# Patient Record
Sex: FEMALE | Race: WHITE | Employment: FULL TIME | URBAN - METROPOLITAN AREA
[De-identification: names, ages, dates, MRNs, and addresses within clinical notes are randomized per-mention and may not be internally consistent; named-entity substitution may affect disease eponyms.]

---

## 2017-03-06 ENCOUNTER — HOSPITAL ENCOUNTER (OUTPATIENT)
Dept: CT IMAGING | Age: 57
Discharge: HOME | End: 2017-03-06
Attending: ORTHOPAEDIC SURGERY

## 2017-03-06 DIAGNOSIS — S92.901K: ICD-10-CM

## 2017-08-10 ENCOUNTER — OFFICE VISIT (OUTPATIENT)
Dept: NEUROLOGY | Age: 57
End: 2017-08-10

## 2017-08-10 VITALS — HEIGHT: 69 IN

## 2017-08-10 DIAGNOSIS — G62.9 NEUROPATHY: Primary | ICD-10-CM

## 2017-08-10 DIAGNOSIS — G62.9 NEUROPATHY: ICD-10-CM

## 2017-08-10 RX ORDER — GABAPENTIN 300 MG/1
CAPSULE ORAL
Qty: 90 CAP | Refills: 6 | Status: SHIPPED | OUTPATIENT
Start: 2017-08-10 | End: 2018-01-16 | Stop reason: SDUPTHER

## 2017-08-10 NOTE — PROGRESS NOTES
Neurology Consult      Subjective:      Pinky Rosen is a 49 y.o. female  who comes in on first encounter with the following history. 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 Walking is a routine part of her job. Has noticed in the last year a combination of numbness in the distal legs and feet and a pain factor especially in the left foot. Tends to be a burning searing type. The sensory changes have not changed and are continuous. Perhaps slightly more noticeable at the end of her workday. Gabapentin has been issued 300 mg twice daily for the last 2 months and does seem to help. Bowel and bladder function is okay. May notice plus or minus changes about her balance but that is not an issue and strength is good. Family history is positive for dad with a neuropathy in his later years but I have no details. Patient is status post left total knee replacement and previous right knee surgery. Former smoker years ago. Drinks alcohol socially on the weekends but it is not a problem. Looking at her electronic medical records she had elevated glucose readings in 2013. Does notice on occasion she has a milder version of what she notes with sensory changes in her upper extremities. They tend to be more positional.  Currently self-motivated to do a treadmill exercise routine at home. Denies any personal knowledge of diabetes for herself or family history. Current Outpatient Prescriptions   Medication Sig Dispense Refill    gabapentin (NEURONTIN) 300 mg capsule 1 po tid prn  Indications: NEUROPATHIC PAIN 90 Cap 6    HYDROcodone-acetaminophen (NORCO) 5-325 mg per tablet Take 2 Tabs by mouth every six (6) hours as needed for Pain for 30 doses. 30 Tab 1    HYDROcodone-acetaminophen (NORCO) 5-325 mg per tablet Take 1 Tab by mouth every four (4) hours as needed.  30 Tab 1    HYDROcodone-acetaminophen (NORCO) 7.5-325 mg per tablet Take 2 Tabs by mouth every six (6) hours as needed for Pain. 30 Tab 1    PV W-O SULEMAN/FERROUS FUMARATE/FA (M-VIT PO) Take 1 Tab by mouth daily.  levothyroxine (SYNTHROID) 150 mcg tablet Take 150 mcg by mouth Daily (before breakfast).  garlic 269 mg Tab Take 577 mg by mouth daily. No Known Allergies  Past Medical History:   Diagnosis Date    Arthritis     left knee    GERD (gastroesophageal reflux disease)     Morbid obesity ([address]     Thyroid disease       Past Surgical History:   Procedure Laterality Date    CHEST SURGERY PROCEDURE UNLISTED  1/7/14    RESECTION OF REMAINING THYROID    HX BLADDER SUSPENSION      HX GYN      uterine ablation about 10 years ago no menses since then    HX HEENT      thyroidectomy    HX ORTHOPAEDIC      left knee arthroscopy    HX ORTHOPAEDIC      left knee meniscus repair    HX TONSILLECTOMY        Social History     Social History    Marital status:      Spouse name: N/A    Number of children: N/A    Years of education: N/A     Occupational History    Not on file. Social History Main Topics    Smoking status: Former Smoker     Packs/day: 0.50     Years: 25.00     Quit date: 7/1/2013    Smokeless tobacco: Never Used    Alcohol use 2.5 oz/week     5 Glasses of wine per week    Drug use: No    Sexual activity: No     Other Topics Concern    Not on file     Social History Narrative    No narrative on file      Family History   Problem Relation Age of Onset    Immunodeficiency Father      lupus      There were no vitals taken for this visit. Review of Systems:   A comprehensive review of systems was negative except for that written in the HPI. Neuro Exam:     Appearance: The patient is well developed, well nourished, provides a coherent history and is in no acute distress. Mental Status: Oriented to time, place and person. Mood and affect appropriate. Cranial Nerves:   Intact visual fields. Fundi are benign. BERTO, EOM's full, no nystagmus, no ptosis.  Facial sensation is normal. Corneal reflexes are intact. Facial movement is symmetric. Hearing is normal bilaterally. Palate is midline with normal sternocleidomastoid and trapezius muscles are normal. Tongue is midline. Motor:  5/5 strength in upper and lower proximal and distal muscles. Normal bulk and tone. No fasciculations. Reflexes:   Deep tendon reflexes 2+/4 and symmetrical.   Sensory:    Diminished distally lower extremities greater than upper to touch, pinprick and vibration. Position sense intact. Gait:  Normal gait. Romberg negative   Tremor:   No tremor noted. Cerebellar:  No cerebellar signs present. Neurovascular:  Normal heart sounds and regular rhythm, peripheral pulses intact, and no carotid bruits. Toes downgoing no clonus no Ana Cristina's. 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 Will suggest a lab work screening and EMG nerve conduction of an arm and leg. Will reissue gabapentin at 300 mg 3 times daily as needed. Encouraged her to continue her efforts with the treadmill exercise routine. Plan:   Revisit in about 2 months.   Signed by :  Catalina Du MD

## 2017-08-10 NOTE — MR AVS SNAPSHOT
Visit Information Date & Time Provider Department Dept. Phone Encounter #  
 8/10/2017  8:00 AM Mandy Esposito MD [facility] [phone] [id number] Follow-up Instructions Return in about 2 months (around 10/10/2017). Upcoming Health Maintenance Date Due Hepatitis C Screening 1960 DTaP/Tdap/Td series (1 - Tdap) 10/6/1983 PAP AKA CERVICAL CYTOLOGY 10/6/1983 BREAST CANCER SCRN MAMMOGRAM 10/6/2012 FOBT Q 1 YEAR AGE 50-75 10/6/2012 INFLUENZA AGE 9 TO ADULT 8/1/2017 Allergies as of 8/10/2017  Review Complete On: 8/10/2017 By: Mandy Esposito MD  
 No Known Allergies Current Immunizations  Never Reviewed Name Date Influenza Vaccine PF 1/8/2014 11:40 AM  
  
 Not reviewed this visit You Were Diagnosed With   
  
 Codes Comments Neuropathy ([facility]    -  Primary ICD-10-CM: G62.9 ICD-9-CM: 183. 9 Vitals Height(growth percentile) OB Status Smoking Status 5' 9\" (1.753 m) Ablation Former Smoker Your Updated Medication List  
  
   
This list is accurate as of: 8/10/17  8:20 AM.  Always use your most recent med list.  
  
  
  
  
 gabapentin 300 mg capsule Commonly known as:  NEURONTIN  
1 po tid prn  Indications: NEUROPATHIC PAIN  
  
 garlic 105 mg Tab Take 300 mg by mouth daily. * HYDROcodone-acetaminophen 5-325 mg per tablet Commonly known as:  [address] Take 2 Tabs by mouth every six (6) hours as needed for Pain for 30 doses. * HYDROcodone-acetaminophen 5-325 mg per tablet Commonly known as:  [address] Take 1 Tab by mouth every four (4) hours as needed. * HYDROcodone-acetaminophen 7.5-325 mg per tablet Commonly known as:  [address] Take 2 Tabs by mouth every six (6) hours as needed for Pain. M-VIT PO Take 1 Tab by mouth daily. SYNTHROID 150 mcg tablet Generic drug:  levothyroxine Take 150 mcg by mouth Daily (before breakfast). * Notice: This list has 3 medication(s) that are the same as other medications prescribed for you. Read the directions carefully, and ask your doctor or other care provider to review them with you. Prescriptions Printed Refills  
 gabapentin (NEURONTIN) 300 mg capsule 6 Si po tid prn  Indications: NEUROPATHIC PAIN Class: Print We Performed the Following DAV, DIRECT, W/REFLEX R723810 CPT(R)] ANCA PANEL N4012170 CPT(R)] C REACTIVE PROTEIN, QT [57975 CPT(R)] CBC WITH AUTOMATED DIFF [85118 CPT(R)] GLUCOSE TOLERANCE (3 SP BLOOD) [id number] CPT(R)] METABOLIC PANEL, COMPREHENSIVE [42745 CPT(R)] PROTEIN ELECTROPHORESIS [09024 CPT(R)] RHEUMATOID FACTOR, QL [id number] CPT(R)] SJOGREN'S ABS, SSA AND SSB [[id number] Custom] TSH 3RD GENERATION [45991 CPT(R)] VITAMIN B12 & FOLATE [47400 CPT(R)] Follow-up Instructions Return in about 2 months (around 10/10/2017). To-Do List   
 08/10/2017 Lab:  SED RATE (ES[facility])   
  
 2017 Neurology:  NCV SENSORY OR MIXED Introducing [facility]! [employer] introduces [employer] patient portal. Now you can access parts of your medical record, email your doctor's office, and request medication refills online. 1. In your internet browser, go to [email or web address] 2. Click on the First Time User? Click Here link in the Sign In box. You will see the New Member Sign Up page. 3. Enter your [employer] Access Code exactly as it appears below. You will not need to use this code after youve completed the sign-up process. If you do not sign up before the expiration date, you must request a new code. · [employer] Access Code: [email or web address] Expires: 2017  8:20 AM 
 
4. Enter the last four digits of your Social Security Number (xxxx) and Date of Birth (mm/dd/yyyy) as indicated and click Submit. You will be taken to the next sign-up page. 5. Create a [employer] ID. This will be your [employer] login ID and cannot be changed, so think of one that is secure and easy to remember. 6. Create a [employer] password. You can change your password at any time. 7. Enter your Password Reset Question and Answer. This can be used at a later time if you forget your password. 8. Enter your e-mail address. You will receive e-mail notification when new information is available in [address] 9. Click Sign Up. You can now view and download portions of your medical record. 10. Click the Download Summary menu link to download a portable copy of your medical information. If you have questions, please visit the Frequently Asked Questions section of the [employer] website. Remember, [employer] is NOT to be used for urgent needs. For medical emergencies, dial 911. Now available from your iPhone and Android! Please provide this summary of care documentation to your next provider. Your primary care clinician is listed as Leon Rahman. If you have any questions after today's visit, please call [phone].

## 2017-09-06 ENCOUNTER — OFFICE VISIT (OUTPATIENT)
Dept: NEUROLOGY | Age: 57
End: 2017-09-06

## 2017-09-06 DIAGNOSIS — G62.9 NEUROPATHY: Primary | ICD-10-CM

## 2017-09-06 NOTE — PROGRESS NOTES
Patient underwent elective EMG nerve conduction for concerns of an expressed length dependent sensory neuropathy by history. One years worth of a combination of numbness in both lower extremities and superimposed burning searing pain especially left leg and foot. 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 Had an isolated elevated random blood sugar some years ago. Diabetes runs with cousins on the maternal side. Blood work pending for peripheral neuropathy screening. Is a remote smoker and no alcohol consumption. No history of falls. Bowel and bladder function is maintained. Balance and strength maintained. Patient's exam on first encounter showed normal strength length dependent sensory depression to touch temperature vibratory in the lower extremities. Reflexes +2 and toes downgoing no clonus no Menchaca's. Transfers and gait normal.    Findings:    1. The EMG interrogation of both lower extremities was normal failing to define a confluent pattern of acute denervation chronic denervation/reinnervation or myopathic potentials. Patient tolerated well. 2.  Nerve conduction portion revealed a mild delay in the right distal median sensory at the wrist.  No response for the right and left superior fibular sensory nerves. No response for the right sural sensory nerve. Depressed amplitudes for the right fibular motor and right tubular motor. Impression: This information suggests a manifest sensorimotor neuropathy most clearly defined for the lower extremities and likely primary axonal and secondary demyelinative by features. Clinical correlation is advised.   [address].

## 2017-09-06 NOTE — PROGRESS NOTES
EMG/ NCS Report  [facility], [address]  Ph: [phone]/ [phone]  FAX: [phone]/ [phone]  Test Date:  2017    Patient: Dorina Goodell : 1960 Physician: Eduar Mendoza MD   Sex: Female Height: ' \" Ref Phys: Charlotte Boyer IV, MD   ID#: [id number] Weight:  lbs. Technician: Sherren Moots     Patient History / Exam:  CC:NUMBNESS,TINGLING ARM/LEG,SYMPTOMS X 18 MOS. EMG & NCV Findings:  Evaluation of the right Fibular motor nerve showed normal distal onset latency (2.7 ms), reduced amplitude (1.3 mV), normal conduction velocity (B Fib-Ankle, 43 m/s), normal conduction velocity (Poplt-B Fib, 77 m/s), normal distal onset latency (3.0 ms), reduced amplitude (0.9 mV), normal conduction velocity (B Fib-Ankle, 45 m/s), and normal conduction velocity (Poplt-B Fib, 71 m/s). The right median motor nerve showed normal distal onset latency (4.5 ms), normal amplitude (8.3 mV), and normal conduction velocity (Elbow-Wrist, 51 m/s). The right tibial motor nerve showed normal distal onset latency (3.8 ms), reduced amplitude (2.2 mV), and normal conduction velocity (Knee-Ankle, 47 m/s). The right ulnar motor nerve showed normal distal onset latency (2.7 ms), normal amplitude (8.8 mV), normal conduction velocity (B Elbow-Wrist, 64 m/s), and normal conduction velocity (A Elbow-B Elbow, 71 m/s). The right median sensory nerve showed prolonged distal peak latency (4.8 ms) and normal amplitude (21.1 µV). The right radial sensory and the right ulnar sensory nerves showed normal distal peak latency (R2.0, R2.8 ms) and normal amplitude (R32.0, R33.0 µV). The left Sup Fibular sensory nerve showed no response (Lower leg) and no response (Site 2). The right Sup Fibular sensory nerve showed no response (Lower leg). The left sural sensory nerve showed normal distal peak latency (3.5 ms) and reduced amplitude (2.3 µV).   The right sural sensory nerve showed no response (Calf). All F Wave latencies were within normal limits. All examined muscles (as indicated in the following table) showed no evidence of electrical instability.         Impression:        ___________________________  General Jeans, IV, MD      Nerve Conduction Studies  Anti Sensory Summary Table     Stim Site NR Peak (ms) Norm Peak (ms) P-T Amp (µV) Norm P-T Amp Site1 Site2 Dist (cm)   Right Median Anti Sensory (2nd Digit)  31.7°C   Wrist    4.8 <4 21.1 >13 Wrist 2nd Digit 14.0   Elbow    4.8  23.9  Elbow Wrist 0.0   Axilla    4.8  21.2  Axilla Elbow 0.0   Right Radial Anti Sensory (Base 1st Digit)  31.9°C   Wrist    2.0 <2.8 32.0 >11 Wrist Base 1st Digit 10.0   Site 2    2.0  31.0       Left Sup Fibular Anti Sensory (Lat ankle)  33.7°C   Lower leg NR  <4.5  >5 Lower leg Lat ankle 10.0   Site 2 NR          Right Sup Fibular Anti Sensory (Lat ankle)  32.8°C   Lower leg NR  <4.5  >5 Lower leg Lat ankle 10.0   Left Sural Anti Sensory (Lat Mall)  33.6°C   Calf    3.5 <4.5 2.3 >4.0 Calf Lat Mall 14.0   Site 2    3.3  3.3       Site 3    3.2  2.8           5.1  21.0       Right Sural Anti Sensory (Lat Mall)  33.1°C   Calf NR  <4.5  >4.0 Calf Lat Mall 14.0   Right Ulnar Anti Sensory (5th Digit)  33.6°C   Wrist    2.8 <4.0 33.0 >9 Wrist 5th Digit 14.0   B Elbow    2.9  40.8  B Elbow Wrist 0.0     Motor Summary Table     Stim Site NR Onset (ms) Norm Onset (ms) O-P Amp (mV) Norm O-P Amp Amp (Prev) (%) Site1 Site2 Dist (cm) Rufino (m/s) Norm Rufino (m/s)   Right Fibular Motor Run #1 (Ext Dig Brev)  33.5°C   Ankle    2.7 <6.5 1.3 >2.6 100.0 Ankle Ext Dig Brev 8.0     B Fib    10.3  1.1  84.6 B Fib Ankle 32.5 43 >38   Poplt    11.6  1.2  109.1 Poplt B Fib 10.0 77 >42   Right Fibular Motor Run #2 (Ext Dig Brev)  34°C   Ankle    3.0 <6.5 0.9 >2.6 100.0 Ankle Ext Dig Brev 8.0     B Fib    10.5  0.9  100.0 B Fib Ankle 34.0 45 >38   Poplt    11.9  0.9  100.0 Poplt B Fib 10.0 71 >42   Right Median Motor (Abd Poll Brev) 32.2°C   Wrist    4.5 <4.5 8.3 >4.1 100.0 Wrist Abd Poll Brev 8.0     Elbow    8.4  7.8  94.0 Elbow Wrist 20.0 51 >49   Right Tibial Motor (Abd Rodney Brev)  32.4°C   Ankle    3.8 <6.1 2.2 >5.3 100.0 Ankle Abd Rodney Brev 8.0     Knee    12.7  1.2  54.5 Knee Ankle 42.0 47 >39   Right Ulnar Motor (Abd Dig Minimi)  32.1°C   Wrist    2.7 <3.1 8.8 >7.0 100.0 Wrist Abd Dig Minimi 8.0  >50   B Elbow    6.0  8.3  94.3 B Elbow Wrist 21.0 64 >50   A Elbow    7.4  7.8  94.0 A Elbow B Elbow 10.0 71 >50     F Wave Studies     NR F-Lat (ms) Lat Norm (ms) L-R F-Lat (ms) L-R Lat Norm   Right Tibial (Mrkrs) (Abd Hallucis)  33.7°C      53.44 <56  <5.7   Right Ulnar (Mrkrs) (Abd Dig Min)  32°C      27.57 <32  <2.5     H Reflex Studies     NR H-Lat (ms) L-R H-Lat (ms) L-R Lat Norm   Right Tibial (Gastroc)  32.5°C      35.20  <2.0     EMG     Side Muscle Nerve Root Ins Act Fibs Psw Recrt Duration Amp Poly Comment   Right Ext Dig Brev Dp Br Peron L5, S1 Nml Nml Nml Nml Nml Nml Nml    Right AntTibialis Dp Br Peron L4-5 Nml Nml Nml Nml Nml Nml Nml    Right MedGastroc Tibial S1-2 Nml Nml Nml Nml Nml Nml Nml    Right VastusMed Femoral L2-4 Nml Nml Nml Nml Nml Nml Nml    Right BicepsFemL Sciatic L5-S2 Nml Nml Nml Nml Nml Nml Nml    Left Ext Dig Brev Dp Br Peron L5, S1 Nml Nml Nml Nml Nml Nml Nml    Left AntTibialis Dp Br Peron L4-5 Nml Nml Nml Nml Nml Nml Nml    Left MedGastroc Tibial S1-2 Nml Nml Nml Nml Nml Nml Nml    Left VastusMed Femoral L2-4 Nml Nml Nml Nml Nml Nml Nml    Left BicepsFemL Sciatic L5-S2 Nml Nml Nml Nml Nml Nml Nml                Nerve Conduction Studies  Anti Sensory Left/Right Comparison     Stim Site L Lat (ms) R Lat (ms) L-R Lat (ms) L Amp (µV) R Amp (µV) L-R Amp (%) Site1 Site2 L Rufino (m/s) R Rufnio (m/s) L-R Rufino (m/s)   Median Anti Sensory (2nd Digit)  31.7°C   Wrist  4.0   21.1  Wrist 2nd Digit  35    Elbow  4.1   23.9  Elbow Wrist      Axilla  4.0   21.2  Axilla Elbow      Radial Anti Sensory (Base 1st Digit)  31.9°C Wrist  1.3   32.0  Wrist Base 1st Digit  77    Site 2  0.9   31.0         Sup Fibular Anti Sensory (Lat ankle)  33.7°C   Lower leg       Lower leg Lat ankle      Site 2              Sural Anti Sensory (Lat Mall)  33.6°C   Calf 2.6   2.3   Calf Lat Mall 54     Site 2 2.7   3.3          Site 3 2.7   2.8           0.9   21.0          Ulnar Anti Sensory (5th Digit)  33.6°C   Wrist  1.8   33.0  Wrist 5th Digit  78    B Elbow  2.1   40.8  B Elbow Wrist        Motor Left/Right Comparison     Stim Site L Lat (ms) R Lat (ms) L-R Lat (ms) L Amp (mV) R Amp (mV) L-R Amp (%) Site1 Site2 L Rufino (m/s) R Rufino (m/s) L-R Rufino (m/s)   Fibular Motor Run #2 (Ext Dig Brev)  34°C   Ankle  3.0   0.9  Ankle Ext Dig Brev      B Fib  10.5   0.9  B Fib Ankle  45    Poplt  11.9   0.9  Poplt B Fib  71    Median Motor (Abd Poll Brev)  32.2°C   Wrist  4.5   8.3  Wrist Abd Poll Brev      Elbow  8.4   7.8  Elbow Wrist  51    Tibial Motor (Abd Rodney Brev)  32.4°C   Ankle  3.8   2.2  Ankle Abd Rodney Brev      Knee  12.7   1.2  Knee Ankle  47    Ulnar Motor (Abd Dig Minimi)  32.1°C   Wrist  2.7   8.8  Wrist Abd Dig Minimi      B Elbow  6.0   8.3  B Elbow Wrist  64    A Elbow  7.4   7.8  A Elbow B Elbow  71          Waveforms:

## 2017-10-12 ENCOUNTER — TELEPHONE (OUTPATIENT)
Dept: NEUROLOGY | Age: 57
End: 2017-10-12

## 2017-10-12 DIAGNOSIS — G62.9 NEUROPATHY: Primary | ICD-10-CM

## 2017-10-12 NOTE — TELEPHONE ENCOUNTER
----- Message from Bryan Simmonds sent at 10/12/2017  1:18 PM EDT -----  Regarding: Dr. Shruti Navarrete  5th request, pt was very upset that she has not been able to get her labs done for a month now because the lab order is not specific for [facility] to complete the draw. She is asking for it to be corrected and a call to her with details if she misses the call. Her number is [phone].

## 2018-01-16 ENCOUNTER — TELEPHONE (OUTPATIENT)
Dept: NEUROLOGY | Age: 58
End: 2018-01-16

## 2018-01-16 RX ORDER — GABAPENTIN 300 MG/1
CAPSULE ORAL
Qty: 90 CAP | Refills: 3 | Status: SHIPPED | OUTPATIENT
Start: 2018-01-16 | End: 2018-01-18 | Stop reason: SDUPTHER

## 2018-01-16 NOTE — TELEPHONE ENCOUNTER
----- Message from Hebert Barthel sent at 1/16/2018  1:52 PM EST -----  Regarding: Dr. Juno Martinez  Pt requested a refill on her Rx(Gabepentin) called to [address] in [place] on [address]. Best contact number [phone].

## 2018-01-23 LAB
ALBUMIN SERPL ELPH-MCNC: 3.5 G/DL (ref 2.9–4.4)
ALBUMIN SERPL-MCNC: 4.1 G/DL (ref 3.5–5.5)
ALBUMIN/GLOB SERPL: 1.2 {RATIO} (ref 0.7–1.7)
ALBUMIN/GLOB SERPL: 1.7 {RATIO} (ref 1.2–2.2)
ALP SERPL-CCNC: 92 IU/L (ref 39–117)
ALPHA1 GLOB SERPL ELPH-MCNC: 0.3 G/DL (ref 0–0.4)
ALPHA2 GLOB SERPL ELPH-MCNC: 0.8 G/DL (ref 0.4–1)
ALT SERPL-CCNC: 21 IU/L (ref 0–32)
ANA SER QL: NEGATIVE
AST SERPL-CCNC: 16 IU/L (ref 0–40)
B-GLOBULIN SERPL ELPH-MCNC: 1.2 G/DL (ref 0.7–1.3)
BASOPHILS # BLD AUTO: 0.1 X10E3/UL (ref 0–0.2)
BASOPHILS NFR BLD AUTO: 1 %
BILIRUB SERPL-MCNC: 0.5 MG/DL (ref 0–1.2)
BUN SERPL-MCNC: 14 MG/DL (ref 6–24)
BUN/CREAT SERPL: 27 (ref 9–23)
C-ANCA TITR SER IF: NORMAL TITER
CALCIUM SERPL-MCNC: 9.1 MG/DL (ref 8.7–10.2)
CHLORIDE SERPL-SCNC: 106 MMOL/L (ref 96–106)
CO2 SERPL-SCNC: 24 MMOL/L (ref 18–29)
CREAT SERPL-MCNC: 0.51 MG/DL (ref 0.57–1)
CRP SERPL-MCNC: 6.9 MG/L (ref 0–4.9)
ENA SS-A AB SER-ACNC: <0.2 AI (ref 0–0.9)
ENA SS-B AB SER-ACNC: <0.2 AI (ref 0–0.9)
EOSINOPHIL # BLD AUTO: 0.4 X10E3/UL (ref 0–0.4)
EOSINOPHIL NFR BLD AUTO: 6 %
ERYTHROCYTE [DISTWIDTH] IN BLOOD BY AUTOMATED COUNT: 13.6 % (ref 12.3–15.4)
ERYTHROCYTE [SEDIMENTATION RATE] IN BLOOD BY WESTERGREN METHOD: 9 MM/HR (ref 0–40)
FOLATE SERPL-MCNC: 9.6 NG/ML
GAMMA GLOB SERPL ELPH-MCNC: 0.8 G/DL (ref 0.4–1.8)
GLOBULIN SER CALC-MCNC: 2.4 G/DL (ref 1.5–4.5)
GLOBULIN SER CALC-MCNC: 3 G/DL (ref 2.2–3.9)
GLUCOSE 1.5H P 75 G GLC PO SERPL-MCNC: NORMAL MG/DL
GLUCOSE 1H P 75 G GLC PO SERPL-MCNC: 167 MG/DL (ref 65–199)
GLUCOSE 2H P 75 G GLC PO SERPL-MCNC: 93 MG/DL (ref 65–139)
GLUCOSE 30M P 75 G GLC PO SERPL-MCNC: NORMAL MG/DL
GLUCOSE 3H P 75 G GLC PO SERPL-MCNC: NORMAL MG/DL
GLUCOSE 4H P 75 G GLC PO SERPL-MCNC: NORMAL MG/DL
GLUCOSE 5H P 75 G GLC PO SERPL-MCNC: NORMAL MG/DL
GLUCOSE 6H P 75 G GLC PO SERPL-MCNC: NORMAL MG/DL
GLUCOSE P FAST SERPL-MCNC: 89 MG/DL (ref 65–99)
GLUCOSE SERPL-MCNC: 98 MG/DL (ref 65–99)
HCT VFR BLD AUTO: 41.4 % (ref 34–46.6)
HGB BLD-MCNC: 13.9 G/DL (ref 11.1–15.9)
IMM GRANULOCYTES # BLD: 0 X10E3/UL (ref 0–0.1)
IMM GRANULOCYTES NFR BLD: 0 %
LYMPHOCYTES # BLD AUTO: 2.4 X10E3/UL (ref 0.7–3.1)
LYMPHOCYTES NFR BLD AUTO: 36 %
M PROTEIN SERPL ELPH-MCNC: NORMAL G/DL
MCH RBC QN AUTO: 30.5 PG (ref 26.6–33)
MCHC RBC AUTO-ENTMCNC: 33.6 G/DL (ref 31.5–35.7)
MCV RBC AUTO: 91 FL (ref 79–97)
MONOCYTES # BLD AUTO: 0.5 X10E3/UL (ref 0.1–0.9)
MONOCYTES NFR BLD AUTO: 8 %
MYELOPEROXIDASE AB SER IA-ACNC: <9 U/ML (ref 0–9)
NEUTROPHILS # BLD AUTO: 3.2 X10E3/UL (ref 1.4–7)
NEUTROPHILS NFR BLD AUTO: 49 %
P-ANCA ATYPICAL TITR SER IF: NORMAL TITER
P-ANCA TITR SER IF: NORMAL TITER
PLATELET # BLD AUTO: 315 X10E3/UL (ref 150–379)
PLEASE NOTE, 011150: NORMAL
POTASSIUM SERPL-SCNC: 4.5 MMOL/L (ref 3.5–5.2)
PROT SERPL-MCNC: 6.5 G/DL (ref 6–8.5)
PROTEINASE3 AB SER IA-ACNC: <3.5 U/ML (ref 0–3.5)
RBC # BLD AUTO: 4.56 X10E6/UL (ref 3.77–5.28)
RHEUMATOID FACT SERPL-ACNC: <10 IU/ML (ref 0–13.9)
SODIUM SERPL-SCNC: 145 MMOL/L (ref 134–144)
TSH SERPL DL<=0.005 MIU/L-ACNC: 0.01 UIU/ML (ref 0.45–4.5)
VIT B12 SERPL-MCNC: 650 PG/ML (ref 232–1245)
WBC # BLD AUTO: 6.6 X10E3/UL (ref 3.4–10.8)

## 2018-01-30 ENCOUNTER — TELEPHONE (OUTPATIENT)
Dept: NEUROLOGY | Age: 58
End: 2018-01-30

## 2018-01-30 NOTE — TELEPHONE ENCOUNTER
----- Message from Brie Granados sent at 1/30/2018  2:32 PM EST -----  Regarding: Dr. Lozano Can  Pt stated that she would like for a call back from the practice in regards to seeing if her meds can be refilled prior to her appt on 3/1/18. Her bets contact number is [phone].

## 2018-02-01 RX ORDER — GABAPENTIN 300 MG/1
CAPSULE ORAL
Qty: 90 CAP | Refills: 0 | Status: SHIPPED | OUTPATIENT
Start: 2018-02-01 | End: 2018-03-01 | Stop reason: SDUPTHER

## 2018-03-01 ENCOUNTER — OFFICE VISIT (OUTPATIENT)
Dept: NEUROLOGY | Age: 58
End: 2018-03-01

## 2018-03-01 VITALS
DIASTOLIC BLOOD PRESSURE: 75 MMHG | BODY MASS INDEX: 40.29 KG/M2 | HEART RATE: 76 BPM | WEIGHT: 272 LBS | OXYGEN SATURATION: 97 % | TEMPERATURE: 98.5 F | HEIGHT: 69 IN | SYSTOLIC BLOOD PRESSURE: 141 MMHG | RESPIRATION RATE: 18 BRPM

## 2018-03-01 DIAGNOSIS — G62.9 NEUROPATHY: Primary | ICD-10-CM

## 2018-03-01 PROBLEM — E66.01 OBESITY, MORBID (HCC): Status: ACTIVE | Noted: 2018-03-01

## 2018-03-01 RX ORDER — GABAPENTIN 300 MG/1
CAPSULE ORAL
Qty: 90 CAP | Refills: 6 | Status: SHIPPED | OUTPATIENT
Start: 2018-03-01 | End: 2018-11-20 | Stop reason: SDUPTHER

## 2018-03-01 NOTE — PROGRESS NOTES
Neurology Consult      Subjective:      Clint Owen is a 56 y.o. female Who comes in with previous neuropathy concerns by history and exam.  EMG and nerve conduction in September showed normal EMG component but nerve conduction showed collectively and mild delay in the right median sensory at the wrist and we went over the repercussions there. Does admit to doing some very repetitive tasks with her right hand and is welcome to try a carpal tunnel splint. Use it at night and take off during the day. Was also taught on the anatomy of the carpal tunnel and what sort of posturing and repetitive action comes to bear on the nerve trauma. The nerve conduction part also showed unobtainable right and left fibular sensory nerve responses as well as right sural sensory. Had a diminished amplitude of the right fibular and tibia motor responses. Subjectively still notices the altered sensibility in her extremities lower greater than upper. Lab work was normal except for a slightly elevated CRP and a diminished TSH. Is already on thyroid supplement. Current Outpatient Prescriptions   Medication Sig Dispense Refill    ASCORBIC ACID/MULTIVIT-MIN (EMERGEN-C PO) Take  by mouth.  gabapentin (NEURONTIN) 300 mg capsule TAKE 1 CAPSULE THREE TIMES A DAY AS NEEDED  Indications: NEUROPATHIC PAIN 90 Cap 6    PV W-O SULEMAN/FERROUS FUMARATE/FA (M-VIT PO) Take 1 Tab by mouth daily.  levothyroxine (SYNTHROID) 150 mcg tablet Take 150 mcg by mouth Daily (before breakfast).  HYDROcodone-acetaminophen (NORCO) 5-325 mg per tablet Take 2 Tabs by mouth every six (6) hours as needed for Pain for 30 doses. 30 Tab 1    HYDROcodone-acetaminophen (NORCO) 5-325 mg per tablet Take 1 Tab by mouth every four (4) hours as needed. 30 Tab 1    HYDROcodone-acetaminophen (NORCO) 7.5-325 mg per tablet Take 2 Tabs by mouth every six (6) hours as needed for Pain. 30 Tab 1    garlic 981 mg Tab Take 698 mg by mouth daily.         No Known Allergies  Past Medical History:   Diagnosis Date    Arthritis     left knee    GERD (gastroesophageal reflux disease)     Morbid obesity ([address]     Thyroid disease       Past Surgical History:   Procedure Laterality Date    CHEST SURGERY PROCEDURE UNLISTED  1/7/14    RESECTION OF REMAINING THYROID    HX BLADDER SUSPENSION      HX GYN      uterine ablation about 10 years ago no menses since then    HX HEENT      thyroidectomy    HX ORTHOPAEDIC      left knee arthroscopy    HX ORTHOPAEDIC      left knee meniscus repair    HX TONSILLECTOMY        Social History     Social History    Marital status:      Spouse name: N/A    Number of children: N/A    Years of education: N/A     Occupational History    Not on file. Social History Main Topics    Smoking status: Former Smoker     Packs/day: 0.50     Years: 25.00     Quit date: 7/1/2013    Smokeless tobacco: Never Used    Alcohol use 2.5 oz/week     5 Glasses of wine per week    Drug use: No    Sexual activity: No     Other Topics Concern    Not on file     Social History Narrative      Family History   Problem Relation Age of Onset    Immunodeficiency Father      lupus      Visit Vitals    /75    Pulse 76    Temp 98.5 °F (36.9 °C) (Oral)    Resp 18    Ht 5' 9\" (1.753 m)    Wt 123.4 kg (272 lb)    SpO2 97%    BMI 40.17 kg/m2        Review of Systems:   A comprehensive review of systems was negative except for that written in the HPI. Neuro Exam:     Appearance: The patient is well developed, well nourished, provides a coherent history and is in no acute distress. Mental Status: Oriented to time, place and person. Mood and affect appropriate. Cranial Nerves:   Intact visual fields. Fundi are benign. BERTO, EOM's full, no nystagmus, no ptosis. Facial sensation is normal. Corneal reflexes are intact. Facial movement is symmetric. Hearing is normal bilaterally.  Palate is midline with normal sternocleidomastoid and trapezius muscles are normal. Tongue is midline. Motor:  5/5 strength in upper and lower proximal and distal muscles. Normal bulk and tone. No fasciculations. Reflexes:   Deep tendon reflexes trace to 0/4 and symmetrical.   Sensory:    Diminished distally to touch, pinprick and vibration. Gait:  Normal gait. Tremor:   No tremor noted. Cerebellar:  No cerebellar signs present. Neurovascular:  Normal heart sounds and regular rhythm, peripheral pulses intact, and no carotid bruits. Assessment:   Sensorimotor neuropathy. Cannot confidently label this neuropathy as to diagnostic possibilities. Would like to share with [facility] and get an academic opinion. This would go not only potentially to diagnosis but even better management depending on evaluation. Will renew gabapentin by request.  Only turned up a slight depression in the TSH and a mild elevation in CRP with labs. Could refer to rheumatology regarding the CRP if she changes her mind but would make no absolute promises as to where that would go and may be just an isolated abnormal lab without a clinical reference. The patient is welcome to try a right hand splint for mild carpal tunnel and went over the education process to the anatomy of the joint and posturing and repetitive activity. Please see progress notes. Plan:   Revisit 3 months.   Signed by :  Navid Naqvi MD

## 2018-03-01 NOTE — MR AVS SNAPSHOT
[address] [address] [phone] Patient: Jazmin Ascencio MRN: [id number] :1960 Visit Information Date & Time Provider Department Dept. Phone Encounter #  
 3/1/2018  8:40 AM Von Nj MD [facility] [phone] [id number] Follow-up Instructions Return in about 3 months (around 2018). Your Appointments 2018  7:40 AM  
Follow Up with Von Nj MD  
[address] [place] [address] [facility]rology Clinic [facility]: follow up neuropathy  $CP  richard  3/1/18  
 [address] [facility] [phone] [facility] [id number]  
[facility] [phone] [facility] [address] Upcoming Health Maintenance Date Due Hepatitis C Screening 1960 DTaP/Tdap/Td series (1 - Tdap) 10/6/1983 PAP AKA CERVICAL CYTOLOGY 10/6/1983 BREAST CANCER SCRN MAMMOGRAM 10/6/2012 FOBT Q 1 YEAR AGE 50-75 10/6/2012 Influenza Age 5 to Adult 2017 Allergies as of 3/1/2018  Review Complete On: 3/1/2018 By: Von Nj MD  
 No Known Allergies Current Immunizations  Never Reviewed Name Date Influenza Vaccine PF 2014 11:40 AM  
  
 Not reviewed this visit You Were Diagnosed With   
  
 Codes Comments Neuropathy    -  Primary ICD-10-CM: G62.9 ICD-9-CM: 682. 9 Vitals BP Pulse Temp Resp Height(growth percentile) Weight(growth percentile) 141/75 76 98.5 °F (36.9 °C) (Oral) 18 5' 9\" (1.753 m) 272 lb (123.4 kg) SpO2 BMI OB Status Smoking Status 97% 40.17 kg/m2 Ablation Former Smoker Vitals History BMI and BSA Data Body Mass Index Body Surface Area  
 40.17 kg/m 2 2.45 m 2 Preferred Pharmacy Pharmacy Name Phone [id number], [address] [place] [address] [phone] Your Updated Medication List  
  
   
 This list is accurate as of 3/1/18 10:05 AM.  Always use your most recent med list.  
  
  
  
  
 EMERGEN-C PO Take  by mouth.  
  
 gabapentin 300 mg capsule Commonly known as:  NEURONTIN  
TAKE 1 CAPSULE THREE TIMES A DAY AS NEEDED  Indications: NEUROPATHIC PAIN  
  
 garlic 772 mg Tab Take 300 mg by mouth daily. * HYDROcodone-acetaminophen 5-325 mg per tablet Commonly known as:  Carmina Dimes Take 2 Tabs by mouth every six (6) hours as needed for Pain for 30 doses. * HYDROcodone-acetaminophen 5-325 mg per tablet Commonly known as:  Carmina Dimes Take 1 Tab by mouth every four (4) hours as needed. * HYDROcodone-acetaminophen 7.5-325 mg per tablet Commonly known as:  Carmina Dimes Take 2 Tabs by mouth every six (6) hours as needed for Pain. M-VIT PO Take 1 Tab by mouth daily. SYNTHROID 150 mcg tablet Generic drug:  levothyroxine Take 150 mcg by mouth Daily (before breakfast). * Notice: This list has 3 medication(s) that are the same as other medications prescribed for you. Read the directions carefully, and ask your doctor or other care provider to review them with you. Prescriptions Sent to Pharmacy Refills  
 gabapentin (NEURONTIN) 300 mg capsule 6 Sig: TAKE 1 CAPSULE THREE TIMES A DAY AS NEEDED  Indications: NEUROPATHIC PAIN Class: Normal  
 Pharmacy: [address] [place] [address], [address] [place] [address] #: [phone] We Performed the Following REFERRAL TO NEUROLOGY [[id number] Custom] Comments:  
 Please evaluate patient for further evaluation and guidance on peripheral neuropathy and diagnosis etc.. [facility] neuromuscular dept. .. Follow-up Instructions Return in about 3 months (around 6/1/2018). Referral Information Referral ID Referred By Referred To  
  
 [id number] Carole Matthew Not Available Visits Status Start Date End Date 1 New Request 3/1/18 3/1/19 If your referral has a status of pending review or denied, additional information will be sent to support the outcome of this decision. Patient Instructions Patient history reviewed and patient examined. Cannot give a precise diagnosis to patient's neuropathy features and will share with [facility] neurology as an academic referral.  Will renew gabapentin and see her back in 3 months. Went over what appears to be a mild right carpal tunnel feature and the anatomy and the nature of irritation to the nerve and a carpal tunnel splint may help at night and take it off during the day. Understands the posturing and repetitive injury to the nerve at the wrist etc. 
 
 
  
Introducing [facility]! Marino Tom introduces [employer] patient portal. Now you can access parts of your medical record, email your doctor's office, and request medication refills online. 1. In your internet browser, go to [email or web address] 2. Click on the First Time User? Click Here link in the Sign In box. You will see the New Member Sign Up page. 3. Enter your [employer] Access Code exactly as it appears below. You will not need to use this code after youve completed the sign-up process. If you do not sign up before the expiration date, you must request a new code. · [employer] Access Code: [email or web address] Expires: 5/30/2018  9:59 AM 
 
4. Enter the last four digits of your Social Security Number (xxxx) and Date of Birth (mm/dd/yyyy) as indicated and click Submit. You will be taken to the next sign-up page. 5. Create a 3VRt ID. This will be your [employer] login ID and cannot be changed, so think of one that is secure and easy to remember. 6. Create a [employer] password. You can change your password at any time. 7. Enter your Password Reset Question and Answer. This can be used at a later time if you forget your password. 8. Enter your e-mail address.  You will receive e-mail notification when new information is available in [employer]. 9. Click Sign Up. You can now view and download portions of your medical record. 10. Click the Download Summary menu link to download a portable copy of your medical information. If you have questions, please visit the Frequently Asked Questions section of the [employer] website. Remember, [employer] is NOT to be used for urgent needs. For medical emergencies, dial 911. Now available from your iPhone and Android! Please provide this summary of care documentation to your next provider. Your primary care clinician is listed as Radha So. If you have any questions after today's visit, please call [phone].

## 2018-03-01 NOTE — PATIENT INSTRUCTIONS
Patient history reviewed and patient examined. Cannot give a precise diagnosis to patient's neuropathy features and will share with [facility] neurology as an academic referral.  Will renew gabapentin and see her back in 3 months. Went over what appears to be a mild right carpal tunnel feature and the anatomy and the nature of irritation to the nerve and a carpal tunnel splint may help at night and take it off during the day.   Understands the posturing and repetitive injury to the nerve at the wrist etc.

## 2018-03-07 ENCOUNTER — TELEPHONE (OUTPATIENT)
Dept: NEUROLOGY | Age: 58
End: 2018-03-07

## 2018-03-07 NOTE — TELEPHONE ENCOUNTER
Appointment made for Dr. Ranjana Garcia on 6/23/18 at [place] [address].    [address], [facility] 7

## 2018-06-22 ENCOUNTER — OP HISTORICAL/CONVERTED ENCOUNTER (OUTPATIENT)
Dept: OTHER | Age: 58
End: 2018-06-22

## 2019-09-08 ENCOUNTER — IP HISTORICAL/CONVERTED ENCOUNTER (OUTPATIENT)
Dept: OTHER | Age: 59
End: 2019-09-08

## 2019-09-15 ENCOUNTER — IP HISTORICAL/CONVERTED ENCOUNTER (OUTPATIENT)
Dept: OTHER | Age: 59
End: 2019-09-15

## 2019-09-25 ENCOUNTER — OP HISTORICAL/CONVERTED ENCOUNTER (OUTPATIENT)
Dept: OTHER | Age: 59
End: 2019-09-25

## 2019-09-30 ENCOUNTER — OP HISTORICAL/CONVERTED ENCOUNTER (OUTPATIENT)
Dept: OTHER | Age: 59
End: 2019-09-30

## 2019-09-30 ENCOUNTER — ED HISTORICAL/CONVERTED ENCOUNTER (OUTPATIENT)
Dept: OTHER | Age: 59
End: 2019-09-30

## 2019-10-04 ENCOUNTER — IP HISTORICAL/CONVERTED ENCOUNTER (OUTPATIENT)
Dept: OTHER | Age: 59
End: 2019-10-04

## 2019-10-23 ENCOUNTER — OP HISTORICAL/CONVERTED ENCOUNTER (OUTPATIENT)
Dept: OTHER | Age: 59
End: 2019-10-23

## 2019-11-14 ENCOUNTER — OP HISTORICAL/CONVERTED ENCOUNTER (OUTPATIENT)
Dept: OTHER | Age: 59
End: 2019-11-14

## 2019-12-04 ENCOUNTER — OP HISTORICAL/CONVERTED ENCOUNTER (OUTPATIENT)
Dept: OTHER | Age: 59
End: 2019-12-04

## 2020-07-30 DIAGNOSIS — E78.5 HYPERLIPIDEMIA, UNSPECIFIED HYPERLIPIDEMIA TYPE: ICD-10-CM

## 2020-07-30 DIAGNOSIS — G62.9 NEUROPATHY: Primary | ICD-10-CM

## 2020-07-30 DIAGNOSIS — G62.9 NEUROPATHY: ICD-10-CM

## 2020-07-30 RX ORDER — ATORVASTATIN CALCIUM 20 MG/1
20 TABLET, FILM COATED ORAL DAILY
Qty: 90 TAB | Refills: 1 | Status: SHIPPED | OUTPATIENT
Start: 2020-07-30

## 2020-07-30 RX ORDER — ATORVASTATIN CALCIUM 20 MG/1
20 TABLET, FILM COATED ORAL DAILY
Qty: 90 TAB | Refills: 1 | Status: SHIPPED | OUTPATIENT
Start: 2020-07-30 | End: 2020-07-30 | Stop reason: SDUPTHER

## 2020-07-30 RX ORDER — GABAPENTIN 300 MG/1
300 CAPSULE ORAL 3 TIMES DAILY
Qty: 90 CAP | Refills: 5 | Status: SHIPPED | OUTPATIENT
Start: 2020-07-30 | End: 2021-10-13

## 2020-07-30 RX ORDER — GABAPENTIN 300 MG/1
300 CAPSULE ORAL 3 TIMES DAILY
Qty: 90 CAP | Refills: 5 | Status: SHIPPED | OUTPATIENT
Start: 2020-07-30 | End: 2020-07-30 | Stop reason: SDUPTHER

## 2021-02-24 RX ORDER — LEVOTHYROXINE SODIUM 125 UG/1
TABLET ORAL
Qty: 90 TAB | Refills: 1 | Status: SHIPPED | OUTPATIENT
Start: 2021-02-24

## 2021-10-13 ENCOUNTER — APPOINTMENT (OUTPATIENT)
Dept: GENERAL RADIOLOGY | Age: 61
End: 2021-10-13
Attending: EMERGENCY MEDICINE
Payer: COMMERCIAL

## 2021-10-13 ENCOUNTER — HOSPITAL ENCOUNTER (EMERGENCY)
Age: 61
Discharge: HOME | End: 2021-10-13
Attending: EMERGENCY MEDICINE
Payer: COMMERCIAL

## 2021-10-13 ENCOUNTER — APPOINTMENT (OUTPATIENT)
Dept: CT IMAGING | Age: 61
End: 2021-10-13
Attending: EMERGENCY MEDICINE
Payer: COMMERCIAL

## 2021-10-13 VITALS
TEMPERATURE: 98.2 F | HEART RATE: 90 BPM | RESPIRATION RATE: 18 BRPM | BODY MASS INDEX: 41.47 KG/M2 | OXYGEN SATURATION: 99 % | DIASTOLIC BLOOD PRESSURE: 63 MMHG | SYSTOLIC BLOOD PRESSURE: 157 MMHG | HEIGHT: 69 IN | WEIGHT: 280 LBS

## 2021-10-13 DIAGNOSIS — M17.11 PRIMARY OSTEOARTHRITIS OF RIGHT KNEE: Primary | ICD-10-CM

## 2021-10-13 PROCEDURE — 73700 CT LOWER EXTREMITY W/O DYE: CPT

## 2021-10-13 PROCEDURE — 96372 THER/PROPH/DIAG INJ SC/IM: CPT

## 2021-10-13 PROCEDURE — 73562 X-RAY EXAM OF KNEE 3: CPT

## 2021-10-13 PROCEDURE — 99283 EMERGENCY DEPT VISIT LOW MDM: CPT

## 2021-10-13 PROCEDURE — 74011250636 HC RX REV CODE- 250/636: Performed by: EMERGENCY MEDICINE

## 2021-10-13 RX ORDER — KETOROLAC TROMETHAMINE 30 MG/ML
60 INJECTION, SOLUTION INTRAMUSCULAR; INTRAVENOUS
Status: COMPLETED | OUTPATIENT
Start: 2021-10-13 | End: 2021-10-13

## 2021-10-13 RX ORDER — TRAMADOL HYDROCHLORIDE 50 MG/1
50 TABLET ORAL
Qty: 12 TABLET | Refills: 0 | Status: SHIPPED | OUTPATIENT
Start: 2021-10-13 | End: 2021-10-16

## 2021-10-13 RX ADMIN — KETOROLAC TROMETHAMINE 60 MG: 30 INJECTION, SOLUTION INTRAMUSCULAR at 08:19

## 2021-10-13 NOTE — ED TRIAGE NOTES
Right knee pain since last Wed with no  Known injury.  Seen by MD for same last week with no relief from prednisone

## 2021-10-13 NOTE — DISCHARGE INSTRUCTIONS
Take medicines as prescribed and follow-up with your PCP in 2 to 3 days. Return to emergency room for any new or worsening symptoms. Thank you! Thank you for allowing me to care for you in the emergency department. I sincerely hope that you are satisfied with your visit today. It is my goal to provide you with excellent care. Below you will find a list of your labs and imaging from your visit today. Should you have any questions regarding these results please do not hesitate to call the emergency department. Labs -     No results found for this or any previous visit (from the past 12 hour(s)). Radiologic Studies -   XR KNEE RT 3 V   Final Result   Degenerative change of the medial right knee. No acute trauma   identified. If internal derangement is of clinical concern, consider MRI. CT KNEE RT WO CONT    (Results Pending)     CT Results  (Last 48 hours)      None          CXR Results  (Last 48 hours)      None               If you feel that you have not received excellent quality care or timely care, please ask to speak to the nurse manager. Please choose us in the future for your continued health care needs. ------------------------------------------------------------------------------------------------------------  The exam and treatment you received in the Emergency Department were for an urgent problem and are not intended as complete care. It is important that you follow-up with a doctor, nurse practitioner, or physician assistant to:  (1) confirm your diagnosis,  (2) re-evaluation of changes in your illness and treatment, and  (3) for ongoing care. If your symptoms become worse or you do not improve as expected and you are unable to reach your usual health care provider, you should return to the Emergency Department. We are available 24 hours a day. Please take your discharge instructions with you when you go to your follow-up appointment.      If you have any problem arranging a follow-up appointment, contact the Emergency Department immediately. If a prescription has been provided, please have it filled as soon as possible to prevent a delay in treatment. Read the entire medication instruction sheet provided to you by the pharmacy. If you have any questions or reservations about taking the medication due to side effects or interactions with other medications, please call your primary care physician or contact the ER to speak with the charge nurse. Make an appointment with your family doctor or the physician you were referred to for follow-up of this visit as instructed on your discharge paperwork, as this is a mandatory follow-up. Return to the ER if you are unable to be seen or if you are unable to be seen in a timely manner. If you have any problem arranging the follow-up visit, contact the Emergency Department immediately.

## 2021-10-14 NOTE — ED PROVIDER NOTES
EMERGENCY DEPARTMENT HISTORY AND PHYSICAL EXAM      Date: 10/13/2021  Patient Name: Kd William    History of Presenting Illness     Chief Complaint   Patient presents with    Knee Pain       History Provided By: Patient    HPI: Kd William, 63 y.o. female with a past medical history significant hypertension, obesity, osteoarthritis and Thyroid disease presents to the ED with cc of right knee pain. No other injuries. No other constitutional symtoms. Pain is persistent in the right knee. No swelling of right knee. There are no other complaints, changes, or physical findings at this time. PCP: Unknown, Provider, MD    No current facility-administered medications on file prior to encounter. Current Outpatient Medications on File Prior to Encounter   Medication Sig Dispense Refill    levothyroxine (SYNTHROID) 125 mcg tablet TAKE 1 TABLET BY MOUTH EVERY DAY 90 Tab 1    atorvastatin (LIPITOR) 20 mg tablet Take 1 Tab by mouth daily. Indications: high cholesterol and high triglycerides 90 Tab 1    [DISCONTINUED] gabapentin (NEURONTIN) 300 mg capsule Take 1 Cap by mouth three (3) times daily. Max Daily Amount: 900 mg. Indications: neuropathic pain 90 Cap 5    [DISCONTINUED] gabapentin (NEURONTIN) 300 mg capsule TAKE 1 CAPSULE THREE TIMES A DAY AS NEEDED FOR NEUROPATHIC PAIN 90 Cap 1    [DISCONTINUED] ASCORBIC ACID/MULTIVIT-MIN (EMERGEN-C PO) Take  by mouth.  [DISCONTINUED] HYDROcodone-acetaminophen (NORCO) 5-325 mg per tablet Take 2 Tabs by mouth every six (6) hours as needed for Pain for 30 doses. 30 Tab 1    [DISCONTINUED] HYDROcodone-acetaminophen (NORCO) 5-325 mg per tablet Take 1 Tab by mouth every four (4) hours as needed. 30 Tab 1    [DISCONTINUED] HYDROcodone-acetaminophen (NORCO) 7.5-325 mg per tablet Take 2 Tabs by mouth every six (6) hours as needed for Pain. 30 Tab 1    levothyroxine (SYNTHROID) 150 mcg tablet Take 150 mcg by mouth Daily (before breakfast).       [DISCONTINUED] PV W-O SULEMAN/FERROUS FUMARATE/FA (M-VIT PO) Take 1 Tab by mouth daily.  [DISCONTINUED] garlic 613 mg Tab Take 591 mg by mouth daily. Past History     Past Medical History:  Past Medical History:   Diagnosis Date    Arthritis     left knee    GERD (gastroesophageal reflux disease)     Hypertension     Morbid obesity ([address]     Thyroid disease        Past Surgical History:  Past Surgical History:   Procedure Laterality Date    HX BLADDER SUSPENSION      HX GYN      uterine ablation about 10 years ago no menses since then    HX HEENT      thyroidectomy    HX ORTHOPAEDIC      left knee arthroscopy    HX ORTHOPAEDIC      left knee meniscus repair    HX TONSILLECTOMY      MA CHEST SURGERY PROCEDURE UNLISTED  14    RESECTION OF REMAINING THYROID       Family History:  Family History   Problem Relation Age of Onset    Immunodeficiency Father         lupus       Social History:  Social History     Tobacco Use    Smoking status: Former Smoker     Packs/day: 0.50     Years: 25.00     Pack years: 12.50     Quit date: 2013     Years since quittin.2    Smokeless tobacco: Never Used    Tobacco comment: quit 5 years ago   Substance Use Topics    Alcohol use: Yes     Alcohol/week: 4.2 standard drinks     Types: 5 Glasses of wine per week    Drug use: No       Allergies:  No Known Allergies      Review of Systems     Review of Systems   Constitutional: Negative for diaphoresis and fatigue. HENT: Negative for congestion, dental problem, ear discharge and ear pain. Eyes: Negative for discharge and redness. Respiratory: Negative for cough, chest tightness and shortness of breath. Cardiovascular: Negative for chest pain and palpitations. Gastrointestinal: Negative for abdominal pain, constipation, diarrhea, nausea and vomiting. Endocrine: Negative. Genitourinary: Negative. Negative for dysuria and frequency. Musculoskeletal: Negative for arthralgias and myalgias. Skin: Negative. Neurological: Negative for dizziness, syncope and light-headedness. Hematological: Negative. Psychiatric/Behavioral: Negative for agitation and behavioral problems. All other systems reviewed and are negative. Physical Exam     Physical Exam  Vitals and nursing note reviewed. Constitutional:       Appearance: Normal appearance. She is normal weight. HENT:      Head: Normocephalic and atraumatic. Nose: Nose normal.      Mouth/Throat:      Mouth: Mucous membranes are moist.      Pharynx: Oropharynx is clear. Eyes:      Extraocular Movements: Extraocular movements intact. Conjunctiva/sclera: Conjunctivae normal.      Pupils: Pupils are equal, round, and reactive to light. Cardiovascular:      Rate and Rhythm: Normal rate and regular rhythm. Pulses: Normal pulses. Heart sounds: Normal heart sounds. Pulmonary:      Effort: Pulmonary effort is normal.      Breath sounds: Normal breath sounds. Abdominal:      General: Abdomen is flat. Palpations: Abdomen is soft. Musculoskeletal:         General: Tenderness present. No swelling, deformity or signs of injury. Cervical back: Normal range of motion and neck supple. Skin:     General: Skin is warm and dry. Capillary Refill: Capillary refill takes less than 2 seconds. Neurological:      General: No focal deficit present. Mental Status: She is alert and oriented to person, place, and time. Psychiatric:         Mood and Affect: Mood normal.         Behavior: Behavior normal.         Lab and Diagnostic Study Results     Labs -   No results found for this or any previous visit (from the past 12 hour(s)). Radiologic Studies -     CT Results  (Last 48 hours)               10/13/21 0848  CT KNEE RT WO CONT Final result    Impression:  1. No findings of acute osseous abnormality. 2. Osteoarthritis with calcified intra-articular bodies. 3. Small joint effusion.         Narrative:  Exam: CT KNEE RT WO CONT TECHNIQUE: Multiple transaxial CT images of the right knee were obtained without   contrast. Coronal and sagittal reformatted images were provided. Dose reduction: All CT scans at this facility are performed using dose reduction   optimization techniques as appropriate to a performed exam including the   following: Automated exposure control, adjustments of the mA and/or kV according   to patient size, or use of iterative reconstruction technique. COMPARISON: Right knee radiographs 10/13/2021       HISTORY: Acute pain right knee r/o occult tibial plateau fracture       FINDINGS:       No findings of acute displaced fracture, dislocation, or aggressive osseous   lesion. Tricompartmental osteoarthritis. There are small calcified   intra-articular bodies present, the largest of which measures up to   approximately 10 mm. Small nonspecific joint effusion. No radiopaque soft tissue   foreign bodies are evident. CXR Results  (Last 48 hours)    None            Medical Decision Making   - I am the first provider for this patient. - I reviewed the vital signs, available nursing notes, past medical history, past surgical history, family history and social history. - Initial assessment performed. The patients presenting problems have been discussed, and they are in agreement with the care plan formulated and outlined with them. I have encouraged them to ask questions as they arise throughout their visit. Vital Signs-Reviewed the patient's vital signs. No data found. Records Reviewed: Nursing Notes    ED Course/Provider Notes (Medical Decision Making): Uneventful ED course, clinical improvement with therapy, patient will be discharged to followup with PCP as directed    Disposition     Disposition: Condition stable  DC- Adult Discharges: All of the diagnostic tests were reviewed and questions answered. Diagnosis, care plan and treatment options were discussed.   The patient understands the instructions and will follow up as directed. The patients results have been reviewed with them. They have been counseled regarding their diagnosis. The patient verbally convey understanding and agreement of the signs, symptoms, diagnosis, treatment and prognosis and additionally agrees to follow up as recommended with their PCP in 24 - 48 hours. They also agree with the care-plan and convey that all of their questions have been answered. I have also put together some discharge instructions for them that include: 1) educational information regarding their diagnosis, 2) how to care for their diagnosis at home, as well a 3) list of reasons why they would want to return to the ED prior to their follow-up appointment, should their condition change. Discharged    DISCHARGE PLAN:  1. Cannot display discharge medications since this patient is not currently admitted. 2.   Follow-up Information     Follow up With Specialties Details Why Contact Info    Follow-up with PCP of your choice in 2 to 3 days. In 2 days          3. Return to ED if worse   4. Discharge Medication List as of 10/13/2021  9:27 AM      START taking these medications    Details   traMADoL (Ultram) 50 mg tablet Take 1 Tablet by mouth every six (6) hours as needed for Pain for up to 3 days. Max Daily Amount: 200 mg., Normal, Disp-12 Tablet, R-0         CONTINUE these medications which have NOT CHANGED    Details   !! levothyroxine (SYNTHROID) 125 mcg tablet TAKE 1 TABLET BY MOUTH EVERY DAY, Normal, Disp-90 Tab, R-1      atorvastatin (LIPITOR) 20 mg tablet Take 1 Tab by mouth daily. Indications: high cholesterol and high triglycerides, Normal, Disp-90 Tab,R-1      !! levothyroxine (SYNTHROID) 150 mcg tablet Take 150 mcg by mouth Daily (before breakfast). , Historical Med       !! - Potential duplicate medications found. Please discuss with provider. Diagnosis     Clinical Impression:   1.  Primary osteoarthritis of right knee        Attestations:    Zion Becerril MD    Please note that this dictation was completed with [employer], the computer voice recognition software. Quite often unanticipated grammatical, syntax, homophones, and other interpretive errors are inadvertently transcribed by the computer software. Please disregard these errors. Please excuse any errors that have escaped final proofreading. Thank you.

## 2022-03-19 PROBLEM — E66.01 OBESITY, MORBID (HCC): Status: ACTIVE | Noted: 2018-03-01

## 2022-11-23 DIAGNOSIS — G62.9 NEUROPATHY: Primary | ICD-10-CM

## 2022-11-23 DIAGNOSIS — G62.9 NEUROPATHY: ICD-10-CM

## 2022-11-23 RX ORDER — GABAPENTIN 300 MG/1
300 CAPSULE ORAL 3 TIMES DAILY
Qty: 90 CAPSULE | Refills: 2 | Status: SHIPPED | OUTPATIENT
Start: 2022-11-23

## 2022-11-23 RX ORDER — GABAPENTIN 300 MG/1
300 CAPSULE ORAL 3 TIMES DAILY
Qty: 90 CAPSULE | Refills: 2 | Status: SHIPPED | OUTPATIENT
Start: 2022-11-23 | End: 2022-11-23 | Stop reason: SDUPTHER

## 2023-03-30 DIAGNOSIS — G62.9 NEUROPATHY: ICD-10-CM

## 2023-03-30 RX ORDER — GABAPENTIN 300 MG/1
300 CAPSULE ORAL 3 TIMES DAILY
Qty: 90 CAPSULE | Refills: 2 | Status: SHIPPED | OUTPATIENT
Start: 2023-03-30

## 2023-05-22 RX ORDER — LEVOTHYROXINE SODIUM 0.15 MG/1
150 TABLET ORAL
COMMUNITY

## 2023-05-22 RX ORDER — LEVOTHYROXINE SODIUM 0.12 MG/1
1 TABLET ORAL DAILY
COMMUNITY
Start: 2021-02-24

## 2023-05-22 RX ORDER — GABAPENTIN 300 MG/1
300 CAPSULE ORAL 3 TIMES DAILY
COMMUNITY
Start: 2023-03-30

## 2023-05-22 RX ORDER — ATORVASTATIN CALCIUM 20 MG/1
20 TABLET, FILM COATED ORAL DAILY
COMMUNITY
Start: 2020-07-30

## 2023-10-23 DIAGNOSIS — G62.9 NEUROPATHY: Primary | ICD-10-CM

## 2023-10-23 RX ORDER — METRONIDAZOLE 500 MG/1
500 TABLET ORAL 2 TIMES DAILY
Qty: 14 TABLET | Refills: 0 | Status: SHIPPED | OUTPATIENT
Start: 2023-10-23 | End: 2023-10-30

## 2023-10-23 RX ORDER — GABAPENTIN 300 MG/1
300 CAPSULE ORAL NIGHTLY
Qty: 90 CAPSULE | Refills: 1 | Status: SHIPPED | OUTPATIENT
Start: 2023-10-23 | End: 2024-04-20

## 2024-02-29 RX ORDER — PREDNISONE 20 MG/1
20 TABLET ORAL 2 TIMES DAILY
Qty: 10 TABLET | Refills: 0 | Status: SHIPPED | OUTPATIENT
Start: 2024-02-29 | End: 2024-03-05

## 2024-02-29 RX ORDER — SULFAMETHOXAZOLE AND TRIMETHOPRIM 800; 160 MG/1; MG/1
1 TABLET ORAL 2 TIMES DAILY
Qty: 14 TABLET | Refills: 0 | Status: SHIPPED | OUTPATIENT
Start: 2024-02-29 | End: 2024-03-07

## 2024-03-06 RX ORDER — CIPROFLOXACIN 250 MG/1
250 TABLET, FILM COATED ORAL 2 TIMES DAILY
Qty: 6 TABLET | Refills: 0 | Status: SHIPPED | OUTPATIENT
Start: 2024-03-06 | End: 2024-03-09

## 2024-04-03 RX ORDER — LEVOTHYROXINE SODIUM 0.15 MG/1
150 TABLET ORAL
Qty: 90 TABLET | Refills: 3 | Status: SHIPPED | OUTPATIENT
Start: 2024-04-03

## 2024-04-03 RX ORDER — CLOBETASOL PROPIONATE 0.5 MG/G
OINTMENT TOPICAL
Qty: 60 G | Refills: 5 | Status: SHIPPED | OUTPATIENT
Start: 2024-04-03

## 2024-04-11 RX ORDER — PREDNISONE 20 MG/1
20 TABLET ORAL 2 TIMES DAILY
Qty: 20 TABLET | Refills: 1 | Status: SHIPPED | OUTPATIENT
Start: 2024-04-11

## 2024-05-30 RX ORDER — PREDNISONE 20 MG/1
20 TABLET ORAL 2 TIMES DAILY
Qty: 20 TABLET | Refills: 1 | Status: ON HOLD | OUTPATIENT
Start: 2024-05-30

## 2024-06-02 ENCOUNTER — ANESTHESIA EVENT (OUTPATIENT)
Facility: HOSPITAL | Age: 64
End: 2024-06-02
Payer: COMMERCIAL

## 2024-06-02 ENCOUNTER — APPOINTMENT (OUTPATIENT)
Facility: HOSPITAL | Age: 64
DRG: 329 | End: 2024-06-02
Payer: COMMERCIAL

## 2024-06-02 ENCOUNTER — ANESTHESIA (OUTPATIENT)
Facility: HOSPITAL | Age: 64
End: 2024-06-02
Payer: COMMERCIAL

## 2024-06-02 ENCOUNTER — HOSPITAL ENCOUNTER (INPATIENT)
Facility: HOSPITAL | Age: 64
LOS: 24 days | Discharge: NURSING, REHAB OR LONG-TERM CARE | DRG: 329 | End: 2024-06-26
Attending: STUDENT IN AN ORGANIZED HEALTH CARE EDUCATION/TRAINING PROGRAM | Admitting: SURGERY
Payer: COMMERCIAL

## 2024-06-02 DIAGNOSIS — K63.89 COLON DISTENTION: ICD-10-CM

## 2024-06-02 DIAGNOSIS — K91.83: ICD-10-CM

## 2024-06-02 DIAGNOSIS — R60.9 EDEMA, UNSPECIFIED TYPE: ICD-10-CM

## 2024-06-02 DIAGNOSIS — K56.2 CECAL VOLVULUS (HCC): Primary | ICD-10-CM

## 2024-06-02 DIAGNOSIS — K55.9 LARGE BOWEL ISCHEMIA (HCC): ICD-10-CM

## 2024-06-02 LAB
ALBUMIN SERPL-MCNC: 3.1 G/DL (ref 3.5–5)
ALBUMIN/GLOB SERPL: 0.9 (ref 1.1–2.2)
ALP SERPL-CCNC: 105 U/L (ref 45–117)
ALT SERPL-CCNC: 31 U/L (ref 12–78)
ANION GAP SERPL CALC-SCNC: 10 MMOL/L (ref 5–15)
APPEARANCE UR: ABNORMAL
AST SERPL W P-5'-P-CCNC: 11 U/L (ref 15–37)
BACTERIA URNS QL MICRO: NEGATIVE /HPF
BASOPHILS # BLD: 0 K/UL (ref 0–0.1)
BASOPHILS NFR BLD: 0 % (ref 0–1)
BILIRUB SERPL-MCNC: 0.5 MG/DL (ref 0.2–1)
BILIRUB UR QL CFM: POSITIVE
BILIRUB UR QL: ABNORMAL
BUN SERPL-MCNC: 15 MG/DL (ref 6–20)
BUN/CREAT SERPL: 20 (ref 12–20)
CA-I BLD-MCNC: 9.2 MG/DL (ref 8.5–10.1)
CAOX CRY URNS QL MICRO: ABNORMAL
CHLORIDE SERPL-SCNC: 106 MMOL/L (ref 97–108)
CO2 SERPL-SCNC: 27 MMOL/L (ref 21–32)
COLOR UR: YELLOW
CREAT SERPL-MCNC: 0.74 MG/DL (ref 0.55–1.02)
DIFFERENTIAL METHOD BLD: ABNORMAL
EOSINOPHIL # BLD: 0 K/UL (ref 0–0.4)
EOSINOPHIL NFR BLD: 0 % (ref 0–7)
EPITH CASTS URNS QL MICRO: ABNORMAL /LPF
ERYTHROCYTE [DISTWIDTH] IN BLOOD BY AUTOMATED COUNT: 12.7 % (ref 11.5–14.5)
GLOBULIN SER CALC-MCNC: 3.3 G/DL (ref 2–4)
GLUCOSE SERPL-MCNC: 165 MG/DL (ref 65–100)
GLUCOSE UR STRIP.AUTO-MCNC: NEGATIVE MG/DL
HCT VFR BLD AUTO: 39.5 % (ref 35–47)
HGB BLD-MCNC: 13.5 G/DL (ref 11.5–16)
HGB UR QL STRIP: ABNORMAL
IMM GRANULOCYTES # BLD AUTO: 0 K/UL (ref 0–0.04)
IMM GRANULOCYTES NFR BLD AUTO: 0 % (ref 0–0.5)
KETONES UR QL STRIP.AUTO: 15 MG/DL
LACTATE SERPL-SCNC: 0.9 MMOL/L (ref 0.4–2)
LEUKOCYTE ESTERASE UR QL STRIP.AUTO: ABNORMAL
LIPASE SERPL-CCNC: 22 U/L (ref 13–75)
LYMPHOCYTES # BLD: 0.9 K/UL (ref 0.8–3.5)
LYMPHOCYTES NFR BLD: 11 % (ref 12–49)
MCH RBC QN AUTO: 35.5 PG (ref 26–34)
MCHC RBC AUTO-ENTMCNC: 34.2 G/DL (ref 30–36.5)
MCV RBC AUTO: 103.9 FL (ref 80–99)
MONOCYTES # BLD: 0.5 K/UL (ref 0–1)
MONOCYTES NFR BLD: 6 % (ref 5–13)
NEUTS SEG # BLD: 6.9 K/UL (ref 1.8–8)
NEUTS SEG NFR BLD: 83 % (ref 32–75)
NITRITE UR QL STRIP.AUTO: NEGATIVE
PH UR STRIP: 6 (ref 5–8)
PLATELET # BLD AUTO: 325 K/UL (ref 150–400)
PMV BLD AUTO: 9.7 FL (ref 8.9–12.9)
POTASSIUM SERPL-SCNC: 3.6 MMOL/L (ref 3.5–5.1)
PROT SERPL-MCNC: 6.4 G/DL (ref 6.4–8.2)
PROT UR STRIP-MCNC: ABNORMAL MG/DL
RBC # BLD AUTO: 3.8 M/UL (ref 3.8–5.2)
RBC #/AREA URNS HPF: ABNORMAL /HPF (ref 0–5)
SODIUM SERPL-SCNC: 143 MMOL/L (ref 136–145)
SP GR UR REFRACTOMETRY: 1.02 (ref 1–1.03)
TROPONIN I SERPL HS-MCNC: 7 NG/L (ref 0–51)
UROBILINOGEN UR QL STRIP.AUTO: 4 EU/DL (ref 0.2–1)
WBC # BLD AUTO: 8.3 K/UL (ref 3.6–11)
WBC URNS QL MICRO: ABNORMAL /HPF (ref 0–4)

## 2024-06-02 PROCEDURE — 3600000014 HC SURGERY LEVEL 4 ADDTL 15MIN: Performed by: SURGERY

## 2024-06-02 PROCEDURE — 99285 EMERGENCY DEPT VISIT HI MDM: CPT

## 2024-06-02 PROCEDURE — 7100000001 HC PACU RECOVERY - ADDTL 15 MIN: Performed by: SURGERY

## 2024-06-02 PROCEDURE — 81001 URINALYSIS AUTO W/SCOPE: CPT

## 2024-06-02 PROCEDURE — 2709999900 HC NON-CHARGEABLE SUPPLY: Performed by: SURGERY

## 2024-06-02 PROCEDURE — 2720000010 HC SURG SUPPLY STERILE: Performed by: SURGERY

## 2024-06-02 PROCEDURE — 6360000004 HC RX CONTRAST MEDICATION: Performed by: STUDENT IN AN ORGANIZED HEALTH CARE EDUCATION/TRAINING PROGRAM

## 2024-06-02 PROCEDURE — 2580000003 HC RX 258: Performed by: NURSE ANESTHETIST, CERTIFIED REGISTERED

## 2024-06-02 PROCEDURE — 6360000002 HC RX W HCPCS: Performed by: ANESTHESIOLOGY

## 2024-06-02 PROCEDURE — 83690 ASSAY OF LIPASE: CPT

## 2024-06-02 PROCEDURE — 3700000000 HC ANESTHESIA ATTENDED CARE: Performed by: SURGERY

## 2024-06-02 PROCEDURE — 6360000002 HC RX W HCPCS: Performed by: STUDENT IN AN ORGANIZED HEALTH CARE EDUCATION/TRAINING PROGRAM

## 2024-06-02 PROCEDURE — 6360000002 HC RX W HCPCS: Performed by: NURSE ANESTHETIST, CERTIFIED REGISTERED

## 2024-06-02 PROCEDURE — 80053 COMPREHEN METABOLIC PANEL: CPT

## 2024-06-02 PROCEDURE — A4217 STERILE WATER/SALINE, 500 ML: HCPCS | Performed by: SURGERY

## 2024-06-02 PROCEDURE — 3600000004 HC SURGERY LEVEL 4 BASE: Performed by: SURGERY

## 2024-06-02 PROCEDURE — 2580000003 HC RX 258: Performed by: SURGERY

## 2024-06-02 PROCEDURE — 6360000002 HC RX W HCPCS

## 2024-06-02 PROCEDURE — 96375 TX/PRO/DX INJ NEW DRUG ADDON: CPT

## 2024-06-02 PROCEDURE — 2500000003 HC RX 250 WO HCPCS: Performed by: ANESTHESIOLOGY

## 2024-06-02 PROCEDURE — 3700000001 HC ADD 15 MINUTES (ANESTHESIA): Performed by: SURGERY

## 2024-06-02 PROCEDURE — 2580000003 HC RX 258: Performed by: STUDENT IN AN ORGANIZED HEALTH CARE EDUCATION/TRAINING PROGRAM

## 2024-06-02 PROCEDURE — 74177 CT ABD & PELVIS W/CONTRAST: CPT

## 2024-06-02 PROCEDURE — 2500000003 HC RX 250 WO HCPCS: Performed by: NURSE ANESTHETIST, CERTIFIED REGISTERED

## 2024-06-02 PROCEDURE — 96365 THER/PROPH/DIAG IV INF INIT: CPT

## 2024-06-02 PROCEDURE — 87040 BLOOD CULTURE FOR BACTERIA: CPT

## 2024-06-02 PROCEDURE — 85025 COMPLETE CBC W/AUTO DIFF WBC: CPT

## 2024-06-02 PROCEDURE — 94761 N-INVAS EAR/PLS OXIMETRY MLT: CPT

## 2024-06-02 PROCEDURE — 99222 1ST HOSP IP/OBS MODERATE 55: CPT | Performed by: SURGERY

## 2024-06-02 PROCEDURE — 83605 ASSAY OF LACTIC ACID: CPT

## 2024-06-02 PROCEDURE — 6360000002 HC RX W HCPCS: Performed by: SURGERY

## 2024-06-02 PROCEDURE — 87086 URINE CULTURE/COLONY COUNT: CPT

## 2024-06-02 PROCEDURE — 93005 ELECTROCARDIOGRAM TRACING: CPT

## 2024-06-02 PROCEDURE — 7100000000 HC PACU RECOVERY - FIRST 15 MIN: Performed by: SURGERY

## 2024-06-02 PROCEDURE — 84484 ASSAY OF TROPONIN QUANT: CPT

## 2024-06-02 PROCEDURE — 0DTF0ZZ RESECTION OF RIGHT LARGE INTESTINE, OPEN APPROACH: ICD-10-PCS | Performed by: SURGERY

## 2024-06-02 PROCEDURE — 2500000003 HC RX 250 WO HCPCS: Performed by: SURGERY

## 2024-06-02 PROCEDURE — 88307 TISSUE EXAM BY PATHOLOGIST: CPT

## 2024-06-02 PROCEDURE — 1100000000 HC RM PRIVATE

## 2024-06-02 RX ORDER — KETOROLAC TROMETHAMINE 15 MG/ML
15 INJECTION, SOLUTION INTRAMUSCULAR; INTRAVENOUS EVERY 6 HOURS
Status: DISPENSED | OUTPATIENT
Start: 2024-06-02 | End: 2024-06-04

## 2024-06-02 RX ORDER — MORPHINE SULFATE 2 MG/ML
2 INJECTION, SOLUTION INTRAMUSCULAR; INTRAVENOUS
Status: DISCONTINUED | OUTPATIENT
Start: 2024-06-02 | End: 2024-06-05

## 2024-06-02 RX ORDER — ROCURONIUM BROMIDE 10 MG/ML
INJECTION, SOLUTION INTRAVENOUS PRN
Status: DISCONTINUED | OUTPATIENT
Start: 2024-06-02 | End: 2024-06-02 | Stop reason: SDUPTHER

## 2024-06-02 RX ORDER — SODIUM CHLORIDE 0.9 % (FLUSH) 0.9 %
5-40 SYRINGE (ML) INJECTION PRN
Status: DISCONTINUED | OUTPATIENT
Start: 2024-06-02 | End: 2024-06-26 | Stop reason: HOSPADM

## 2024-06-02 RX ORDER — ENOXAPARIN SODIUM 100 MG/ML
30 INJECTION SUBCUTANEOUS 2 TIMES DAILY
Status: DISCONTINUED | OUTPATIENT
Start: 2024-06-02 | End: 2024-06-09

## 2024-06-02 RX ORDER — FENTANYL CITRATE 50 UG/ML
INJECTION, SOLUTION INTRAMUSCULAR; INTRAVENOUS PRN
Status: DISCONTINUED | OUTPATIENT
Start: 2024-06-02 | End: 2024-06-02 | Stop reason: SDUPTHER

## 2024-06-02 RX ORDER — SODIUM CHLORIDE, SODIUM LACTATE, POTASSIUM CHLORIDE, AND CALCIUM CHLORIDE .6; .31; .03; .02 G/100ML; G/100ML; G/100ML; G/100ML
1000 INJECTION, SOLUTION INTRAVENOUS ONCE
Status: COMPLETED | OUTPATIENT
Start: 2024-06-02 | End: 2024-06-02

## 2024-06-02 RX ORDER — DEXTROSE MONOHYDRATE, SODIUM CHLORIDE, AND POTASSIUM CHLORIDE 50; 1.49; 4.5 G/1000ML; G/1000ML; G/1000ML
INJECTION, SOLUTION INTRAVENOUS CONTINUOUS
Status: DISCONTINUED | OUTPATIENT
Start: 2024-06-02 | End: 2024-06-04

## 2024-06-02 RX ORDER — MORPHINE SULFATE 4 MG/ML
4 INJECTION, SOLUTION INTRAMUSCULAR; INTRAVENOUS
Status: COMPLETED | OUTPATIENT
Start: 2024-06-02 | End: 2024-06-02

## 2024-06-02 RX ORDER — MIDAZOLAM HYDROCHLORIDE 1 MG/ML
INJECTION INTRAMUSCULAR; INTRAVENOUS PRN
Status: DISCONTINUED | OUTPATIENT
Start: 2024-06-02 | End: 2024-06-02 | Stop reason: SDUPTHER

## 2024-06-02 RX ORDER — LABETALOL HYDROCHLORIDE 5 MG/ML
10 INJECTION, SOLUTION INTRAVENOUS
Status: DISCONTINUED | OUTPATIENT
Start: 2024-06-02 | End: 2024-06-02 | Stop reason: HOSPADM

## 2024-06-02 RX ORDER — MORPHINE SULFATE 4 MG/ML
4 INJECTION, SOLUTION INTRAMUSCULAR; INTRAVENOUS
Status: DISCONTINUED | OUTPATIENT
Start: 2024-06-02 | End: 2024-06-05

## 2024-06-02 RX ORDER — DEXTROSE MONOHYDRATE 100 MG/ML
INJECTION, SOLUTION INTRAVENOUS CONTINUOUS PRN
Status: DISCONTINUED | OUTPATIENT
Start: 2024-06-02 | End: 2024-06-02 | Stop reason: HOSPADM

## 2024-06-02 RX ORDER — SODIUM CHLORIDE, SODIUM LACTATE, POTASSIUM CHLORIDE, CALCIUM CHLORIDE 600; 310; 30; 20 MG/100ML; MG/100ML; MG/100ML; MG/100ML
INJECTION, SOLUTION INTRAVENOUS CONTINUOUS PRN
Status: DISCONTINUED | OUTPATIENT
Start: 2024-06-02 | End: 2024-06-02 | Stop reason: SDUPTHER

## 2024-06-02 RX ORDER — SODIUM CHLORIDE 9 MG/ML
INJECTION, SOLUTION INTRAVENOUS PRN
Status: DISCONTINUED | OUTPATIENT
Start: 2024-06-02 | End: 2024-06-02 | Stop reason: HOSPADM

## 2024-06-02 RX ORDER — OXYCODONE HYDROCHLORIDE 5 MG/1
5 TABLET ORAL PRN
Status: DISCONTINUED | OUTPATIENT
Start: 2024-06-02 | End: 2024-06-02 | Stop reason: HOSPADM

## 2024-06-02 RX ORDER — IPRATROPIUM BROMIDE AND ALBUTEROL SULFATE 2.5; .5 MG/3ML; MG/3ML
1 SOLUTION RESPIRATORY (INHALATION)
Status: DISCONTINUED | OUTPATIENT
Start: 2024-06-02 | End: 2024-06-02 | Stop reason: HOSPADM

## 2024-06-02 RX ORDER — METOCLOPRAMIDE HYDROCHLORIDE 5 MG/ML
10 INJECTION INTRAMUSCULAR; INTRAVENOUS
Status: DISCONTINUED | OUTPATIENT
Start: 2024-06-02 | End: 2024-06-02 | Stop reason: HOSPADM

## 2024-06-02 RX ORDER — DEXAMETHASONE SODIUM PHOSPHATE 4 MG/ML
INJECTION, SOLUTION INTRA-ARTICULAR; INTRALESIONAL; INTRAMUSCULAR; INTRAVENOUS; SOFT TISSUE PRN
Status: DISCONTINUED | OUTPATIENT
Start: 2024-06-02 | End: 2024-06-02 | Stop reason: SDUPTHER

## 2024-06-02 RX ORDER — ONDANSETRON 2 MG/ML
INJECTION INTRAMUSCULAR; INTRAVENOUS PRN
Status: DISCONTINUED | OUTPATIENT
Start: 2024-06-02 | End: 2024-06-02 | Stop reason: SDUPTHER

## 2024-06-02 RX ORDER — 0.9 % SODIUM CHLORIDE 0.9 %
1000 INTRAVENOUS SOLUTION INTRAVENOUS ONCE
Status: COMPLETED | OUTPATIENT
Start: 2024-06-02 | End: 2024-06-03

## 2024-06-02 RX ORDER — FAMOTIDINE 20 MG/1
20 TABLET, FILM COATED ORAL 2 TIMES DAILY
Status: DISCONTINUED | OUTPATIENT
Start: 2024-06-02 | End: 2024-06-26 | Stop reason: HOSPADM

## 2024-06-02 RX ORDER — SODIUM CHLORIDE 0.9 % (FLUSH) 0.9 %
5-40 SYRINGE (ML) INJECTION EVERY 12 HOURS SCHEDULED
Status: DISCONTINUED | OUTPATIENT
Start: 2024-06-02 | End: 2024-06-09

## 2024-06-02 RX ORDER — ONDANSETRON 2 MG/ML
4 INJECTION INTRAMUSCULAR; INTRAVENOUS
Status: DISCONTINUED | OUTPATIENT
Start: 2024-06-02 | End: 2024-06-02 | Stop reason: HOSPADM

## 2024-06-02 RX ORDER — NALOXONE HYDROCHLORIDE 0.4 MG/ML
INJECTION, SOLUTION INTRAMUSCULAR; INTRAVENOUS; SUBCUTANEOUS PRN
Status: DISCONTINUED | OUTPATIENT
Start: 2024-06-02 | End: 2024-06-02 | Stop reason: HOSPADM

## 2024-06-02 RX ORDER — SODIUM CHLORIDE 0.9 % (FLUSH) 0.9 %
5-40 SYRINGE (ML) INJECTION EVERY 12 HOURS SCHEDULED
Status: DISCONTINUED | OUTPATIENT
Start: 2024-06-02 | End: 2024-06-02 | Stop reason: HOSPADM

## 2024-06-02 RX ORDER — CALCIUM GLUCONATE 94 MG/ML
INJECTION, SOLUTION INTRAVENOUS PRN
Status: DISCONTINUED | OUTPATIENT
Start: 2024-06-02 | End: 2024-06-02 | Stop reason: SDUPTHER

## 2024-06-02 RX ORDER — OXYCODONE HYDROCHLORIDE 5 MG/1
10 TABLET ORAL PRN
Status: DISCONTINUED | OUTPATIENT
Start: 2024-06-02 | End: 2024-06-02 | Stop reason: HOSPADM

## 2024-06-02 RX ORDER — LEVOTHYROXINE SODIUM 0.07 MG/1
150 TABLET ORAL
Status: DISCONTINUED | OUTPATIENT
Start: 2024-06-03 | End: 2024-06-26 | Stop reason: HOSPADM

## 2024-06-02 RX ORDER — SODIUM CHLORIDE 9 MG/ML
INJECTION, SOLUTION INTRAVENOUS PRN
Status: DISCONTINUED | OUTPATIENT
Start: 2024-06-02 | End: 2024-06-09

## 2024-06-02 RX ORDER — GLUCAGON 1 MG/ML
1 KIT INJECTION PRN
Status: DISCONTINUED | OUTPATIENT
Start: 2024-06-02 | End: 2024-06-02 | Stop reason: HOSPADM

## 2024-06-02 RX ORDER — DIPHENHYDRAMINE HYDROCHLORIDE 50 MG/ML
12.5 INJECTION INTRAMUSCULAR; INTRAVENOUS
Status: COMPLETED | OUTPATIENT
Start: 2024-06-02 | End: 2024-06-02

## 2024-06-02 RX ORDER — LORAZEPAM 2 MG/ML
0.5 INJECTION INTRAMUSCULAR
Status: DISCONTINUED | OUTPATIENT
Start: 2024-06-02 | End: 2024-06-02 | Stop reason: HOSPADM

## 2024-06-02 RX ORDER — ONDANSETRON 4 MG/1
4 TABLET, ORALLY DISINTEGRATING ORAL EVERY 8 HOURS PRN
Status: DISCONTINUED | OUTPATIENT
Start: 2024-06-02 | End: 2024-06-09

## 2024-06-02 RX ORDER — EPHEDRINE SULFATE 50 MG/ML
INJECTION INTRAVENOUS PRN
Status: DISCONTINUED | OUTPATIENT
Start: 2024-06-02 | End: 2024-06-02 | Stop reason: SDUPTHER

## 2024-06-02 RX ORDER — LISINOPRIL 5 MG/1
TABLET ORAL
COMMUNITY

## 2024-06-02 RX ORDER — MEPERIDINE HYDROCHLORIDE 25 MG/ML
12.5 INJECTION INTRAMUSCULAR; INTRAVENOUS; SUBCUTANEOUS EVERY 5 MIN PRN
Status: DISCONTINUED | OUTPATIENT
Start: 2024-06-02 | End: 2024-06-02 | Stop reason: HOSPADM

## 2024-06-02 RX ORDER — FENTANYL CITRATE 50 UG/ML
50 INJECTION, SOLUTION INTRAMUSCULAR; INTRAVENOUS EVERY 5 MIN PRN
Status: DISCONTINUED | OUTPATIENT
Start: 2024-06-02 | End: 2024-06-02 | Stop reason: HOSPADM

## 2024-06-02 RX ORDER — MAGNESIUM SULFATE HEPTAHYDRATE 500 MG/ML
INJECTION, SOLUTION INTRAMUSCULAR; INTRAVENOUS PRN
Status: DISCONTINUED | OUTPATIENT
Start: 2024-06-02 | End: 2024-06-02 | Stop reason: SDUPTHER

## 2024-06-02 RX ORDER — HYDROMORPHONE HYDROCHLORIDE 1 MG/ML
0.5 INJECTION, SOLUTION INTRAMUSCULAR; INTRAVENOUS; SUBCUTANEOUS EVERY 5 MIN PRN
Status: DISCONTINUED | OUTPATIENT
Start: 2024-06-02 | End: 2024-06-02 | Stop reason: HOSPADM

## 2024-06-02 RX ORDER — SODIUM CHLORIDE 0.9 % (FLUSH) 0.9 %
5-40 SYRINGE (ML) INJECTION PRN
Status: DISCONTINUED | OUTPATIENT
Start: 2024-06-02 | End: 2024-06-02 | Stop reason: HOSPADM

## 2024-06-02 RX ORDER — AMOXICILLIN 500 MG/1
CAPSULE ORAL
Status: ON HOLD | COMMUNITY
Start: 2024-05-29 | End: 2024-06-26 | Stop reason: HOSPADM

## 2024-06-02 RX ORDER — ONDANSETRON 2 MG/ML
4 INJECTION INTRAMUSCULAR; INTRAVENOUS EVERY 6 HOURS PRN
Status: DISCONTINUED | OUTPATIENT
Start: 2024-06-02 | End: 2024-06-09

## 2024-06-02 RX ORDER — HYDRALAZINE HYDROCHLORIDE 20 MG/ML
10 INJECTION INTRAMUSCULAR; INTRAVENOUS
Status: DISCONTINUED | OUTPATIENT
Start: 2024-06-02 | End: 2024-06-02 | Stop reason: HOSPADM

## 2024-06-02 RX ORDER — LIDOCAINE 4 G/G
1 PATCH TOPICAL AS NEEDED
Status: DISCONTINUED | OUTPATIENT
Start: 2024-06-02 | End: 2024-06-02 | Stop reason: HOSPADM

## 2024-06-02 RX ORDER — FENTANYL CITRATE 50 UG/ML
INJECTION, SOLUTION INTRAMUSCULAR; INTRAVENOUS
Status: COMPLETED
Start: 2024-06-02 | End: 2024-06-02

## 2024-06-02 RX ORDER — MAGNESIUM HYDROXIDE 1200 MG/15ML
LIQUID ORAL CONTINUOUS PRN
Status: COMPLETED | OUTPATIENT
Start: 2024-06-02 | End: 2024-06-02

## 2024-06-02 RX ORDER — ONDANSETRON 2 MG/ML
4 INJECTION INTRAMUSCULAR; INTRAVENOUS ONCE
Status: COMPLETED | OUTPATIENT
Start: 2024-06-02 | End: 2024-06-02

## 2024-06-02 RX ORDER — SUCCINYLCHOLINE/SOD CL,ISO/PF 200MG/10ML
SYRINGE (ML) INTRAVENOUS PRN
Status: DISCONTINUED | OUTPATIENT
Start: 2024-06-02 | End: 2024-06-02 | Stop reason: SDUPTHER

## 2024-06-02 RX ORDER — OXYCODONE HYDROCHLORIDE 5 MG/1
5 TABLET ORAL EVERY 4 HOURS PRN
Status: DISPENSED | OUTPATIENT
Start: 2024-06-02 | End: 2024-06-04

## 2024-06-02 RX ORDER — LIDOCAINE HYDROCHLORIDE 20 MG/ML
INJECTION, SOLUTION EPIDURAL; INFILTRATION; INTRACAUDAL; PERINEURAL PRN
Status: DISCONTINUED | OUTPATIENT
Start: 2024-06-02 | End: 2024-06-02 | Stop reason: SDUPTHER

## 2024-06-02 RX ORDER — SODIUM CHLORIDE, SODIUM LACTATE, POTASSIUM CHLORIDE, CALCIUM CHLORIDE 600; 310; 30; 20 MG/100ML; MG/100ML; MG/100ML; MG/100ML
INJECTION, SOLUTION INTRAVENOUS ONCE
Status: DISCONTINUED | OUTPATIENT
Start: 2024-06-02 | End: 2024-06-02 | Stop reason: HOSPADM

## 2024-06-02 RX ORDER — KETOROLAC TROMETHAMINE 15 MG/ML
15 INJECTION, SOLUTION INTRAMUSCULAR; INTRAVENOUS ONCE
Status: COMPLETED | OUTPATIENT
Start: 2024-06-02 | End: 2024-06-02

## 2024-06-02 RX ADMIN — FENTANYL CITRATE 100 MCG: 50 INJECTION, SOLUTION INTRAMUSCULAR; INTRAVENOUS at 15:06

## 2024-06-02 RX ADMIN — LIDOCAINE HYDROCHLORIDE 100 MG: 20 INJECTION, SOLUTION EPIDURAL; INFILTRATION; INTRACAUDAL; PERINEURAL at 15:06

## 2024-06-02 RX ADMIN — MORPHINE SULFATE 4 MG: 4 INJECTION INTRAVENOUS at 19:28

## 2024-06-02 RX ADMIN — DEXTROSE MONOHYDRATE, SODIUM CHLORIDE, AND POTASSIUM CHLORIDE: 50; 4.5; 1.49 INJECTION, SOLUTION INTRAVENOUS at 19:57

## 2024-06-02 RX ADMIN — MORPHINE SULFATE 4 MG: 4 INJECTION INTRAVENOUS at 23:44

## 2024-06-02 RX ADMIN — SODIUM CHLORIDE, POTASSIUM CHLORIDE, SODIUM LACTATE AND CALCIUM CHLORIDE: 600; 310; 30; 20 INJECTION, SOLUTION INTRAVENOUS at 14:59

## 2024-06-02 RX ADMIN — SUGAMMADEX 400 MG: 100 INJECTION, SOLUTION INTRAVENOUS at 17:39

## 2024-06-02 RX ADMIN — ROCURONIUM BROMIDE 20 MG: 10 INJECTION, SOLUTION INTRAVENOUS at 16:12

## 2024-06-02 RX ADMIN — HYDROMORPHONE HYDROCHLORIDE 0.5 MG: 1 INJECTION, SOLUTION INTRAMUSCULAR; INTRAVENOUS; SUBCUTANEOUS at 18:30

## 2024-06-02 RX ADMIN — SODIUM CHLORIDE, PRESERVATIVE FREE 10 ML: 5 INJECTION INTRAVENOUS at 23:32

## 2024-06-02 RX ADMIN — ROCURONIUM BROMIDE 50 MG: 10 INJECTION, SOLUTION INTRAVENOUS at 15:14

## 2024-06-02 RX ADMIN — MORPHINE SULFATE 4 MG: 4 INJECTION INTRAVENOUS at 11:28

## 2024-06-02 RX ADMIN — SODIUM CHLORIDE, POTASSIUM CHLORIDE, SODIUM LACTATE AND CALCIUM CHLORIDE 1000 ML: 600; 310; 30; 20 INJECTION, SOLUTION INTRAVENOUS at 13:21

## 2024-06-02 RX ADMIN — FENTANYL CITRATE 50 MCG: 50 INJECTION, SOLUTION INTRAMUSCULAR; INTRAVENOUS at 18:08

## 2024-06-02 RX ADMIN — MIDAZOLAM HYDROCHLORIDE 2 MG: 2 INJECTION, SOLUTION INTRAMUSCULAR; INTRAVENOUS at 14:59

## 2024-06-02 RX ADMIN — SODIUM CHLORIDE, PRESERVATIVE FREE 10 ML: 5 INJECTION INTRAVENOUS at 20:37

## 2024-06-02 RX ADMIN — CALCIUM GLUCONATE 1 G: 98 INJECTION, SOLUTION INTRAVENOUS at 17:10

## 2024-06-02 RX ADMIN — ENOXAPARIN SODIUM 30 MG: 100 INJECTION SUBCUTANEOUS at 20:37

## 2024-06-02 RX ADMIN — SODIUM CHLORIDE 1000 ML: 9 INJECTION, SOLUTION INTRAVENOUS at 23:28

## 2024-06-02 RX ADMIN — KETOROLAC TROMETHAMINE 15 MG: 15 INJECTION, SOLUTION INTRAMUSCULAR; INTRAVENOUS at 20:33

## 2024-06-02 RX ADMIN — PIPERACILLIN AND TAZOBACTAM 3375 MG: 3; .375 INJECTION, POWDER, LYOPHILIZED, FOR SOLUTION INTRAVENOUS at 20:36

## 2024-06-02 RX ADMIN — PHENYLEPHRINE HYDROCHLORIDE 200 MCG: 10 INJECTION INTRAVENOUS at 15:10

## 2024-06-02 RX ADMIN — EPHEDRINE SULFATE 20 MG: 50 INJECTION INTRAVENOUS at 15:25

## 2024-06-02 RX ADMIN — Medication 160 MG: at 15:06

## 2024-06-02 RX ADMIN — MAGNESIUM SULFATE HEPTAHYDRATE 1 G: 500 INJECTION, SOLUTION INTRAMUSCULAR; INTRAVENOUS at 17:10

## 2024-06-02 RX ADMIN — HYDROMORPHONE HYDROCHLORIDE 1 MG: 1 INJECTION, SOLUTION INTRAMUSCULAR; INTRAVENOUS; SUBCUTANEOUS at 14:59

## 2024-06-02 RX ADMIN — SODIUM CHLORIDE, POTASSIUM CHLORIDE, SODIUM LACTATE AND CALCIUM CHLORIDE 1000 ML: 600; 310; 30; 20 INJECTION, SOLUTION INTRAVENOUS at 09:50

## 2024-06-02 RX ADMIN — SODIUM CHLORIDE, PRESERVATIVE FREE 10 ML: 5 INJECTION INTRAVENOUS at 20:38

## 2024-06-02 RX ADMIN — PROPOFOL 150 MG: 10 INJECTION, EMULSION INTRAVENOUS at 15:06

## 2024-06-02 RX ADMIN — PIPERACILLIN AND TAZOBACTAM 4500 MG: 4; .5 INJECTION, POWDER, LYOPHILIZED, FOR SOLUTION INTRAVENOUS at 11:30

## 2024-06-02 RX ADMIN — IOPAMIDOL 100 ML: 755 INJECTION, SOLUTION INTRAVENOUS at 10:23

## 2024-06-02 RX ADMIN — SODIUM CHLORIDE, PRESERVATIVE FREE 20 MG: 5 INJECTION INTRAVENOUS at 20:35

## 2024-06-02 RX ADMIN — DIPHENHYDRAMINE HYDROCHLORIDE 12.5 MG: 50 INJECTION INTRAMUSCULAR; INTRAVENOUS at 18:22

## 2024-06-02 RX ADMIN — DEXAMETHASONE SODIUM PHOSPHATE 4 MG: 4 INJECTION, SOLUTION INTRA-ARTICULAR; INTRALESIONAL; INTRAMUSCULAR; INTRAVENOUS; SOFT TISSUE at 15:21

## 2024-06-02 RX ADMIN — HYDROMORPHONE HYDROCHLORIDE 1 MG: 1 INJECTION, SOLUTION INTRAMUSCULAR; INTRAVENOUS; SUBCUTANEOUS at 17:56

## 2024-06-02 RX ADMIN — FENTANYL CITRATE 100 MCG: 50 INJECTION, SOLUTION INTRAMUSCULAR; INTRAVENOUS at 17:14

## 2024-06-02 RX ADMIN — ONDANSETRON 4 MG: 2 INJECTION INTRAMUSCULAR; INTRAVENOUS at 15:21

## 2024-06-02 RX ADMIN — HYDROMORPHONE HYDROCHLORIDE 0.5 MG: 1 INJECTION, SOLUTION INTRAMUSCULAR; INTRAVENOUS; SUBCUTANEOUS at 18:21

## 2024-06-02 RX ADMIN — KETOROLAC TROMETHAMINE 15 MG: 15 INJECTION, SOLUTION INTRAMUSCULAR; INTRAVENOUS at 09:52

## 2024-06-02 RX ADMIN — ONDANSETRON 4 MG: 2 INJECTION INTRAMUSCULAR; INTRAVENOUS at 09:51

## 2024-06-02 ASSESSMENT — PAIN SCALES - GENERAL
PAINLEVEL_OUTOF10: 2
PAINLEVEL_OUTOF10: 5
PAINLEVEL_OUTOF10: 9
PAINLEVEL_OUTOF10: 7
PAINLEVEL_OUTOF10: 9
PAINLEVEL_OUTOF10: 8
PAINLEVEL_OUTOF10: 8
PAINLEVEL_OUTOF10: 9
PAINLEVEL_OUTOF10: 8
PAINLEVEL_OUTOF10: 8
PAINLEVEL_OUTOF10: 7
PAINLEVEL_OUTOF10: 9
PAINLEVEL_OUTOF10: 7
PAINLEVEL_OUTOF10: 7
PAINLEVEL_OUTOF10: 9
PAINLEVEL_OUTOF10: 3

## 2024-06-02 ASSESSMENT — PAIN DESCRIPTION - DESCRIPTORS
DESCRIPTORS: ACHING
DESCRIPTORS: DISCOMFORT
DESCRIPTORS: ACHING;THROBBING;TENDER
DESCRIPTORS: ACHING
DESCRIPTORS: ACHING

## 2024-06-02 ASSESSMENT — PAIN - FUNCTIONAL ASSESSMENT
PAIN_FUNCTIONAL_ASSESSMENT: 0-10
PAIN_FUNCTIONAL_ASSESSMENT: PREVENTS OR INTERFERES WITH MANY ACTIVE NOT PASSIVE ACTIVITIES

## 2024-06-02 ASSESSMENT — PAIN DESCRIPTION - LOCATION
LOCATION: ABDOMEN
LOCATION: ABDOMEN;HIP

## 2024-06-02 ASSESSMENT — PAIN DESCRIPTION - ORIENTATION
ORIENTATION: ANTERIOR
ORIENTATION: LEFT
ORIENTATION: ANTERIOR
ORIENTATION: ANTERIOR
ORIENTATION: MID
ORIENTATION: ANTERIOR
ORIENTATION: ANTERIOR

## 2024-06-02 NOTE — ED PROVIDER NOTES
[facility] EMERGENCY DEPT  EMERGENCY DEPARTMENT HISTORY AND PHYSICAL EXAM      Date: 6/2/2024  Patient Name: Maddie Goldberg  MRN: [id number]  YOB: 1960  Date of evaluation: 6/2/2024  Provider: Evans Calvo MD   Note Started: 9:56 AM EDT 6/2/24    HISTORY OF PRESENT ILLNESS     Chief Complaint   Patient presents with    Abdominal Pain    Emesis    Diarrhea       History Provided By: Patient    HPI: Maddie Goldberg is a 63 y.o. female presents to the emergency department for evaluation of abdominal pain, nausea, diarrhea.  Patient states over the last 2 weeks she has had intermittent episodes of aching pain describes as lower abdominal with radiation up to the epigastrium.  Denies any fevers chills, no chest pain shortness of breath.  Patient was prescribed amoxicillin?  Denies any pain burning on urination.  Patient states last bowel movement was 2 days prior.  Not sure if she is passing gas today    PAST MEDICAL HISTORY   Past Medical History:  Past Medical History:   Diagnosis Date    Arthritis     left knee    GERD (gastroesophageal reflux disease)     Hypertension     Morbid obesity (HCC)     Thyroid disease        Past Surgical History:  Past Surgical History:   Procedure Laterality Date    BLADDER SUSPENSION      CHEST SURGERY  1/7/14    RESECTION OF REMAINING THYROID    GYN      uterine ablation about 10 years ago no menses since then    HEENT      thyroidectomy    ORTHOPEDIC SURGERY      left knee arthroscopy    ORTHOPEDIC SURGERY      left knee meniscus repair    TONSILLECTOMY         Family History:  Family History   Problem Relation Age of Onset    Immunodeficiency Father         lupus       Social History:  Social History     Tobacco Use    Smoking status: Former     Current packs/day: 0.00     Types: Cigarettes     Quit date: 7/1/2013     Years since quitting: 10.9    Smokeless tobacco: Never    Tobacco comments:     Quit smoking: quit 5 years ago   Substance Use Topics    Alcohol use: Yes      Absolute 0.0 0.0 - 0.4 K/UL    Basophils Absolute 0.0 0.0 - 0.1 K/UL    Immature Granulocytes Absolute 0.0 0.00 - 0.04 K/UL    Differential Type AUTOMATED     Comprehensive Metabolic Panel    Collection Time: 06/02/24  9:49 AM   Result Value Ref Range    Sodium 143 136 - 145 mmol/L    Potassium 3.6 3.5 - 5.1 mmol/L    Chloride 106 97 - 108 mmol/L    CO2 27 21 - 32 mmol/L    Anion Gap 10 5 - 15 mmol/L    Glucose 165 (H) 65 - 100 mg/dL    BUN 15 6 - 20 mg/dL    Creatinine 0.74 0.55 - 1.02 mg/dL    BUN/Creatinine Ratio 20 12 - 20      Est, Glom Filt Rate >90 >60 ml/min/1.73m2    Calcium 9.2 8.5 - 10.1 mg/dL    Total Bilirubin 0.5 0.2 - 1.0 mg/dL    AST 11 (L) 15 - 37 U/L    ALT 31 12 - 78 U/L    Alk Phosphatase 105 45 - 117 U/L    Total Protein 6.4 6.4 - 8.2 g/dL    Albumin 3.1 (L) 3.5 - 5.0 g/dL    Globulin 3.3 2.0 - 4.0 g/dL    Albumin/Globulin Ratio 0.9 (L) 1.1 - 2.2     Troponin    Collection Time: 06/02/24  9:49 AM   Result Value Ref Range    Troponin, High Sensitivity 7 0 - 51 ng/L   Lipase    Collection Time: 06/02/24  9:49 AM   Result Value Ref Range    Lipase 22 13 - 75 U/L   Urinalysis with Microscopic    Collection Time: 06/02/24  9:50 AM   Result Value Ref Range    Color, UA Yellow      Appearance Cloudy (A) Clear      Specific Gravity, UA 1.020 1.003 - 1.030      pH, Urine 6.0 5.0 - 8.0      Protein, UA Trace (A) Negative mg/dL    Glucose, Ur Negative Negative mg/dL    Ketones, Urine 15 (A) Negative mg/dL    Bilirubin, Urine Small (A) Negative      Blood, Urine Small (A) Negative      Urobilinogen, Urine 4.0 (H) 0.2 - 1.0 EU/dL    Nitrite, Urine Negative Negative      Leukocyte Esterase, Urine Small (A) Negative      Bilirubin Confirmation, UA Positive (A) Negative      WBC, UA 10-20 0 - 4 /hpf    RBC, UA 10-20 0 - 5 /hpf    Epithelial Cells, UA Few Few /lpf    BACTERIA, URINE Negative Negative /hpf    Calcium Oxalate 2+ (A) Negative   Lactic Acid    Collection Time: 06/02/24 11:10 AM   Result Value Ref

## 2024-06-02 NOTE — ANESTHESIA POSTPROCEDURE EVALUATION
Department of Anesthesiology  Postprocedure Note    Patient: Maddie Goldberg  MRN: [id number]  YOB: 1960  Date of evaluation: 6/2/2024    Procedure Summary       Date: 06/02/24 Room / Location: [facility] MAIN OR 02 / [facility] MAIN OR    Anesthesia Start: 1500 Anesthesia Stop: 1803    Procedure: EXPLORATORY LAPAROTOMY; RIGHT COLON RESECTION (Abdomen) Diagnosis:       Colon distention      Cecal volvulus (HCC)      (Colon distention [K63.89])      (Cecal volvulus (HCC) [K56.2])    Surgeons: Kylah Rice MD Responsible Provider: Sam Wood MD    Anesthesia Type: general ASA Status: 3 - Emergent            Anesthesia Type: No value filed.    Lenore Phase I:      Lenore Phase II:      Anesthesia Post Evaluation    Patient location during evaluation: PACU  Patient participation: complete - patient cannot participate  Level of consciousness: awake  Pain score: 0  Airway patency: patent  Nausea & Vomiting: no nausea and no vomiting  Cardiovascular status: hemodynamically stable  Respiratory status: acceptable  Hydration status: stable  Multimodal analgesia pain management approach    No notable events documented.

## 2024-06-02 NOTE — H&P
General Surgery History and Physical      History of Present Illness      Maddie Goldberg is an 63 y.o.  female who presents with a one week history of worsening abdominal pain associated with bloating, obstipation and chills. Now pain diffuse and associated with nausea and heartburn. Patient is a transfer from a free standing ER.     Past Medical History:   Diagnosis Date    Arthritis     left knee    GERD (gastroesophageal reflux disease)     Hypertension     Morbid obesity (HCC)     Thyroid disease      Family History   Problem Relation Age of Onset    Immunodeficiency Father         lupus     Prior to Admission Medications   Prescriptions Last Dose Informant Patient Reported? Taking?   amoxicillin (AMOXIL) 500 MG capsule 6/2/2024  Yes Yes   atorvastatin (LIPITOR) 20 MG tablet   Yes No   Sig: Take 1 tablet by mouth daily   clobetasol (TEMOVATE) 0.05 % ointment   No No   Sig: Apply topically 2 times daily.   diclofenac sodium (VOLTAREN) 1 % GEL   No No   Sig: Apply 2 g topically 4 times daily   gabapentin (NEURONTIN) 300 MG capsule   No No   Sig: Take 1 capsule by mouth nightly for 180 days. Max Daily Amount: 300 mg   levothyroxine (SYNTHROID) 150 MCG tablet 6/2/2024  No No   Sig: Take 1 tablet by mouth every morning (before breakfast)   lisinopril (PRINIVIL;ZESTRIL) 5 MG tablet 6/2/2024  Yes No   Sig: TK 1 T PO  ONCE DAILY FOR BLOOD PRESSURE   predniSONE (DELTASONE) 20 MG tablet 6/2/2024  No No   Sig: Take 1 tablet by mouth 2 times daily      Facility-Administered Medications: None     No Known Allergies  Social History     Socioeconomic History    Marital status:      Spouse name: Not on file    Number of children: Not on file    Years of education: Not on file    Highest education level: Not on file   Occupational History    Not on file   Tobacco Use    Smoking status: Former     Current packs/day: 0.00     Types: Cigarettes     Quit date: 7/1/2013     Years since quitting: 10.9    Smokeless tobacco: Never  Anion Gap 10 5 - 15 mmol/L    Glucose 165 (H) 65 - 100 mg/dL    BUN 15 6 - 20 mg/dL    Creatinine 0.74 0.55 - 1.02 mg/dL    BUN/Creatinine Ratio 20 12 - 20      Est, Glom Filt Rate >90 >60 ml/min/1.73m2    Calcium 9.2 8.5 - 10.1 mg/dL    Total Bilirubin 0.5 0.2 - 1.0 mg/dL    AST 11 (L) 15 - 37 U/L    ALT 31 12 - 78 U/L    Alk Phosphatase 105 45 - 117 U/L    Total Protein 6.4 6.4 - 8.2 g/dL    Albumin 3.1 (L) 3.5 - 5.0 g/dL    Globulin 3.3 2.0 - 4.0 g/dL    Albumin/Globulin Ratio 0.9 (L) 1.1 - 2.2     Troponin    Collection Time: 06/02/24  9:49 AM   Result Value Ref Range    Troponin, High Sensitivity 7 0 - 51 ng/L   Lipase    Collection Time: 06/02/24  9:49 AM   Result Value Ref Range    Lipase 22 13 - 75 U/L   Urinalysis with Microscopic    Collection Time: 06/02/24  9:50 AM   Result Value Ref Range    Color, UA Yellow      Appearance Cloudy (A) Clear      Specific Gravity, UA 1.020 1.003 - 1.030      pH, Urine 6.0 5.0 - 8.0      Protein, UA Trace (A) Negative mg/dL    Glucose, Ur Negative Negative mg/dL    Ketones, Urine 15 (A) Negative mg/dL    Bilirubin, Urine Small (A) Negative      Blood, Urine Small (A) Negative      Urobilinogen, Urine 4.0 (H) 0.2 - 1.0 EU/dL    Nitrite, Urine Negative Negative      Leukocyte Esterase, Urine Small (A) Negative      Bilirubin Confirmation, UA Positive (A) Negative      WBC, UA 10-20 0 - 4 /hpf    RBC, UA 10-20 0 - 5 /hpf    Epithelial Cells, UA Few Few /lpf    BACTERIA, URINE Negative Negative /hpf    Calcium Oxalate 2+ (A) Negative   Lactic Acid    Collection Time: 06/02/24 11:10 AM   Result Value Ref Range    Lactic Acid, Plasma 0.9 0.4 - 2.0 mmol/L       Labs and radiology       Assessment and Plan   Maddie Goldberg is an 63 y.o.  female who presents with a one week history of worsening abdominal pain associated with bloating, obstipation and chills. Now pain diffuse and associated with nausea and heartburn. Patient is a transfer from a free standing ER.     CT scan

## 2024-06-02 NOTE — ED NOTES
Assumed care from free standing clinic nurse, patient sitting up in bed, states a pain level of 7 on a scale of 0 to 10 to the abdomen. Patient connected to the monitor. Stretcher in the lowest position, call light within reach.

## 2024-06-02 NOTE — ED TRIAGE NOTES
PT. TO ED WITH C/O ABDOMINAL PAIN, NAUSEA, VOMITING AND DIARRHEA FOR ABOUT ONE WEEK.  PT. STATES BEEN TAKING AMOXICILLIN THAT WAS PRESCRIBED AND PREDNISONE FOR SCIATICA.

## 2024-06-02 NOTE — OP NOTE
Operative Note      Patient: Maddie Goldberg  YOB: 1960  MRN: [id number]    Date of Procedure: 6/2/2024    Pre-Op Diagnosis Codes:     * Colon distention [K63.89]     * Cecal volvulus (HCC) [K56.2]    Post-Op Diagnosis: Same       Procedure(s):  EXPLORATORY LAPAROTOMY; RIGHT COLON RESECTION    Surgeon(s):  Kylah Rice MD    Assistant:   Surgical Assistant: Torrey Singer    Anesthesia: General    Estimated Blood Loss (mL): less than 50     Complications: None    Specimens:   ID Type Source Tests Collected by Time Destination   1 : RIGHT COLON Tissue Colon SURGICAL PATHOLOGY Kylah Rice MD 6/2/2024 1614    A : URINE Urine Urine, indwelling catheter CULTURE, URINE Kylah Rice MD 6/2/2024 1550        Implants:  * No implants in log *      Drains:   Closed/Suction Drain Left LLQ Bulb (Active)       Urinary Catheter 06/02/24 (Active)       Findings:  Infection Present At Time Of Surgery (PATOS) (choose all levels that have infection present):  - Organ Space infection (below fascia) present as evidenced by purulent fluid and fluid consistent with infection  Other Findings: pneumatosis of the cecum  This procedure was not performed to treat colon cancer through resection    Detailed Description of Procedure:   The patient was identified in the preoperative holding area and informed consent obtained.  she was given preoperative antibiotics.  she was then taken to the operating room, placed in the supine position and underwent general endotracheal anesthesia without complication.  A Quiroga catheter was then inserted under sterile technique.  Her abdomen was prepped and draped in the usual sterile fashion.  A timeout was preformed.  The patient was identified as Maddie Goldberg and that the procedure was a right hemicolectomy.            A midline incision was made starting 4 cm below her xiphoid and extending proximal to the pubic symphysis.  Electrocautery was used to cut through the subcutaneous tissue and the

## 2024-06-02 NOTE — ANESTHESIA PRE PROCEDURE
administered.  Anesthetic plan and risks discussed with patient (and family, if present.).                        Sam Wood MD   6/2/2024

## 2024-06-03 LAB
ANION GAP SERPL CALC-SCNC: 5 MMOL/L (ref 5–15)
BACTERIA SPEC CULT: NORMAL
BUN SERPL-MCNC: 17 MG/DL (ref 6–20)
BUN/CREAT SERPL: 18 (ref 12–20)
CA-I BLD-MCNC: 8.7 MG/DL (ref 8.5–10.1)
CHLORIDE SERPL-SCNC: 108 MMOL/L (ref 97–108)
CO2 SERPL-SCNC: 27 MMOL/L (ref 21–32)
CREAT SERPL-MCNC: 0.92 MG/DL (ref 0.55–1.02)
ERYTHROCYTE [DISTWIDTH] IN BLOOD BY AUTOMATED COUNT: 13.1 % (ref 11.5–14.5)
GLUCOSE SERPL-MCNC: 117 MG/DL (ref 65–100)
HCT VFR BLD AUTO: 39.2 % (ref 35–47)
HGB BLD-MCNC: 12.7 G/DL (ref 11.5–16)
Lab: NORMAL
MAGNESIUM SERPL-MCNC: 1.9 MG/DL (ref 1.6–2.4)
MCH RBC QN AUTO: 35.2 PG (ref 26–34)
MCHC RBC AUTO-ENTMCNC: 32.4 G/DL (ref 30–36.5)
MCV RBC AUTO: 108.6 FL (ref 80–99)
NRBC # BLD: 0 K/UL (ref 0–0.01)
NRBC BLD-RTO: 0 PER 100 WBC
PHOSPHATE SERPL-MCNC: 3.9 MG/DL (ref 2.6–4.7)
PLATELET # BLD AUTO: 335 K/UL (ref 150–400)
PMV BLD AUTO: 10.6 FL (ref 8.9–12.9)
POTASSIUM SERPL-SCNC: 5 MMOL/L (ref 3.5–5.1)
RBC # BLD AUTO: 3.61 M/UL (ref 3.8–5.2)
SODIUM SERPL-SCNC: 140 MMOL/L (ref 136–145)
WBC # BLD AUTO: 9.8 K/UL (ref 3.6–11)

## 2024-06-03 PROCEDURE — 83735 ASSAY OF MAGNESIUM: CPT

## 2024-06-03 PROCEDURE — 80048 BASIC METABOLIC PNL TOTAL CA: CPT

## 2024-06-03 PROCEDURE — 2500000003 HC RX 250 WO HCPCS: Performed by: SURGERY

## 2024-06-03 PROCEDURE — 6360000002 HC RX W HCPCS: Performed by: SURGERY

## 2024-06-03 PROCEDURE — 99024 POSTOP FOLLOW-UP VISIT: CPT | Performed by: SURGERY

## 2024-06-03 PROCEDURE — 85027 COMPLETE CBC AUTOMATED: CPT

## 2024-06-03 PROCEDURE — 2580000003 HC RX 258: Performed by: SURGERY

## 2024-06-03 PROCEDURE — 1100000000 HC RM PRIVATE

## 2024-06-03 PROCEDURE — 0D9670Z DRAINAGE OF STOMACH WITH DRAINAGE DEVICE, VIA NATURAL OR ARTIFICIAL OPENING: ICD-10-PCS | Performed by: SURGERY

## 2024-06-03 PROCEDURE — 36415 COLL VENOUS BLD VENIPUNCTURE: CPT

## 2024-06-03 PROCEDURE — 94761 N-INVAS EAR/PLS OXIMETRY MLT: CPT

## 2024-06-03 PROCEDURE — 84100 ASSAY OF PHOSPHORUS: CPT

## 2024-06-03 PROCEDURE — 6370000000 HC RX 637 (ALT 250 FOR IP): Performed by: SURGERY

## 2024-06-03 RX ORDER — SODIUM CHLORIDE, SODIUM LACTATE, POTASSIUM CHLORIDE, AND CALCIUM CHLORIDE .6; .31; .03; .02 G/100ML; G/100ML; G/100ML; G/100ML
1000 INJECTION, SOLUTION INTRAVENOUS ONCE
Status: COMPLETED | OUTPATIENT
Start: 2024-06-03 | End: 2024-06-03

## 2024-06-03 RX ADMIN — DEXTROSE MONOHYDRATE, SODIUM CHLORIDE, AND POTASSIUM CHLORIDE 1000 ML: 50; 4.5; 1.49 INJECTION, SOLUTION INTRAVENOUS at 04:36

## 2024-06-03 RX ADMIN — KETOROLAC TROMETHAMINE 15 MG: 15 INJECTION, SOLUTION INTRAMUSCULAR; INTRAVENOUS at 17:15

## 2024-06-03 RX ADMIN — MORPHINE SULFATE 4 MG: 4 INJECTION INTRAVENOUS at 18:50

## 2024-06-03 RX ADMIN — PIPERACILLIN AND TAZOBACTAM 3375 MG: 3; .375 INJECTION, POWDER, LYOPHILIZED, FOR SOLUTION INTRAVENOUS at 04:38

## 2024-06-03 RX ADMIN — PIPERACILLIN AND TAZOBACTAM 3375 MG: 3; .375 INJECTION, POWDER, LYOPHILIZED, FOR SOLUTION INTRAVENOUS at 10:57

## 2024-06-03 RX ADMIN — SODIUM CHLORIDE, PRESERVATIVE FREE 10 ML: 5 INJECTION INTRAVENOUS at 20:21

## 2024-06-03 RX ADMIN — SODIUM CHLORIDE, PRESERVATIVE FREE 20 MG: 5 INJECTION INTRAVENOUS at 09:33

## 2024-06-03 RX ADMIN — SODIUM CHLORIDE, POTASSIUM CHLORIDE, SODIUM LACTATE AND CALCIUM CHLORIDE 1000 ML: 600; 310; 30; 20 INJECTION, SOLUTION INTRAVENOUS at 18:43

## 2024-06-03 RX ADMIN — KETOROLAC TROMETHAMINE 15 MG: 15 INJECTION, SOLUTION INTRAMUSCULAR; INTRAVENOUS at 09:33

## 2024-06-03 RX ADMIN — ENOXAPARIN SODIUM 30 MG: 100 INJECTION SUBCUTANEOUS at 09:33

## 2024-06-03 RX ADMIN — ENOXAPARIN SODIUM 30 MG: 100 INJECTION SUBCUTANEOUS at 20:20

## 2024-06-03 RX ADMIN — MORPHINE SULFATE 4 MG: 4 INJECTION INTRAVENOUS at 11:00

## 2024-06-03 RX ADMIN — DEXTROSE MONOHYDRATE, SODIUM CHLORIDE, AND POTASSIUM CHLORIDE: 50; 4.5; 1.49 INJECTION, SOLUTION INTRAVENOUS at 17:16

## 2024-06-03 RX ADMIN — SODIUM CHLORIDE, PRESERVATIVE FREE 10 ML: 5 INJECTION INTRAVENOUS at 09:34

## 2024-06-03 RX ADMIN — DEXTROSE MONOHYDRATE, SODIUM CHLORIDE, AND POTASSIUM CHLORIDE: 50; 4.5; 1.49 INJECTION, SOLUTION INTRAVENOUS at 10:56

## 2024-06-03 RX ADMIN — LEVOTHYROXINE SODIUM 150 MCG: 0.07 TABLET ORAL at 07:03

## 2024-06-03 RX ADMIN — PIPERACILLIN AND TAZOBACTAM 3375 MG: 3; .375 INJECTION, POWDER, LYOPHILIZED, FOR SOLUTION INTRAVENOUS at 20:20

## 2024-06-03 RX ADMIN — MORPHINE SULFATE 4 MG: 4 INJECTION INTRAVENOUS at 14:55

## 2024-06-03 RX ADMIN — MORPHINE SULFATE 4 MG: 4 INJECTION INTRAVENOUS at 07:02

## 2024-06-03 RX ADMIN — SODIUM CHLORIDE, PRESERVATIVE FREE 20 MG: 5 INJECTION INTRAVENOUS at 20:21

## 2024-06-03 RX ADMIN — MORPHINE SULFATE 4 MG: 4 INJECTION INTRAVENOUS at 23:19

## 2024-06-03 RX ADMIN — KETOROLAC TROMETHAMINE 15 MG: 15 INJECTION, SOLUTION INTRAMUSCULAR; INTRAVENOUS at 02:42

## 2024-06-03 ASSESSMENT — PAIN DESCRIPTION - DESCRIPTORS
DESCRIPTORS: ACHING;CRAMPING;DISCOMFORT
DESCRIPTORS: ACHING;DISCOMFORT;CRAMPING
DESCRIPTORS: ACHING
DESCRIPTORS: ACHING
DESCRIPTORS: ACHING;DISCOMFORT;CRAMPING
DESCRIPTORS: ACHING;CRAMPING
DESCRIPTORS: ACHING

## 2024-06-03 ASSESSMENT — PAIN DESCRIPTION - LOCATION
LOCATION: ABDOMEN

## 2024-06-03 ASSESSMENT — PAIN SCALES - GENERAL
PAINLEVEL_OUTOF10: 8
PAINLEVEL_OUTOF10: 7
PAINLEVEL_OUTOF10: 7
PAINLEVEL_OUTOF10: 8
PAINLEVEL_OUTOF10: 7
PAINLEVEL_OUTOF10: 7
PAINLEVEL_OUTOF10: 6
PAINLEVEL_OUTOF10: 7
PAINLEVEL_OUTOF10: 8
PAINLEVEL_OUTOF10: 7

## 2024-06-03 ASSESSMENT — PAIN DESCRIPTION - ORIENTATION
ORIENTATION: ANTERIOR
ORIENTATION: MID

## 2024-06-03 NOTE — CONSULTS
Medical Consultation Note    NAME:   Maddie Goldberg   : 1960   MRN: [id number]     Date/Time: 6/3/2024 5:09 PM    Patient PCP: No primary care provider on file.    ______________________________________________________________________  Given the patient's current clinical presentation, I have a high level of concern for decompensation if discharged from the emergency department.  Complex decision making was performed, which includes reviewing the patient's available past medical records, laboratory results, and x-ray films.       My assessment of this patient's clinical condition and my plan of care is as follows.    Assessment / Plan:    Hypertension  Currently very well-controlled without home lisinopril ordered    Hypothyroidism  Hold p.o. Synthroid and can restart once NG tube has been discontinued per surgery.  If NG tube will be in place for a slightly prolonged time then will start IV Synthroid if needed.    Cecal volvulus  POD #1 s/p ex lap and right colon resection.  Management per general surgery.    Appreciate consultation and continue to monitor in hospital stay.      Medical Decision Making:   I personally reviewed labs: CBC, BMP  I personally reviewed imaging:  I personally reviewed EKG:  Toxic drug monitoring:       Subjective:   CHIEF COMPLAINT: Abdominal pain    HISTORY OF PRESENT ILLNESS:     Maddie Goldberg is a 63 y.o.  female with PMHx hypertension, morbid obesity and hypothyroidism presenting initially with bowel obstruction s/p exploratory laparotomy on .    Consultation placed per general surgery for possible management for hypertension and hypothyroidism.    Patient currently has NG tube in place and is alert and oriented x 4 with abdominal pain currently controlled.  Denies any chest pain, shortness of breath, fever/chills.    Denies any previous history of CAD, CVA.  States that she is only on low-dose lisinopril and Synthroid for hypothyroidism.    Past Medical History:  136 - 145 mmol/L    Potassium 5.0 3.5 - 5.1 mmol/L    Chloride 108 97 - 108 mmol/L    CO2 27 21 - 32 mmol/L    Anion Gap 5 5 - 15 mmol/L    Glucose 117 (H) 65 - 100 mg/dL    BUN 17 6 - 20 mg/dL    Creatinine 0.92 0.55 - 1.02 mg/dL    BUN/Creatinine Ratio 18 12 - 20      Est, Glom Filt Rate 71 >60 ml/min/1.73m2    Calcium 8.7 8.5 - 10.1 mg/dL   Magnesium    Collection Time: 06/03/24  1:00 PM   Result Value Ref Range    Magnesium 1.9 1.6 - 2.4 mg/dL   Phosphorus    Collection Time: 06/03/24  1:00 PM   Result Value Ref Range    Phosphorus 3.9 2.6 - 4.7 mg/dL   CBC    Collection Time: 06/03/24  1:00 PM   Result Value Ref Range    WBC 9.8 3.6 - 11.0 K/uL    RBC 3.61 (L) 3.80 - 5.20 M/uL    Hemoglobin 12.7 11.5 - 16.0 g/dL    Hematocrit 39.2 35.0 - 47.0 %    .6 (H) 80.0 - 99.0 FL    MCH 35.2 (H) 26.0 - 34.0 PG    MCHC 32.4 30.0 - 36.5 g/dL    RDW 13.1 11.5 - 14.5 %    Platelets 335 150 - 400 K/uL    MPV 10.6 8.9 - 12.9 FL    Nucleated RBCs 0.0 0.0  WBC    nRBC 0.00 0.00 - 0.01 K/uL         CT ABDOMEN PELVIS W IV CONTRAST Additional Contrast? None    Addendum Date: 6/2/2024    Addendum:  Correction: Dilatation of the loop in the lower pelvis is actually the cecum with the cecum located in the left lower quadrant and oriented horizontally. The area with pneumatosis is the cecum. 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 Maximal diameter 11 cm. Dr. Sin has seen the patient.     Result Date: 6/2/2024  EXAM: CT ABDOMEN PELVIS W IV CONTRAST INDICATION: abdominal pain, r/o diverticulitis COMPARISON: 2019 CONTRAST: 100 mL of Isovue-370. ORAL CONTRAST: None TECHNIQUE: Following intravenous administration of contrast, thin axial images were obtained through the abdomen and pelvis. Coronal and sagittal reconstructions were generated. CT dose reduction was achieved through use of a standardized protocol tailored for this examination and automatic exposure control for dose

## 2024-06-03 NOTE — PROGRESS NOTES
4 Eyes Skin Assessment     NAME:  Maddie Goldberg  YOB: 1960  MEDICAL RECORD NUMBER:  [id number]    The patient is being assessed for  Admission    I agree that at least one RN has performed a thorough Head to Toe Skin Assessment on the patient. ALL assessment sites listed below have been assessed.      Areas assessed by both nurses:    Head, Face, Ears, Shoulders, Back, Chest, Arms, Elbows, Hands, Sacrum. Buttock, Coccyx, Ischium, Legs. Feet and Heels, Under Medical Devices , and Other ***        Does the Patient have a Wound? No noted wound(s)       Rashaad Prevention initiated by RN: Yes  Wound Care Orders initiated by RN: No    Pressure Injury (Stage 3,4, Unstageable, DTI, NWPT, and Complex wounds) if present, place Wound referral order by RN under : No    New Ostomies, if present place, Ostomy referral order under : No     Nurse 1 eSignature: Electronically signed by Amy Sutton RN on 6/3/24 at 7:15 AM EDT    **SHARE this note so that the co-signing nurse can place an eSignature**    Nurse 2 eSignature: {Esignature:[id number]}

## 2024-06-03 NOTE — CARE COORDINATION
06/03/24 1004   Service Assessment   Patient Orientation Alert and Oriented   Cognition Alert   History Provided By Patient   Primary Caregiver Self   Accompanied By/Relationship alone   Support Systems Children   Patient's Healthcare Decision Maker is: Named in Scanned ACP Document   PCP Verified by CM Yes  (is seen by physcian at job site or [facility])   Last Visit to PCP Within last 3 months   Prior Functional Level Independent in ADLs/IADLs   Current Functional Level Independent in ADLs/IADLs   Can patient return to prior living arrangement Yes   Ability to make needs known: Good   Family able to assist with home care needs: Yes   Would you like for me to discuss the discharge plan with any other family members/significant others, and if so, who? Yes   Financial Resources Other (Comment)  ([facility])   Community Resources None   Social/Functional History   Lives With Alone   Type of Home House   Home Equipment None   ADL Assistance Independent   Homemaking Assistance Independent   Ambulation Assistance Independent   Transfer Assistance Independent   Active  Yes     Lives at address on file, alone. Independent with care. Uses [facility] in [place] for pharmacy.    Current discharge plan once clinically stable is home self care.

## 2024-06-03 NOTE — PROGRESS NOTES
Progress Note    Patient: Maddie Goldberg MRN: [id number]  SSN: [id number]    YOB: 1960  Age: 63 y.o.  Sex: female      Admit Date: 6/2/2024    LOS: 1 day     Subjective:     Patient is POD#1 s/p Procedure(s) (LRB):  EXPLORATORY LAPAROTOMY; RIGHT COLON RESECTION (N/A).     Doing well. Pain well controlled with current pain medications.  Nausea well controlled.  Adequate urine output via Quiroga catheter.  NG tube in place with moderate bilious output.  Patient denies nausea or vomiting.  She denies any flatus or bowel movement.    Objective:     Vitals:    06/03/24 0014 06/03/24 0430 06/03/24 0702 06/03/24 0754   BP:  106/62  116/73   Pulse:  74  76   Resp: 16 16 16 18   Temp:  97.8 °F (36.6 °C)  97.9 °F (36.6 °C)   TempSrc:  Axillary  Oral   SpO2:    100%   Weight:       Height:            Intake and Output:  Current Shift: 06/03 0701 - 06/03 1900  In: -   Out: 160 [Drains:160]  Last three shifts: 06/01 1901 - 06/03 0700  In: 4000 [I.V.:3000]  Out: 1295 [Urine:950; Drains:345]    Physical Exam:   General: alert, oriented, in no acute distress  Neck: supple, no masses, no JVD  Cardiovascular: regular rate and rhythm  Pulmonary: unlabored breathing, equal chest rise bilaterally  Abdomen: soft, appropriately tender, non distended, incisions clean, dry, and intact, drain serosanguineous, NG tube in place with bilious output  Extremities: no swelling, pulses equal and palpable in all extremities    Lab/Data Review:  Recent Results (from the past 24 hour(s))   Lactic Acid    Collection Time: 06/02/24 11:10 AM   Result Value Ref Range    Lactic Acid, Plasma 0.9 0.4 - 2.0 mmol/L   Blood Culture 1    Collection Time: 06/02/24 11:16 AM    Specimen: Blood   Result Value Ref Range    Special Requests No Special Requests      Culture No growth after 15 hours     Blood Culture 2    Collection Time: 06/02/24 11:17 AM    Specimen: Blood   Result Value Ref Range    Special Requests No Special Requests      Culture No

## 2024-06-04 LAB
ANION GAP SERPL CALC-SCNC: 2 MMOL/L (ref 5–15)
BUN SERPL-MCNC: 11 MG/DL (ref 6–20)
BUN/CREAT SERPL: 15 (ref 12–20)
CA-I BLD-MCNC: 8.5 MG/DL (ref 8.5–10.1)
CHLORIDE SERPL-SCNC: 107 MMOL/L (ref 97–108)
CO2 SERPL-SCNC: 25 MMOL/L (ref 21–32)
CREAT SERPL-MCNC: 0.71 MG/DL (ref 0.55–1.02)
ERYTHROCYTE [DISTWIDTH] IN BLOOD BY AUTOMATED COUNT: 12.9 % (ref 11.5–14.5)
GLUCOSE SERPL-MCNC: 111 MG/DL (ref 65–100)
HCT VFR BLD AUTO: 41.2 % (ref 35–47)
HGB BLD-MCNC: 12.9 G/DL (ref 11.5–16)
MAGNESIUM SERPL-MCNC: 1.8 MG/DL (ref 1.6–2.4)
MCH RBC QN AUTO: 35.4 PG (ref 26–34)
MCHC RBC AUTO-ENTMCNC: 31.3 G/DL (ref 30–36.5)
MCV RBC AUTO: 113.2 FL (ref 80–99)
NRBC # BLD: 0 K/UL (ref 0–0.01)
NRBC BLD-RTO: 0 PER 100 WBC
PHOSPHATE SERPL-MCNC: 2.4 MG/DL (ref 2.6–4.7)
PLATELET # BLD AUTO: 279 K/UL (ref 150–400)
PMV BLD AUTO: 10.2 FL (ref 8.9–12.9)
POTASSIUM SERPL-SCNC: 4.1 MMOL/L (ref 3.5–5.1)
RBC # BLD AUTO: 3.64 M/UL (ref 3.8–5.2)
SODIUM SERPL-SCNC: 134 MMOL/L (ref 136–145)
WBC # BLD AUTO: 13.3 K/UL (ref 3.6–11)

## 2024-06-04 PROCEDURE — 94761 N-INVAS EAR/PLS OXIMETRY MLT: CPT

## 2024-06-04 PROCEDURE — 99024 POSTOP FOLLOW-UP VISIT: CPT | Performed by: SURGERY

## 2024-06-04 PROCEDURE — 84100 ASSAY OF PHOSPHORUS: CPT

## 2024-06-04 PROCEDURE — 83735 ASSAY OF MAGNESIUM: CPT

## 2024-06-04 PROCEDURE — 6360000002 HC RX W HCPCS: Performed by: SURGERY

## 2024-06-04 PROCEDURE — 2580000003 HC RX 258: Performed by: SURGERY

## 2024-06-04 PROCEDURE — 36415 COLL VENOUS BLD VENIPUNCTURE: CPT

## 2024-06-04 PROCEDURE — 2500000003 HC RX 250 WO HCPCS: Performed by: SURGERY

## 2024-06-04 PROCEDURE — 6370000000 HC RX 637 (ALT 250 FOR IP): Performed by: SURGERY

## 2024-06-04 PROCEDURE — 85027 COMPLETE CBC AUTOMATED: CPT

## 2024-06-04 PROCEDURE — 1100000000 HC RM PRIVATE

## 2024-06-04 PROCEDURE — 80048 BASIC METABOLIC PNL TOTAL CA: CPT

## 2024-06-04 RX ORDER — KETOROLAC TROMETHAMINE 15 MG/ML
15 INJECTION, SOLUTION INTRAMUSCULAR; INTRAVENOUS EVERY 6 HOURS
Status: DISCONTINUED | OUTPATIENT
Start: 2024-06-04 | End: 2024-06-04

## 2024-06-04 RX ORDER — OXYCODONE HYDROCHLORIDE 5 MG/1
5 TABLET ORAL EVERY 4 HOURS PRN
Status: DISPENSED | OUTPATIENT
Start: 2024-06-04 | End: 2024-06-06

## 2024-06-04 RX ORDER — SODIUM CHLORIDE, SODIUM LACTATE, POTASSIUM CHLORIDE, CALCIUM CHLORIDE 600; 310; 30; 20 MG/100ML; MG/100ML; MG/100ML; MG/100ML
INJECTION, SOLUTION INTRAVENOUS CONTINUOUS
Status: DISCONTINUED | OUTPATIENT
Start: 2024-06-04 | End: 2024-06-16

## 2024-06-04 RX ADMIN — KETOROLAC TROMETHAMINE 15 MG: 15 INJECTION, SOLUTION INTRAMUSCULAR; INTRAVENOUS at 09:18

## 2024-06-04 RX ADMIN — ENOXAPARIN SODIUM 30 MG: 100 INJECTION SUBCUTANEOUS at 09:18

## 2024-06-04 RX ADMIN — DEXTROSE MONOHYDRATE, SODIUM CHLORIDE, AND POTASSIUM CHLORIDE: 50; 4.5; 1.49 INJECTION, SOLUTION INTRAVENOUS at 06:18

## 2024-06-04 RX ADMIN — OXYCODONE HYDROCHLORIDE 5 MG: 5 TABLET ORAL at 12:40

## 2024-06-04 RX ADMIN — PIPERACILLIN AND TAZOBACTAM 3375 MG: 3; .375 INJECTION, POWDER, LYOPHILIZED, FOR SOLUTION INTRAVENOUS at 18:12

## 2024-06-04 RX ADMIN — OXYCODONE HYDROCHLORIDE 5 MG: 5 TABLET ORAL at 19:57

## 2024-06-04 RX ADMIN — PIPERACILLIN AND TAZOBACTAM 3375 MG: 3; .375 INJECTION, POWDER, LYOPHILIZED, FOR SOLUTION INTRAVENOUS at 12:37

## 2024-06-04 RX ADMIN — ENOXAPARIN SODIUM 30 MG: 100 INJECTION SUBCUTANEOUS at 20:57

## 2024-06-04 RX ADMIN — SODIUM CHLORIDE, POTASSIUM CHLORIDE, SODIUM LACTATE AND CALCIUM CHLORIDE: 600; 310; 30; 20 INJECTION, SOLUTION INTRAVENOUS at 09:19

## 2024-06-04 RX ADMIN — SODIUM CHLORIDE, PRESERVATIVE FREE 10 ML: 5 INJECTION INTRAVENOUS at 09:39

## 2024-06-04 RX ADMIN — MORPHINE SULFATE 4 MG: 4 INJECTION INTRAVENOUS at 17:08

## 2024-06-04 RX ADMIN — KETOROLAC TROMETHAMINE 15 MG: 15 INJECTION, SOLUTION INTRAMUSCULAR; INTRAVENOUS at 01:25

## 2024-06-04 RX ADMIN — LEVOTHYROXINE SODIUM 150 MCG: 0.07 TABLET ORAL at 06:18

## 2024-06-04 RX ADMIN — MORPHINE SULFATE 4 MG: 4 INJECTION INTRAVENOUS at 06:18

## 2024-06-04 RX ADMIN — MORPHINE SULFATE 4 MG: 4 INJECTION INTRAVENOUS at 09:37

## 2024-06-04 RX ADMIN — KETOROLAC TROMETHAMINE 15 MG: 15 INJECTION, SOLUTION INTRAMUSCULAR; INTRAVENOUS at 12:38

## 2024-06-04 RX ADMIN — PIPERACILLIN AND TAZOBACTAM 3375 MG: 3; .375 INJECTION, POWDER, LYOPHILIZED, FOR SOLUTION INTRAVENOUS at 03:27

## 2024-06-04 RX ADMIN — DEXTROSE MONOHYDRATE, SODIUM CHLORIDE, AND POTASSIUM CHLORIDE: 50; 4.5; 1.49 INJECTION, SOLUTION INTRAVENOUS at 03:30

## 2024-06-04 RX ADMIN — SODIUM CHLORIDE, POTASSIUM CHLORIDE, SODIUM LACTATE AND CALCIUM CHLORIDE: 600; 310; 30; 20 INJECTION, SOLUTION INTRAVENOUS at 17:09

## 2024-06-04 RX ADMIN — SODIUM CHLORIDE, PRESERVATIVE FREE 10 ML: 5 INJECTION INTRAVENOUS at 20:57

## 2024-06-04 RX ADMIN — SODIUM CHLORIDE, PRESERVATIVE FREE 20 MG: 5 INJECTION INTRAVENOUS at 20:56

## 2024-06-04 RX ADMIN — MORPHINE SULFATE 4 MG: 4 INJECTION INTRAVENOUS at 21:00

## 2024-06-04 ASSESSMENT — PAIN SCALES - GENERAL
PAINLEVEL_OUTOF10: 6
PAINLEVEL_OUTOF10: 6
PAINLEVEL_OUTOF10: 5
PAINLEVEL_OUTOF10: 7
PAINLEVEL_OUTOF10: 9
PAINLEVEL_OUTOF10: 7
PAINLEVEL_OUTOF10: 5
PAINLEVEL_OUTOF10: 7
PAINLEVEL_OUTOF10: 6
PAINLEVEL_OUTOF10: 6
PAINLEVEL_OUTOF10: 7
PAINLEVEL_OUTOF10: 7
PAINLEVEL_OUTOF10: 8

## 2024-06-04 ASSESSMENT — PAIN DESCRIPTION - LOCATION
LOCATION: ABDOMEN

## 2024-06-04 ASSESSMENT — PAIN DESCRIPTION - DESCRIPTORS
DESCRIPTORS: SHARP
DESCRIPTORS: ACHING
DESCRIPTORS: CRAMPING
DESCRIPTORS: ACHING;CRAMPING;DISCOMFORT
DESCRIPTORS: CRAMPING
DESCRIPTORS: CRAMPING;SHARP
DESCRIPTORS: ACHING;DISCOMFORT;CRAMPING
DESCRIPTORS: CRAMPING
DESCRIPTORS: ACHING;CRAMPING;DISCOMFORT
DESCRIPTORS: ACHING;CRAMPING;DISCOMFORT

## 2024-06-04 ASSESSMENT — PAIN - FUNCTIONAL ASSESSMENT
PAIN_FUNCTIONAL_ASSESSMENT: PREVENTS OR INTERFERES SOME ACTIVE ACTIVITIES AND ADLS
PAIN_FUNCTIONAL_ASSESSMENT: ACTIVITIES ARE NOT PREVENTED
PAIN_FUNCTIONAL_ASSESSMENT: PREVENTS OR INTERFERES SOME ACTIVE ACTIVITIES AND ADLS

## 2024-06-04 ASSESSMENT — PAIN DESCRIPTION - ORIENTATION
ORIENTATION: ANTERIOR
ORIENTATION: ANTERIOR
ORIENTATION: MID
ORIENTATION: ANTERIOR
ORIENTATION: MID;LOWER
ORIENTATION: ANTERIOR
ORIENTATION: LEFT;LOWER
ORIENTATION: MID;LOWER
ORIENTATION: MID;LOWER

## 2024-06-04 ASSESSMENT — PAIN SCALES - WONG BAKER
WONGBAKER_NUMERICALRESPONSE: HURTS A LITTLE BIT
WONGBAKER_NUMERICALRESPONSE: HURTS LITTLE MORE

## 2024-06-04 NOTE — PROGRESS NOTES
Hospitalist Progress Note    NAME:   Maddie Goldberg   : 1960   MRN: [id number]     Date/Time: 2024 3:48 PM  Patient PCP: No primary care provider on file.    Estimated discharge date:  Barriers:       Assessment / Plan:    Hypertension  Currently very well-controlled without home lisinopril ordered     Hypothyroidism  Hold p.o. Synthroid and can restart once NG tube has been discontinued per surgery.  If NG tube will be in place for a slightly prolonged time then will start IV Synthroid if needed.     Cecal volvulus  POD #2 s/p ex lap and right colon resection.  Management per general surgery.    Medical Decision Making:   I personally reviewed labs:  I personally reviewed imaging:  I personally reviewed EKG:  Toxic drug monitoring:   Discussed case with: Patient      Subjective:     Chief Complaint / Reason for Physician Visit  \" Abdominal pain\".  Discussed with RN events overnight.     6/3 - Medical Consultation   63 y.o.  female with PMHx hypertension, morbid obesity and hypothyroidism presenting initially with cecal volvulus s/p exploratory laparotomy on .     Consultation placed per general surgery for possible management for hypertension and hypothyroidism.     Patient currently has NG tube in place and is alert and oriented x 4 with abdominal pain currently controlled.  Denies any chest pain, shortness of breath, fever/chills.    /  Continues to have NG tube in place and is alert and oriented.    No overnight fever/chills, chest pain, shortness of breath noted.    Blood pressure is currently very well-controlled.  Possibility of NG tube to be discontinued today.    Objective:     VITALS:   Last 24hrs VS reviewed since prior progress note. Most recent are:  Patient Vitals for the past 24 hrs:   BP Temp Temp src Pulse Resp SpO2   24 1415 113/67 99 °F (37.2 °C) Oral (!) 105 18 97 %   24 1238 -- -- -- -- 18 --   24 0937 -- -- -- -- 18 --   24 0843 120/73 99.1 °F (37.3  °C) Oral 99 18 98 %   06/04/24 0648 -- -- -- -- 18 --   06/04/24 0618 -- -- -- -- 18 --   06/04/24 0327 111/69 98.6 °F (37 °C) Oral 89 18 100 %   06/04/24 0011 114/73 98.2 °F (36.8 °C) Oral 90 18 97 %   06/03/24 2349 -- -- -- -- 18 --   06/03/24 2319 -- -- -- -- 18 --   06/03/24 2017 123/70 98.4 °F (36.9 °C) Oral 86 18 96 %   06/03/24 1920 -- -- -- -- 18 --         Intake/Output Summary (Last 24 hours) at 6/4/2024 1548  Last data filed at 6/4/2024 0830  Gross per 24 hour   Intake 5152.92 ml   Output 1760 ml   Net 3392.92 ml        I had a face to face encounter and independently examined this patient on 6/4/2024, as outlined below:  PHYSICAL EXAM:  General: Alert, cooperative  EENT:  EOMI. Anicteric sclerae.  Resp:  CTA bilaterally, no wheezing or rales.  No accessory muscle use  CV:  Regular  rhythm,  No edema  GI:  Soft, Non distended, Non tender.  +Bowel sounds  Neurologic:  Alert and oriented X 3, normal speech,   Psych:   Good insight. Not anxious nor agitated  Skin:  No rashes.  No jaundice    Reviewed most current lab test results and cultures  YES  Reviewed most current radiology test results   YES  Review and summation of old records today    NO  Reviewed patient's current orders and MAR    YES  PMH/SH reviewed - no change compared to H&P    Procedures: see electronic medical records for all procedures/Xrays and details which were not copied into this note but were reviewed prior to creation of Plan.      LABS:  I reviewed today's most current labs and imaging studies.  Pertinent labs include:  Recent Labs     06/02/24  0949 06/03/24  1300 06/04/24  0919   WBC 8.3 9.8 13.3*   HGB 13.5 12.7 12.9   HCT 39.5 39.2 41.2    335 279     Recent Labs     06/02/24  0949 06/03/24  1300    140   K 3.6 5.0    108   CO2 27 27   GLUCOSE 165* 117*   BUN 15 17   CREATININE 0.74 0.92   CALCIUM 9.2 8.7   MG  --  1.9   PHOS  --  3.9   BILITOT 0.5  --    AST 11*  --    ALT 31  --        Signed: PACO

## 2024-06-04 NOTE — PROGRESS NOTES
SANDYB from Dr Sigala to cancel toradol order d/t patient complaint of abd cramping with administration, even with dilution and slowed administration.

## 2024-06-04 NOTE — PLAN OF CARE
Problem: Pain  Goal: Verbalizes/displays adequate comfort level or baseline comfort level  Outcome: Progressing  Flowsheets (Taken 6/3/2024 2017)  Verbalizes/displays adequate comfort level or baseline comfort level:   Encourage patient to monitor pain and request assistance   Assess pain using appropriate pain scale   Administer analgesics based on type and severity of pain and evaluate response     Problem: Discharge Planning  Goal: Discharge to home or other facility with appropriate resources  Outcome: Progressing     Problem: Safety - Adult  Goal: Free from fall injury  Outcome: Progressing     Problem: Skin/Tissue Integrity  Goal: Absence of new skin breakdown  Description: 1.  Monitor for areas of redness and/or skin breakdown  2.  Assess vascular access sites hourly  3.  Every 4-6 hours minimum:  Change oxygen saturation probe site  4.  Every 4-6 hours:  If on nasal continuous positive airway pressure, respiratory therapy assess nares and determine need for appliance change or resting period.  Outcome: Progressing

## 2024-06-04 NOTE — PROGRESS NOTES
Progress Note    Patient: Maddie Goldberg MRN: [id number]  SSN: [id number]    YOB: 1960  Age: 63 y.o.  Sex: female      Admit Date: 6/2/2024    LOS: 2 days     Subjective:     Patient is POD#2 s/p Procedure(s) (LRB):  EXPLORATORY LAPAROTOMY; RIGHT COLON RESECTION (N/A).     Doing well. Pain well controlled with current pain medications.  Nausea well controlled.  Adequate urine output via Quiroga catheter.  NG tube in place with minimal output.  Patient denies nausea or vomiting. Passing flatus.    Objective:     Vitals:    06/04/24 0648 06/04/24 0843 06/04/24 0937 06/04/24 1238   BP:  120/73     Pulse:  99     Resp: 18 18 18 18   Temp:  99.1 °F (37.3 °C)     TempSrc:  Oral     SpO2:  98%     Weight:       Height:            Intake and Output:  Current Shift: No intake/output data recorded.  Last three shifts: 06/02 1901 - 06/04 0700  In: 5152.9 [P.O.:50; I.V.:3912.5]  Out: 2485 [Urine:1420; Drains:815]    Physical Exam:   General: alert, oriented, in no acute distress  Neck: supple, no masses, no JVD  Cardiovascular: regular rate and rhythm  Pulmonary: unlabored breathing, equal chest rise bilaterally  Abdomen: soft, appropriately tender, non distended, incisions clean, dry, and intact, drain serosanguineous, NG tube in place with minimal output  Extremities: no swelling, pulses equal and palpable in all extremities    Lab/Data Review:  Recent Results (from the past 24 hour(s))   CBC    Collection Time: 06/04/24  9:19 AM   Result Value Ref Range    WBC 13.3 (H) 3.6 - 11.0 K/uL    RBC 3.64 (L) 3.80 - 5.20 M/uL    Hemoglobin 12.9 11.5 - 16.0 g/dL    Hematocrit 41.2 35.0 - 47.0 %    .2 (H) 80.0 - 99.0 FL    MCH 35.4 (H) 26.0 - 34.0 PG    MCHC 31.3 30.0 - 36.5 g/dL    RDW 12.9 11.5 - 14.5 %    Platelets 279 150 - 400 K/uL    MPV 10.2 8.9 - 12.9 FL    Nucleated RBCs 0.0 0.0  WBC    nRBC 0.00 0.00 - 0.01 K/uL          Assessment:     Principal Problem:    Cecal volvulus (HCC)  Active Problems:     Large bowel ischemia (HCC)  Resolved Problems:    * No resolved hospital problems. *      POD#1 s/p Procedure(s) (LRB):  EXPLORATORY LAPAROTOMY; RIGHT COLON RESECTION (N/A)    Plan:     Remove NGT  Clear liquids  Remove Quiroga  DVT/GI prophylaxis  Ambulate, out of bed to chair  Incentive spirometry  Pain and nausea control  Dispo: pending return of bowel function    Signed By: Alejandra Sigala DO     June 4, 2024

## 2024-06-04 NOTE — PLAN OF CARE
Problem: Pain  Goal: Verbalizes/displays adequate comfort level or baseline comfort level  Outcome: Progressing     Problem: Discharge Planning  Goal: Discharge to home or other facility with appropriate resources  Outcome: Progressing  Flowsheets (Taken 6/4/2024 0830)  Discharge to home or other facility with appropriate resources: Identify barriers to discharge with patient and caregiver     Problem: Safety - Adult  Goal: Free from fall injury  Outcome: Progressing     Problem: Skin/Tissue Integrity  Goal: Absence of new skin breakdown  Description: 1.  Monitor for areas of redness and/or skin breakdown  2.  Assess vascular access sites hourly  3.  Every 4-6 hours minimum:  Change oxygen saturation probe site  4.  Every 4-6 hours:  If on nasal continuous positive airway pressure, respiratory therapy assess nares and determine need for appliance change or resting period.  Outcome: Progressing

## 2024-06-05 LAB
ANION GAP SERPL CALC-SCNC: 6 MMOL/L (ref 5–15)
BUN SERPL-MCNC: 10 MG/DL (ref 6–20)
BUN/CREAT SERPL: 15 (ref 12–20)
CA-I BLD-MCNC: 8.5 MG/DL (ref 8.5–10.1)
CHLORIDE SERPL-SCNC: 106 MMOL/L (ref 97–108)
CO2 SERPL-SCNC: 24 MMOL/L (ref 21–32)
CREAT SERPL-MCNC: 0.65 MG/DL (ref 0.55–1.02)
ERYTHROCYTE [DISTWIDTH] IN BLOOD BY AUTOMATED COUNT: 12.8 % (ref 11.5–14.5)
GLUCOSE SERPL-MCNC: 111 MG/DL (ref 65–100)
HCT VFR BLD AUTO: 36.4 % (ref 35–47)
HGB BLD-MCNC: 12.1 G/DL (ref 11.5–16)
MAGNESIUM SERPL-MCNC: 1.7 MG/DL (ref 1.6–2.4)
MCH RBC QN AUTO: 34.8 PG (ref 26–34)
MCHC RBC AUTO-ENTMCNC: 33.2 G/DL (ref 30–36.5)
MCV RBC AUTO: 104.6 FL (ref 80–99)
NRBC # BLD: 0 K/UL (ref 0–0.01)
NRBC BLD-RTO: 0 PER 100 WBC
PHOSPHATE SERPL-MCNC: 3.2 MG/DL (ref 2.6–4.7)
PLATELET # BLD AUTO: 327 K/UL (ref 150–400)
PMV BLD AUTO: 10.4 FL (ref 8.9–12.9)
POTASSIUM SERPL-SCNC: 4 MMOL/L (ref 3.5–5.1)
RBC # BLD AUTO: 3.48 M/UL (ref 3.8–5.2)
SODIUM SERPL-SCNC: 136 MMOL/L (ref 136–145)
WBC # BLD AUTO: 13.3 K/UL (ref 3.6–11)

## 2024-06-05 PROCEDURE — 84100 ASSAY OF PHOSPHORUS: CPT

## 2024-06-05 PROCEDURE — 94761 N-INVAS EAR/PLS OXIMETRY MLT: CPT

## 2024-06-05 PROCEDURE — 6360000002 HC RX W HCPCS: Performed by: SURGERY

## 2024-06-05 PROCEDURE — 36415 COLL VENOUS BLD VENIPUNCTURE: CPT

## 2024-06-05 PROCEDURE — 6370000000 HC RX 637 (ALT 250 FOR IP): Performed by: SURGERY

## 2024-06-05 PROCEDURE — 85027 COMPLETE CBC AUTOMATED: CPT

## 2024-06-05 PROCEDURE — 80048 BASIC METABOLIC PNL TOTAL CA: CPT

## 2024-06-05 PROCEDURE — 1100000000 HC RM PRIVATE

## 2024-06-05 PROCEDURE — 83735 ASSAY OF MAGNESIUM: CPT

## 2024-06-05 PROCEDURE — 2580000003 HC RX 258: Performed by: SURGERY

## 2024-06-05 PROCEDURE — 2500000003 HC RX 250 WO HCPCS: Performed by: SURGERY

## 2024-06-05 PROCEDURE — 99024 POSTOP FOLLOW-UP VISIT: CPT | Performed by: SURGERY

## 2024-06-05 RX ORDER — HYDROMORPHONE HYDROCHLORIDE 1 MG/ML
1 INJECTION, SOLUTION INTRAMUSCULAR; INTRAVENOUS; SUBCUTANEOUS EVERY 4 HOURS PRN
Status: DISCONTINUED | OUTPATIENT
Start: 2024-06-05 | End: 2024-06-05

## 2024-06-05 RX ORDER — MAGNESIUM SULFATE HEPTAHYDRATE 40 MG/ML
2000 INJECTION, SOLUTION INTRAVENOUS ONCE
Status: COMPLETED | OUTPATIENT
Start: 2024-06-05 | End: 2024-06-05

## 2024-06-05 RX ADMIN — PIPERACILLIN AND TAZOBACTAM 3375 MG: 3; .375 INJECTION, POWDER, LYOPHILIZED, FOR SOLUTION INTRAVENOUS at 01:40

## 2024-06-05 RX ADMIN — MORPHINE SULFATE 4 MG: 4 INJECTION INTRAVENOUS at 12:05

## 2024-06-05 RX ADMIN — OXYCODONE HYDROCHLORIDE 5 MG: 5 TABLET ORAL at 20:34

## 2024-06-05 RX ADMIN — SODIUM CHLORIDE, POTASSIUM CHLORIDE, SODIUM LACTATE AND CALCIUM CHLORIDE: 600; 310; 30; 20 INJECTION, SOLUTION INTRAVENOUS at 01:31

## 2024-06-05 RX ADMIN — MORPHINE SULFATE 4 MG: 4 INJECTION INTRAVENOUS at 01:32

## 2024-06-05 RX ADMIN — SODIUM CHLORIDE, PRESERVATIVE FREE 10 ML: 5 INJECTION INTRAVENOUS at 20:35

## 2024-06-05 RX ADMIN — OXYCODONE HYDROCHLORIDE 5 MG: 5 TABLET ORAL at 16:46

## 2024-06-05 RX ADMIN — MAGNESIUM SULFATE HEPTAHYDRATE 2000 MG: 40 INJECTION, SOLUTION INTRAVENOUS at 09:09

## 2024-06-05 RX ADMIN — ENOXAPARIN SODIUM 30 MG: 100 INJECTION SUBCUTANEOUS at 20:35

## 2024-06-05 RX ADMIN — SODIUM CHLORIDE, POTASSIUM CHLORIDE, SODIUM LACTATE AND CALCIUM CHLORIDE: 600; 310; 30; 20 INJECTION, SOLUTION INTRAVENOUS at 16:46

## 2024-06-05 RX ADMIN — PIPERACILLIN AND TAZOBACTAM 3375 MG: 3; .375 INJECTION, POWDER, LYOPHILIZED, FOR SOLUTION INTRAVENOUS at 17:34

## 2024-06-05 RX ADMIN — LEVOTHYROXINE SODIUM 150 MCG: 0.07 TABLET ORAL at 05:38

## 2024-06-05 RX ADMIN — PIPERACILLIN AND TAZOBACTAM 3375 MG: 3; .375 INJECTION, POWDER, LYOPHILIZED, FOR SOLUTION INTRAVENOUS at 09:12

## 2024-06-05 RX ADMIN — ONDANSETRON 4 MG: 2 INJECTION INTRAMUSCULAR; INTRAVENOUS at 14:48

## 2024-06-05 RX ADMIN — ENOXAPARIN SODIUM 30 MG: 100 INJECTION SUBCUTANEOUS at 09:12

## 2024-06-05 RX ADMIN — MORPHINE SULFATE 4 MG: 4 INJECTION INTRAVENOUS at 09:04

## 2024-06-05 RX ADMIN — FAMOTIDINE 20 MG: 20 TABLET, FILM COATED ORAL at 09:12

## 2024-06-05 RX ADMIN — HYDROMORPHONE HYDROCHLORIDE 1 MG: 1 INJECTION, SOLUTION INTRAMUSCULAR; INTRAVENOUS; SUBCUTANEOUS at 22:28

## 2024-06-05 RX ADMIN — SODIUM CHLORIDE, POTASSIUM CHLORIDE, SODIUM LACTATE AND CALCIUM CHLORIDE: 600; 310; 30; 20 INJECTION, SOLUTION INTRAVENOUS at 09:06

## 2024-06-05 RX ADMIN — FAMOTIDINE 20 MG: 20 TABLET, FILM COATED ORAL at 20:34

## 2024-06-05 RX ADMIN — MORPHINE SULFATE 4 MG: 4 INJECTION INTRAVENOUS at 05:40

## 2024-06-05 RX ADMIN — HYDROMORPHONE HYDROCHLORIDE 1 MG: 1 INJECTION, SOLUTION INTRAMUSCULAR; INTRAVENOUS; SUBCUTANEOUS at 14:37

## 2024-06-05 RX ADMIN — HYDROMORPHONE HYDROCHLORIDE 1 MG: 1 INJECTION, SOLUTION INTRAMUSCULAR; INTRAVENOUS; SUBCUTANEOUS at 18:34

## 2024-06-05 ASSESSMENT — PAIN DESCRIPTION - LOCATION
LOCATION: ABDOMEN

## 2024-06-05 ASSESSMENT — PAIN - FUNCTIONAL ASSESSMENT
PAIN_FUNCTIONAL_ASSESSMENT: ACTIVITIES ARE NOT PREVENTED
PAIN_FUNCTIONAL_ASSESSMENT: PREVENTS OR INTERFERES SOME ACTIVE ACTIVITIES AND ADLS
PAIN_FUNCTIONAL_ASSESSMENT: ACTIVITIES ARE NOT PREVENTED
PAIN_FUNCTIONAL_ASSESSMENT: PREVENTS OR INTERFERES SOME ACTIVE ACTIVITIES AND ADLS
PAIN_FUNCTIONAL_ASSESSMENT: PREVENTS OR INTERFERES SOME ACTIVE ACTIVITIES AND ADLS

## 2024-06-05 ASSESSMENT — PAIN SCALES - WONG BAKER
WONGBAKER_NUMERICALRESPONSE: HURTS EVEN MORE
WONGBAKER_NUMERICALRESPONSE: HURTS EVEN MORE
WONGBAKER_NUMERICALRESPONSE: HURTS LITTLE MORE

## 2024-06-05 ASSESSMENT — PAIN DESCRIPTION - DESCRIPTORS
DESCRIPTORS: ACHING;SHARP
DESCRIPTORS: CRAMPING
DESCRIPTORS: DISCOMFORT;ACHING
DESCRIPTORS: ACHING
DESCRIPTORS: BURNING;CRAMPING
DESCRIPTORS: DISCOMFORT;ACHING
DESCRIPTORS: BURNING;CRAMPING

## 2024-06-05 ASSESSMENT — PAIN SCALES - GENERAL
PAINLEVEL_OUTOF10: 8
PAINLEVEL_OUTOF10: 7
PAINLEVEL_OUTOF10: 5
PAINLEVEL_OUTOF10: 8
PAINLEVEL_OUTOF10: 8
PAINLEVEL_OUTOF10: 7
PAINLEVEL_OUTOF10: 1
PAINLEVEL_OUTOF10: 7
PAINLEVEL_OUTOF10: 9
PAINLEVEL_OUTOF10: 8
PAINLEVEL_OUTOF10: 6
PAINLEVEL_OUTOF10: 0
PAINLEVEL_OUTOF10: 6

## 2024-06-05 ASSESSMENT — PAIN DESCRIPTION - ORIENTATION
ORIENTATION: MID
ORIENTATION: LEFT
ORIENTATION: LEFT;LOWER
ORIENTATION: MID;LOWER
ORIENTATION: LOWER;MID
ORIENTATION: MID
ORIENTATION: LOWER;MID

## 2024-06-05 NOTE — CARE COORDINATION
CM reviewed chart. Patient on clear liquid diet.    Once clinically stable, current discharge plans are home self care.

## 2024-06-05 NOTE — PROGRESS NOTES
Progress Note    Patient: Maddie Goldberg MRN: [id number]  SSN: [id number]    YOB: 1960  Age: 63 y.o.  Sex: female      Admit Date: 6/2/2024    LOS: 3 days     Subjective:     Patient is POD#3 s/p Procedure(s) (LRB):  EXPLORATORY LAPAROTOMY; RIGHT COLON RESECTION (N/A).     Doing well. Pain not controlled. Complaining of abdominal cramping.  Nausea well controlled.  Voiding well.  Patient denies nausea or vomiting. Passing flatus. Tolerating clear liquids.    Objective:     Vitals:    06/05/24 1205 06/05/24 1235 06/05/24 1437 06/05/24 1500   BP:    109/69   Pulse:    99   Resp: 18 18 18    Temp:    98.8 °F (37.1 °C)   TempSrc:    Oral   SpO2:    95%   Weight:       Height:            Intake and Output:  Current Shift: 06/05 0701 - 06/05 1900  In: -   Out: 215 [Drains:215]  Last three shifts: 06/03 1901 - 06/05 0700  In: 5452.9 [P.O.:350; I.V.:3912.5]  Out: 2650 [Urine:2040; Drains:560]    Physical Exam:   General: alert, oriented, in no acute distress  Neck: supple, no masses, no JVD  Cardiovascular: regular rate and rhythm  Pulmonary: unlabored breathing, equal chest rise bilaterally  Abdomen: soft, appropriately tender, non distended, incisions clean, dry, and intact, drain serosanguineous  Extremities: no swelling, pulses equal and palpable in all extremities    Lab/Data Review:  Recent Results (from the past 24 hour(s))   Basic Metabolic Panel    Collection Time: 06/04/24  4:40 PM   Result Value Ref Range    Sodium 134 (L) 136 - 145 mmol/L    Potassium 4.1 3.5 - 5.1 mmol/L    Chloride 107 97 - 108 mmol/L    CO2 25 21 - 32 mmol/L    Anion Gap 2 (L) 5 - 15 mmol/L    Glucose 111 (H) 65 - 100 mg/dL    BUN 11 6 - 20 mg/dL    Creatinine 0.71 0.55 - 1.02 mg/dL    BUN/Creatinine Ratio 15 12 - 20      Est, Glom Filt Rate >90 >60 ml/min/1.73m2    Calcium 8.5 8.5 - 10.1 mg/dL   Magnesium    Collection Time: 06/04/24  4:40 PM   Result Value Ref Range    Magnesium 1.8 1.6 - 2.4 mg/dL   Phosphorus    Collection

## 2024-06-05 NOTE — PROGRESS NOTES
Hospitalist Progress Note    NAME:   Maddie Goldberg   : 1960   MRN: [id number]     Date/Time: 2024 9:28 AM  Patient PCP: No primary care provider on file.    Estimated discharge date:  Barriers:       Assessment / Plan:    Hypertension  Currently very well-controlled without home lisinopril ordered     Hypothyroidism  Synthroid restarted as patient tolerating p.o. now.     Cecal volvulus  POD #3 s/p ex lap and right colon resection.  Management per general surgery.  NG tube discontinued as patient continues to have abdominal discomfort without any nausea/vomiting.    Medical Decision Making:   I personally reviewed labs:  I personally reviewed imaging:  I personally reviewed EKG:  Toxic drug monitoring:   Discussed case with: Patient, general surgery    At this time patient is currently stable and improving from a surgical perspective and we will go ahead and sign off.    Subjective:     Chief Complaint / Reason for Physician Visit  \" Abdominal pain\".  Discussed with RN events overnight.     6/3 - Medical Consultation   63 y.o.  female with PMHx hypertension, morbid obesity and hypothyroidism presenting initially with cecal volvulus s/p exploratory laparotomy on .     Consultation placed per general surgery for possible management for hypertension and hypothyroidism.     Patient currently has NG tube in place and is alert and oriented x 4 with abdominal pain currently controlled.  Denies any chest pain, shortness of breath, fever/chills.      Continues to have NG tube in place and is alert and oriented.    No overnight fever/chills, chest pain, shortness of breath noted.    Blood pressure is currently very well-controlled.  Possibility of NG tube to be discontinued today.          Alert and oriented x 4.  NG tube discontinued on .  No overnight fever/chills, chest pain, shortness of breath noted.    Continues to have abdominal discomfort however no significant nausea/vomiting and

## 2024-06-05 NOTE — PLAN OF CARE
Problem: Pain  Goal: Verbalizes/displays adequate comfort level or baseline comfort level  6/4/2024 2350 by Toya Prabhakar, RN  Outcome: Progressing    Problem: Safety - Adult  Goal: Free from fall injury  6/4/2024 2350 by Toya Prabhakar, RN  Outcome: Progressing

## 2024-06-05 NOTE — PLAN OF CARE
Problem: Pain  Goal: Verbalizes/displays adequate comfort level or baseline comfort level  6/5/2024 1102 by Tracy Beyer RN  Outcome: Progressing  6/4/2024 2350 by Toya Prabhakar RN  Outcome: Progressing     Problem: Discharge Planning  Goal: Discharge to home or other facility with appropriate resources  Outcome: Progressing  Flowsheets (Taken 6/5/2024 0830)  Discharge to home or other facility with appropriate resources: Identify barriers to discharge with patient and caregiver     Problem: Safety - Adult  Goal: Free from fall injury  6/5/2024 1102 by Tracy Beyer RN  Outcome: Progressing  6/4/2024 2350 by oTya Prabhakar RN  Outcome: Progressing     Problem: Skin/Tissue Integrity  Goal: Absence of new skin breakdown  Description: 1.  Monitor for areas of redness and/or skin breakdown  2.  Assess vascular access sites hourly  3.  Every 4-6 hours minimum:  Change oxygen saturation probe site  4.  Every 4-6 hours:  If on nasal continuous positive airway pressure, respiratory therapy assess nares and determine need for appliance change or resting period.  Outcome: Progressing

## 2024-06-05 NOTE — PROGRESS NOTES
4 Eyes Skin Assessment     NAME:  Maddie Goldberg  YOB: 1960  MEDICAL RECORD NUMBER:  [id number]    The patient is being assessed for  Other weekly assessment    I agree that at least one RN has performed a thorough Head to Toe Skin Assessment on the patient. ALL assessment sites listed below have been assessed.      Areas assessed by both nurses:    Head, Face, Ears, Shoulders, Back, Chest, Arms, Elbows, Hands, Sacrum. Buttock, Coccyx, Ischium, and Legs. Feet and Heels        Does the Patient have a Wound? No noted wound(s)       Rashaad Prevention initiated by RN: Yes  Wound Care Orders initiated by RN: No    Pressure Injury (Stage 3,4, Unstageable, DTI, NWPT, and Complex wounds) if present, place Wound referral order by RN under : No    New Ostomies, if present place, Ostomy referral order under : No     Nurse 1 eSignature: Electronically signed by Tracy Beyer RN on 6/5/24 at 11:03 AM EDT    **SHARE this note so that the co-signing nurse can place an eSignature**    Nurse 2 eSignature: Electronically signed by Quan Angel RN on 6/5/24 at 6:44 PM EDT

## 2024-06-06 ENCOUNTER — APPOINTMENT (OUTPATIENT)
Facility: HOSPITAL | Age: 64
DRG: 329 | End: 2024-06-06
Payer: COMMERCIAL

## 2024-06-06 LAB
ANION GAP SERPL CALC-SCNC: 7 MMOL/L (ref 5–15)
BASOPHILS # BLD: 0.1 K/UL (ref 0–0.1)
BASOPHILS NFR BLD: 1 % (ref 0–1)
BUN SERPL-MCNC: 12 MG/DL (ref 6–20)
BUN/CREAT SERPL: 17 (ref 12–20)
CA-I BLD-MCNC: 8.6 MG/DL (ref 8.5–10.1)
CHLORIDE SERPL-SCNC: 104 MMOL/L (ref 97–108)
CO2 SERPL-SCNC: 24 MMOL/L (ref 21–32)
CREAT SERPL-MCNC: 0.7 MG/DL (ref 0.55–1.02)
DIFFERENTIAL METHOD BLD: ABNORMAL
EOSINOPHIL # BLD: 0.3 K/UL (ref 0–0.4)
EOSINOPHIL NFR BLD: 3 % (ref 0–7)
ERYTHROCYTE [DISTWIDTH] IN BLOOD BY AUTOMATED COUNT: 12.5 % (ref 11.5–14.5)
GLUCOSE SERPL-MCNC: 123 MG/DL (ref 65–100)
HCT VFR BLD AUTO: 37 % (ref 35–47)
HGB BLD-MCNC: 12.5 G/DL (ref 11.5–16)
IMM GRANULOCYTES # BLD AUTO: 0.1 K/UL (ref 0–0.04)
IMM GRANULOCYTES NFR BLD AUTO: 1 % (ref 0–0.5)
LYMPHOCYTES # BLD: 1.4 K/UL (ref 0.8–3.5)
LYMPHOCYTES NFR BLD: 12 % (ref 12–49)
MAGNESIUM SERPL-MCNC: 2.1 MG/DL (ref 1.6–2.4)
MCH RBC QN AUTO: 34.3 PG (ref 26–34)
MCHC RBC AUTO-ENTMCNC: 33.8 G/DL (ref 30–36.5)
MCV RBC AUTO: 101.6 FL (ref 80–99)
MONOCYTES # BLD: 1.3 K/UL (ref 0–1)
MONOCYTES NFR BLD: 12 % (ref 5–13)
NEUTS SEG # BLD: 8 K/UL (ref 1.8–8)
NEUTS SEG NFR BLD: 71 % (ref 32–75)
NRBC # BLD: 0 K/UL (ref 0–0.01)
NRBC BLD-RTO: 0 PER 100 WBC
PHOSPHATE SERPL-MCNC: 3.6 MG/DL (ref 2.6–4.7)
PLATELET # BLD AUTO: 388 K/UL (ref 150–400)
PMV BLD AUTO: 10.3 FL (ref 8.9–12.9)
POTASSIUM SERPL-SCNC: 3.9 MMOL/L (ref 3.5–5.1)
RBC # BLD AUTO: 3.64 M/UL (ref 3.8–5.2)
SODIUM SERPL-SCNC: 135 MMOL/L (ref 136–145)
WBC # BLD AUTO: 11.2 K/UL (ref 3.6–11)

## 2024-06-06 PROCEDURE — 6360000002 HC RX W HCPCS: Performed by: SURGERY

## 2024-06-06 PROCEDURE — 97161 PT EVAL LOW COMPLEX 20 MIN: CPT

## 2024-06-06 PROCEDURE — 80048 BASIC METABOLIC PNL TOTAL CA: CPT

## 2024-06-06 PROCEDURE — 83735 ASSAY OF MAGNESIUM: CPT

## 2024-06-06 PROCEDURE — 74177 CT ABD & PELVIS W/CONTRAST: CPT

## 2024-06-06 PROCEDURE — 99024 POSTOP FOLLOW-UP VISIT: CPT | Performed by: SURGERY

## 2024-06-06 PROCEDURE — 6360000004 HC RX CONTRAST MEDICATION: Performed by: SURGERY

## 2024-06-06 PROCEDURE — 2500000003 HC RX 250 WO HCPCS: Performed by: SURGERY

## 2024-06-06 PROCEDURE — 85025 COMPLETE CBC W/AUTO DIFF WBC: CPT

## 2024-06-06 PROCEDURE — 2580000003 HC RX 258: Performed by: SURGERY

## 2024-06-06 PROCEDURE — 1100000000 HC RM PRIVATE

## 2024-06-06 PROCEDURE — 84100 ASSAY OF PHOSPHORUS: CPT

## 2024-06-06 PROCEDURE — 36415 COLL VENOUS BLD VENIPUNCTURE: CPT

## 2024-06-06 PROCEDURE — 94761 N-INVAS EAR/PLS OXIMETRY MLT: CPT

## 2024-06-06 PROCEDURE — 6370000000 HC RX 637 (ALT 250 FOR IP): Performed by: SURGERY

## 2024-06-06 PROCEDURE — 97530 THERAPEUTIC ACTIVITIES: CPT

## 2024-06-06 RX ORDER — KETOROLAC TROMETHAMINE 30 MG/ML
30 INJECTION, SOLUTION INTRAMUSCULAR; INTRAVENOUS EVERY 6 HOURS PRN
Status: DISCONTINUED | OUTPATIENT
Start: 2024-06-06 | End: 2024-06-08

## 2024-06-06 RX ORDER — OXYCODONE HYDROCHLORIDE 5 MG/1
5 TABLET ORAL EVERY 4 HOURS PRN
Status: DISPENSED | OUTPATIENT
Start: 2024-06-06 | End: 2024-06-08

## 2024-06-06 RX ORDER — MORPHINE SULFATE 2 MG/ML
2 INJECTION, SOLUTION INTRAMUSCULAR; INTRAVENOUS EVERY 4 HOURS PRN
Status: DISCONTINUED | OUTPATIENT
Start: 2024-06-06 | End: 2024-06-07 | Stop reason: SDUPTHER

## 2024-06-06 RX ADMIN — ENOXAPARIN SODIUM 30 MG: 100 INJECTION SUBCUTANEOUS at 19:42

## 2024-06-06 RX ADMIN — SODIUM CHLORIDE, PRESERVATIVE FREE 20 MG: 5 INJECTION INTRAVENOUS at 08:18

## 2024-06-06 RX ADMIN — SODIUM CHLORIDE, POTASSIUM CHLORIDE, SODIUM LACTATE AND CALCIUM CHLORIDE: 600; 310; 30; 20 INJECTION, SOLUTION INTRAVENOUS at 12:29

## 2024-06-06 RX ADMIN — LEVOTHYROXINE SODIUM 150 MCG: 0.07 TABLET ORAL at 05:29

## 2024-06-06 RX ADMIN — OXYCODONE HYDROCHLORIDE 5 MG: 5 TABLET ORAL at 05:29

## 2024-06-06 RX ADMIN — PIPERACILLIN AND TAZOBACTAM 3375 MG: 3; .375 INJECTION, POWDER, LYOPHILIZED, FOR SOLUTION INTRAVENOUS at 17:31

## 2024-06-06 RX ADMIN — SODIUM CHLORIDE, PRESERVATIVE FREE 10 ML: 5 INJECTION INTRAVENOUS at 19:42

## 2024-06-06 RX ADMIN — SODIUM CHLORIDE, POTASSIUM CHLORIDE, SODIUM LACTATE AND CALCIUM CHLORIDE: 600; 310; 30; 20 INJECTION, SOLUTION INTRAVENOUS at 19:51

## 2024-06-06 RX ADMIN — OXYCODONE HYDROCHLORIDE 5 MG: 5 TABLET ORAL at 01:54

## 2024-06-06 RX ADMIN — SODIUM CHLORIDE, PRESERVATIVE FREE 10 ML: 5 INJECTION INTRAVENOUS at 08:19

## 2024-06-06 RX ADMIN — ONDANSETRON 4 MG: 2 INJECTION INTRAMUSCULAR; INTRAVENOUS at 08:24

## 2024-06-06 RX ADMIN — PIPERACILLIN AND TAZOBACTAM 3375 MG: 3; .375 INJECTION, POWDER, LYOPHILIZED, FOR SOLUTION INTRAVENOUS at 10:25

## 2024-06-06 RX ADMIN — HYDROMORPHONE HYDROCHLORIDE 1 MG: 1 INJECTION, SOLUTION INTRAMUSCULAR; INTRAVENOUS; SUBCUTANEOUS at 08:18

## 2024-06-06 RX ADMIN — SODIUM CHLORIDE, POTASSIUM CHLORIDE, SODIUM LACTATE AND CALCIUM CHLORIDE: 600; 310; 30; 20 INJECTION, SOLUTION INTRAVENOUS at 01:30

## 2024-06-06 RX ADMIN — HYDROMORPHONE HYDROCHLORIDE 1 MG: 1 INJECTION, SOLUTION INTRAMUSCULAR; INTRAVENOUS; SUBCUTANEOUS at 16:33

## 2024-06-06 RX ADMIN — ENOXAPARIN SODIUM 30 MG: 100 INJECTION SUBCUTANEOUS at 08:18

## 2024-06-06 RX ADMIN — FAMOTIDINE 20 MG: 20 TABLET, FILM COATED ORAL at 19:42

## 2024-06-06 RX ADMIN — HYDROMORPHONE HYDROCHLORIDE 1 MG: 1 INJECTION, SOLUTION INTRAMUSCULAR; INTRAVENOUS; SUBCUTANEOUS at 04:17

## 2024-06-06 RX ADMIN — PIPERACILLIN AND TAZOBACTAM 3375 MG: 3; .375 INJECTION, POWDER, LYOPHILIZED, FOR SOLUTION INTRAVENOUS at 01:54

## 2024-06-06 RX ADMIN — IOPAMIDOL 100 ML: 755 INJECTION, SOLUTION INTRAVENOUS at 20:42

## 2024-06-06 RX ADMIN — HYDROMORPHONE HYDROCHLORIDE 1 MG: 1 INJECTION, SOLUTION INTRAMUSCULAR; INTRAVENOUS; SUBCUTANEOUS at 12:22

## 2024-06-06 RX ADMIN — KETOROLAC TROMETHAMINE 30 MG: 30 INJECTION, SOLUTION INTRAMUSCULAR; INTRAVENOUS at 19:41

## 2024-06-06 RX ADMIN — ONDANSETRON 4 MG: 2 INJECTION INTRAMUSCULAR; INTRAVENOUS at 16:33

## 2024-06-06 RX ADMIN — OXYCODONE HYDROCHLORIDE 5 MG: 5 TABLET ORAL at 23:28

## 2024-06-06 ASSESSMENT — PAIN DESCRIPTION - DESCRIPTORS
DESCRIPTORS: DISCOMFORT;ACHING
DESCRIPTORS: ACHING;DISCOMFORT

## 2024-06-06 ASSESSMENT — PAIN SCALES - GENERAL
PAINLEVEL_OUTOF10: 3
PAINLEVEL_OUTOF10: 7
PAINLEVEL_OUTOF10: 8
PAINLEVEL_OUTOF10: 4
PAINLEVEL_OUTOF10: 8
PAINLEVEL_OUTOF10: 7
PAINLEVEL_OUTOF10: 8
PAINLEVEL_OUTOF10: 7
PAINLEVEL_OUTOF10: 4
PAINLEVEL_OUTOF10: 8
PAINLEVEL_OUTOF10: 8
PAINLEVEL_OUTOF10: 7

## 2024-06-06 ASSESSMENT — PAIN DESCRIPTION - ORIENTATION
ORIENTATION: MID

## 2024-06-06 ASSESSMENT — PAIN DESCRIPTION - LOCATION
LOCATION: ABDOMEN

## 2024-06-06 ASSESSMENT — PAIN SCALES - WONG BAKER
WONGBAKER_NUMERICALRESPONSE: HURTS WHOLE LOT
WONGBAKER_NUMERICALRESPONSE: HURTS WHOLE LOT

## 2024-06-06 NOTE — PROGRESS NOTES
Pt with increased pain.  Has been medicated with hydromorphone 1mg q4h.  Per patient:  \"It feels like it did before I had surgery.\"  [employer] message sent to Dr. Sigala.    1836:  Checked on patient, pt now resting in bed with eyes closed.  Family at bedside.

## 2024-06-06 NOTE — PLAN OF CARE
Problem: Pain  Goal: Verbalizes/displays adequate comfort level or baseline comfort level  6/6/2024 0944 by Jillian Sandhu RN  Outcome: Progressing  Flowsheets (Taken 6/6/2024 0944)  Verbalizes/displays adequate comfort level or baseline comfort level:   Encourage patient to monitor pain and request assistance   Assess pain using appropriate pain scale   Administer analgesics based on type and severity of pain and evaluate response     Problem: Safety - Adult  Goal: Free from fall injury  6/6/2024 0944 by Jillian Sandhu RN  Outcome: Progressing  Flowsheets (Taken 6/6/2024 0944)  Free From Fall Injury: Instruct family/caregiver on patient safety  Note: Advised patient not to fall asleep while sitting on the bed due to risk of falling because of drowsiness due to pain medication

## 2024-06-06 NOTE — PLAN OF CARE
Problem: Pain  Goal: Verbalizes/displays adequate comfort level or baseline comfort level  6/5/2024 2306 by Ashly Blackmon, RN  Outcome: Progressing    Problem: Safety - Adult  Goal: Free from fall injury  6/5/2024 2306 by Ashly Blackmon, RN  Outcome: Progressing    Problem: Discharge Planning  Goal: Discharge to home or other facility with appropriate resources  6/5/2024 2306 by Ashly Blackmon, RN  Outcome: Progressing

## 2024-06-06 NOTE — PROGRESS NOTES
Progress Note    Patient: Maddie Goldberg MRN: [id number]  SSN: [id number]    YOB: 1960  Age: 63 y.o.  Sex: female      Admit Date: 6/2/2024    LOS: 4 days     Subjective:     Patient is POD#4 s/p Procedure(s) (LRB):  EXPLORATORY LAPAROTOMY; RIGHT COLON RESECTION (N/A).     Doing well. Pain better controlled but continues to have cramping.  Voiding well.  Patient denies nausea or vomiting. Passing flatus, no bowel movements. Tolerating clear liquids.    Objective:     Vitals:    06/06/24 0200 06/06/24 0743 06/06/24 0818 06/06/24 0848   BP: 100/62 103/63     Pulse: 67 (!) 104     Resp: 18 18 17 19   Temp: 98.6 °F (37 °C) 97.5 °F (36.4 °C)     TempSrc: Oral Oral     SpO2: 100% 97%     Weight:       Height:            Intake and Output:  Current Shift: No intake/output data recorded.  Last three shifts: 06/04 1901 - 06/06 0700  In: 1946.1 [P.O.:600; I.V.:1091.7]  Out: 1975 [Urine:1050; Drains:925]    Physical Exam:   General: alert, oriented, in no acute distress  Neck: supple, no masses, no JVD  Cardiovascular: regular rate and rhythm  Pulmonary: unlabored breathing, equal chest rise bilaterally  Abdomen: soft, appropriately tender, non distended, incisions clean, dry, and intact, drain high volume serous drainage  Extremities: no swelling, pulses equal and palpable in all extremities    Lab/Data Review:  Recent Results (from the past 24 hour(s))   Basic Metabolic Panel    Collection Time: 06/06/24  7:06 AM   Result Value Ref Range    Sodium 135 (L) 136 - 145 mmol/L    Potassium 3.9 3.5 - 5.1 mmol/L    Chloride 104 97 - 108 mmol/L    CO2 24 21 - 32 mmol/L    Anion Gap 7 5 - 15 mmol/L    Glucose 123 (H) 65 - 100 mg/dL    BUN 12 6 - 20 mg/dL    Creatinine 0.70 0.55 - 1.02 mg/dL    BUN/Creatinine Ratio 17 12 - 20      Est, Glom Filt Rate >90 >60 ml/min/1.73m2    Calcium 8.6 8.5 - 10.1 mg/dL   CBC with Auto Differential    Collection Time: 06/06/24  7:06 AM   Result Value Ref Range    WBC 11.2 (H) 3.6 - 11.0

## 2024-06-06 NOTE — PLAN OF CARE
PHYSICAL THERAPY EVALUATION  Patient: Maddie Goldberg (63 y.o. 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]  Large bowel ischemia (HCC) [K55.9]  Procedure(s) (LRB):  EXPLORATORY LAPAROTOMY; RIGHT COLON RESECTION (N/A) 4 Days Post-Op   Precautions: Fall Risk, Bed Alarm                      Recommendations for nursing mobility: Out of bed to chair for meals, Encourage HEP in prep for ADLs/mobility; see handout for details, Frequent repositioning to prevent skin breakdown, Use of bed/chair alarm for safety, LE elevation for management of edema, AD and gt belt for bed to chair , Amb to bathroom with AD and gait belt, and Assist x1    In place during session: Peripheral IV, External Catheter, and EKG/telemetry     ASSESSMENT  Pt is a 63 y.o. female  admitted on 6/2/2024 for one week history of worsening abdominal pain associated with bloating, obstipation and chills.; pt currently being treated for cecal volvulus, large bowel ischemia, s/p exploratory laparotomy and R colon resection on  6/2/24. Pt received semi supine on bed upon PT arrival, agreeable to evaluation. Pt A&O x 4.      Based on the objective data described below, the patient currently presents with impaired functional mobility, decreased ROM, impaired strength, decreased activity tolerance, impaired balance, impaired posture, and increased pain levels. (See below for objective details and assist levels).     Overall pt tolerated session fair today with c/o crampy pain at abdomen -8/10 and limited activity tolerance. Pt required SBA/Barbie and additional time for bed mobility. Completed STS transfers with CGA, use of RW, cues for hand placement and technique. Pt amb 75 feet with RW, gt belt and CGA; took 3 standing rest breaks during ambulation sec to SOB and fatigue, SpO2 - 96%, require cues  for upright posture, stay close to the walker and to practice pursed lip DBEx's. Demonstrates wide KIRSTIE with slow ronal and decreased step  length b/l LE. Pt will benefit from continued skilled PT to address above deficits and return to PLOF. Potential barriers for safe discharge: pt lives alone, pt is a high fall risk, and concern for pt safely navigating or managing the home environment. Current PT DC recommendation Inpatient Rehabilitation Facility  once medically appropriate. If pt decides to go home, will need HHPT and bariatric RW.     Start of Session End of Session   SPO2 (%) 97 98   Heart Rate (BPM) 78 112     GOALS:    Problem: Physical Therapy - Adult  Goal: By Discharge: Performs mobility at highest level of function for planned discharge setting.  See evaluation for individualized goals.  Description: FUNCTIONAL STATUS PRIOR TO ADMISSION: Patient was independent and active without use of DME.    HOME SUPPORT PRIOR TO ADMISSION: The patient lived alone but did not require assistance.    Physical Therapy Goals  Initiated 6/6/2024  Pt stated goal: To go home  Pt will be I with LE HEP in 7 days.  Pt will perform bed mobility with [place] in 7 days.  Pt will perform transfers with [place] in 7 days.   Pt will amb 200 feet with LRAD safely with [place] in 7 days.  Outcome: Progressing        PLAN :  Recommendations and Planned Interventions: bed mobility training, transfer training, gait training, therapeutic exercises, neuromuscular re-education, patient and family training/education, and therapeutic activities    Frequency/Duration: Patient will be followed by physical therapy:  3-5x/week to address goals.    Recommendation for discharge: (in order for the patient to meet his/her long term goals)  Inpatient Rehabilitation Facility     IF patient discharges home will need the following DME: Bariatric rolling walker       SUBJECTIVE:   Patient stated “I am getting cramps in my stomach.”    OBJECTIVE DATA SUMMARY:   HISTORY:    Past Medical History:   Diagnosis Date    Arthritis     left knee    GERD (gastroesophageal reflux  mobility, Transfers, Gait).  Score 24 23 22-20 19-15 14-10 9-7 6   Modifier CH CI CJ CK CL CM CN         Physical Therapy Evaluation Charge Determination   History Examination Presentation Decision-Making   MEDIUM  Complexity : 1-2 comorbidities / personal factors will impact the outcome/ POC  MEDIUM Complexity : 3 Standardized tests and measures addressing body structure, function, activity limitation and / or participation in recreation  LOW Complexity : Stable, uncomplicated  Other outcome measures [facility] 6  MEDIUM      Based on the above components, the patient evaluation is determined to be of the following complexity level: LOW    Pain Ratin/10 Pain at abdomen  Pain Intervention(s):   patient medicated for pain prior to session    Activity Tolerance:   Fair , requires frequent rest breaks, observed shortness of breath on exertion, and SpO2 stable on room air    After treatment patient left in no apparent distress:   Bed locked and in lowest position Patient left in no apparent distress in bed, Call bell within reach, Bed/ chair alarm activated, and Side rails x3 and nsg updated.    COMMUNICATION/EDUCATION:   The patient’s plan of care was discussed with: Registered nurse    Patient Education  Education Given To: Patient  Education Provided: Role of Therapy;Plan of Care;Transfer Training;Equipment;Fall Prevention Strategies  Education Method: Verbal  Barriers to Learning: None  Education Outcome: Verbalized understanding;Continued education needed     Thank you for this referral.  Tana Payan PT  Minutes: 35

## 2024-06-07 ENCOUNTER — APPOINTMENT (OUTPATIENT)
Facility: HOSPITAL | Age: 64
DRG: 329 | End: 2024-06-07
Payer: COMMERCIAL

## 2024-06-07 LAB
ANION GAP SERPL CALC-SCNC: 7 MMOL/L (ref 5–15)
BASOPHILS # BLD: 0.1 K/UL (ref 0–0.1)
BASOPHILS NFR BLD: 1 % (ref 0–1)
BUN SERPL-MCNC: 14 MG/DL (ref 6–20)
BUN/CREAT SERPL: 21 (ref 12–20)
CA-I BLD-MCNC: 8.8 MG/DL (ref 8.5–10.1)
CHLORIDE SERPL-SCNC: 101 MMOL/L (ref 97–108)
CO2 SERPL-SCNC: 26 MMOL/L (ref 21–32)
CREAT SERPL-MCNC: 0.68 MG/DL (ref 0.55–1.02)
DIFFERENTIAL METHOD BLD: ABNORMAL
EOSINOPHIL # BLD: 0 K/UL (ref 0–0.4)
EOSINOPHIL NFR BLD: 0 % (ref 0–7)
ERYTHROCYTE [DISTWIDTH] IN BLOOD BY AUTOMATED COUNT: 12.9 % (ref 11.5–14.5)
GLUCOSE SERPL-MCNC: 107 MG/DL (ref 65–100)
HCT VFR BLD AUTO: 38.2 % (ref 35–47)
HGB BLD-MCNC: 13 G/DL (ref 11.5–16)
IMM GRANULOCYTES # BLD AUTO: 0 K/UL
IMM GRANULOCYTES NFR BLD AUTO: 0 %
LYMPHOCYTES # BLD: 1.7 K/UL (ref 0.8–3.5)
LYMPHOCYTES NFR BLD: 18 % (ref 12–49)
MAGNESIUM SERPL-MCNC: 2 MG/DL (ref 1.6–2.4)
MCH RBC QN AUTO: 34.7 PG (ref 26–34)
MCHC RBC AUTO-ENTMCNC: 34 G/DL (ref 30–36.5)
MCV RBC AUTO: 101.9 FL (ref 80–99)
METAMYELOCYTES NFR BLD MANUAL: 1 %
MONOCYTES # BLD: 1.5 K/UL (ref 0–1)
MONOCYTES NFR BLD: 16 % (ref 5–13)
NEUTS BAND NFR BLD MANUAL: 15 % (ref 0–6)
NEUTS SEG # BLD: 5.9 K/UL (ref 1.8–8)
NEUTS SEG NFR BLD: 49 % (ref 32–75)
NRBC # BLD: 0 K/UL (ref 0–0.01)
NRBC BLD-RTO: 0 PER 100 WBC
PHOSPHATE SERPL-MCNC: 4.6 MG/DL (ref 2.6–4.7)
PLATELET # BLD AUTO: 454 K/UL (ref 150–400)
PMV BLD AUTO: 10.4 FL (ref 8.9–12.9)
POTASSIUM SERPL-SCNC: 3.9 MMOL/L (ref 3.5–5.1)
RBC # BLD AUTO: 3.75 M/UL (ref 3.8–5.2)
RBC MORPH BLD: ABNORMAL
SODIUM SERPL-SCNC: 134 MMOL/L (ref 136–145)
WBC # BLD AUTO: 9.2 K/UL (ref 3.6–11)

## 2024-06-07 PROCEDURE — 71045 X-RAY EXAM CHEST 1 VIEW: CPT

## 2024-06-07 PROCEDURE — 84100 ASSAY OF PHOSPHORUS: CPT

## 2024-06-07 PROCEDURE — 6370000000 HC RX 637 (ALT 250 FOR IP): Performed by: SURGERY

## 2024-06-07 PROCEDURE — 99024 POSTOP FOLLOW-UP VISIT: CPT | Performed by: SURGERY

## 2024-06-07 PROCEDURE — 2500000003 HC RX 250 WO HCPCS: Performed by: SURGERY

## 2024-06-07 PROCEDURE — 6360000002 HC RX W HCPCS: Performed by: SURGERY

## 2024-06-07 PROCEDURE — 2580000003 HC RX 258: Performed by: SURGERY

## 2024-06-07 PROCEDURE — 94761 N-INVAS EAR/PLS OXIMETRY MLT: CPT

## 2024-06-07 PROCEDURE — 83735 ASSAY OF MAGNESIUM: CPT

## 2024-06-07 PROCEDURE — 80048 BASIC METABOLIC PNL TOTAL CA: CPT

## 2024-06-07 PROCEDURE — 1100000000 HC RM PRIVATE

## 2024-06-07 PROCEDURE — 36569 INSJ PICC 5 YR+ W/O IMAGING: CPT

## 2024-06-07 PROCEDURE — 85025 COMPLETE CBC W/AUTO DIFF WBC: CPT

## 2024-06-07 PROCEDURE — 36415 COLL VENOUS BLD VENIPUNCTURE: CPT

## 2024-06-07 RX ORDER — HYDROMORPHONE HYDROCHLORIDE 1 MG/ML
1 INJECTION, SOLUTION INTRAMUSCULAR; INTRAVENOUS; SUBCUTANEOUS EVERY 4 HOURS PRN
Status: DISCONTINUED | OUTPATIENT
Start: 2024-06-07 | End: 2024-06-11

## 2024-06-07 RX ORDER — 0.9 % SODIUM CHLORIDE 0.9 %
1000 INTRAVENOUS SOLUTION INTRAVENOUS ONCE
Status: COMPLETED | OUTPATIENT
Start: 2024-06-07 | End: 2024-06-07

## 2024-06-07 RX ORDER — LIDOCAINE HYDROCHLORIDE 20 MG/ML
JELLY TOPICAL ONCE
Status: DISCONTINUED | OUTPATIENT
Start: 2024-06-07 | End: 2024-06-26 | Stop reason: HOSPADM

## 2024-06-07 RX ADMIN — PIPERACILLIN AND TAZOBACTAM 3375 MG: 3; .375 INJECTION, POWDER, LYOPHILIZED, FOR SOLUTION INTRAVENOUS at 10:55

## 2024-06-07 RX ADMIN — HYDROMORPHONE HYDROCHLORIDE 1 MG: 1 INJECTION, SOLUTION INTRAMUSCULAR; INTRAVENOUS; SUBCUTANEOUS at 18:53

## 2024-06-07 RX ADMIN — SODIUM CHLORIDE, PRESERVATIVE FREE 10 ML: 5 INJECTION INTRAVENOUS at 19:37

## 2024-06-07 RX ADMIN — KETOROLAC TROMETHAMINE 30 MG: 30 INJECTION, SOLUTION INTRAMUSCULAR; INTRAVENOUS at 11:13

## 2024-06-07 RX ADMIN — HYDROMORPHONE HYDROCHLORIDE 1 MG: 1 INJECTION, SOLUTION INTRAMUSCULAR; INTRAVENOUS; SUBCUTANEOUS at 23:03

## 2024-06-07 RX ADMIN — SODIUM CHLORIDE, POTASSIUM CHLORIDE, SODIUM LACTATE AND CALCIUM CHLORIDE: 600; 310; 30; 20 INJECTION, SOLUTION INTRAVENOUS at 23:03

## 2024-06-07 RX ADMIN — SODIUM CHLORIDE, POTASSIUM CHLORIDE, SODIUM LACTATE AND CALCIUM CHLORIDE: 600; 310; 30; 20 INJECTION, SOLUTION INTRAVENOUS at 16:44

## 2024-06-07 RX ADMIN — MORPHINE SULFATE 2 MG: 2 INJECTION, SOLUTION INTRAMUSCULAR; INTRAVENOUS at 10:55

## 2024-06-07 RX ADMIN — OXYCODONE HYDROCHLORIDE 5 MG: 5 TABLET ORAL at 03:46

## 2024-06-07 RX ADMIN — KETOROLAC TROMETHAMINE 30 MG: 30 INJECTION, SOLUTION INTRAMUSCULAR; INTRAVENOUS at 01:25

## 2024-06-07 RX ADMIN — LEVOTHYROXINE SODIUM 150 MCG: 0.07 TABLET ORAL at 05:54

## 2024-06-07 RX ADMIN — SODIUM CHLORIDE, PRESERVATIVE FREE 20 MG: 5 INJECTION INTRAVENOUS at 10:55

## 2024-06-07 RX ADMIN — KETOROLAC TROMETHAMINE 30 MG: 30 INJECTION, SOLUTION INTRAMUSCULAR; INTRAVENOUS at 17:36

## 2024-06-07 RX ADMIN — SODIUM CHLORIDE 1000 ML: 9 INJECTION, SOLUTION INTRAVENOUS at 14:31

## 2024-06-07 RX ADMIN — SODIUM CHLORIDE, POTASSIUM CHLORIDE, SODIUM LACTATE AND CALCIUM CHLORIDE: 600; 310; 30; 20 INJECTION, SOLUTION INTRAVENOUS at 03:52

## 2024-06-07 RX ADMIN — SODIUM CHLORIDE, PRESERVATIVE FREE 10 ML: 5 INJECTION INTRAVENOUS at 11:04

## 2024-06-07 RX ADMIN — ONDANSETRON 4 MG: 2 INJECTION INTRAMUSCULAR; INTRAVENOUS at 14:25

## 2024-06-07 RX ADMIN — ONDANSETRON 4 MG: 2 INJECTION INTRAMUSCULAR; INTRAVENOUS at 06:01

## 2024-06-07 RX ADMIN — SODIUM CHLORIDE, PRESERVATIVE FREE 20 MG: 5 INJECTION INTRAVENOUS at 19:34

## 2024-06-07 RX ADMIN — PIPERACILLIN AND TAZOBACTAM 3375 MG: 3; .375 INJECTION, POWDER, LYOPHILIZED, FOR SOLUTION INTRAVENOUS at 01:25

## 2024-06-07 RX ADMIN — SODIUM CHLORIDE, POTASSIUM CHLORIDE, SODIUM LACTATE AND CALCIUM CHLORIDE: 600; 310; 30; 20 INJECTION, SOLUTION INTRAVENOUS at 11:08

## 2024-06-07 RX ADMIN — ENOXAPARIN SODIUM 30 MG: 100 INJECTION SUBCUTANEOUS at 19:33

## 2024-06-07 RX ADMIN — ENOXAPARIN SODIUM 30 MG: 100 INJECTION SUBCUTANEOUS at 10:55

## 2024-06-07 RX ADMIN — MORPHINE SULFATE 2 MG: 2 INJECTION, SOLUTION INTRAMUSCULAR; INTRAVENOUS at 05:55

## 2024-06-07 RX ADMIN — HYDROMORPHONE HYDROCHLORIDE 1 MG: 1 INJECTION, SOLUTION INTRAMUSCULAR; INTRAVENOUS; SUBCUTANEOUS at 14:26

## 2024-06-07 ASSESSMENT — PAIN DESCRIPTION - DESCRIPTORS
DESCRIPTORS: ACHING
DESCRIPTORS: ACHING;DISCOMFORT
DESCRIPTORS: ACHING
DESCRIPTORS: ACHING;DISCOMFORT;BURNING
DESCRIPTORS: ACHING

## 2024-06-07 ASSESSMENT — PAIN SCALES - GENERAL
PAINLEVEL_OUTOF10: 9
PAINLEVEL_OUTOF10: 8
PAINLEVEL_OUTOF10: 8
PAINLEVEL_OUTOF10: 7
PAINLEVEL_OUTOF10: 7
PAINLEVEL_OUTOF10: 8
PAINLEVEL_OUTOF10: 7

## 2024-06-07 ASSESSMENT — PAIN DESCRIPTION - ORIENTATION
ORIENTATION: MID

## 2024-06-07 ASSESSMENT — PAIN DESCRIPTION - LOCATION
LOCATION: ABDOMEN

## 2024-06-07 ASSESSMENT — PAIN SCALES - WONG BAKER
WONGBAKER_NUMERICALRESPONSE: HURTS WHOLE LOT
WONGBAKER_NUMERICALRESPONSE: HURTS WHOLE LOT

## 2024-06-07 NOTE — PROGRESS NOTES
Attempted placement of NGT in both nostrils. Met with resistance. Notified Tracy CASTRO Nurse on duty.       6/7/24 11:30 I notified Dr Pineda of Shahana attempt of NG TUBE insertion that was unsuccessful and he said thank you he has heard from shahana and he will be up here to try and I also notified Dr Pineda of patient refusal of nursing assessment

## 2024-06-07 NOTE — PROGRESS NOTES
Patient declined therapy session this morning reporting she was not feeling well currently after recently staff tried to place NGT. Will check back with patient at another time.

## 2024-06-07 NOTE — PROGRESS NOTES
Alejandra Sigala DO   Patient:TESSA BARRETO   YOB: 1960  MRN: [id number]  Location: [facility]    [phone]      [phone] Hospital or Facility: [facility] From: Ashly Blackmon RE: TESSA BARRETO 1960 RM: [phone]   Good evening Doctor. Pt is s/p ex lap, Right colon resection. Received report for this pt tonight and pt had vomitted 2-3x today after receiving PRN Dilaudid 1mg. Greenish in consisitency about half a cup. Would you like to change her IV pain med? She does have PRN oxycodone tablet as well. Need Callback: NEEDS CALLBACK 3 EAST  Read 7:52 PM     6/6/24 7:53 PM  No we can continue the pain meds. Make her NPO please. And give antiemetics as needed.    6/6/24 7:54 PM  I will order a CT as soon as I can.  6/6/24 7:54 PM    If she continues to vomit, will need an NGT.  6/6/24 8:59 PM     Thank you  I got a hold of Dr. Pineda pain meds were amended. PRN Toradol 30mg q6 and Morphine 2mg IV q4 added. Dilaudid 1mg was DC.  Read 8:59 PM     6/6/24 8:59 PM   CT scan done.  Read 8:59 PM     6/6/24 9:03 PM   Ok Dr well noted on the NGT, so far no vomiting. Gave her one dose of IV Toradol been tolerating well. She is now NPO too except ice chips.  Read 9:04 PM

## 2024-06-07 NOTE — PROGRESS NOTES
PROGRESS NOTE      Chief Complaints:  Patient examined this morning.  Patient states that she is not feeling well.  HPI and  Objective:    This patient was reviewed.  Chart reviewed.    Patient is here his abdomen is distended.  Pain is not going away.  She had a CT scan done last night.  No fever however tachycardic.  Review of Systems:  Rest of review of system reviewed personally and they are negative.    EXAM:  /70   Pulse (!) 102   Temp 98.2 °F (36.8 °C)   Resp 18   Ht 1.727 m (5' 8\")   Wt 127 kg (280 lb)   SpO2 98%   BMI 42.57 kg/m²     Patient is awake   Head and neck atraumatic, normocephalic.  ENT: No hoarse voice, gaze appropriate.  Cardiac system regular rate rhythm.  Pulmonary no audible wheeze, no cyanosis.  Chest wall excursion normal with respiration cycle  Abdomen is soft not particularly distended.  Neurologically nonfocal.  Hematology system: No bruising noted.  Musculoskeletal system: No joint deformity noted.  Genitourinary system: No hematuria noted.  Skin is warm and moist.  Psychosocial: Cooperative.  Vascular examination as previously noted no changes.    Current Facility-Administered Medications   Medication Dose Route Frequency Provider Last Rate Last Admin    ketorolac (TORADOL) injection 30 mg  30 mg IntraVENous Q6H PRN Tommie Pineda MD   30 mg at 06/07/24 0125    morphine (PF) injection 2 mg  2 mg IntraVENous Q4H PRN Tommie Pineda MD   2 mg at 06/07/24 0555    oxyCODONE (ROXICODONE) immediate release tablet 5 mg  5 mg Oral Q4H PRN Zakiya, Alejandra V, DO   5 mg at 06/07/24 0346    lactated ringers IV soln infusion   IntraVENous Continuous ZakiyaBrendaAlejandra V,  mL/hr at 06/07/24 0352 New Bag at 06/07/24 0352    levothyroxine (SYNTHROID) tablet 150 mcg  150 mcg Oral QAM AC Kylah Rice MD   150 mcg at 06/07/24 0554    sodium chloride flush 0.9 % injection 5-40 mL  5-40 mL IntraVENous 2 times per day Kylah Rice MD   10 mL at 06/06/24 1942    sodium chloride flush 0.9

## 2024-06-07 NOTE — PLAN OF CARE
Problem: Pain  Goal: Verbalizes/displays adequate comfort level or baseline comfort level  6/6/2024 2108 by Ashly Blackmon, RN  Outcome: Progressing     Problem: Safety - Adult  Goal: Free from fall injury  6/6/2024 2108 by Ashly Blackmon, RN  Outcome: Progressing    Problem: Skin/Tissue Integrity  Goal: Absence of new skin breakdown  Description: 1.  Monitor for areas of redness and/or skin breakdown  2.  Assess vascular access sites hourly  3.  Every 4-6 hours minimum:  Change oxygen saturation probe site  4.  Every 4-6 hours:  If on nasal continuous positive airway pressure, respiratory therapy assess nares and determine need for appliance change or resting period.  Outcome: Progressing     Problem: Discharge Planning  Goal: Discharge to home or other facility with appropriate resources  Outcome: Progressing

## 2024-06-07 NOTE — CARE COORDINATION
CM reviewed chart. Patient NPO. Orders for NG tube placement. Per IDR, possible surgery.    CM following for safe discharge planning.

## 2024-06-08 ENCOUNTER — ANESTHESIA EVENT (OUTPATIENT)
Facility: HOSPITAL | Age: 64
End: 2024-06-08
Payer: COMMERCIAL

## 2024-06-08 ENCOUNTER — APPOINTMENT (OUTPATIENT)
Facility: HOSPITAL | Age: 64
DRG: 329 | End: 2024-06-08
Payer: COMMERCIAL

## 2024-06-08 ENCOUNTER — ANESTHESIA (OUTPATIENT)
Facility: HOSPITAL | Age: 64
End: 2024-06-08
Payer: COMMERCIAL

## 2024-06-08 LAB
ALBUMIN SERPL-MCNC: 1.4 G/DL (ref 3.5–5)
ALBUMIN/GLOB SERPL: 0.5 (ref 1.1–2.2)
ALP SERPL-CCNC: 106 U/L (ref 45–117)
ALT SERPL-CCNC: 19 U/L (ref 12–78)
ANION GAP SERPL CALC-SCNC: 8 MMOL/L (ref 5–15)
ARTERIAL PATENCY WRIST A: NO
AST SERPL W P-5'-P-CCNC: 9 U/L (ref 15–37)
BACTERIA SPEC CULT: NORMAL
BACTERIA SPEC CULT: NORMAL
BASE EXCESS BLDA CALC-SCNC: 0.2 MMOL/L (ref 0–3)
BASOPHILS # BLD: 0 K/UL (ref 0–0.1)
BASOPHILS NFR BLD: 0 % (ref 0–1)
BDY SITE: ABNORMAL
BILIRUB SERPL-MCNC: 0.6 MG/DL (ref 0.2–1)
BODY TEMPERATURE: 97
BUN SERPL-MCNC: 13 MG/DL (ref 6–20)
BUN/CREAT SERPL: 25 (ref 12–20)
CA-I BLD-MCNC: 7.2 MG/DL (ref 8.5–10.1)
CHLORIDE SERPL-SCNC: 108 MMOL/L (ref 97–108)
CO2 SERPL-SCNC: 25 MMOL/L (ref 21–32)
COHGB MFR BLD: 0.3 % (ref 1–2)
CREAT SERPL-MCNC: 0.53 MG/DL (ref 0.55–1.02)
DIFFERENTIAL METHOD BLD: ABNORMAL
EOSINOPHIL # BLD: 0 K/UL (ref 0–0.4)
EOSINOPHIL NFR BLD: 0 % (ref 0–7)
ERYTHROCYTE [DISTWIDTH] IN BLOOD BY AUTOMATED COUNT: 13.1 % (ref 11.5–14.5)
FIO2 ON VENT: 100 %
GAS FLOW.O2 SETTING OXYMISER: 16
GLOBULIN SER CALC-MCNC: 2.7 G/DL (ref 2–4)
GLUCOSE BLD STRIP.AUTO-MCNC: 81 MG/DL (ref 65–100)
GLUCOSE SERPL-MCNC: 76 MG/DL (ref 65–100)
HCO3 BLDA-SCNC: 24 MMOL/L (ref 22–26)
HCT VFR BLD AUTO: 34.6 % (ref 35–47)
HGB BLD-MCNC: 11.7 G/DL (ref 11.5–16)
IMM GRANULOCYTES # BLD AUTO: 0 K/UL
IMM GRANULOCYTES NFR BLD AUTO: 0 %
INR PPP: 1.1 (ref 0.9–1.1)
LYMPHOCYTES # BLD: 1 K/UL (ref 0.8–3.5)
LYMPHOCYTES NFR BLD: 13 % (ref 12–49)
Lab: NORMAL
Lab: NORMAL
MCH RBC QN AUTO: 34.4 PG (ref 26–34)
MCHC RBC AUTO-ENTMCNC: 33.8 G/DL (ref 30–36.5)
MCV RBC AUTO: 101.8 FL (ref 80–99)
METAMYELOCYTES NFR BLD MANUAL: 3 %
METHGB MFR BLD: 0.5 % (ref 0–1.4)
MONOCYTES # BLD: 0.7 K/UL (ref 0–1)
MONOCYTES NFR BLD: 9 % (ref 5–13)
NEUTS BAND NFR BLD MANUAL: 18 % (ref 0–6)
NEUTS SEG # BLD: 5.6 K/UL (ref 1.8–8)
NEUTS SEG NFR BLD: 57 % (ref 32–75)
NRBC # BLD: 0 K/UL (ref 0–0.01)
NRBC BLD-RTO: 0 PER 100 WBC
OXYHGB MFR BLD: 98.6 % (ref 95–99)
PCO2 BLDA: 36 MMHG (ref 35–45)
PEEP RESPIRATORY: 5
PERFORMED BY:: ABNORMAL
PERFORMED BY:: NORMAL
PH BLDA: 7.44 (ref 7.35–7.45)
PLATELET # BLD AUTO: 450 K/UL (ref 150–400)
PMV BLD AUTO: 9.8 FL (ref 8.9–12.9)
PO2 BLDA: 378 MMHG (ref 80–100)
POTASSIUM SERPL-SCNC: 3.1 MMOL/L (ref 3.5–5.1)
PROT SERPL-MCNC: 4.1 G/DL (ref 6.4–8.2)
PROTHROMBIN TIME: 15.2 SEC (ref 11.9–14.6)
RBC # BLD AUTO: 3.4 M/UL (ref 3.8–5.2)
RBC MORPH BLD: ABNORMAL
SAO2 % BLD: 99 % (ref 95–99)
SAO2% DEVICE SAO2% SENSOR NAME: ABNORMAL
SODIUM SERPL-SCNC: 141 MMOL/L (ref 136–145)
SPECIMEN SITE: ABNORMAL
VENTILATION MODE VENT: ABNORMAL
VT SETTING VENT: 450
WBC # BLD AUTO: 7.5 K/UL (ref 3.6–11)

## 2024-06-08 PROCEDURE — 87106 FUNGI IDENTIFICATION YEAST: CPT

## 2024-06-08 PROCEDURE — 80053 COMPREHEN METABOLIC PANEL: CPT

## 2024-06-08 PROCEDURE — 2580000003 HC RX 258: Performed by: ANESTHESIOLOGY

## 2024-06-08 PROCEDURE — 2500000003 HC RX 250 WO HCPCS: Performed by: SURGERY

## 2024-06-08 PROCEDURE — 2580000003 HC RX 258: Performed by: SURGERY

## 2024-06-08 PROCEDURE — 0BH17EZ INSERTION OF ENDOTRACHEAL AIRWAY INTO TRACHEA, VIA NATURAL OR ARTIFICIAL OPENING: ICD-10-PCS | Performed by: ANESTHESIOLOGY

## 2024-06-08 PROCEDURE — 3700000001 HC ADD 15 MINUTES (ANESTHESIA): Performed by: SURGERY

## 2024-06-08 PROCEDURE — 07BB0ZZ EXCISION OF MESENTERIC LYMPHATIC, OPEN APPROACH: ICD-10-PCS | Performed by: SURGERY

## 2024-06-08 PROCEDURE — 2700000000 HC OXYGEN THERAPY PER DAY

## 2024-06-08 PROCEDURE — 88307 TISSUE EXAM BY PATHOLOGIST: CPT

## 2024-06-08 PROCEDURE — 2720000010 HC SURG SUPPLY STERILE: Performed by: SURGERY

## 2024-06-08 PROCEDURE — 2500000003 HC RX 250 WO HCPCS: Performed by: NURSE ANESTHETIST, CERTIFIED REGISTERED

## 2024-06-08 PROCEDURE — 94002 VENT MGMT INPAT INIT DAY: CPT

## 2024-06-08 PROCEDURE — 3E1M48Z IRRIGATION OF PERITONEAL CAVITY USING IRRIGATING SUBSTANCE, PERCUTANEOUS ENDOSCOPIC APPROACH: ICD-10-PCS | Performed by: SURGERY

## 2024-06-08 PROCEDURE — 0DTN0ZZ RESECTION OF SIGMOID COLON, OPEN APPROACH: ICD-10-PCS | Performed by: SURGERY

## 2024-06-08 PROCEDURE — 2580000003 HC RX 258: Performed by: NURSE ANESTHETIST, CERTIFIED REGISTERED

## 2024-06-08 PROCEDURE — 3700000000 HC ANESTHESIA ATTENDED CARE: Performed by: SURGERY

## 2024-06-08 PROCEDURE — 6360000002 HC RX W HCPCS: Performed by: NURSE ANESTHETIST, CERTIFIED REGISTERED

## 2024-06-08 PROCEDURE — 2709999900 HC NON-CHARGEABLE SUPPLY: Performed by: SURGERY

## 2024-06-08 PROCEDURE — 82962 GLUCOSE BLOOD TEST: CPT

## 2024-06-08 PROCEDURE — 0DBE0ZZ EXCISION OF LARGE INTESTINE, OPEN APPROACH: ICD-10-PCS | Performed by: SURGERY

## 2024-06-08 PROCEDURE — 3600000004 HC SURGERY LEVEL 4 BASE: Performed by: SURGERY

## 2024-06-08 PROCEDURE — P9047 ALBUMIN (HUMAN), 25%, 50ML: HCPCS | Performed by: SURGERY

## 2024-06-08 PROCEDURE — 2000000000 HC ICU R&B

## 2024-06-08 PROCEDURE — 87070 CULTURE OTHR SPECIMN AEROBIC: CPT

## 2024-06-08 PROCEDURE — 6360000002 HC RX W HCPCS: Performed by: SURGERY

## 2024-06-08 PROCEDURE — 44141 PARTIAL REMOVAL OF COLON: CPT | Performed by: SURGERY

## 2024-06-08 PROCEDURE — 36556 INSERT NON-TUNNEL CV CATH: CPT | Performed by: SURGERY

## 2024-06-08 PROCEDURE — 87102 FUNGUS ISOLATION CULTURE: CPT

## 2024-06-08 PROCEDURE — 02HV33Z INSERTION OF INFUSION DEVICE INTO SUPERIOR VENA CAVA, PERCUTANEOUS APPROACH: ICD-10-PCS | Performed by: SURGERY

## 2024-06-08 PROCEDURE — 6370000000 HC RX 637 (ALT 250 FOR IP): Performed by: SURGERY

## 2024-06-08 PROCEDURE — 85025 COMPLETE CBC W/AUTO DIFF WBC: CPT

## 2024-06-08 PROCEDURE — 94761 N-INVAS EAR/PLS OXIMETRY MLT: CPT

## 2024-06-08 PROCEDURE — 04HY32Z INSERTION OF MONITORING DEVICE INTO LOWER ARTERY, PERCUTANEOUS APPROACH: ICD-10-PCS | Performed by: SURGERY

## 2024-06-08 PROCEDURE — 82803 BLOOD GASES ANY COMBINATION: CPT

## 2024-06-08 PROCEDURE — 36620 INSERTION CATHETER ARTERY: CPT | Performed by: SURGERY

## 2024-06-08 PROCEDURE — 71045 X-RAY EXAM CHEST 1 VIEW: CPT

## 2024-06-08 PROCEDURE — 97530 THERAPEUTIC ACTIVITIES: CPT

## 2024-06-08 PROCEDURE — P9045 ALBUMIN (HUMAN), 5%, 250 ML: HCPCS | Performed by: NURSE ANESTHETIST, CERTIFIED REGISTERED

## 2024-06-08 PROCEDURE — 49084 PERITONEAL LAVAGE: CPT | Performed by: SURGERY

## 2024-06-08 PROCEDURE — 87086 URINE CULTURE/COLONY COUNT: CPT

## 2024-06-08 PROCEDURE — 87205 SMEAR GRAM STAIN: CPT

## 2024-06-08 PROCEDURE — 0DBB0ZZ EXCISION OF ILEUM, OPEN APPROACH: ICD-10-PCS | Performed by: SURGERY

## 2024-06-08 PROCEDURE — 85610 PROTHROMBIN TIME: CPT

## 2024-06-08 PROCEDURE — 3600000014 HC SURGERY LEVEL 4 ADDTL 15MIN: Performed by: SURGERY

## 2024-06-08 PROCEDURE — 5A1945Z RESPIRATORY VENTILATION, 24-96 CONSECUTIVE HOURS: ICD-10-PCS | Performed by: ANESTHESIOLOGY

## 2024-06-08 PROCEDURE — 0DNU0ZZ RELEASE OMENTUM, OPEN APPROACH: ICD-10-PCS | Performed by: SURGERY

## 2024-06-08 RX ORDER — OXYCODONE HYDROCHLORIDE 5 MG/1
10 TABLET ORAL PRN
Status: DISCONTINUED | OUTPATIENT
Start: 2024-06-08 | End: 2024-06-08

## 2024-06-08 RX ORDER — NOREPINEPHRINE BITARTRATE 0.06 MG/ML
INJECTION, SOLUTION INTRAVENOUS
Status: DISPENSED
Start: 2024-06-08 | End: 2024-06-09

## 2024-06-08 RX ORDER — GLUCAGON 1 MG/ML
1 KIT INJECTION PRN
Status: DISCONTINUED | OUTPATIENT
Start: 2024-06-08 | End: 2024-06-08

## 2024-06-08 RX ORDER — POTASSIUM CHLORIDE 7.45 MG/ML
10 INJECTION INTRAVENOUS
Status: COMPLETED | OUTPATIENT
Start: 2024-06-09 | End: 2024-06-09

## 2024-06-08 RX ORDER — FENTANYL CITRATE 50 UG/ML
50 INJECTION, SOLUTION INTRAMUSCULAR; INTRAVENOUS EVERY 5 MIN PRN
Status: DISCONTINUED | OUTPATIENT
Start: 2024-06-08 | End: 2024-06-08 | Stop reason: HOSPADM

## 2024-06-08 RX ORDER — SODIUM CHLORIDE 0.9 % (FLUSH) 0.9 %
5-40 SYRINGE (ML) INJECTION PRN
Status: DISCONTINUED | OUTPATIENT
Start: 2024-06-08 | End: 2024-06-09

## 2024-06-08 RX ORDER — PROPOFOL 10 MG/ML
INJECTION, EMULSION INTRAVENOUS PRN
Status: DISCONTINUED | OUTPATIENT
Start: 2024-06-08 | End: 2024-06-08 | Stop reason: SDUPTHER

## 2024-06-08 RX ORDER — DEXTROSE MONOHYDRATE 100 MG/ML
INJECTION, SOLUTION INTRAVENOUS CONTINUOUS PRN
Status: DISCONTINUED | OUTPATIENT
Start: 2024-06-08 | End: 2024-06-08 | Stop reason: HOSPADM

## 2024-06-08 RX ORDER — HYDROMORPHONE HYDROCHLORIDE 1 MG/ML
0.5 INJECTION, SOLUTION INTRAMUSCULAR; INTRAVENOUS; SUBCUTANEOUS EVERY 5 MIN PRN
Status: DISCONTINUED | OUTPATIENT
Start: 2024-06-08 | End: 2024-06-11

## 2024-06-08 RX ORDER — SODIUM CHLORIDE 9 MG/ML
INJECTION, SOLUTION INTRAVENOUS PRN
Status: DISCONTINUED | OUTPATIENT
Start: 2024-06-08 | End: 2024-06-08 | Stop reason: HOSPADM

## 2024-06-08 RX ORDER — CALCIUM GLUCONATE 20 MG/ML
1000 INJECTION, SOLUTION INTRAVENOUS ONCE
Status: COMPLETED | OUTPATIENT
Start: 2024-06-08 | End: 2024-06-09

## 2024-06-08 RX ORDER — ALBUMIN, HUMAN INJ 5% 5 %
SOLUTION INTRAVENOUS PRN
Status: DISCONTINUED | OUTPATIENT
Start: 2024-06-08 | End: 2024-06-08 | Stop reason: SDUPTHER

## 2024-06-08 RX ORDER — SODIUM CHLORIDE 0.9 % (FLUSH) 0.9 %
5-40 SYRINGE (ML) INJECTION PRN
Status: DISCONTINUED | OUTPATIENT
Start: 2024-06-08 | End: 2024-06-08

## 2024-06-08 RX ORDER — IPRATROPIUM BROMIDE AND ALBUTEROL SULFATE 2.5; .5 MG/3ML; MG/3ML
1 SOLUTION RESPIRATORY (INHALATION)
Status: DISCONTINUED | OUTPATIENT
Start: 2024-06-08 | End: 2024-06-08 | Stop reason: HOSPADM

## 2024-06-08 RX ORDER — ENOXAPARIN SODIUM 100 MG/ML
30 INJECTION SUBCUTANEOUS 2 TIMES DAILY
Status: DISCONTINUED | OUTPATIENT
Start: 2024-06-08 | End: 2024-06-08 | Stop reason: SDUPTHER

## 2024-06-08 RX ORDER — ROCURONIUM BROMIDE 10 MG/ML
INJECTION, SOLUTION INTRAVENOUS PRN
Status: DISCONTINUED | OUTPATIENT
Start: 2024-06-08 | End: 2024-06-08 | Stop reason: SDUPTHER

## 2024-06-08 RX ORDER — HYDRALAZINE HYDROCHLORIDE 20 MG/ML
10 INJECTION INTRAMUSCULAR; INTRAVENOUS
Status: DISCONTINUED | OUTPATIENT
Start: 2024-06-08 | End: 2024-06-08 | Stop reason: HOSPADM

## 2024-06-08 RX ORDER — MIDAZOLAM HYDROCHLORIDE 1 MG/ML
INJECTION INTRAMUSCULAR; INTRAVENOUS PRN
Status: DISCONTINUED | OUTPATIENT
Start: 2024-06-08 | End: 2024-06-08 | Stop reason: SDUPTHER

## 2024-06-08 RX ORDER — SODIUM CHLORIDE, SODIUM LACTATE, POTASSIUM CHLORIDE, CALCIUM CHLORIDE 600; 310; 30; 20 MG/100ML; MG/100ML; MG/100ML; MG/100ML
INJECTION, SOLUTION INTRAVENOUS ONCE
Status: DISCONTINUED | OUTPATIENT
Start: 2024-06-08 | End: 2024-06-08 | Stop reason: HOSPADM

## 2024-06-08 RX ORDER — LIDOCAINE 4 G/G
1 PATCH TOPICAL AS NEEDED
Status: DISCONTINUED | OUTPATIENT
Start: 2024-06-08 | End: 2024-06-08

## 2024-06-08 RX ORDER — FENTANYL CITRATE 50 UG/ML
INJECTION, SOLUTION INTRAMUSCULAR; INTRAVENOUS PRN
Status: DISCONTINUED | OUTPATIENT
Start: 2024-06-08 | End: 2024-06-08 | Stop reason: SDUPTHER

## 2024-06-08 RX ORDER — PROPOFOL 10 MG/ML
5-50 INJECTION, EMULSION INTRAVENOUS CONTINUOUS
Status: DISCONTINUED | OUTPATIENT
Start: 2024-06-08 | End: 2024-06-11

## 2024-06-08 RX ORDER — SODIUM CHLORIDE 0.9 % (FLUSH) 0.9 %
5-40 SYRINGE (ML) INJECTION EVERY 12 HOURS SCHEDULED
Status: DISCONTINUED | OUTPATIENT
Start: 2024-06-08 | End: 2024-06-08

## 2024-06-08 RX ORDER — CALCIUM GLUCONATE 94 MG/ML
INJECTION, SOLUTION INTRAVENOUS PRN
Status: DISCONTINUED | OUTPATIENT
Start: 2024-06-08 | End: 2024-06-08 | Stop reason: SDUPTHER

## 2024-06-08 RX ORDER — PHENYLEPHRINE HYDROCHLORIDE 10 MG/ML
INJECTION INTRAVENOUS PRN
Status: DISCONTINUED | OUTPATIENT
Start: 2024-06-08 | End: 2024-06-08 | Stop reason: SDUPTHER

## 2024-06-08 RX ORDER — CEFAZOLIN SODIUM 1 G/3ML
INJECTION, POWDER, FOR SOLUTION INTRAMUSCULAR; INTRAVENOUS
Status: COMPLETED
Start: 2024-06-08 | End: 2024-06-08

## 2024-06-08 RX ORDER — ONDANSETRON 2 MG/ML
4 INJECTION INTRAMUSCULAR; INTRAVENOUS EVERY 6 HOURS PRN
Status: DISCONTINUED | OUTPATIENT
Start: 2024-06-08 | End: 2024-06-26 | Stop reason: HOSPADM

## 2024-06-08 RX ORDER — LIDOCAINE HYDROCHLORIDE 20 MG/ML
INJECTION, SOLUTION EPIDURAL; INFILTRATION; INTRACAUDAL; PERINEURAL PRN
Status: DISCONTINUED | OUTPATIENT
Start: 2024-06-08 | End: 2024-06-08 | Stop reason: SDUPTHER

## 2024-06-08 RX ORDER — ONDANSETRON 2 MG/ML
4 INJECTION INTRAMUSCULAR; INTRAVENOUS
Status: DISCONTINUED | OUTPATIENT
Start: 2024-06-08 | End: 2024-06-08

## 2024-06-08 RX ORDER — CEFAZOLIN SODIUM 1 G/3ML
INJECTION, POWDER, FOR SOLUTION INTRAMUSCULAR; INTRAVENOUS PRN
Status: DISCONTINUED | OUTPATIENT
Start: 2024-06-08 | End: 2024-06-08 | Stop reason: SDUPTHER

## 2024-06-08 RX ORDER — METOCLOPRAMIDE HYDROCHLORIDE 5 MG/ML
10 INJECTION INTRAMUSCULAR; INTRAVENOUS
Status: DISCONTINUED | OUTPATIENT
Start: 2024-06-08 | End: 2024-06-08

## 2024-06-08 RX ORDER — ONDANSETRON 4 MG/1
4 TABLET, ORALLY DISINTEGRATING ORAL EVERY 8 HOURS PRN
Status: DISCONTINUED | OUTPATIENT
Start: 2024-06-08 | End: 2024-06-26 | Stop reason: HOSPADM

## 2024-06-08 RX ORDER — DIPHENHYDRAMINE HYDROCHLORIDE 50 MG/ML
12.5 INJECTION INTRAMUSCULAR; INTRAVENOUS
Status: ACTIVE | OUTPATIENT
Start: 2024-06-08 | End: 2024-06-09

## 2024-06-08 RX ORDER — OXYCODONE HYDROCHLORIDE 5 MG/1
5 TABLET ORAL PRN
Status: DISCONTINUED | OUTPATIENT
Start: 2024-06-08 | End: 2024-06-08

## 2024-06-08 RX ORDER — MEPERIDINE HYDROCHLORIDE 25 MG/ML
12.5 INJECTION INTRAMUSCULAR; INTRAVENOUS; SUBCUTANEOUS EVERY 5 MIN PRN
Status: DISCONTINUED | OUTPATIENT
Start: 2024-06-08 | End: 2024-06-08

## 2024-06-08 RX ORDER — SODIUM CHLORIDE, SODIUM LACTATE, POTASSIUM CHLORIDE, CALCIUM CHLORIDE 600; 310; 30; 20 MG/100ML; MG/100ML; MG/100ML; MG/100ML
INJECTION, SOLUTION INTRAVENOUS ONCE
Status: COMPLETED | OUTPATIENT
Start: 2024-06-08 | End: 2024-06-08

## 2024-06-08 RX ORDER — FUROSEMIDE 10 MG/ML
20 INJECTION INTRAMUSCULAR; INTRAVENOUS ONCE
Status: COMPLETED | OUTPATIENT
Start: 2024-06-08 | End: 2024-06-08

## 2024-06-08 RX ORDER — SODIUM CHLORIDE, SODIUM LACTATE, POTASSIUM CHLORIDE, CALCIUM CHLORIDE 600; 310; 30; 20 MG/100ML; MG/100ML; MG/100ML; MG/100ML
INJECTION, SOLUTION INTRAVENOUS CONTINUOUS PRN
Status: DISCONTINUED | OUTPATIENT
Start: 2024-06-08 | End: 2024-06-08 | Stop reason: SDUPTHER

## 2024-06-08 RX ORDER — ALBUMIN (HUMAN) 12.5 G/50ML
25 SOLUTION INTRAVENOUS ONCE
Status: COMPLETED | OUTPATIENT
Start: 2024-06-08 | End: 2024-06-09

## 2024-06-08 RX ORDER — SODIUM CHLORIDE 0.9 % (FLUSH) 0.9 %
5-40 SYRINGE (ML) INJECTION EVERY 12 HOURS SCHEDULED
Status: DISCONTINUED | OUTPATIENT
Start: 2024-06-08 | End: 2024-06-09

## 2024-06-08 RX ORDER — MAGNESIUM SULFATE IN WATER 40 MG/ML
INJECTION, SOLUTION INTRAVENOUS PRN
Status: DISCONTINUED | OUTPATIENT
Start: 2024-06-08 | End: 2024-06-08 | Stop reason: SDUPTHER

## 2024-06-08 RX ORDER — LABETALOL HYDROCHLORIDE 5 MG/ML
10 INJECTION, SOLUTION INTRAVENOUS
Status: CANCELLED | OUTPATIENT
Start: 2024-06-08

## 2024-06-08 RX ORDER — LORAZEPAM 2 MG/ML
0.5 INJECTION INTRAMUSCULAR
Status: DISCONTINUED | OUTPATIENT
Start: 2024-06-08 | End: 2024-06-08 | Stop reason: HOSPADM

## 2024-06-08 RX ORDER — HYDRALAZINE HYDROCHLORIDE 20 MG/ML
10 INJECTION INTRAMUSCULAR; INTRAVENOUS
Status: CANCELLED | OUTPATIENT
Start: 2024-06-08

## 2024-06-08 RX ORDER — SODIUM CHLORIDE 9 MG/ML
INJECTION, SOLUTION INTRAVENOUS CONTINUOUS
Status: DISCONTINUED | OUTPATIENT
Start: 2024-06-08 | End: 2024-06-09

## 2024-06-08 RX ORDER — SODIUM CHLORIDE 9 MG/ML
INJECTION, SOLUTION INTRAVENOUS PRN
Status: DISCONTINUED | OUTPATIENT
Start: 2024-06-08 | End: 2024-06-09

## 2024-06-08 RX ORDER — LABETALOL HYDROCHLORIDE 5 MG/ML
10 INJECTION, SOLUTION INTRAVENOUS
Status: DISCONTINUED | OUTPATIENT
Start: 2024-06-08 | End: 2024-06-08 | Stop reason: HOSPADM

## 2024-06-08 RX ORDER — HYDROMORPHONE HYDROCHLORIDE 1 MG/ML
0.5 INJECTION, SOLUTION INTRAMUSCULAR; INTRAVENOUS; SUBCUTANEOUS EVERY 5 MIN PRN
Status: DISCONTINUED | OUTPATIENT
Start: 2024-06-08 | End: 2024-06-08 | Stop reason: HOSPADM

## 2024-06-08 RX ORDER — HYDROMORPHONE HYDROCHLORIDE 1 MG/ML
INJECTION, SOLUTION INTRAMUSCULAR; INTRAVENOUS; SUBCUTANEOUS PRN
Status: DISCONTINUED | OUTPATIENT
Start: 2024-06-08 | End: 2024-06-08 | Stop reason: SDUPTHER

## 2024-06-08 RX ORDER — NALOXONE HYDROCHLORIDE 0.4 MG/ML
INJECTION, SOLUTION INTRAMUSCULAR; INTRAVENOUS; SUBCUTANEOUS PRN
Status: DISCONTINUED | OUTPATIENT
Start: 2024-06-08 | End: 2024-06-08

## 2024-06-08 RX ORDER — SUCCINYLCHOLINE/SOD CL,ISO/PF 200MG/10ML
SYRINGE (ML) INTRAVENOUS PRN
Status: DISCONTINUED | OUTPATIENT
Start: 2024-06-08 | End: 2024-06-08 | Stop reason: SDUPTHER

## 2024-06-08 RX ORDER — DIPHENHYDRAMINE HYDROCHLORIDE 50 MG/ML
12.5 INJECTION INTRAMUSCULAR; INTRAVENOUS
Status: DISCONTINUED | OUTPATIENT
Start: 2024-06-08 | End: 2024-06-08 | Stop reason: HOSPADM

## 2024-06-08 RX ORDER — FENTANYL CITRATE-0.9 % NACL/PF 10 MCG/ML
75 PLASTIC BAG, INJECTION (ML) INTRAVENOUS CONTINUOUS
Status: DISCONTINUED | OUTPATIENT
Start: 2024-06-08 | End: 2024-06-11

## 2024-06-08 RX ADMIN — FENTANYL CITRATE 75 MCG/HR: at 18:22

## 2024-06-08 RX ADMIN — MAGNESIUM SULFATE HEPTAHYDRATE 2000 MG: 40 INJECTION, SOLUTION INTRAVENOUS at 17:24

## 2024-06-08 RX ADMIN — SODIUM BICARBONATE 100 MEQ: 84 INJECTION, SOLUTION INTRAVENOUS at 17:22

## 2024-06-08 RX ADMIN — SODIUM CHLORIDE, POTASSIUM CHLORIDE, SODIUM LACTATE AND CALCIUM CHLORIDE: 600; 310; 30; 20 INJECTION, SOLUTION INTRAVENOUS at 11:05

## 2024-06-08 RX ADMIN — ROCURONIUM BROMIDE 50 MG: 10 INJECTION, SOLUTION INTRAVENOUS at 17:06

## 2024-06-08 RX ADMIN — MIDAZOLAM HYDROCHLORIDE 2 MG: 2 INJECTION, SOLUTION INTRAMUSCULAR; INTRAVENOUS at 17:34

## 2024-06-08 RX ADMIN — SODIUM CHLORIDE, POTASSIUM CHLORIDE, SODIUM LACTATE AND CALCIUM CHLORIDE: 600; 310; 30; 20 INJECTION, SOLUTION INTRAVENOUS at 06:33

## 2024-06-08 RX ADMIN — ROCURONIUM BROMIDE 20 MG: 10 INJECTION, SOLUTION INTRAVENOUS at 15:46

## 2024-06-08 RX ADMIN — SODIUM CHLORIDE, PRESERVATIVE FREE 10 ML: 5 INJECTION INTRAVENOUS at 08:14

## 2024-06-08 RX ADMIN — CALCIUM GLUCONATE 1000 MG: 20 INJECTION, SOLUTION INTRAVENOUS at 23:46

## 2024-06-08 RX ADMIN — PROPOFOL 150 MG: 10 INJECTION, EMULSION INTRAVENOUS at 14:48

## 2024-06-08 RX ADMIN — CEFAZOLIN SODIUM 3 G: 1 INJECTION, POWDER, FOR SOLUTION INTRAMUSCULAR; INTRAVENOUS at 14:37

## 2024-06-08 RX ADMIN — HYDROMORPHONE HYDROCHLORIDE 1 MG: 1 INJECTION, SOLUTION INTRAMUSCULAR; INTRAVENOUS; SUBCUTANEOUS at 17:40

## 2024-06-08 RX ADMIN — HYDROMORPHONE HYDROCHLORIDE 1 MG: 1 INJECTION, SOLUTION INTRAMUSCULAR; INTRAVENOUS; SUBCUTANEOUS at 12:10

## 2024-06-08 RX ADMIN — ENOXAPARIN SODIUM 30 MG: 100 INJECTION SUBCUTANEOUS at 21:16

## 2024-06-08 RX ADMIN — MIDAZOLAM HYDROCHLORIDE 2 MG: 2 INJECTION, SOLUTION INTRAMUSCULAR; INTRAVENOUS at 14:37

## 2024-06-08 RX ADMIN — SODIUM CHLORIDE, POTASSIUM CHLORIDE, SODIUM LACTATE AND CALCIUM CHLORIDE: 600; 310; 30; 20 INJECTION, SOLUTION INTRAVENOUS at 14:37

## 2024-06-08 RX ADMIN — LEVOTHYROXINE SODIUM 150 MCG: 0.07 TABLET ORAL at 05:46

## 2024-06-08 RX ADMIN — Medication 160 MG: at 14:48

## 2024-06-08 RX ADMIN — FENTANYL CITRATE 100 MCG: 50 INJECTION, SOLUTION INTRAMUSCULAR; INTRAVENOUS at 14:48

## 2024-06-08 RX ADMIN — ROCURONIUM BROMIDE 50 MG: 10 INJECTION, SOLUTION INTRAVENOUS at 15:06

## 2024-06-08 RX ADMIN — ALBUMIN (HUMAN) 25 G: 0.25 INJECTION, SOLUTION INTRAVENOUS at 23:31

## 2024-06-08 RX ADMIN — HYDROMORPHONE HYDROCHLORIDE 1 MG: 1 INJECTION, SOLUTION INTRAMUSCULAR; INTRAVENOUS; SUBCUTANEOUS at 14:37

## 2024-06-08 RX ADMIN — SODIUM CHLORIDE, POTASSIUM CHLORIDE, SODIUM LACTATE AND CALCIUM CHLORIDE: 600; 310; 30; 20 INJECTION, SOLUTION INTRAVENOUS at 23:05

## 2024-06-08 RX ADMIN — CALCIUM GLUCONATE 1 G: 98 INJECTION, SOLUTION INTRAVENOUS at 17:24

## 2024-06-08 RX ADMIN — ROCURONIUM BROMIDE 50 MG: 10 INJECTION, SOLUTION INTRAVENOUS at 17:52

## 2024-06-08 RX ADMIN — HYDROMORPHONE HYDROCHLORIDE 1 MG: 1 INJECTION, SOLUTION INTRAMUSCULAR; INTRAVENOUS; SUBCUTANEOUS at 08:13

## 2024-06-08 RX ADMIN — KETOROLAC TROMETHAMINE 30 MG: 30 INJECTION, SOLUTION INTRAMUSCULAR; INTRAVENOUS at 01:46

## 2024-06-08 RX ADMIN — ROCURONIUM BROMIDE 30 MG: 10 INJECTION, SOLUTION INTRAVENOUS at 16:37

## 2024-06-08 RX ADMIN — SODIUM CHLORIDE, POTASSIUM CHLORIDE, SODIUM LACTATE AND CALCIUM CHLORIDE: 600; 310; 30; 20 INJECTION, SOLUTION INTRAVENOUS at 19:05

## 2024-06-08 RX ADMIN — LIDOCAINE HYDROCHLORIDE 100 MG: 20 INJECTION, SOLUTION EPIDURAL; INFILTRATION; INTRACAUDAL; PERINEURAL at 14:48

## 2024-06-08 RX ADMIN — ALBUMIN (HUMAN) 12.5 G: 12.5 INJECTION, SOLUTION INTRAVENOUS at 15:01

## 2024-06-08 RX ADMIN — FUROSEMIDE 20 MG: 10 INJECTION, SOLUTION INTRAMUSCULAR; INTRAVENOUS at 23:46

## 2024-06-08 RX ADMIN — SODIUM CHLORIDE, PRESERVATIVE FREE 10 ML: 5 INJECTION INTRAVENOUS at 21:18

## 2024-06-08 RX ADMIN — PROPOFOL 20 MCG/KG/MIN: 10 INJECTION, EMULSION INTRAVENOUS at 18:12

## 2024-06-08 RX ADMIN — HYDROMORPHONE HYDROCHLORIDE 1 MG: 1 INJECTION, SOLUTION INTRAMUSCULAR; INTRAVENOUS; SUBCUTANEOUS at 04:23

## 2024-06-08 RX ADMIN — SODIUM CHLORIDE: 9 INJECTION, SOLUTION INTRAVENOUS at 18:18

## 2024-06-08 RX ADMIN — POTASSIUM CHLORIDE 10 MEQ: 7.46 INJECTION, SOLUTION INTRAVENOUS at 23:26

## 2024-06-08 RX ADMIN — SODIUM CHLORIDE, PRESERVATIVE FREE 20 MG: 5 INJECTION INTRAVENOUS at 21:16

## 2024-06-08 RX ADMIN — PHENYLEPHRINE HYDROCHLORIDE 20 MCG/MIN: 10 INJECTION INTRAVENOUS at 14:58

## 2024-06-08 RX ADMIN — SODIUM CHLORIDE, PRESERVATIVE FREE 20 MG: 5 INJECTION INTRAVENOUS at 08:14

## 2024-06-08 RX ADMIN — PHENYLEPHRINE HYDROCHLORIDE 300 MCG: 10 INJECTION INTRAVENOUS at 14:53

## 2024-06-08 ASSESSMENT — PAIN SCALES - GENERAL
PAINLEVEL_OUTOF10: 7
PAINLEVEL_OUTOF10: 8
PAINLEVEL_OUTOF10: 0
PAINLEVEL_OUTOF10: 0
PAINLEVEL_OUTOF10: 7
PAINLEVEL_OUTOF10: 0
PAINLEVEL_OUTOF10: 7
PAINLEVEL_OUTOF10: 0

## 2024-06-08 ASSESSMENT — PULMONARY FUNCTION TESTS
PIF_VALUE: 20
PIF_VALUE: 20
PIF_VALUE: 21
PIF_VALUE: 18
PIF_VALUE: 19
PIF_VALUE: 21
PIF_VALUE: 22

## 2024-06-08 ASSESSMENT — PAIN DESCRIPTION - LOCATION
LOCATION: ABDOMEN

## 2024-06-08 ASSESSMENT — PAIN - FUNCTIONAL ASSESSMENT
PAIN_FUNCTIONAL_ASSESSMENT: PREVENTS OR INTERFERES SOME ACTIVE ACTIVITIES AND ADLS
PAIN_FUNCTIONAL_ASSESSMENT: ACTIVITIES ARE NOT PREVENTED

## 2024-06-08 ASSESSMENT — PAIN DESCRIPTION - ORIENTATION
ORIENTATION: MID
ORIENTATION: MID
ORIENTATION: LEFT
ORIENTATION: RIGHT

## 2024-06-08 ASSESSMENT — PAIN DESCRIPTION - DESCRIPTORS
DESCRIPTORS: ACHING;DISCOMFORT
DESCRIPTORS: ACHING
DESCRIPTORS: ACHING;DISCOMFORT
DESCRIPTORS: ACHING

## 2024-06-08 NOTE — PROGRESS NOTES
OT eval order received and acknowledged. OT eval attempted at 1:25PM however per pt chart and conversation with charge nurse, pt needs to go back to surgery s/t concerning findings on recent CT scan. Per charge nurse, surgery anticipated for 6/9/2024. Hold OT until after surgery. Thank you.

## 2024-06-08 NOTE — PLAN OF CARE
Problem: Pain  Goal: Verbalizes/displays adequate comfort level or baseline comfort level  6/7/2024 2324 by Ashly Blackmon, RN  Outcome: Progressing     Problem: Safety - Adult  Goal: Free from fall injury  6/7/2024 2324 by Ashly Blackmon, RN  Outcome: Progressing     Problem: Discharge Planning  Goal: Discharge to home or other facility with appropriate resources  6/7/2024 2324 by Ashly Blackmon, RN  Outcome: Progressing

## 2024-06-08 NOTE — PROGRESS NOTES
Pt glasses hand carried to ICU rm 269 nurse Brianne notified pt glasses in ICU rm as pt has no family present

## 2024-06-08 NOTE — OR NURSING
2 ng tubes and 2 xray detectable towels intentionally retained in the abdomen. Surgical site left open and patient to come back.

## 2024-06-08 NOTE — ANESTHESIA PRE PROCEDURE
EXPLORATORY LAPAROTOMY; RIGHT COLON RESECTION performed by Kylah Rice MD at [facility] MAIN OR    ORTHOPEDIC SURGERY      left knee arthroscopy    ORTHOPEDIC SURGERY      left knee meniscus repair    TONSILLECTOMY         Social History:    Social History     Tobacco Use    Smoking status: Former     Current packs/day: 0.00     Types: Cigarettes     Quit date: 7/1/2013     Years since quitting: 10.9    Smokeless tobacco: Never    Tobacco comments:     Quit smoking: quit 5 years ago   Substance Use Topics    Alcohol use: Yes     Alcohol/week: 4.2 standard drinks of alcohol                                Counseling given: Not Answered  Tobacco comments: Quit smoking: quit 5 years ago      Vital Signs (Current):   Vitals:    06/08/24 0843 06/08/24 0900 06/08/24 1210 06/08/24 1240   BP:  (!) 111/58     Pulse:  81     Resp: 18 16 18 18   Temp:  97.9 °F (36.6 °C)     TempSrc:  Oral     SpO2:  97%     Weight:       Height:                                                  BP Readings from Last 3 Encounters:   06/08/24 (!) 111/58       NPO Status: Time of last liquid consumption: 1900                        Time of last solid consumption: 1800                        Date of last liquid consumption: 06/07/24                        Date of last solid food consumption: 05/31/24    BMI:   Wt Readings from Last 3 Encounters:   06/02/24 127 kg (280 lb)     Body mass index is 42.57 kg/m².    CBC:   Lab Results   Component Value Date/Time    WBC 9.2 06/07/2024 08:15 AM    RBC 3.75 06/07/2024 08:15 AM    HGB 13.0 06/07/2024 08:15 AM    HCT 38.2 06/07/2024 08:15 AM    .9 06/07/2024 08:15 AM    RDW 12.9 06/07/2024 08:15 AM     06/07/2024 08:15 AM       CMP:   Lab Results   Component Value Date/Time     06/07/2024 08:15 AM    K 3.9 06/07/2024 08:15 AM     06/07/2024 08:15 AM    CO2 26 06/07/2024 08:15 AM    BUN 14 06/07/2024 08:15 AM    CREATININE 0.68 06/07/2024 08:15 AM    LABGLOM >90 06/07/2024 08:15 AM

## 2024-06-08 NOTE — ANESTHESIA POSTPROCEDURE EVALUATION
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    Lenore Phase I: Lenore Score: 10    Lenore Phase II:      Anesthesia Post Evaluation    Patient location during evaluation: ICU  Patient participation: complete - patient cannot participate  Level of consciousness: sedated and ventilated  Pain score: 0  Airway patency: patent  Nausea & Vomiting: no vomiting and no nausea  Cardiovascular status: hemodynamically stable  Respiratory status: acceptable  Hydration status: stable  Multimodal analgesia pain management approach    No notable events documented.

## 2024-06-08 NOTE — PLAN OF CARE
PHYSICAL THERAPY TREATMENT     Patient: Maddie Goldberg (63 y.o. 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  ASSESSMENT  Patient continues with skilled PT services and is progressing towards goals. Pt semi supine upon PT arrival, agreeable to session with min encouragement. Pt A&O x 4. (See below for objective details and assist levels).     Overall pt tolerated session well today with bed mobility, seated therex, transfers and ambulation. Pt demo's improve functional mobility to date requiring mostly CGA for ambulation. Performed bed mobility with SBA and HOB elevated. Demo's good sitting balance EOB. Tolerated increased ambulation distances with no overt LOB or knee buckling noted. Pt returned sitting and left with nursing at bedside. Educated on importance of OOB mobility and participating in treatment. Will continue to benefit from skilled PT services, and will continue to progress as tolerated. Potential barriers for safe discharge: concern for pt safely navigating or managing the home environment. Current PT DC recommendation Inpatient Rehabilitation Facility  once medically appropriate.    GOALS:    Problem: Physical Therapy - Adult  Goal: By Discharge: Performs mobility at highest level of function for planned discharge setting.  See evaluation for individualized goals.  Description: FUNCTIONAL STATUS PRIOR TO ADMISSION: Patient was independent

## 2024-06-08 NOTE — PROCEDURES
PROCEDURE NOTE  Date: 6/7/2024   Name: Maddie Goldberg  YOB: 1960    Procedures    Called to place PICC for patient with limited IV access and history of failed PIV attempts.  Patient states she has had PICC line in the past and denies any questions at this time.  Signed consent placed in patient chart.  Right upper extremity assessed first.  A large cephalic was identified with good compressibility, which was chosen for access.  PICC inserted sterilely using modified seldinger technique under ultrasound guidance.  Lidocaine given for local anesthetic, which patient tolerated well.  Vessel accessed with good venous blood return.  Guidewire advanced easily.  Introducer placed and guidewire removed.  PICC cut to 40 cm and inserted with good venous blood return and easy flushing.  PICC placement confirmed at bedside with 3CG, which shows PICC in the distal SVC/CAJ.  PICC secured at 0 cm out with stabilization device and covered with CHG dressing.  Patient tolerated procedure well with no obvious adverse effects noted at this time.  Patient's nurse, YANY Mojica, notified that PICC is okay to use at this time.      REF:  [id number]  LOT:  [id number]  EXP:  07/31/2025    Post insertion arm circumference 42 cm.                     Numbers 0-10

## 2024-06-08 NOTE — PLAN OF CARE
Problem: Pain  Goal: Verbalizes/displays adequate comfort level or baseline comfort level  6/8/2024 0823 by Rachana Herman RN  Outcome: Progressing  6/7/2024 2324 by Ashly Blackmon RN  Outcome: Progressing     Problem: Discharge Planning  Goal: Discharge to home or other facility with appropriate resources  6/7/2024 2324 by Ashly Blackmon, RN  Outcome: Progressing     Problem: Safety - Adult  Goal: Free from fall injury  6/7/2024 2324 by Ashly Blackmon, RN  Outcome: Progressing

## 2024-06-09 ENCOUNTER — APPOINTMENT (OUTPATIENT)
Facility: HOSPITAL | Age: 64
DRG: 329 | End: 2024-06-09
Payer: COMMERCIAL

## 2024-06-09 ENCOUNTER — ANESTHESIA (OUTPATIENT)
Facility: HOSPITAL | Age: 64
End: 2024-06-09
Payer: COMMERCIAL

## 2024-06-09 ENCOUNTER — ANESTHESIA EVENT (OUTPATIENT)
Facility: HOSPITAL | Age: 64
End: 2024-06-09
Payer: COMMERCIAL

## 2024-06-09 LAB
ALBUMIN SERPL-MCNC: 1.5 G/DL (ref 3.5–5)
ALBUMIN SERPL-MCNC: 1.7 G/DL (ref 3.5–5)
ALBUMIN/GLOB SERPL: 0.5 (ref 1.1–2.2)
ALBUMIN/GLOB SERPL: 0.7 (ref 1.1–2.2)
ALP SERPL-CCNC: 114 U/L (ref 45–117)
ALP SERPL-CCNC: 92 U/L (ref 45–117)
ALT SERPL-CCNC: 15 U/L (ref 12–78)
ALT SERPL-CCNC: 18 U/L (ref 12–78)
ANION GAP SERPL CALC-SCNC: 7 MMOL/L (ref 5–15)
ANION GAP SERPL CALC-SCNC: 8 MMOL/L (ref 5–15)
ARTERIAL PATENCY WRIST A: ABNORMAL
ARTERIAL PATENCY WRIST A: ABNORMAL
AST SERPL W P-5'-P-CCNC: 11 U/L (ref 15–37)
AST SERPL W P-5'-P-CCNC: 17 U/L (ref 15–37)
BASE DEFICIT BLDA-SCNC: 0.9 MMOL/L
BASE DEFICIT BLDA-SCNC: 5.4 MMOL/L
BASOPHILS # BLD: 0 K/UL (ref 0–0.1)
BASOPHILS NFR BLD: 0 % (ref 0–1)
BDY SITE: ABNORMAL
BDY SITE: ABNORMAL
BILIRUB SERPL-MCNC: 0.8 MG/DL (ref 0.2–1)
BILIRUB SERPL-MCNC: 0.8 MG/DL (ref 0.2–1)
BODY TEMPERATURE: 98.2
BUN SERPL-MCNC: 12 MG/DL (ref 6–20)
BUN SERPL-MCNC: 13 MG/DL (ref 6–20)
BUN/CREAT SERPL: 23 (ref 12–20)
BUN/CREAT SERPL: 25 (ref 12–20)
CA-I BLD-MCNC: 1.03 MMOL/L (ref 1.13–1.32)
CA-I BLD-MCNC: 1.03 MMOL/L (ref 1.13–1.32)
CA-I BLD-MCNC: 7.4 MG/DL (ref 8.5–10.1)
CA-I BLD-MCNC: 7.7 MG/DL (ref 8.5–10.1)
CHLORIDE SERPL-SCNC: 105 MMOL/L (ref 97–108)
CHLORIDE SERPL-SCNC: 105 MMOL/L (ref 97–108)
CO2 SERPL-SCNC: 25 MMOL/L (ref 21–32)
CO2 SERPL-SCNC: 27 MMOL/L (ref 21–32)
COHGB MFR BLD: 0.3 % (ref 1–2)
COHGB MFR BLD: 0.7 % (ref 1–2)
CREAT SERPL-MCNC: 0.52 MG/DL (ref 0.55–1.02)
CREAT SERPL-MCNC: 0.53 MG/DL (ref 0.55–1.02)
DIFFERENTIAL METHOD BLD: ABNORMAL
EOSINOPHIL # BLD: 0 K/UL (ref 0–0.4)
EOSINOPHIL NFR BLD: 0 % (ref 0–7)
ERYTHROCYTE [DISTWIDTH] IN BLOOD BY AUTOMATED COUNT: 13.2 % (ref 11.5–14.5)
FIO2 ON VENT: 35 %
FIO2 ON VENT: 35 %
GAS FLOW.O2 SETTING OXYMISER: 16
GAS FLOW.O2 SETTING OXYMISER: 16
GLOBULIN SER CALC-MCNC: 2.5 G/DL (ref 2–4)
GLOBULIN SER CALC-MCNC: 2.9 G/DL (ref 2–4)
GLUCOSE BLD STRIP.AUTO-MCNC: 89 MG/DL (ref 65–100)
GLUCOSE BLD STRIP.AUTO-MCNC: 90 MG/DL (ref 65–100)
GLUCOSE SERPL-MCNC: 112 MG/DL (ref 65–100)
GLUCOSE SERPL-MCNC: 88 MG/DL (ref 65–100)
HCO3 BLDA-SCNC: 19 MMOL/L (ref 22–26)
HCO3 BLDA-SCNC: 21 MMOL/L (ref 22–26)
HCT VFR BLD AUTO: 34.1 % (ref 35–47)
HGB BLD-MCNC: 11.5 G/DL (ref 11.5–16)
IMM GRANULOCYTES # BLD AUTO: 0 K/UL
IMM GRANULOCYTES NFR BLD AUTO: 0 %
LYMPHOCYTES # BLD: 2.3 K/UL (ref 0.8–3.5)
LYMPHOCYTES NFR BLD: 17 % (ref 12–49)
MCH RBC QN AUTO: 33.9 PG (ref 26–34)
MCHC RBC AUTO-ENTMCNC: 33.7 G/DL (ref 30–36.5)
MCV RBC AUTO: 100.6 FL (ref 80–99)
METHGB MFR BLD: 0.5 % (ref 0–1.4)
METHGB MFR BLD: 0.6 % (ref 0–1.4)
MONOCYTES # BLD: 0.8 K/UL (ref 0–1)
MONOCYTES NFR BLD: 6 % (ref 5–13)
MYELOCYTES NFR BLD MANUAL: 2 %
NEUTS BAND NFR BLD MANUAL: 8 % (ref 0–6)
NEUTS SEG # BLD: 10.3 K/UL (ref 1.8–8)
NEUTS SEG NFR BLD: 67 % (ref 32–75)
NRBC # BLD: 0 K/UL (ref 0–0.01)
NRBC BLD-RTO: 0 PER 100 WBC
OXYHGB MFR BLD: 94.5 % (ref 95–99)
OXYHGB MFR BLD: 97.5 % (ref 95–99)
PCO2 BLDA: 29 MMHG (ref 35–45)
PCO2 BLDA: 32 MMHG (ref 35–45)
PEEP RESPIRATORY: 5
PEEP RESPIRATORY: 5
PERFORMED BY:: ABNORMAL
PERFORMED BY:: ABNORMAL
PERFORMED BY:: NORMAL
PERFORMED BY:: NORMAL
PH BLDA: 7.38 (ref 7.35–7.45)
PH BLDA: 7.49 (ref 7.35–7.45)
PLATELET # BLD AUTO: 507 K/UL (ref 150–400)
PMV BLD AUTO: 9.7 FL (ref 8.9–12.9)
PO2 BLDA: 121 MMHG (ref 80–100)
PO2 BLDA: 82 MMHG (ref 80–100)
POTASSIUM SERPL-SCNC: 3.8 MMOL/L (ref 3.5–5.1)
POTASSIUM SERPL-SCNC: 3.9 MMOL/L (ref 3.5–5.1)
PROT SERPL-MCNC: 4.2 G/DL (ref 6.4–8.2)
PROT SERPL-MCNC: 4.4 G/DL (ref 6.4–8.2)
RBC # BLD AUTO: 3.39 M/UL (ref 3.8–5.2)
RBC MORPH BLD: ABNORMAL
RBC MORPH BLD: ABNORMAL
SAO2 % BLD: 96 % (ref 95–99)
SAO2 % BLD: 98 % (ref 95–99)
SAO2% DEVICE SAO2% SENSOR NAME: ABNORMAL
SAO2% DEVICE SAO2% SENSOR NAME: ABNORMAL
SODIUM SERPL-SCNC: 137 MMOL/L (ref 136–145)
SODIUM SERPL-SCNC: 140 MMOL/L (ref 136–145)
SPECIMEN SITE: ABNORMAL
SPECIMEN SITE: ABNORMAL
VENTILATION MODE VENT: ABNORMAL
VENTILATION MODE VENT: ABNORMAL
VT SETTING VENT: 450
VT SETTING VENT: 450
WBC # BLD AUTO: 13.7 K/UL (ref 3.6–11)

## 2024-06-09 PROCEDURE — 2580000003 HC RX 258: Performed by: NURSE ANESTHETIST, CERTIFIED REGISTERED

## 2024-06-09 PROCEDURE — 36415 COLL VENOUS BLD VENIPUNCTURE: CPT

## 2024-06-09 PROCEDURE — 6360000002 HC RX W HCPCS: Performed by: NURSE ANESTHETIST, CERTIFIED REGISTERED

## 2024-06-09 PROCEDURE — 3600000004 HC SURGERY LEVEL 4 BASE: Performed by: SURGERY

## 2024-06-09 PROCEDURE — 2700000000 HC OXYGEN THERAPY PER DAY

## 2024-06-09 PROCEDURE — 2500000003 HC RX 250 WO HCPCS: Performed by: SURGERY

## 2024-06-09 PROCEDURE — 3700000000 HC ANESTHESIA ATTENDED CARE: Performed by: SURGERY

## 2024-06-09 PROCEDURE — 71045 X-RAY EXAM CHEST 1 VIEW: CPT

## 2024-06-09 PROCEDURE — 0D1B0Z4 BYPASS ILEUM TO CUTANEOUS, OPEN APPROACH: ICD-10-PCS | Performed by: SURGERY

## 2024-06-09 PROCEDURE — 82803 BLOOD GASES ANY COMBINATION: CPT

## 2024-06-09 PROCEDURE — 6360000002 HC RX W HCPCS: Performed by: SURGERY

## 2024-06-09 PROCEDURE — 2580000003 HC RX 258: Performed by: SURGERY

## 2024-06-09 PROCEDURE — 2000000000 HC ICU R&B

## 2024-06-09 PROCEDURE — 2500000003 HC RX 250 WO HCPCS: Performed by: NURSE ANESTHETIST, CERTIFIED REGISTERED

## 2024-06-09 PROCEDURE — 49084 PERITONEAL LAVAGE: CPT | Performed by: SURGERY

## 2024-06-09 PROCEDURE — 2709999900 HC NON-CHARGEABLE SUPPLY: Performed by: SURGERY

## 2024-06-09 PROCEDURE — 44310 ILEOSTOMY/JEJUNOSTOMY: CPT | Performed by: SURGERY

## 2024-06-09 PROCEDURE — 99291 CRITICAL CARE FIRST HOUR: CPT | Performed by: SURGERY

## 2024-06-09 PROCEDURE — 3600000014 HC SURGERY LEVEL 4 ADDTL 15MIN: Performed by: SURGERY

## 2024-06-09 PROCEDURE — 44144 PARTIAL REMOVAL OF COLON: CPT | Performed by: SURGERY

## 2024-06-09 PROCEDURE — 3700000001 HC ADD 15 MINUTES (ANESTHESIA): Performed by: SURGERY

## 2024-06-09 PROCEDURE — 2720000010 HC SURG SUPPLY STERILE: Performed by: SURGERY

## 2024-06-09 PROCEDURE — 85025 COMPLETE CBC W/AUTO DIFF WBC: CPT

## 2024-06-09 PROCEDURE — 82962 GLUCOSE BLOOD TEST: CPT

## 2024-06-09 PROCEDURE — 80053 COMPREHEN METABOLIC PANEL: CPT

## 2024-06-09 PROCEDURE — 94003 VENT MGMT INPAT SUBQ DAY: CPT

## 2024-06-09 PROCEDURE — 82330 ASSAY OF CALCIUM: CPT

## 2024-06-09 PROCEDURE — 94761 N-INVAS EAR/PLS OXIMETRY MLT: CPT

## 2024-06-09 RX ORDER — ENOXAPARIN SODIUM 100 MG/ML
30 INJECTION SUBCUTANEOUS 2 TIMES DAILY
Status: DISCONTINUED | OUTPATIENT
Start: 2024-06-09 | End: 2024-06-26 | Stop reason: HOSPADM

## 2024-06-09 RX ORDER — DEXAMETHASONE SODIUM PHOSPHATE 4 MG/ML
INJECTION, SOLUTION INTRA-ARTICULAR; INTRALESIONAL; INTRAMUSCULAR; INTRAVENOUS; SOFT TISSUE PRN
Status: DISCONTINUED | OUTPATIENT
Start: 2024-06-09 | End: 2024-06-09 | Stop reason: SDUPTHER

## 2024-06-09 RX ORDER — ROCURONIUM BROMIDE 10 MG/ML
INJECTION, SOLUTION INTRAVENOUS PRN
Status: DISCONTINUED | OUTPATIENT
Start: 2024-06-09 | End: 2024-06-09 | Stop reason: SDUPTHER

## 2024-06-09 RX ORDER — FLUCONAZOLE 2 MG/ML
400 INJECTION, SOLUTION INTRAVENOUS EVERY 24 HOURS
Status: DISCONTINUED | OUTPATIENT
Start: 2024-06-09 | End: 2024-06-22

## 2024-06-09 RX ORDER — FAMOTIDINE 10 MG/ML
INJECTION, SOLUTION INTRAVENOUS PRN
Status: DISCONTINUED | OUTPATIENT
Start: 2024-06-09 | End: 2024-06-09 | Stop reason: SDUPTHER

## 2024-06-09 RX ORDER — NOREPINEPHRINE BITARTRATE 0.06 MG/ML
1-100 INJECTION, SOLUTION INTRAVENOUS CONTINUOUS
Status: DISCONTINUED | OUTPATIENT
Start: 2024-06-09 | End: 2024-06-15

## 2024-06-09 RX ORDER — SODIUM CHLORIDE, SODIUM LACTATE, POTASSIUM CHLORIDE, CALCIUM CHLORIDE 600; 310; 30; 20 MG/100ML; MG/100ML; MG/100ML; MG/100ML
INJECTION, SOLUTION INTRAVENOUS CONTINUOUS PRN
Status: DISCONTINUED | OUTPATIENT
Start: 2024-06-09 | End: 2024-06-09 | Stop reason: SDUPTHER

## 2024-06-09 RX ORDER — SODIUM CHLORIDE 0.9 % (FLUSH) 0.9 %
5-40 SYRINGE (ML) INJECTION EVERY 12 HOURS SCHEDULED
Status: DISCONTINUED | OUTPATIENT
Start: 2024-06-09 | End: 2024-06-26 | Stop reason: HOSPADM

## 2024-06-09 RX ORDER — SODIUM CHLORIDE 9 MG/ML
INJECTION, SOLUTION INTRAVENOUS PRN
Status: DISCONTINUED | OUTPATIENT
Start: 2024-06-09 | End: 2024-06-26 | Stop reason: HOSPADM

## 2024-06-09 RX ORDER — ONDANSETRON 2 MG/ML
4 INJECTION INTRAMUSCULAR; INTRAVENOUS EVERY 6 HOURS PRN
Status: DISCONTINUED | OUTPATIENT
Start: 2024-06-09 | End: 2024-06-09

## 2024-06-09 RX ORDER — ONDANSETRON 4 MG/1
4 TABLET, ORALLY DISINTEGRATING ORAL EVERY 8 HOURS PRN
Status: DISCONTINUED | OUTPATIENT
Start: 2024-06-09 | End: 2024-06-09

## 2024-06-09 RX ORDER — SODIUM CHLORIDE 0.9 % (FLUSH) 0.9 %
5-40 SYRINGE (ML) INJECTION PRN
Status: DISCONTINUED | OUTPATIENT
Start: 2024-06-09 | End: 2024-06-09

## 2024-06-09 RX ORDER — MIDAZOLAM HYDROCHLORIDE 1 MG/ML
INJECTION INTRAMUSCULAR; INTRAVENOUS PRN
Status: DISCONTINUED | OUTPATIENT
Start: 2024-06-09 | End: 2024-06-09 | Stop reason: SDUPTHER

## 2024-06-09 RX ADMIN — SODIUM CHLORIDE, PRESERVATIVE FREE 10 ML: 5 INJECTION INTRAVENOUS at 09:03

## 2024-06-09 RX ADMIN — PROPOFOL 35 MCG/KG/MIN: 10 INJECTION, EMULSION INTRAVENOUS at 14:01

## 2024-06-09 RX ADMIN — HYDROMORPHONE HYDROCHLORIDE 1 MG: 1 INJECTION, SOLUTION INTRAMUSCULAR; INTRAVENOUS; SUBCUTANEOUS at 22:02

## 2024-06-09 RX ADMIN — FENTANYL CITRATE 75 MCG/HR: at 17:39

## 2024-06-09 RX ADMIN — POTASSIUM CHLORIDE 10 MEQ: 7.46 INJECTION, SOLUTION INTRAVENOUS at 01:34

## 2024-06-09 RX ADMIN — SODIUM CHLORIDE, POTASSIUM CHLORIDE, SODIUM LACTATE AND CALCIUM CHLORIDE: 600; 310; 30; 20 INJECTION, SOLUTION INTRAVENOUS at 20:02

## 2024-06-09 RX ADMIN — PROPOFOL 20 MCG/KG/MIN: 10 INJECTION, EMULSION INTRAVENOUS at 03:12

## 2024-06-09 RX ADMIN — DEXAMETHASONE SODIUM PHOSPHATE 8 MG: 4 INJECTION, SOLUTION INTRA-ARTICULAR; INTRALESIONAL; INTRAMUSCULAR; INTRAVENOUS; SOFT TISSUE at 19:28

## 2024-06-09 RX ADMIN — POTASSIUM CHLORIDE 10 MEQ: 7.46 INJECTION, SOLUTION INTRAVENOUS at 00:27

## 2024-06-09 RX ADMIN — FENTANYL CITRATE 75 MCG/HR: at 21:15

## 2024-06-09 RX ADMIN — FENTANYL CITRATE 75 MCG/HR: at 04:13

## 2024-06-09 RX ADMIN — SODIUM CHLORIDE, PRESERVATIVE FREE 20 MG: 5 INJECTION INTRAVENOUS at 22:01

## 2024-06-09 RX ADMIN — HYDROMORPHONE HYDROCHLORIDE 1 MG: 1 INJECTION, SOLUTION INTRAMUSCULAR; INTRAVENOUS; SUBCUTANEOUS at 15:47

## 2024-06-09 RX ADMIN — PROPOFOL 35 MCG/KG/MIN: 10 INJECTION, EMULSION INTRAVENOUS at 17:35

## 2024-06-09 RX ADMIN — FLUCONAZOLE 400 MG: 2 INJECTION, SOLUTION INTRAVENOUS at 09:04

## 2024-06-09 RX ADMIN — PROPOFOL 35 MCG/KG/MIN: 10 INJECTION, EMULSION INTRAVENOUS at 21:52

## 2024-06-09 RX ADMIN — ROCURONIUM BROMIDE 50 MG: 50 INJECTION INTRAVENOUS at 19:30

## 2024-06-09 RX ADMIN — SODIUM CHLORIDE, POTASSIUM CHLORIDE, SODIUM LACTATE AND CALCIUM CHLORIDE: 600; 310; 30; 20 INJECTION, SOLUTION INTRAVENOUS at 06:11

## 2024-06-09 RX ADMIN — ENOXAPARIN SODIUM 30 MG: 100 INJECTION SUBCUTANEOUS at 22:01

## 2024-06-09 RX ADMIN — FAMOTIDINE 20 MG: 10 INJECTION INTRAVENOUS at 19:28

## 2024-06-09 RX ADMIN — ROCURONIUM BROMIDE 40 MG: 50 INJECTION INTRAVENOUS at 19:12

## 2024-06-09 RX ADMIN — POTASSIUM CHLORIDE 10 MEQ: 7.46 INJECTION, SOLUTION INTRAVENOUS at 02:30

## 2024-06-09 RX ADMIN — SODIUM CHLORIDE, POTASSIUM CHLORIDE, SODIUM LACTATE AND CALCIUM CHLORIDE: 600; 310; 30; 20 INJECTION, SOLUTION INTRAVENOUS at 14:01

## 2024-06-09 RX ADMIN — Medication 5 MCG/MIN: at 02:58

## 2024-06-09 RX ADMIN — HYDROMORPHONE HYDROCHLORIDE 1 MG: 1 INJECTION, SOLUTION INTRAMUSCULAR; INTRAVENOUS; SUBCUTANEOUS at 20:54

## 2024-06-09 RX ADMIN — SODIUM CHLORIDE, PRESERVATIVE FREE 20 MG: 5 INJECTION INTRAVENOUS at 09:03

## 2024-06-09 RX ADMIN — SODIUM CHLORIDE, PRESERVATIVE FREE 10 ML: 5 INJECTION INTRAVENOUS at 22:36

## 2024-06-09 RX ADMIN — HYDROMORPHONE HYDROCHLORIDE 1 MG: 1 INJECTION, SOLUTION INTRAMUSCULAR; INTRAVENOUS; SUBCUTANEOUS at 10:42

## 2024-06-09 RX ADMIN — MIDAZOLAM HYDROCHLORIDE 2 MG: 2 INJECTION, SOLUTION INTRAMUSCULAR; INTRAVENOUS at 19:12

## 2024-06-09 RX ADMIN — PROPOFOL 20 MCG/KG/MIN: 10 INJECTION, EMULSION INTRAVENOUS at 09:10

## 2024-06-09 ASSESSMENT — PULMONARY FUNCTION TESTS
PIF_VALUE: 19
PIF_VALUE: 19
PIF_VALUE: 20
PIF_VALUE: 19
PIF_VALUE: 20
PIF_VALUE: 19
PIF_VALUE: 18
PIF_VALUE: 19
PIF_VALUE: 20
PIF_VALUE: 19
PIF_VALUE: 19
PIF_VALUE: 20
PIF_VALUE: 20
PIF_VALUE: 19
PIF_VALUE: 21
PIF_VALUE: 19
PIF_VALUE: 20
PIF_VALUE: 16
PIF_VALUE: 19
PIF_VALUE: 19
PIF_VALUE: 20
PIF_VALUE: 19
PIF_VALUE: 27
PIF_VALUE: 19
PIF_VALUE: 20
PIF_VALUE: 19
PIF_VALUE: 19
PIF_VALUE: 16
PIF_VALUE: 21
PIF_VALUE: 16
PIF_VALUE: 19
PIF_VALUE: 19
PIF_VALUE: 18
PIF_VALUE: 19
PIF_VALUE: 20
PIF_VALUE: 20
PIF_VALUE: 19
PIF_VALUE: 19
PIF_VALUE: 22
PIF_VALUE: 20
PIF_VALUE: 19
PIF_VALUE: 19

## 2024-06-09 ASSESSMENT — PAIN SCALES - GENERAL
PAINLEVEL_OUTOF10: 7
PAINLEVEL_OUTOF10: 7
PAINLEVEL_OUTOF10: 2

## 2024-06-09 ASSESSMENT — PAIN DESCRIPTION - ORIENTATION
ORIENTATION: MID
ORIENTATION: MID

## 2024-06-09 ASSESSMENT — PAIN DESCRIPTION - DESCRIPTORS
DESCRIPTORS: SORE
DESCRIPTORS: SORE

## 2024-06-09 ASSESSMENT — PAIN SCALES - WONG BAKER
WONGBAKER_NUMERICALRESPONSE: HURTS A LITTLE BIT
WONGBAKER_NUMERICALRESPONSE: HURTS A LITTLE BIT

## 2024-06-09 ASSESSMENT — PAIN DESCRIPTION - LOCATION
LOCATION: ABDOMEN
LOCATION: ABDOMEN

## 2024-06-09 NOTE — PROGRESS NOTES
Surgery critical care Progress Note     Subjective:   Patient examined in ICU.     Hospital Course:  Patient was taken back to surgery yesterday for laparotomy.  Patient was found to have anastomosis leak and also sigmoid colon mass was removed.  Patient was profoundly hypotensive therefore we could not complete the procedure.  Patient currently have temporary abdominal closure.     Subjective:  Patient currently sedated and intubated.  Patient also diagnosed with Lasix, put out over 1000 cc urine output.        Review of Systems  Unable to assess   Objective:    BP (!) 99/53   Pulse (!) 101   Temp 98.8 °F (37.1 °C) (Esophageal)   Resp 16   Ht 1.727 m (5' 8\")   Wt (!) 137.4 kg (302 lb 14.6 oz)   SpO2 99%   BMI 46.06 kg/m²       Head and neck atraumatic, normocephalic.  ENT: No hoarse voice  Cardiac system regular rate rhythm.  Pulmonary no audible wheeze  Chest wall excursion normal with respiration cycle  Abdomen is soft not particularly distended.  Neurologically unable to assess  Skin is warm and moist.  Psychosocial: Unable to assess  Vascular examination as previously noted no changes.    Current Facility-Administered Medications   Medication Dose Route Frequency Provider Last Rate Last Admin    norepinephrine (LEVOPHED) 16 mg in sodium chloride 0.9 % 250 mL infusion  1-100 mcg/min IntraVENous Continuous Tommie Pineda MD 4.7 mL/hr at 06/09/24 0258 5 mcg/min at 06/09/24 0258    diphenhydrAMINE (BENADRYL) injection 12.5 mg  12.5 mg IntraVENous Once PRN Haris Salgado Jr., MD        HYDROmorphone HCl PF (DILAUDID) injection 0.5 mg  0.5 mg IntraVENous Q5 Min PRN Haris Salgado Jr., MD        sodium chloride flush 0.9 % injection 5-40 mL  5-40 mL IntraVENous 2 times per day Tommie Pineda MD   10 mL at 06/08/24 2118    sodium chloride flush 0.9 % injection 5-40 mL  5-40 mL IntraVENous PRN Tommei Pineda MD        0.9 % sodium chloride infusion   IntraVENous PRN Tommie Pineda MD        ondansetron  Manual      RBC Comment Macrocytosis  1+        RBC Comment Polychromasia  1+       Comprehensive Metabolic Panel    Collection Time: 06/09/24  4:49 AM   Result Value Ref Range    Sodium 140 136 - 145 mmol/L    Potassium 3.8 3.5 - 5.1 mmol/L    Chloride 105 97 - 108 mmol/L    CO2 27 21 - 32 mmol/L    Anion Gap 8 5 - 15 mmol/L    Glucose 88 65 - 100 mg/dL    BUN 12 6 - 20 mg/dL    Creatinine 0.52 (L) 0.55 - 1.02 mg/dL    BUN/Creatinine Ratio 23 (H) 12 - 20      Est, Glom Filt Rate >90 >60 ml/min/1.73m2    Calcium 7.7 (L) 8.5 - 10.1 mg/dL    Total Bilirubin 0.8 0.2 - 1.0 mg/dL    AST 11 (L) 15 - 37 U/L    ALT 18 12 - 78 U/L    Alk Phosphatase 92 45 - 117 U/L    Total Protein 4.2 (L) 6.4 - 8.2 g/dL    Albumin 1.7 (L) 3.5 - 5.0 g/dL    Globulin 2.5 2.0 - 4.0 g/dL    Albumin/Globulin Ratio 0.7 (L) 1.1 - 2.2     POC Blood Gas & Ionized Calcium    Collection Time: 06/09/24  4:56 AM   Result Value Ref Range    pH, Arterial 7.49 (H) 7.35 - 7.45      pCO2, Arterial 29 (L) 35 - 45 mmHg    pO2, Arterial 121 (H) 80 - 100 mmHg    HCO3, Arterial 21 (L) 22 - 26 mmol/L    Base deficit, arterial blood 0.9 mmol/L    O2 Sat, Arterial 98 95 - 99 %    Calcium, Ionized 1.03 (L) 1.13 - 1.32 mmol/L    O2 Method VENT      FIO2 Arterial 35 %    Mode ASSIST CONTROL      POC TIDAL VOLUME 450.0      Set Rate, POC 16      POC PEEP/CPA 5.0      Source Arterial      Site DRAWN FROM ARTERIAL LINE      Martín Test NOT APPLICABLE      Carboxyhgb, Arterial 0.3 (L) 1 - 2 %    Methemoglobin, Arterial 0.6 0 - 1.4 %    Oxyhemoglobin 97.5 95 - 99 %    Performed by: Ankit Shelley                Diagnosis:  Patient Active Problem List   Diagnosis    Nontoxic multinodular goiter    Goiter diffuse, nontoxic    Obesity, morbid (HCC)    Multinodular goiter (nontoxic)    GERD (gastroesophageal reflux disease)    Cecal volvulus (HCC)    Large bowel ischemia (HCC)        Assessment / Plan:  Cardiovascular system:  Patient currently on low-dose Levophed drip.

## 2024-06-09 NOTE — OP NOTE
Operative Note      Patient: Maddie Goldberg  YOB: 1960  MRN: [id number]    Date of Procedure: 6/8/2024    Preop diagnosis:  1.  Attention to previous recent laparotomy with right hemicolectomy for cecal volvulus.  2.  Ileal-colonic anastomosis leak.  3.  Sigmoid rectal mass/stricture.  4.  Complete bowel obstruction  5.  Septic syndrome    Post-Op Diagnosis:  1.  Same as above.  2.  Ileal-colonic anastomosis breakdown with a stool contamination.  3.  Sigmoid rectal junction mass.  4.  Intraoperative hypotension.       Procedure:  1.  Exploratory laparotomy.  2.  Lysis of adhesion.  3.  Resection of the ileal-colonic anastomosis.  4.  Resection of the sigmoid rectal junction with lymphadenectomy.  5.  Small bowel lavage.  6.  Peritoneal cavity irrigation and washout.  7.  Temporary abdominal closure.  8.  Left subclavian central line placement.  9.  Right common femoral artery arterial line placement.    Surgeon(s):  Tommie Pineda MD    Assistant:   Surgical Assistant: Nancy Christie    Anesthesia: General    Estimated Blood Loss (mL): 500    Complications: None    Specimens:   ID Type Source Tests Collected by Time Destination   1 : Enterocolostomy anastamosis breakdown Tissue Colostomy SURGICAL PATHOLOGY Tommie Pineda MD 6/8/2024 1538    2 : Sigmoid colon Tissue Sigmoid Colon SURGICAL PATHOLOGY Tommie Pineda MD 6/8/2024 1744    3 : Proximal Colon Tissue Colon SURGICAL PATHOLOGY Tommie Pineda MD 6/8/2024 1823    A :  Urine Urine, indwelling catheter CULTURE, URINE Tommie Pineda MD 6/8/2024 1514    B : Wound Culture from surgical incision. #1 Swab Abdomen CULTURE, FUNGUS, CULTURE, WOUND Tommie Pineda MD 6/8/2024 1519    C : Wound Culture from surgical incision. #2 Swab Abdomen CULTURE, FUNGUS, CULTURE, WOUND Tommie Pineda MD 6/8/2024 1522        Implants:  * No implants in log *      Drains:   Open Drain 06/08/24 Superior Abdomen (Active)   Site Description Intact 06/09/24 0000  0830   Drainage Appearance Green 06/04/24 0830   Output (mL) 50 ml 06/04/24 0830   Action Taken Placement verified (comment) 06/03/24 2102       [REMOVED] Urinary Catheter 06/02/24 (Removed)   $ Urethral catheter insertion Inserted for procedure 06/03/24 2017   Catheter Indications Perioperative use for selected surgical procedures 06/04/24 0757   Site Assessment No urethral drainage 06/04/24 0757   Urine Color Nikky 06/03/24 2017   Urine Appearance Clear 06/03/24 2017   Collection Container Standard 06/03/24 2017   Securement Method Securing device (Describe) 06/03/24 2017   Catheter Care  Perineal wipes 06/03/24 2017   Catheter Best Practices  Drainage tube clipped to bed;Catheter secured to thigh;Tamper seal intact;Bag below bladder;Bag not on floor;Lack of dependent loop in tubing;Drainage bag less than half full 06/03/24 2017   Status Draining 06/03/24 2017   Output (mL) 190 mL 06/04/24 0757       [REMOVED] External Urinary Catheter (Removed)   Site Assessment Clean,dry & intact 06/07/24 1118   Placement Replaced 06/07/24 0352   Catheter Care Catheter/Wick replaced;Suction Canister/Tubing changed 06/07/24 0352   Perineal Care Yes 06/07/24 0352   Suction 40 mmgHg continuous 06/07/24 0352   Urine Color Nikky 06/07/24 0352   Urine Appearance Clear 06/07/24 0352   Output (mL) 250 mL 06/07/24 0352       Findings:  Infection Present At Time Of Surgery (PATOS) (choose all levels that have infection present):  No infection present  Other Findings: As above.    Detailed Description of Procedure:   Patient was brought to the operating room table.  Comfortable supine position.  Followed by general anesthesia was initiated.  Followed by patient abdomen was prepped using Betadine solution.  Followed by sterile drapes were placed usual fashion.  At this time timeout was called after confirmation was made procedure commenced.    Previous placed the staples were removed from mid abdomen.  Followed by fascia was opened.

## 2024-06-10 ENCOUNTER — APPOINTMENT (OUTPATIENT)
Facility: HOSPITAL | Age: 64
DRG: 329 | End: 2024-06-10
Payer: COMMERCIAL

## 2024-06-10 PROBLEM — K66.0 ADHESION OF INTESTINE: Status: ACTIVE | Noted: 2024-06-10

## 2024-06-10 PROBLEM — K91.89 LEAK OF ANASTOMOSIS BETWEEN GASTROINTESTINAL STRUCTURES: Status: ACTIVE | Noted: 2024-06-10

## 2024-06-10 PROBLEM — J96.01 ACUTE RESPIRATORY FAILURE WITH HYPOXIA (HCC): Status: ACTIVE | Noted: 2024-06-10

## 2024-06-10 PROBLEM — E86.1 HYPOTENSION DUE TO HYPOVOLEMIA: Status: ACTIVE | Noted: 2024-06-10

## 2024-06-10 PROBLEM — S31.109A OPEN WOUND OF ABDOMEN: Status: ACTIVE | Noted: 2024-06-10

## 2024-06-10 PROBLEM — K63.89 MASS OF COLON: Status: ACTIVE | Noted: 2024-06-10

## 2024-06-10 LAB
ALBUMIN SERPL-MCNC: 1.3 G/DL (ref 3.5–5)
ALBUMIN/GLOB SERPL: 0.4 (ref 1.1–2.2)
ALP SERPL-CCNC: 88 U/L (ref 45–117)
ALT SERPL-CCNC: 15 U/L (ref 12–78)
ANION GAP SERPL CALC-SCNC: 7 MMOL/L (ref 5–15)
ARTERIAL PATENCY WRIST A: ABNORMAL
AST SERPL W P-5'-P-CCNC: 15 U/L (ref 15–37)
BASE DEFICIT BLDA-SCNC: 1.5 MMOL/L
BASOPHILS # BLD: 0 K/UL (ref 0–0.1)
BASOPHILS # BLD: 0 K/UL (ref 0–0.1)
BASOPHILS NFR BLD: 0 % (ref 0–1)
BASOPHILS NFR BLD: 0 % (ref 0–1)
BDY SITE: ABNORMAL
BILIRUB SERPL-MCNC: 0.6 MG/DL (ref 0.2–1)
BODY TEMPERATURE: 98.8
BODY TEMPERATURE: 99
BUN SERPL-MCNC: 14 MG/DL (ref 6–20)
BUN/CREAT SERPL: 27 (ref 12–20)
CA-I BLD-MCNC: 1.01 MMOL/L (ref 1.13–1.32)
CA-I BLD-MCNC: 7.4 MG/DL (ref 8.5–10.1)
CHLORIDE SERPL-SCNC: 107 MMOL/L (ref 97–108)
CO2 SERPL-SCNC: 24 MMOL/L (ref 21–32)
COHGB MFR BLD: 0.3 % (ref 1–2)
CREAT SERPL-MCNC: 0.52 MG/DL (ref 0.55–1.02)
DIFFERENTIAL METHOD BLD: ABNORMAL
DIFFERENTIAL METHOD BLD: ABNORMAL
EOSINOPHIL # BLD: 0 K/UL (ref 0–0.4)
EOSINOPHIL # BLD: 0 K/UL (ref 0–0.4)
EOSINOPHIL NFR BLD: 0 % (ref 0–7)
EOSINOPHIL NFR BLD: 0 % (ref 0–7)
ERYTHROCYTE [DISTWIDTH] IN BLOOD BY AUTOMATED COUNT: 13.4 % (ref 11.5–14.5)
ERYTHROCYTE [DISTWIDTH] IN BLOOD BY AUTOMATED COUNT: 13.5 % (ref 11.5–14.5)
FIO2 ON VENT: 0.35 %
GAS FLOW.O2 SETTING OXYMISER: 16
GLOBULIN SER CALC-MCNC: 2.9 G/DL (ref 2–4)
GLUCOSE BLD STRIP.AUTO-MCNC: 129 MG/DL (ref 65–100)
GLUCOSE SERPL-MCNC: 139 MG/DL (ref 65–100)
HCO3 BLDA-SCNC: 23 MMOL/L (ref 22–26)
HCT VFR BLD AUTO: 34.6 % (ref 35–47)
HCT VFR BLD AUTO: 36 % (ref 35–47)
HGB BLD-MCNC: 11.4 G/DL (ref 11.5–16)
HGB BLD-MCNC: 12.2 G/DL (ref 11.5–16)
IMM GRANULOCYTES # BLD AUTO: 0 K/UL
IMM GRANULOCYTES # BLD AUTO: 0 K/UL
IMM GRANULOCYTES NFR BLD AUTO: 0 %
IMM GRANULOCYTES NFR BLD AUTO: 0 %
LYMPHOCYTES # BLD: 1.5 K/UL (ref 0.8–3.5)
LYMPHOCYTES # BLD: 1.5 K/UL (ref 0.8–3.5)
LYMPHOCYTES NFR BLD: 6 % (ref 12–49)
LYMPHOCYTES NFR BLD: 7 % (ref 12–49)
MCH RBC QN AUTO: 34.2 PG (ref 26–34)
MCH RBC QN AUTO: 34.8 PG (ref 26–34)
MCHC RBC AUTO-ENTMCNC: 32.9 G/DL (ref 30–36.5)
MCHC RBC AUTO-ENTMCNC: 33.9 G/DL (ref 30–36.5)
MCV RBC AUTO: 102.6 FL (ref 80–99)
MCV RBC AUTO: 103.9 FL (ref 80–99)
METAMYELOCYTES NFR BLD MANUAL: 1 %
METAMYELOCYTES NFR BLD MANUAL: 1 %
METHGB MFR BLD: 0.4 % (ref 0–1.4)
MONOCYTES # BLD: 0.5 K/UL (ref 0–1)
MONOCYTES # BLD: 0.9 K/UL (ref 0–1)
MONOCYTES NFR BLD: 2 % (ref 5–13)
MONOCYTES NFR BLD: 4 % (ref 5–13)
MYELOCYTES NFR BLD MANUAL: 2 %
MYELOCYTES NFR BLD MANUAL: 3 %
NEUTS BAND NFR BLD MANUAL: 10 % (ref 0–6)
NEUTS BAND NFR BLD MANUAL: 2 % (ref 0–6)
NEUTS SEG # BLD: 18.3 K/UL (ref 1.8–8)
NEUTS SEG # BLD: 21.9 K/UL (ref 1.8–8)
NEUTS SEG NFR BLD: 76 % (ref 32–75)
NEUTS SEG NFR BLD: 86 % (ref 32–75)
NRBC # BLD: 0 K/UL (ref 0–0.01)
NRBC # BLD: 0 K/UL (ref 0–0.01)
NRBC BLD-RTO: 0 PER 100 WBC
NRBC BLD-RTO: 0 PER 100 WBC
OXYHGB MFR BLD: 97.3 % (ref 95–99)
PCO2 BLDA: 37 MMHG (ref 35–45)
PEEP RESPIRATORY: 5
PERFORMED BY:: ABNORMAL
PERFORMED BY:: ABNORMAL
PH BLDA: 7.41 (ref 7.35–7.45)
PLATELET # BLD AUTO: 542 K/UL (ref 150–400)
PLATELET # BLD AUTO: 542 K/UL (ref 150–400)
PMV BLD AUTO: 10 FL (ref 8.9–12.9)
PMV BLD AUTO: 10.1 FL (ref 8.9–12.9)
PO2 BLDA: 107 MMHG (ref 80–100)
POTASSIUM SERPL-SCNC: 4.2 MMOL/L (ref 3.5–5.1)
PROT SERPL-MCNC: 4.2 G/DL (ref 6.4–8.2)
RBC # BLD AUTO: 3.33 M/UL (ref 3.8–5.2)
RBC # BLD AUTO: 3.51 M/UL (ref 3.8–5.2)
RBC MORPH BLD: ABNORMAL
RBC MORPH BLD: ABNORMAL
SAO2 % BLD: 98 % (ref 95–99)
SAO2% DEVICE SAO2% SENSOR NAME: ABNORMAL
SODIUM SERPL-SCNC: 138 MMOL/L (ref 136–145)
SPECIMEN SITE: ABNORMAL
VENTILATION MODE VENT: ABNORMAL
VT SETTING VENT: 450
WBC # BLD AUTO: 21.3 K/UL (ref 3.6–11)
WBC # BLD AUTO: 24.9 K/UL (ref 3.6–11)

## 2024-06-10 PROCEDURE — 2700000000 HC OXYGEN THERAPY PER DAY

## 2024-06-10 PROCEDURE — 94761 N-INVAS EAR/PLS OXIMETRY MLT: CPT

## 2024-06-10 PROCEDURE — 94003 VENT MGMT INPAT SUBQ DAY: CPT

## 2024-06-10 PROCEDURE — 2500000003 HC RX 250 WO HCPCS: Performed by: SURGERY

## 2024-06-10 PROCEDURE — P9047 ALBUMIN (HUMAN), 25%, 50ML: HCPCS | Performed by: SURGERY

## 2024-06-10 PROCEDURE — 82330 ASSAY OF CALCIUM: CPT

## 2024-06-10 PROCEDURE — 2580000003 HC RX 258: Performed by: SURGERY

## 2024-06-10 PROCEDURE — 82803 BLOOD GASES ANY COMBINATION: CPT

## 2024-06-10 PROCEDURE — 71045 X-RAY EXAM CHEST 1 VIEW: CPT

## 2024-06-10 PROCEDURE — 36415 COLL VENOUS BLD VENIPUNCTURE: CPT

## 2024-06-10 PROCEDURE — 6360000002 HC RX W HCPCS: Performed by: SURGERY

## 2024-06-10 PROCEDURE — 99291 CRITICAL CARE FIRST HOUR: CPT | Performed by: SURGERY

## 2024-06-10 PROCEDURE — 80053 COMPREHEN METABOLIC PANEL: CPT

## 2024-06-10 PROCEDURE — 36600 WITHDRAWAL OF ARTERIAL BLOOD: CPT

## 2024-06-10 PROCEDURE — 2000000000 HC ICU R&B

## 2024-06-10 PROCEDURE — 97606 NEG PRS WND THER DME>50 SQCM: CPT | Performed by: SURGERY

## 2024-06-10 PROCEDURE — 85025 COMPLETE CBC W/AUTO DIFF WBC: CPT

## 2024-06-10 PROCEDURE — 82962 GLUCOSE BLOOD TEST: CPT

## 2024-06-10 RX ORDER — FUROSEMIDE 10 MG/ML
20 INJECTION INTRAMUSCULAR; INTRAVENOUS 2 TIMES DAILY
Status: DISCONTINUED | OUTPATIENT
Start: 2024-06-10 | End: 2024-06-21

## 2024-06-10 RX ORDER — ALBUMIN (HUMAN) 12.5 G/50ML
25 SOLUTION INTRAVENOUS ONCE
Status: COMPLETED | OUTPATIENT
Start: 2024-06-10 | End: 2024-06-10

## 2024-06-10 RX ORDER — CALCIUM GLUCONATE 20 MG/ML
2000 INJECTION, SOLUTION INTRAVENOUS ONCE
Status: COMPLETED | OUTPATIENT
Start: 2024-06-10 | End: 2024-06-10

## 2024-06-10 RX ADMIN — FUROSEMIDE 20 MG: 10 INJECTION, SOLUTION INTRAMUSCULAR; INTRAVENOUS at 08:44

## 2024-06-10 RX ADMIN — SODIUM CHLORIDE, PRESERVATIVE FREE 10 ML: 5 INJECTION INTRAVENOUS at 09:11

## 2024-06-10 RX ADMIN — PROPOFOL 35 MCG/KG/MIN: 10 INJECTION, EMULSION INTRAVENOUS at 16:57

## 2024-06-10 RX ADMIN — FENTANYL CITRATE 75 MCG/HR: at 22:40

## 2024-06-10 RX ADMIN — FLUCONAZOLE 400 MG: 2 INJECTION, SOLUTION INTRAVENOUS at 08:44

## 2024-06-10 RX ADMIN — PROPOFOL 35 MCG/KG/MIN: 10 INJECTION, EMULSION INTRAVENOUS at 08:44

## 2024-06-10 RX ADMIN — PROPOFOL 40 MCG/KG/MIN: 10 INJECTION, EMULSION INTRAVENOUS at 21:12

## 2024-06-10 RX ADMIN — HYDROMORPHONE HYDROCHLORIDE 1 MG: 1 INJECTION, SOLUTION INTRAMUSCULAR; INTRAVENOUS; SUBCUTANEOUS at 04:14

## 2024-06-10 RX ADMIN — SODIUM CHLORIDE, POTASSIUM CHLORIDE, SODIUM LACTATE AND CALCIUM CHLORIDE: 600; 310; 30; 20 INJECTION, SOLUTION INTRAVENOUS at 04:12

## 2024-06-10 RX ADMIN — FUROSEMIDE 20 MG: 10 INJECTION, SOLUTION INTRAMUSCULAR; INTRAVENOUS at 16:57

## 2024-06-10 RX ADMIN — PROPOFOL 35 MCG/KG/MIN: 10 INJECTION, EMULSION INTRAVENOUS at 04:45

## 2024-06-10 RX ADMIN — SODIUM CHLORIDE, PRESERVATIVE FREE 20 MG: 5 INJECTION INTRAVENOUS at 22:26

## 2024-06-10 RX ADMIN — HYDROMORPHONE HYDROCHLORIDE 1 MG: 1 INJECTION, SOLUTION INTRAMUSCULAR; INTRAVENOUS; SUBCUTANEOUS at 21:12

## 2024-06-10 RX ADMIN — ASCORBIC ACID, VITAMIN A PALMITATE, CHOLECALCIFEROL, THIAMINE HYDROCHLORIDE, RIBOFLAVIN-5 PHOSPHATE SODIUM, PYRIDOXINE HYDROCHLORIDE, NIACINAMIDE, DEXPANTHENOL, ALPHA-TOCOPHEROL ACETATE, VITAMIN K1, FOLIC ACID, BIOTIN, CYANOCOBALAMIN: 200; 3300; 200; 6; 3.6; 6; 40; 15; 10; 150; 600; 60; 5 INJECTION, SOLUTION INTRAVENOUS at 23:43

## 2024-06-10 RX ADMIN — PROPOFOL 50 MCG/KG/MIN: 10 INJECTION, EMULSION INTRAVENOUS at 23:31

## 2024-06-10 RX ADMIN — PROPOFOL 35 MCG/KG/MIN: 10 INJECTION, EMULSION INTRAVENOUS at 01:02

## 2024-06-10 RX ADMIN — SODIUM CHLORIDE, PRESERVATIVE FREE 10 ML: 5 INJECTION INTRAVENOUS at 22:25

## 2024-06-10 RX ADMIN — ENOXAPARIN SODIUM 30 MG: 100 INJECTION SUBCUTANEOUS at 08:44

## 2024-06-10 RX ADMIN — FENTANYL CITRATE 75 MCG/HR: at 09:16

## 2024-06-10 RX ADMIN — ALBUMIN (HUMAN) 25 G: 0.25 INJECTION, SOLUTION INTRAVENOUS at 06:14

## 2024-06-10 RX ADMIN — Medication 4 MCG/MIN: at 13:14

## 2024-06-10 RX ADMIN — CALCIUM GLUCONATE 2000 MG: 20 INJECTION, SOLUTION INTRAVENOUS at 06:14

## 2024-06-10 RX ADMIN — PROPOFOL 35 MCG/KG/MIN: 10 INJECTION, EMULSION INTRAVENOUS at 13:14

## 2024-06-10 RX ADMIN — SODIUM CHLORIDE, PRESERVATIVE FREE 20 MG: 5 INJECTION INTRAVENOUS at 08:44

## 2024-06-10 RX ADMIN — ENOXAPARIN SODIUM 30 MG: 100 INJECTION SUBCUTANEOUS at 22:25

## 2024-06-10 ASSESSMENT — PULMONARY FUNCTION TESTS
PIF_VALUE: 19
PIF_VALUE: 21
PIF_VALUE: 20
PIF_VALUE: 20
PIF_VALUE: 18
PIF_VALUE: 19
PIF_VALUE: 18
PIF_VALUE: 20
PIF_VALUE: 19
PIF_VALUE: 19
PIF_VALUE: 14
PIF_VALUE: 19
PIF_VALUE: 19
PIF_VALUE: 20
PIF_VALUE: 21
PIF_VALUE: 19
PIF_VALUE: 18
PIF_VALUE: 19
PIF_VALUE: 19
PIF_VALUE: 20
PIF_VALUE: 19
PIF_VALUE: 19
PIF_VALUE: 21
PIF_VALUE: 18
PIF_VALUE: 18
PIF_VALUE: 19
PIF_VALUE: 21
PIF_VALUE: 19
PIF_VALUE: 20
PIF_VALUE: 20
PIF_VALUE: 19
PIF_VALUE: 18
PIF_VALUE: 18
PIF_VALUE: 19
PIF_VALUE: 19
PIF_VALUE: 18
PIF_VALUE: 20
PIF_VALUE: 19
PIF_VALUE: 19
PIF_VALUE: 22
PIF_VALUE: 19
PIF_VALUE: 20
PIF_VALUE: 19
PIF_VALUE: 20
PIF_VALUE: 18
PIF_VALUE: 19
PIF_VALUE: 19
PIF_VALUE: 18
PIF_VALUE: 19
PIF_VALUE: 20
PIF_VALUE: 19
PIF_VALUE: 18
PIF_VALUE: 19
PIF_VALUE: 18
PIF_VALUE: 20
PIF_VALUE: 18
PIF_VALUE: 18
PIF_VALUE: 19
PIF_VALUE: 18

## 2024-06-10 ASSESSMENT — PAIN SCALES - GENERAL: PAINLEVEL_OUTOF10: 0

## 2024-06-10 NOTE — PROGRESS NOTES
Comprehensive Nutrition Assessment    Type and Reason for Visit:  Initial, NPO/Clear Liquid (x8 days)    Nutrition Recommendations/Plan:   NPO, advance as medically appropriate.    Start TPN as D14, AA8 at 42 mL/hr, advance to goal rate of 62 mL/hr(1500 mL/d).  SMOF Lipids 20%, 500 mL 3 times/week(429 kcal/d; 26% Fat).  Provides: 1623 kcal(79%), 120 gm protein(94%), 210 gm CHO.                        +705 kcal(113%) from Propofol at 26.7 mL/hr.                                                         Pt not at high risk for refeeding syndrome at this time.    Monitor and record PN rate, tolerance, BM in I/Os.     Malnutrition Assessment:  Malnutrition Status:  Mild malnutrition (06/10/24 1415)    Context:  Acute Illness     Findings of the 6 clinical characteristics of malnutrition:  Energy Intake:  50% or less of estimated energy requirements for 5 or more days  Weight Loss:  No significant weight loss     Body Fat Loss:  No significant body fat loss     Muscle Mass Loss:  No significant muscle mass loss    Fluid Accumulation:  No significant fluid accumulation     Strength:  Not Performed    Nutrition Assessment:    Admitted for cecal volvulus, large bowel ischemia, complete bowel obstruction. (6/8) POD 6 ex-lap colon resection. Transferred from [facility] [place]/t decline in medical status. Pt returned to OR for resection of the ileal-colonic anastomosis; temporary abdominal closure on 6/. Pt returned to OR on 6/9 for abdominal washout, wound vac placement and abd closure. (6/10) Screened for NPO/CLD x8 days. POD 2 ex-lap partial colon resection. Pt transferred to ICU s/p sx, intubated but undergoing vent weaning. Pt on minimal pressure support, sedated on propofol and fentanyl. Pt is hemodynamically stable for NST. Given nonfunctional gut and prolonged NPO/Clear status, RD rec's PN. MD Pineda agreeable to starting PN, concern for fluid balance. RD to provide regimen above. Labs: H/H 11.4/34.6, Cr 0.52, Ca 7.4, .  Meds: Levo 1-4 mcg, propofol, LCR, fentanyl, dilaudid, lasix, pepcid, lovenox.    Nutrition Related Findings:    NFPE w/o acute findings, well nourished. No N/V/D reported. NPO w/ NGT for OVO=973 mL x24 hrs. LIONEL drain= 395 mL x24 hrs per I/Os. +2 pitting BLE edema. Wound Type: Surgical Incision       Current Nutrition Intake & Therapies:    Average Meal Intake: NPO  Average Supplements Intake: NPO  Diet NPO Exceptions are: Ice Chips, Sips of Water with Meds    Anthropometric Measures:  Height: 172.7 cm (5' 8\")  Ideal Body Weight (IBW): 140 lbs (64 kg)       Current Body Weight: 134.2 kg (295 lb 13.7 oz) (6/9), 211.3 % IBW. Weight Source: Bed Scale  Current BMI (kg/m2): 45  Usual Body Weight:  (UTO)     Weight Adjustment For: No Adjustment                 BMI Categories: Obese Class 3 (BMI 40.0 or greater)    Estimated Daily Nutrient Needs:  Energy Requirements Based On: Formula  Weight Used for Energy Requirements: Current  Energy (kcal/day): 2057 kcal/d ([id number])  OR  when extubated-- 3145-1913 kcal/d (25-30 kcal/kg AdjBW, ERAS postop)  Weight Used for Protein Requirements: Current  Protein (g/day): 128 gm/d (2 gm/kg IBW)   OR  122 gm/d (1.5 gm/kg AdjBW ERAS postop)  Method Used for Fluid Requirements: Other (Comment)  Fluid (ml/day): ~1500 mL/d per MD    Nutrition Diagnosis:   Inadequate oral intake related to altered GI structure as evidenced by NPO or clear liquid status due to medical condition, GI abnormality  Increased nutrient needs related to altered GI structure, increase demand for energy/nutrients as evidenced by NPO or clear liquid status due to medical condition, GI abnormality (surgery)    Nutrition Interventions:   Food and/or Nutrient Delivery: Continue NPO, Start Parenteral Nutrition  Nutrition Education/Counseling: Education not indicated  Coordination of Nutrition Care: Continue to monitor while inpatient  Plan of Care discussed with: RN, Pharmacist, MD Pineda    Goals:     Goals: Meet at least 75%

## 2024-06-10 NOTE — PROGRESS NOTES
1500: Patient transferred from room 269 to room 285 via bed, on monitor. Family bedside and aware.

## 2024-06-10 NOTE — PROGRESS NOTES
Surgery critical care Progress Note     Subjective:   Patient examined in ICU.     Hospital Course:  Patient was taken back to surgery yesterday and the abdomen was closed.  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     Subjective:  Patient currently vented and sedated.        Review of Systems  Unable to assess.   Objective:    BP (!) 95/58   Pulse 69   Temp 98.6 °F (37 °C) (Oral)   Resp 16   Ht 1.727 m (5' 8\")   Wt 134.2 kg (295 lb 13.7 oz)   SpO2 99%   BMI 44.98 kg/m²       Head and neck atraumatic, normocephalic.  ENT: No hoarse voice  Cardiac system regular rate rhythm.  Pulmonary no audible wheeze  Chest wall excursion normal with respiration cycle  Abdomen is soft not particularly distended.  Neurologically unable to assess  Skin is warm and moist.  Psychosocial: Unable to assess  Vascular examination as previously noted no changes.    Current Facility-Administered Medications   Medication Dose Route Frequency Provider Last Rate Last Admin    calcium gluconate 2,000 mg in sodium chloride 100 mL  2,000 mg IntraVENous Once Tommie Pineda MD 50 mL/hr at 06/10/24 0614 2,000 mg at 06/10/24 0614    albumin human 25% IV solution 25 g  25 g IntraVENous Once Tommie Pineda MD   Stopped at 06/10/24 0714    furosemide (LASIX) injection 20 mg  20 mg IntraVENous BID Tommie Pineda MD        norepinephrine (LEVOPHED) 16 mg in sodium chloride 0.9 % 250 mL infusion  1-100 mcg/min IntraVENous Continuous Tommie Pineda MD 9.4 mL/hr at 06/10/24 0204 10 mcg/min at 06/10/24 0204    fluconazole (DIFLUCAN) in 0.9 % sodium chloride IVPB 400 mg  400 mg IntraVENous Q24H Tommie Pineda  mL/hr at 06/09/24 0904 400 mg at 06/09/24 0904    sodium chloride flush 0.9 % injection 5-40 mL  5-40 mL IntraVENous 2 times per day Tommie Pineda MD   10 mL at 06/09/24 2236    0.9 % sodium chloride infusion   IntraVENous PRN Tommie Pineda MD   Stopped at 06/09/24 2145    enoxaparin Sodium (LOVENOX) injection 30  care time spent 30minutes involving direct patient care as well as reviewing patient's labs and coordination of care with nursing staff     Care Plan discussed with: Patient/Family/RN/Case Management           Total time spent with patient: 30 minutes.

## 2024-06-10 NOTE — OP NOTE
Operative Note      Patient: Maddie Goldberg  YOB: 1960  MRN: [id number]    Date of Procedure: 6/9/2024    Preop diagnosis:  1.  Cecal volvulus.  2.  Attention to previous laparotomy, right colectomy for cecal volvulus.  3.  Ileocolonic anastomotic leak and necrosis.  4.  Attention to laparotomy, resection of the ileal-colonic anastomosis, and resection of the sigmoid rectal junction mass.  5.  Hypotension, respiratory failure  6.  Temporary abdominal closure performed 24 hours ago.    Post-Op Diagnosis:      Same as above.    Procedure:  Second look exploratory laparotomy.  Washout of the abdominal cavity and lavage.  Creation of terminal ileostomy.  Creation of the mucous fistula using descending colon.  Wound VAC placement on abdominal wound, 10 x 30 cm        Surgeon(s):  Tommie Pineda MD    Assistant:   * No surgical staff found *    Anesthesia: General    Estimated Blood Loss (mL): 200     Complications: None    Specimens:   * No specimens in log *    Implants:  * No implants in log *      Drains:   Closed/Suction Drain RUQ (Active)   Site Description Clean, dry & intact 06/10/24 0000   Dressing Status Clean, dry & intact 06/10/24 0000   Drainage Appearance Serosanguinous 06/10/24 0000   Drain Status To bulb suction 06/10/24 0000   Output (ml) 70 ml 06/10/24 0200       Closed/Suction Drain RLQ (Active)   Site Description Clean, dry & intact 06/10/24 0000   Dressing Status Clean, dry & intact 06/10/24 0000   Drainage Appearance Serosanguinous 06/10/24 0000   Drain Status To bulb suction 06/10/24 0000   Output (ml) 20 ml 06/10/24 0200       Closed/Suction Drain LLQ (Active)   Site Description Clean, dry & intact 06/10/24 0000   Dressing Status Clean, dry & intact 06/10/24 0000   Drainage Appearance Serosanguinous 06/10/24 0000   Drain Status To bulb suction 06/10/24 0000   Output (ml) 40 ml 06/10/24 0200       NG/OG/NJ/NE Tube Nasogastric 16 fr Left nostril (Active)   Surrounding Skin Clean, dry &

## 2024-06-10 NOTE — PROGRESS NOTES
ABG documented on patient (Ashlyn) at 0336 was a mislabeled specimen. Correct ABG drawn from A-line at 0417.

## 2024-06-10 NOTE — CARE COORDINATION
CM reviewed Pt medicals, Pt lives alone and is IND with ADL.    PT/OT recommending IRF.    CM will meet with Pt for choice.

## 2024-06-10 NOTE — PROGRESS NOTES
Patient currently on PT/OT caseload, however patient transferred to ICU from 5W due to medical decline. Pt currently placed on hold and current PT/OT orders will be discontinued at this time. Will need new PT/OT evaluation order once pt medically stable for (re)assessment. Thank you.

## 2024-06-11 ENCOUNTER — APPOINTMENT (OUTPATIENT)
Facility: HOSPITAL | Age: 64
DRG: 329 | End: 2024-06-11
Attending: SURGERY
Payer: COMMERCIAL

## 2024-06-11 LAB
ALBUMIN SERPL-MCNC: 1.5 G/DL (ref 3.5–5)
ALBUMIN SERPL-MCNC: 1.5 G/DL (ref 3.5–5)
ALBUMIN/GLOB SERPL: 0.5 (ref 1.1–2.2)
ALBUMIN/GLOB SERPL: 0.5 (ref 1.1–2.2)
ALP SERPL-CCNC: 75 U/L (ref 45–117)
ALP SERPL-CCNC: 92 U/L (ref 45–117)
ALT SERPL-CCNC: 12 U/L (ref 12–78)
ALT SERPL-CCNC: 14 U/L (ref 12–78)
ANION GAP SERPL CALC-SCNC: 5 MMOL/L (ref 5–15)
ARTERIAL PATENCY WRIST A: ABNORMAL
AST SERPL W P-5'-P-CCNC: 14 U/L (ref 15–37)
AST SERPL W P-5'-P-CCNC: 14 U/L (ref 15–37)
BACTERIA SPEC CULT: ABNORMAL
BACTERIA SPEC CULT: ABNORMAL
BASE DEFICIT BLDA-SCNC: 1.9 MMOL/L
BASOPHILS # BLD: 0 K/UL (ref 0–0.1)
BASOPHILS NFR BLD: 0 % (ref 0–1)
BDY SITE: ABNORMAL
BILIRUB DIRECT SERPL-MCNC: 0.2 MG/DL (ref 0–0.2)
BILIRUB SERPL-MCNC: 0.4 MG/DL (ref 0.2–1)
BILIRUB SERPL-MCNC: 0.5 MG/DL (ref 0.2–1)
BODY TEMPERATURE: 97.3
BUN SERPL-MCNC: 17 MG/DL (ref 6–20)
BUN/CREAT SERPL: 30 (ref 12–20)
CA-I BLD-MCNC: 1.04 MMOL/L (ref 1.13–1.32)
CA-I BLD-MCNC: 7.7 MG/DL (ref 8.5–10.1)
CHLORIDE SERPL-SCNC: 104 MMOL/L (ref 97–108)
CO2 SERPL-SCNC: 27 MMOL/L (ref 21–32)
COHGB MFR BLD: 0.3 % (ref 1–2)
CREAT SERPL-MCNC: 0.57 MG/DL (ref 0.55–1.02)
DIFFERENTIAL METHOD BLD: ABNORMAL
EOSINOPHIL # BLD: 0 K/UL (ref 0–0.4)
EOSINOPHIL NFR BLD: 0 % (ref 0–7)
ERYTHROCYTE [DISTWIDTH] IN BLOOD BY AUTOMATED COUNT: 13.4 % (ref 11.5–14.5)
FIO2 ON VENT: 0.35 %
GAS FLOW.O2 SETTING OXYMISER: 16
GLOBULIN SER CALC-MCNC: 3 G/DL (ref 2–4)
GLOBULIN SER CALC-MCNC: 3 G/DL (ref 2–4)
GLUCOSE BLD STRIP.AUTO-MCNC: 125 MG/DL (ref 65–100)
GLUCOSE BLD STRIP.AUTO-MCNC: 131 MG/DL (ref 65–100)
GLUCOSE SERPL-MCNC: 161 MG/DL (ref 65–100)
GRAM STN SPEC: ABNORMAL
HCO3 BLDA-SCNC: 21 MMOL/L (ref 22–26)
HCT VFR BLD AUTO: 29.2 % (ref 35–47)
HGB BLD-MCNC: 9.5 G/DL (ref 11.5–16)
IMM GRANULOCYTES # BLD AUTO: 0 K/UL
IMM GRANULOCYTES NFR BLD AUTO: 0 %
LYMPHOCYTES # BLD: 2 K/UL (ref 0.8–3.5)
LYMPHOCYTES NFR BLD: 10 % (ref 12–49)
Lab: ABNORMAL
Lab: ABNORMAL
MAGNESIUM SERPL-MCNC: 2.1 MG/DL (ref 1.6–2.4)
MCH RBC QN AUTO: 33.8 PG (ref 26–34)
MCHC RBC AUTO-ENTMCNC: 32.5 G/DL (ref 30–36.5)
MCV RBC AUTO: 103.9 FL (ref 80–99)
METAMYELOCYTES NFR BLD MANUAL: 1 %
METHGB MFR BLD: 0.7 % (ref 0–1.4)
MONOCYTES # BLD: 0.8 K/UL (ref 0–1)
MONOCYTES NFR BLD: 4 % (ref 5–13)
MYELOCYTES NFR BLD MANUAL: 3 %
NEUTS BAND NFR BLD MANUAL: 4 % (ref 0–6)
NEUTS SEG # BLD: 16.2 K/UL (ref 1.8–8)
NEUTS SEG NFR BLD: 78 % (ref 32–75)
NRBC # BLD: 0 K/UL (ref 0–0.01)
NRBC BLD-RTO: 0 PER 100 WBC
OXYHGB MFR BLD: 96.3 % (ref 95–99)
PCO2 BLDA: 30 MMHG (ref 35–45)
PEEP RESPIRATORY: 5
PERFORMED BY:: ABNORMAL
PH BLDA: 7.47 (ref 7.35–7.45)
PHOSPHATE SERPL-MCNC: 3.2 MG/DL (ref 2.6–4.7)
PLATELET # BLD AUTO: 515 K/UL (ref 150–400)
PMV BLD AUTO: 10.1 FL (ref 8.9–12.9)
PO2 BLDA: 100 MMHG (ref 80–100)
POTASSIUM SERPL-SCNC: 3.7 MMOL/L (ref 3.5–5.1)
PROT SERPL-MCNC: 4.5 G/DL (ref 6.4–8.2)
PROT SERPL-MCNC: 4.5 G/DL (ref 6.4–8.2)
RBC # BLD AUTO: 2.81 M/UL (ref 3.8–5.2)
RBC MORPH BLD: ABNORMAL
RBC MORPH BLD: ABNORMAL
SAO2 % BLD: 97 % (ref 95–99)
SAO2% DEVICE SAO2% SENSOR NAME: ABNORMAL
SODIUM SERPL-SCNC: 136 MMOL/L (ref 136–145)
SPECIMEN SITE: ABNORMAL
TRIGL SERPL-MCNC: 138 MG/DL
VENTILATION MODE VENT: ABNORMAL
VT SETTING VENT: 450
WBC # BLD AUTO: 19.7 K/UL (ref 3.6–11)

## 2024-06-11 PROCEDURE — 94003 VENT MGMT INPAT SUBQ DAY: CPT

## 2024-06-11 PROCEDURE — 2580000003 HC RX 258: Performed by: SURGERY

## 2024-06-11 PROCEDURE — 83735 ASSAY OF MAGNESIUM: CPT

## 2024-06-11 PROCEDURE — 2500000003 HC RX 250 WO HCPCS: Performed by: SURGERY

## 2024-06-11 PROCEDURE — 93970 EXTREMITY STUDY: CPT

## 2024-06-11 PROCEDURE — 36415 COLL VENOUS BLD VENIPUNCTURE: CPT

## 2024-06-11 PROCEDURE — 93970 EXTREMITY STUDY: CPT | Performed by: SURGERY

## 2024-06-11 PROCEDURE — 85025 COMPLETE CBC W/AUTO DIFF WBC: CPT

## 2024-06-11 PROCEDURE — 84478 ASSAY OF TRIGLYCERIDES: CPT

## 2024-06-11 PROCEDURE — 82803 BLOOD GASES ANY COMBINATION: CPT

## 2024-06-11 PROCEDURE — 80076 HEPATIC FUNCTION PANEL: CPT

## 2024-06-11 PROCEDURE — 94761 N-INVAS EAR/PLS OXIMETRY MLT: CPT

## 2024-06-11 PROCEDURE — 2000000000 HC ICU R&B

## 2024-06-11 PROCEDURE — P9047 ALBUMIN (HUMAN), 25%, 50ML: HCPCS | Performed by: SURGERY

## 2024-06-11 PROCEDURE — 6360000002 HC RX W HCPCS: Performed by: SURGERY

## 2024-06-11 PROCEDURE — 97606 NEG PRS WND THER DME>50 SQCM: CPT

## 2024-06-11 PROCEDURE — 82962 GLUCOSE BLOOD TEST: CPT

## 2024-06-11 PROCEDURE — 80053 COMPREHEN METABOLIC PANEL: CPT

## 2024-06-11 PROCEDURE — 84100 ASSAY OF PHOSPHORUS: CPT

## 2024-06-11 PROCEDURE — 99291 CRITICAL CARE FIRST HOUR: CPT | Performed by: SURGERY

## 2024-06-11 PROCEDURE — 2500000003 HC RX 250 WO HCPCS: Performed by: ANESTHESIOLOGY

## 2024-06-11 PROCEDURE — 82330 ASSAY OF CALCIUM: CPT

## 2024-06-11 PROCEDURE — 2700000000 HC OXYGEN THERAPY PER DAY

## 2024-06-11 RX ORDER — HYDROMORPHONE HYDROCHLORIDE 1 MG/ML
0.5 INJECTION, SOLUTION INTRAMUSCULAR; INTRAVENOUS; SUBCUTANEOUS
Status: DISCONTINUED | OUTPATIENT
Start: 2024-06-11 | End: 2024-06-13

## 2024-06-11 RX ORDER — CALCIUM GLUCONATE 20 MG/ML
2000 INJECTION, SOLUTION INTRAVENOUS ONCE
Status: COMPLETED | OUTPATIENT
Start: 2024-06-11 | End: 2024-06-11

## 2024-06-11 RX ORDER — ALBUMIN (HUMAN) 12.5 G/50ML
25 SOLUTION INTRAVENOUS ONCE
Status: COMPLETED | OUTPATIENT
Start: 2024-06-11 | End: 2024-06-11

## 2024-06-11 RX ORDER — HYDROMORPHONE HYDROCHLORIDE 1 MG/ML
1 INJECTION, SOLUTION INTRAMUSCULAR; INTRAVENOUS; SUBCUTANEOUS ONCE
Status: DISCONTINUED | OUTPATIENT
Start: 2024-06-11 | End: 2024-06-19

## 2024-06-11 RX ORDER — POLYETHYLENE GLYCOL 3350 17 G/17G
17 POWDER, FOR SOLUTION ORAL DAILY PRN
Status: DISCONTINUED | OUTPATIENT
Start: 2024-06-11 | End: 2024-06-11

## 2024-06-11 RX ORDER — HYDROMORPHONE HYDROCHLORIDE 1 MG/ML
0.8 INJECTION, SOLUTION INTRAMUSCULAR; INTRAVENOUS; SUBCUTANEOUS
Status: DISCONTINUED | OUTPATIENT
Start: 2024-06-11 | End: 2024-06-13

## 2024-06-11 RX ORDER — NALOXONE HYDROCHLORIDE 0.4 MG/ML
INJECTION, SOLUTION INTRAMUSCULAR; INTRAVENOUS; SUBCUTANEOUS PRN
Status: DISCONTINUED | OUTPATIENT
Start: 2024-06-11 | End: 2024-06-26 | Stop reason: HOSPADM

## 2024-06-11 RX ORDER — LEVOTHYROXINE SODIUM 20 UG/ML
50 INJECTION, SOLUTION INTRAVENOUS DAILY
Status: DISCONTINUED | OUTPATIENT
Start: 2024-06-11 | End: 2024-06-11

## 2024-06-11 RX ADMIN — HYDROMORPHONE HYDROCHLORIDE 0.5 MG: 1 INJECTION, SOLUTION INTRAMUSCULAR; INTRAVENOUS; SUBCUTANEOUS at 08:37

## 2024-06-11 RX ADMIN — SODIUM CHLORIDE, POTASSIUM CHLORIDE, SODIUM LACTATE AND CALCIUM CHLORIDE: 600; 310; 30; 20 INJECTION, SOLUTION INTRAVENOUS at 17:37

## 2024-06-11 RX ADMIN — PROPOFOL 40 MCG/KG/MIN: 10 INJECTION, EMULSION INTRAVENOUS at 02:06

## 2024-06-11 RX ADMIN — HYDROMORPHONE HYDROCHLORIDE 0.5 MG: 1 INJECTION, SOLUTION INTRAMUSCULAR; INTRAVENOUS; SUBCUTANEOUS at 06:37

## 2024-06-11 RX ADMIN — PROPOFOL 30 MCG/KG/MIN: 10 INJECTION, EMULSION INTRAVENOUS at 05:27

## 2024-06-11 RX ADMIN — ENOXAPARIN SODIUM 30 MG: 100 INJECTION SUBCUTANEOUS at 08:39

## 2024-06-11 RX ADMIN — HYDROMORPHONE HYDROCHLORIDE 1 MG: 1 INJECTION, SOLUTION INTRAMUSCULAR; INTRAVENOUS; SUBCUTANEOUS at 01:29

## 2024-06-11 RX ADMIN — HYDROMORPHONE HYDROCHLORIDE 0.8 MG: 1 INJECTION, SOLUTION INTRAMUSCULAR; INTRAVENOUS; SUBCUTANEOUS at 17:28

## 2024-06-11 RX ADMIN — CALCIUM GLUCONATE 2000 MG: 20 INJECTION, SOLUTION INTRAVENOUS at 07:21

## 2024-06-11 RX ADMIN — HYDROMORPHONE HYDROCHLORIDE 0.8 MG: 1 INJECTION, SOLUTION INTRAMUSCULAR; INTRAVENOUS; SUBCUTANEOUS at 20:40

## 2024-06-11 RX ADMIN — ASCORBIC ACID, VITAMIN A PALMITATE, CHOLECALCIFEROL, THIAMINE HYDROCHLORIDE, RIBOFLAVIN-5 PHOSPHATE SODIUM, PYRIDOXINE HYDROCHLORIDE, NIACINAMIDE, DEXPANTHENOL, ALPHA-TOCOPHEROL ACETATE, VITAMIN K1, FOLIC ACID, BIOTIN, CYANOCOBALAMIN: 200; 3300; 200; 6; 3.6; 6; 40; 15; 10; 150; 600; 60; 5 INJECTION, SOLUTION INTRAVENOUS at 22:17

## 2024-06-11 RX ADMIN — ALBUMIN (HUMAN) 25 G: 0.25 INJECTION, SOLUTION INTRAVENOUS at 07:21

## 2024-06-11 RX ADMIN — FUROSEMIDE 20 MG: 10 INJECTION, SOLUTION INTRAMUSCULAR; INTRAVENOUS at 08:18

## 2024-06-11 RX ADMIN — SODIUM CHLORIDE, PRESERVATIVE FREE 20 MG: 5 INJECTION INTRAVENOUS at 20:41

## 2024-06-11 RX ADMIN — FUROSEMIDE 20 MG: 10 INJECTION, SOLUTION INTRAMUSCULAR; INTRAVENOUS at 17:29

## 2024-06-11 RX ADMIN — SODIUM CHLORIDE, PRESERVATIVE FREE 10 ML: 5 INJECTION INTRAVENOUS at 20:41

## 2024-06-11 RX ADMIN — HYDROMORPHONE HYDROCHLORIDE 0.5 MG: 1 INJECTION, SOLUTION INTRAMUSCULAR; INTRAVENOUS; SUBCUTANEOUS at 09:44

## 2024-06-11 RX ADMIN — FLUCONAZOLE 400 MG: 2 INJECTION, SOLUTION INTRAVENOUS at 08:22

## 2024-06-11 RX ADMIN — SODIUM CHLORIDE, PRESERVATIVE FREE 10 ML: 5 INJECTION INTRAVENOUS at 09:09

## 2024-06-11 RX ADMIN — Medication: at 10:23

## 2024-06-11 RX ADMIN — SODIUM CHLORIDE, PRESERVATIVE FREE 20 MG: 5 INJECTION INTRAVENOUS at 08:17

## 2024-06-11 RX ADMIN — ENOXAPARIN SODIUM 30 MG: 100 INJECTION SUBCUTANEOUS at 20:40

## 2024-06-11 ASSESSMENT — PAIN DESCRIPTION - LOCATION
LOCATION: ABDOMEN;HIP
LOCATION: ABDOMEN
LOCATION: ABDOMEN;HIP
LOCATION: ABDOMEN;HIP

## 2024-06-11 ASSESSMENT — PAIN SCALES - GENERAL
PAINLEVEL_OUTOF10: 0
PAINLEVEL_OUTOF10: 9
PAINLEVEL_OUTOF10: 0
PAINLEVEL_OUTOF10: 6
PAINLEVEL_OUTOF10: 7
PAINLEVEL_OUTOF10: 8
PAINLEVEL_OUTOF10: 9
PAINLEVEL_OUTOF10: 8

## 2024-06-11 ASSESSMENT — PULMONARY FUNCTION TESTS
PIF_VALUE: 10
PIF_VALUE: 20
PIF_VALUE: 19
PIF_VALUE: 24
PIF_VALUE: 20
PIF_VALUE: 37
PIF_VALUE: 19
PIF_VALUE: 19
PIF_VALUE: 18
PIF_VALUE: 19
PIF_VALUE: 22
PIF_VALUE: 21
PIF_VALUE: 19
PIF_VALUE: 20
PIF_VALUE: 19
PIF_VALUE: 30
PIF_VALUE: 19
PIF_VALUE: 28
PIF_VALUE: 20
PIF_VALUE: 21
PIF_VALUE: 19
PIF_VALUE: 19

## 2024-06-11 ASSESSMENT — PAIN SCALES - WONG BAKER
WONGBAKER_NUMERICALRESPONSE: HURTS A LITTLE BIT
WONGBAKER_NUMERICALRESPONSE: HURTS A LITTLE BIT
WONGBAKER_NUMERICALRESPONSE: NO HURT

## 2024-06-11 NOTE — CARE COORDINATION
CM reviewed Pt medicals, Pt was extubated this AM.    PT/OT recommending IRF.    CM will meet with Pt to discuss D/C needs.     11:45  PROSPER met f/f with Pt, her mom and dad and son.  Discussed with them the recommendation from PT/OT.    Pt son asked CM to send a referral to [facility].  Choice letter signed for [facility], will send a referral, will place choice letter on Pt chart.

## 2024-06-11 NOTE — PROGRESS NOTES
SAT and SBT performed. Passed.     Extubated to 4L with MD at bedside @ 0640.     Dilaudid PCA ordered for pain management. PRN 0.5 mg Dilaudid given for pain level of 9/10 post extubation.     Attempted to notify family x 2.

## 2024-06-11 NOTE — PROGRESS NOTES
Consult received, chart reviewed and spoke with nsg.  Per Dr. Pineda's note from previously this date, pt to remain NPO with NG awaiting GI function to normalize.  Will hold bedside sw eval and cont to follow.

## 2024-06-11 NOTE — ADT AUTH CERT
[facility]  [address] [address]  [email or web address] [id number]  @[email or web address] [id number]  Auth number: [id number]    RETURN CONTACT:  Pastora Ocampo Ph. [phone]  Fax: [phone]     Progress Notes by Alejandra Sigala DO at 6/4/2024  1:51 PM    Author: Alejandra Sigala DO Service: General Surgery Author Type: Physician   Filed: 6/4/2024  1:54 PM Date of Service: 6/4/2024  1:51 PM Status: Signed   : Alejandra Sigala DO (Physician)   Expand All Collapse All  Progress Note     Patient: Maddie Goldberg MRN: [id number]  SSN: [id number]    YOB: 1960  Age: 63 y.o.  Sex: female       Admit Date: 6/2/2024    LOS: 2 days      Subjective:      Patient is POD#2 s/p Procedure(s) (LRB):  EXPLORATORY LAPAROTOMY; RIGHT COLON RESECTION (N/A).      Doing well. Pain well controlled with current pain medications.  Nausea well controlled.  Adequate urine output via Quiroga catheter.  NG tube in place with minimal output.  Patient denies nausea or vomiting. Passing flatus.     Objective:      Vitals          Vitals:     06/04/24 0648 06/04/24 0843 06/04/24 0937 06/04/24 1238   BP:   120/73       Pulse:   99       Resp: 18 18 18 18   Temp:   99.1 °F (37.3 °C)       TempSrc:   Oral       SpO2:   98%       Weight:           Height:                    Intake and Output:  Current Shift: No intake/output data recorded.  Last three shifts: 06/02 1901 - 06/04 0700  In: 5152.9 [P.O.:50; I.V.:3912.5]  Out: 2485 [Urine:1420; Drains:815]     Physical Exam:   General: alert, oriented, in no acute distress  Neck: supple, no masses, no JVD  Cardiovascular: regular rate and rhythm  Pulmonary: unlabored breathing, equal chest rise bilaterally  Abdomen: soft, appropriately tender, non distended, incisions clean, dry, and intact, drain serosanguineous, NG tube in place with minimal output  Extremities: no swelling, pulses equal and palpable in all extremities     Lab/Data Review:  Recent

## 2024-06-11 NOTE — PLAN OF CARE
Problem: Gastrointestinal - Adult  Goal: Maintains or returns to baseline bowel function  Outcome: Not Progressing  Flowsheets (Taken 6/10/2024 2000)  Maintains or returns to baseline bowel function:   Assess bowel function   Administer IV fluids as ordered to ensure adequate hydration   Administer ordered medications as needed   Nutrition consult to assist patient with appropriate food choices  Goal: Maintains adequate nutritional intake  Outcome: Not Progressing  Flowsheets (Taken 6/10/2024 2000)  Maintains adequate nutritional intake:   Monitor intake and output, weight and lab values   Obtain nutritional consult as needed

## 2024-06-11 NOTE — PROGRESS NOTES
Patient extubated per doctor's order and placed on 4L NC. Patient currently stable with vital HR: 93, RR: 14, SpO2: 100.

## 2024-06-11 NOTE — PROGRESS NOTES
Surgery critical care Progress Note     Subjective:   Patient examined in ICU.     Hospital Course:  Patient examined ICU.  Patient is awake and alert.  Patient has been breathing trial since 3:00 this morning.  We were able to extubate her this morning.     Subjective:  Patient is following commands.  No temperature.        Review of Systems  Unable to assess.   Objective:    /64   Pulse 89   Temp 97.8 °F (36.6 °C) (Bladder)   Resp 15   Ht 1.727 m (5' 8\")   Wt 136 kg (299 lb 13.2 oz)   SpO2 98%   BMI 45.59 kg/m²       Head and neck atraumatic, normocephalic.  ENT: No hoarse voice  Cardiac system regular rate rhythm.  Pulmonary no audible wheeze  Chest wall excursion normal with respiration cycle  Abdomen is soft not particularly distended.  Neurologically unable to assess  Skin is warm and moist.  Psychosocial: Unable to assess  Vascular examination as previously noted no changes.    Current Facility-Administered Medications   Medication Dose Route Frequency Provider Last Rate Last Admin    naloxone 0.4 mg in 10 mL sodium chloride syringe   IntraVENous PRN Tommie Pineda MD        polyethylene glycol (GLYCOLAX) packet 17 g  17 g Oral Daily PRN Tommie Pineda MD        HYDROmorphone (DILAUDID) 1 mg/mL PCA   IntraVENous Continuous Tommie Pineda MD        calcium gluconate 2,000 mg in sodium chloride 100 mL  2,000 mg IntraVENous Once Tommie Pineda MD   Stopped at 06/11/24 0921    albumin human 25% IV solution 25 g  25 g IntraVENous Once Tommie Pineda MD   Stopped at 06/11/24 0821    furosemide (LASIX) injection 20 mg  20 mg IntraVENous BID Tommie Pineda MD   20 mg at 06/10/24 1657    PN-Adult Premix 8/14 - Standard Electrolytes - Central Line   IntraVENous Continuous TPN Tommie Pineda MD 42 mL/hr at 06/10/24 2343 New Bag at 06/10/24 2343    norepinephrine (LEVOPHED) 16 mg in sodium chloride 0.9 % 250 mL infusion  1-100 mcg/min IntraVENous Continuous Tommie Pineda MD   Stopped at 06/11/24

## 2024-06-11 NOTE — PROGRESS NOTES
Spiritual Care Assessment/Progress Note  [facility]    Name: Maddie Goldberg MRN: [id number]    Age: 63 y.o.     Sex: female   Language: English     Date: 6/11/2024            Total Time Calculated: 9 min              Spiritual Assessment begun in SSR 2 CCU  Service Provided For: Patient and family together  Referral/Consult From: Rounding  Encounter Overview/Reason: Initial Encounter    Spiritual beliefs:      [x] Involved in a gabriela tradition/spiritual practice: Alevism     [] Supported by a gabriela community:      [] Claims no spiritual orientation:      [] Seeking spiritual identity:           [] Adheres to an individual form of spirituality:      [] Not able to assess:                Identified resources for coping and support system:   Support System: Children       [x] Prayer                  [] Devotional reading               [] Music                  [] Guided Imagery     [] Pet visits                                        [] Other: (COMMENT)     Specific area/focus of visit   Encounter:    Crisis:    Spiritual/Emotional needs: Type: Spiritual Support  Ritual, Rites and Sacraments:    Grief, Loss, and Adjustments:    Ethics/Mediation:    Behavioral Health:    Palliative Care:    Advance Care Planning:      Plan/Referrals: Other (Comment) ( follow up is available by referral)    Narrative:  is visiting for an initial spiritual assessment with the patient in 285. Maddie and her son, Shekhar, were present at the time. Maddie and Shekhar shared about her recent medical concerns. Maddie shared she is somewhat Yazidism, but does not actively attend a Rastafarian. Her gabriela and belief in God are important to her.  engaged patient through life review, spiritual assessment, and prayer.     FERN GirardDiv, M.S, [facility]   can be reached by calling the  at [facility]   [phone]

## 2024-06-12 LAB
ALBUMIN SERPL-MCNC: 1.7 G/DL (ref 3.5–5)
ALBUMIN/GLOB SERPL: 0.5 (ref 1.1–2.2)
ALP SERPL-CCNC: 77 U/L (ref 45–117)
ALT SERPL-CCNC: 12 U/L (ref 12–78)
ANION GAP SERPL CALC-SCNC: 4 MMOL/L (ref 5–15)
AST SERPL W P-5'-P-CCNC: 10 U/L (ref 15–37)
BACTERIA SPEC CULT: ABNORMAL
BILIRUB DIRECT SERPL-MCNC: 0.2 MG/DL (ref 0–0.2)
BILIRUB SERPL-MCNC: 0.3 MG/DL (ref 0.2–1)
BUN SERPL-MCNC: 20 MG/DL (ref 6–20)
BUN/CREAT SERPL: 41 (ref 12–20)
CA-I BLD-MCNC: 7.9 MG/DL (ref 8.5–10.1)
CHLORIDE SERPL-SCNC: 106 MMOL/L (ref 97–108)
CO2 SERPL-SCNC: 32 MMOL/L (ref 21–32)
COLONY COUNT, CNT: ABNORMAL
COLONY COUNT, CNT: ABNORMAL
CREAT SERPL-MCNC: 0.49 MG/DL (ref 0.55–1.02)
ECHO BSA: 2.47 M2
GLOBULIN SER CALC-MCNC: 3.1 G/DL (ref 2–4)
GLUCOSE BLD STRIP.AUTO-MCNC: 103 MG/DL (ref 65–100)
GLUCOSE BLD STRIP.AUTO-MCNC: 111 MG/DL (ref 65–100)
GLUCOSE BLD STRIP.AUTO-MCNC: 113 MG/DL (ref 65–100)
GLUCOSE BLD STRIP.AUTO-MCNC: 119 MG/DL (ref 65–100)
GLUCOSE BLD STRIP.AUTO-MCNC: 121 MG/DL (ref 65–100)
GLUCOSE SERPL-MCNC: 175 MG/DL (ref 65–100)
Lab: ABNORMAL
MAGNESIUM SERPL-MCNC: 1.8 MG/DL (ref 1.6–2.4)
PERFORMED BY:: ABNORMAL
PHOSPHATE SERPL-MCNC: 2.2 MG/DL (ref 2.6–4.7)
POTASSIUM SERPL-SCNC: 3.4 MMOL/L (ref 3.5–5.1)
PROT SERPL-MCNC: 4.8 G/DL (ref 6.4–8.2)
SODIUM SERPL-SCNC: 142 MMOL/L (ref 136–145)

## 2024-06-12 PROCEDURE — 2700000000 HC OXYGEN THERAPY PER DAY

## 2024-06-12 PROCEDURE — 94761 N-INVAS EAR/PLS OXIMETRY MLT: CPT

## 2024-06-12 PROCEDURE — 2580000003 HC RX 258: Performed by: SURGERY

## 2024-06-12 PROCEDURE — 93970 EXTREMITY STUDY: CPT | Performed by: SURGERY

## 2024-06-12 PROCEDURE — 2500000003 HC RX 250 WO HCPCS: Performed by: SURGERY

## 2024-06-12 PROCEDURE — 80076 HEPATIC FUNCTION PANEL: CPT

## 2024-06-12 PROCEDURE — 2000000000 HC ICU R&B

## 2024-06-12 PROCEDURE — 82962 GLUCOSE BLOOD TEST: CPT

## 2024-06-12 PROCEDURE — 83735 ASSAY OF MAGNESIUM: CPT

## 2024-06-12 PROCEDURE — 6360000002 HC RX W HCPCS: Performed by: SURGERY

## 2024-06-12 PROCEDURE — 80048 BASIC METABOLIC PNL TOTAL CA: CPT

## 2024-06-12 PROCEDURE — 36415 COLL VENOUS BLD VENIPUNCTURE: CPT

## 2024-06-12 PROCEDURE — 84100 ASSAY OF PHOSPHORUS: CPT

## 2024-06-12 PROCEDURE — 6370000000 HC RX 637 (ALT 250 FOR IP): Performed by: SURGERY

## 2024-06-12 RX ADMIN — LEVOTHYROXINE SODIUM 150 MCG: 0.07 TABLET ORAL at 09:28

## 2024-06-12 RX ADMIN — SODIUM CHLORIDE, PRESERVATIVE FREE 10 ML: 5 INJECTION INTRAVENOUS at 10:33

## 2024-06-12 RX ADMIN — HYDROMORPHONE HYDROCHLORIDE 0.8 MG: 1 INJECTION, SOLUTION INTRAMUSCULAR; INTRAVENOUS; SUBCUTANEOUS at 12:49

## 2024-06-12 RX ADMIN — FUROSEMIDE 20 MG: 10 INJECTION, SOLUTION INTRAMUSCULAR; INTRAVENOUS at 18:20

## 2024-06-12 RX ADMIN — FUROSEMIDE 20 MG: 10 INJECTION, SOLUTION INTRAMUSCULAR; INTRAVENOUS at 09:29

## 2024-06-12 RX ADMIN — FAMOTIDINE 20 MG: 20 TABLET, FILM COATED ORAL at 21:02

## 2024-06-12 RX ADMIN — SMOFLIPID 500 ML: 6; 6; 5; 3 INJECTION, EMULSION INTRAVENOUS at 22:17

## 2024-06-12 RX ADMIN — HYDROMORPHONE HYDROCHLORIDE 0.8 MG: 1 INJECTION, SOLUTION INTRAMUSCULAR; INTRAVENOUS; SUBCUTANEOUS at 18:20

## 2024-06-12 RX ADMIN — HYDROMORPHONE HYDROCHLORIDE 0.5 MG: 1 INJECTION, SOLUTION INTRAMUSCULAR; INTRAVENOUS; SUBCUTANEOUS at 16:03

## 2024-06-12 RX ADMIN — ENOXAPARIN SODIUM 30 MG: 100 INJECTION SUBCUTANEOUS at 21:03

## 2024-06-12 RX ADMIN — HYDROMORPHONE HYDROCHLORIDE 0.5 MG: 1 INJECTION, SOLUTION INTRAMUSCULAR; INTRAVENOUS; SUBCUTANEOUS at 09:28

## 2024-06-12 RX ADMIN — HYDROMORPHONE HYDROCHLORIDE 0.8 MG: 1 INJECTION, SOLUTION INTRAMUSCULAR; INTRAVENOUS; SUBCUTANEOUS at 21:03

## 2024-06-12 RX ADMIN — FLUCONAZOLE 400 MG: 2 INJECTION, SOLUTION INTRAVENOUS at 09:29

## 2024-06-12 RX ADMIN — ENOXAPARIN SODIUM 30 MG: 100 INJECTION SUBCUTANEOUS at 09:28

## 2024-06-12 RX ADMIN — SODIUM CHLORIDE, PRESERVATIVE FREE 10 ML: 5 INJECTION INTRAVENOUS at 21:11

## 2024-06-12 RX ADMIN — FAMOTIDINE 20 MG: 20 TABLET, FILM COATED ORAL at 09:29

## 2024-06-12 RX ADMIN — SODIUM CHLORIDE, POTASSIUM CHLORIDE, SODIUM LACTATE AND CALCIUM CHLORIDE: 600; 310; 30; 20 INJECTION, SOLUTION INTRAVENOUS at 15:20

## 2024-06-12 RX ADMIN — HYDROMORPHONE HYDROCHLORIDE 0.8 MG: 1 INJECTION, SOLUTION INTRAMUSCULAR; INTRAVENOUS; SUBCUTANEOUS at 05:26

## 2024-06-12 RX ADMIN — POTASSIUM PHOSPHATE, MONOBASIC POTASSIUM PHOSPHATE, DIBASIC: 224; 236 INJECTION, SOLUTION, CONCENTRATE INTRAVENOUS at 22:17

## 2024-06-12 ASSESSMENT — PAIN DESCRIPTION - DESCRIPTORS
DESCRIPTORS: SORE
DESCRIPTORS: SORE

## 2024-06-12 ASSESSMENT — PAIN SCALES - GENERAL
PAINLEVEL_OUTOF10: 4
PAINLEVEL_OUTOF10: 8
PAINLEVEL_OUTOF10: 8
PAINLEVEL_OUTOF10: 5
PAINLEVEL_OUTOF10: 3
PAINLEVEL_OUTOF10: 4
PAINLEVEL_OUTOF10: 5
PAINLEVEL_OUTOF10: 4
PAINLEVEL_OUTOF10: 5
PAINLEVEL_OUTOF10: 4
PAINLEVEL_OUTOF10: 2
PAINLEVEL_OUTOF10: 8
PAINLEVEL_OUTOF10: 8
PAINLEVEL_OUTOF10: 4
PAINLEVEL_OUTOF10: 8

## 2024-06-12 ASSESSMENT — PAIN DESCRIPTION - LOCATION
LOCATION: ABDOMEN

## 2024-06-12 ASSESSMENT — PAIN DESCRIPTION - FREQUENCY: FREQUENCY: CONTINUOUS

## 2024-06-12 ASSESSMENT — PAIN DESCRIPTION - ONSET: ONSET: ON-GOING

## 2024-06-12 ASSESSMENT — PAIN DESCRIPTION - ORIENTATION
ORIENTATION: MID

## 2024-06-12 ASSESSMENT — PAIN DESCRIPTION - PAIN TYPE: TYPE: SURGICAL PAIN

## 2024-06-12 NOTE — PROGRESS NOTES
Per Patient request, patient's friend Tracy Chatterjee [phone]  is authorized to receive updates on her condition.

## 2024-06-12 NOTE — PROGRESS NOTES
Covering for Dr. Pineda     POD #3    Chief Complaint: none       Subjective:  Ms. Maddie Goldberg is a 63 y.o. year old  female who had an anastomotic leak following right hemicolectomy performed by Dr. Rice, and reexploratory laparotomy with sigmoid colon resection and end ileostomy with mucous fistula creationby Dr. Pineda for sigmoid colon mass.   Today, she is doing ok. She does not have nausea and has not vomited.   No new issues overnight.    Review of Systems:   Constitutional:  no fever, no chills,  no sweats, No weakness, No fatigue, No decreased activity.  Respiratory: No shortness of breath, No cough, No sputum production, No hemoptysis, No wheezing, No cyanosis.  Cardiovascular: No chest pain, No palpitations, No bradycardia, No tachycardia, No peripheral edema, No syncope.  Gastrointestinal: No nausea, No vomiting, No diarrhea, No constipation, No heartburn, abdominal pain.  Genitourinary: No dysuria, No hematuria, No change in urine stream, No urethral discharge, No lesions.  Hematology/Lymphatics: No bruising tendency, No bleeding tendency, No petechiae, No swollen lymph glands.  Endocrine: No excessive thirst, No polyuria, No cold intolerance, No heat intolerance, No excessive hunger.  Musculoskeletal: No back pain, No neck pain, No joint pain, No muscle pain, No claudication, No decreased range of motion, No trauma.  Integumentary: No rash, No pruritus, No abrasions.  Neurologic: Alert and oriented X4, No abnormal balance, No headache, No confusion, No numbness, No tingling.  Psychiatric: No anxiety, No depression, No jigna.      Physical Exam:     Vitals & Measurements:    Wt Readings from Last 3 Encounters:   06/12/24 135 kg (297 lb 9.9 oz)     Temp Readings from Last 3 Encounters:   06/12/24 98 °F (36.7 °C) (Axillary)     BP Readings from Last 3 Encounters:   06/12/24 134/73     Pulse Readings from Last 3 Encounters:   06/12/24 86      Ht Readings from Last 3 Encounters:   06/12/24 1.727 m (5' 8\")    postoperative change. However, small leak is not excluded.   3. Significant fecal retention in the remaining large bowel with focal   stricturing in the rectosigmoid colon.   4. Cholelithiasis.   5. Bibasilar atelectasis with 4 mm right middle lobe lung nodule.      Guidelines by the Fleischner society (Radiology 2017 special report) suggest   that patients with low risk for lung cancer and solid nodule(s) less than or   equal to 6 mm in diameter require no follow-up.  In patients with a higher risk,   such as smokers, follow-up noncontrast chest CT should be considered at 12   months.  Patients with a known malignancy are at increased risk for metastasis   and should receive a three month follow-up.               Electronically signed by Obi Lacy      CT ABDOMEN PELVIS W IV CONTRAST Additional Contrast? None   Final Result   Addendum (preliminary) 1 of 1   Addendum:  Correction: Dilatation of the loop in the lower pelvis is    actually   the cecum with the cecum located in the left lower quadrant and oriented   horizontally. The area with pneumatosis is the cecum. This is concerning    for   cecal bascule, rather than small bowel obstruction. Maximal diameter 11    cm.   Dr. Sin has seen the patient.       Final   Small bowel obstruction secondary to an effusion or internal hernia.   Concern for early developing pneumatosis.      Incidental cholelithiasis.      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

## 2024-06-12 NOTE — CARE COORDINATION
CM reviewed Pt medicals, Pt lives with alone and was IND at baseline.    CM sent a referral to [facility].

## 2024-06-12 NOTE — PROGRESS NOTES
Comprehensive Nutrition Assessment    Type and Reason for Visit:  Reassess (Interim)    Nutrition Recommendations/Plan:   NPO, advance as medically appropriate.    Continue TPN as D14, AA8 at goal rate of 62 mL/hr(1500 mL/d).  SMOF Lipids 20%, 500 mL 3 times/week(429 kcal/d; 26% Fat).  Provides: 1623 kcal(80%), 120 gm protein(94%), 210 gm CHO.       Continue to monitor and record PN rate, tolerance, BM in I/Os.     Malnutrition Assessment:  Malnutrition Status:  Mild malnutrition (06/10/24 1415)    Context:  Acute Illness       Nutrition Assessment:    Admitted for cecal volvulus, large bowel ischemia, complete bowel obstruction. (6/8) POD 6 ex-lap colon resection. Transferred from [facility] d/t decline in medical status. Pt returned to OR for resection of the ileal-colonic anastomosis; temporary abdominal closure on 6/. Pt returned to OR on 6/9 for abdominal washout, wound vac placement and abd closure. (6/10) Screened for NPO/CLD x8 days. POD 2 ex-lap partial colon resection. Pt transferred to ICU s/p sx, intubated but undergoing vent weaning. Pt on minimal pressure support, sedated on propofol and fentanyl. Pt is hemodynamically stable for NST. Given nonfunctional gut and prolonged NPO/Clear status, RD rec's PN. MD Pineda agreeable to starting PN, concern for fluid balance. RD to provide regimen above. (6/12) Pt extubated on 6/11, off propofol. Today, Pt awake, able to deny N/V. Pt reported wanting to eat d/t prolonged NPO status- no hunger cues. TPN infusing and tolerated at goal rate. RD rec's continue regimen. Labs: K 3.4, Cr 0.49, Ca 7.9, Phos 2.2, ; Phos trending low- will monitor. Meds: LCR, dilaudid, lasix, synthroid, pepcid, lovenox.    Nutrition Related Findings:    NFPE w/o acute findings, well nourished. No N/V/D reported. Last BM PTA.  NPO w/ NGT for HFO=182 mL x24 hrs. LIONEL drainx3= 80 mL x24 hrs per I/Os. Wound vac= 125 mL x24 hrs. +1 pitting BLE edema. Wound Type: Surgical Incision       Current  Biochemical Data, GI Status, Weight    Discharge Planning:    Too soon to determine     Sayra Luis RD  Contact: ext. [phone] or perfectserve.

## 2024-06-12 NOTE — PROGRESS NOTES
Received new PT orders s/p ICU transfer.  PT attempted to see pt for PT however at this time pt is not appropriate to participate with PT.  Pt is too lethargic at time of attempt.  Of note, pt also has R femoral a-line which will limit mobility as well.

## 2024-06-12 NOTE — PROGRESS NOTES
Spoke w/ RN, patient not appropriate for swallow evaluation due to alertness and continues to be NPO per surgeon recommendations.   SLP consult due to extubation, however patient remains NPO due to recent abdominal and GI surgery per surgeon recommendations.  Will d/c consult at this time.   Please re-consult when patient is appropriate and surgeon clears for oral intake.

## 2024-06-13 LAB
ANION GAP SERPL CALC-SCNC: 3 MMOL/L (ref 5–15)
ANION GAP SERPL CALC-SCNC: 4 MMOL/L (ref 5–15)
BUN SERPL-MCNC: 13 MG/DL (ref 6–20)
BUN SERPL-MCNC: 19 MG/DL (ref 6–20)
BUN/CREAT SERPL: 30 (ref 12–20)
BUN/CREAT SERPL: 43 (ref 12–20)
CA-I BLD-MCNC: 8 MG/DL (ref 8.5–10.1)
CA-I BLD-MCNC: ABNORMAL MG/DL (ref 8.5–10.1)
CHLORIDE SERPL-SCNC: 100 MMOL/L (ref 97–108)
CHLORIDE SERPL-SCNC: 104 MMOL/L (ref 97–108)
CO2 SERPL-SCNC: 31 MMOL/L (ref 21–32)
CO2 SERPL-SCNC: 32 MMOL/L (ref 21–32)
CREAT SERPL-MCNC: 0.44 MG/DL (ref 0.55–1.02)
CREAT SERPL-MCNC: 0.44 MG/DL (ref 0.55–1.02)
GLUCOSE BLD STRIP.AUTO-MCNC: 112 MG/DL (ref 65–100)
GLUCOSE BLD STRIP.AUTO-MCNC: 116 MG/DL (ref 65–100)
GLUCOSE SERPL-MCNC: 125 MG/DL (ref 65–100)
GLUCOSE SERPL-MCNC: ABNORMAL MG/DL (ref 65–100)
MAGNESIUM SERPL-MCNC: 1.6 MG/DL (ref 1.6–2.4)
MAGNESIUM SERPL-MCNC: 1.7 MG/DL (ref 1.6–2.4)
PERFORMED BY:: ABNORMAL
PERFORMED BY:: ABNORMAL
PHOSPHATE SERPL-MCNC: 2.9 MG/DL (ref 2.6–4.7)
PHOSPHATE SERPL-MCNC: NORMAL MG/DL (ref 2.6–4.7)
POTASSIUM SERPL-SCNC: 3.3 MMOL/L (ref 3.5–5.1)
POTASSIUM SERPL-SCNC: 3.4 MMOL/L (ref 3.5–5.1)
SODIUM SERPL-SCNC: 134 MMOL/L (ref 136–145)
SODIUM SERPL-SCNC: 140 MMOL/L (ref 136–145)

## 2024-06-13 PROCEDURE — 2700000000 HC OXYGEN THERAPY PER DAY

## 2024-06-13 PROCEDURE — 2500000003 HC RX 250 WO HCPCS: Performed by: SURGERY

## 2024-06-13 PROCEDURE — 2000000000 HC ICU R&B

## 2024-06-13 PROCEDURE — 6370000000 HC RX 637 (ALT 250 FOR IP): Performed by: SURGERY

## 2024-06-13 PROCEDURE — 81001 URINALYSIS AUTO W/SCOPE: CPT

## 2024-06-13 PROCEDURE — 36415 COLL VENOUS BLD VENIPUNCTURE: CPT

## 2024-06-13 PROCEDURE — 94761 N-INVAS EAR/PLS OXIMETRY MLT: CPT

## 2024-06-13 PROCEDURE — 6360000002 HC RX W HCPCS: Performed by: SURGERY

## 2024-06-13 PROCEDURE — 80048 BASIC METABOLIC PNL TOTAL CA: CPT

## 2024-06-13 PROCEDURE — 2580000003 HC RX 258: Performed by: SURGERY

## 2024-06-13 PROCEDURE — 99223 1ST HOSP IP/OBS HIGH 75: CPT | Performed by: INTERNAL MEDICINE

## 2024-06-13 PROCEDURE — 83735 ASSAY OF MAGNESIUM: CPT

## 2024-06-13 PROCEDURE — 97530 THERAPEUTIC ACTIVITIES: CPT

## 2024-06-13 PROCEDURE — 82962 GLUCOSE BLOOD TEST: CPT

## 2024-06-13 PROCEDURE — 87070 CULTURE OTHR SPECIMN AEROBIC: CPT

## 2024-06-13 PROCEDURE — 87205 SMEAR GRAM STAIN: CPT

## 2024-06-13 PROCEDURE — 84100 ASSAY OF PHOSPHORUS: CPT

## 2024-06-13 RX ORDER — HYDROMORPHONE HYDROCHLORIDE 1 MG/ML
0.5 INJECTION, SOLUTION INTRAMUSCULAR; INTRAVENOUS; SUBCUTANEOUS
Status: ACTIVE | OUTPATIENT
Start: 2024-06-13 | End: 2024-06-14

## 2024-06-13 RX ORDER — POTASSIUM CHLORIDE 20 MEQ/1
40 TABLET, EXTENDED RELEASE ORAL PRN
Status: DISCONTINUED | OUTPATIENT
Start: 2024-06-13 | End: 2024-06-26 | Stop reason: HOSPADM

## 2024-06-13 RX ORDER — POTASSIUM CHLORIDE 7.45 MG/ML
10 INJECTION INTRAVENOUS PRN
Status: DISCONTINUED | OUTPATIENT
Start: 2024-06-13 | End: 2024-06-26 | Stop reason: HOSPADM

## 2024-06-13 RX ORDER — HYDROMORPHONE HYDROCHLORIDE 1 MG/ML
1 INJECTION, SOLUTION INTRAMUSCULAR; INTRAVENOUS; SUBCUTANEOUS
Status: DISPENSED | OUTPATIENT
Start: 2024-06-13 | End: 2024-06-14

## 2024-06-13 RX ADMIN — LEVOTHYROXINE SODIUM 150 MCG: 0.07 TABLET ORAL at 08:39

## 2024-06-13 RX ADMIN — HYDROMORPHONE HYDROCHLORIDE 0.8 MG: 1 INJECTION, SOLUTION INTRAMUSCULAR; INTRAVENOUS; SUBCUTANEOUS at 05:32

## 2024-06-13 RX ADMIN — HYDROMORPHONE HYDROCHLORIDE 0.8 MG: 1 INJECTION, SOLUTION INTRAMUSCULAR; INTRAVENOUS; SUBCUTANEOUS at 03:12

## 2024-06-13 RX ADMIN — HYDROMORPHONE HYDROCHLORIDE 1 MG: 1 INJECTION, SOLUTION INTRAMUSCULAR; INTRAVENOUS; SUBCUTANEOUS at 21:42

## 2024-06-13 RX ADMIN — FUROSEMIDE 20 MG: 10 INJECTION, SOLUTION INTRAMUSCULAR; INTRAVENOUS at 18:46

## 2024-06-13 RX ADMIN — SODIUM CHLORIDE, PRESERVATIVE FREE 10 ML: 5 INJECTION INTRAVENOUS at 21:24

## 2024-06-13 RX ADMIN — ENOXAPARIN SODIUM 30 MG: 100 INJECTION SUBCUTANEOUS at 08:39

## 2024-06-13 RX ADMIN — SODIUM CHLORIDE, POTASSIUM CHLORIDE, SODIUM LACTATE AND CALCIUM CHLORIDE: 600; 310; 30; 20 INJECTION, SOLUTION INTRAVENOUS at 14:20

## 2024-06-13 RX ADMIN — HYDROMORPHONE HYDROCHLORIDE 0.8 MG: 1 INJECTION, SOLUTION INTRAMUSCULAR; INTRAVENOUS; SUBCUTANEOUS at 12:22

## 2024-06-13 RX ADMIN — HYDROMORPHONE HYDROCHLORIDE 0.8 MG: 1 INJECTION, SOLUTION INTRAMUSCULAR; INTRAVENOUS; SUBCUTANEOUS at 18:46

## 2024-06-13 RX ADMIN — ASCORBIC ACID, VITAMIN A PALMITATE, CHOLECALCIFEROL, THIAMINE HYDROCHLORIDE, RIBOFLAVIN-5 PHOSPHATE SODIUM, PYRIDOXINE HYDROCHLORIDE, NIACINAMIDE, DEXPANTHENOL, ALPHA-TOCOPHEROL ACETATE, VITAMIN K1, FOLIC ACID, BIOTIN, CYANOCOBALAMIN: 200; 3300; 200; 6; 3.6; 6; 40; 15; 10; 150; 600; 60; 5 INJECTION, SOLUTION INTRAVENOUS at 22:02

## 2024-06-13 RX ADMIN — FUROSEMIDE 20 MG: 10 INJECTION, SOLUTION INTRAMUSCULAR; INTRAVENOUS at 08:39

## 2024-06-13 RX ADMIN — SODIUM CHLORIDE, PRESERVATIVE FREE 10 ML: 5 INJECTION INTRAVENOUS at 08:39

## 2024-06-13 RX ADMIN — ENOXAPARIN SODIUM 30 MG: 100 INJECTION SUBCUTANEOUS at 21:24

## 2024-06-13 RX ADMIN — POTASSIUM BICARBONATE 40 MEQ: 782 TABLET, EFFERVESCENT ORAL at 18:46

## 2024-06-13 RX ADMIN — HYDROMORPHONE HYDROCHLORIDE 0.8 MG: 1 INJECTION, SOLUTION INTRAMUSCULAR; INTRAVENOUS; SUBCUTANEOUS at 15:40

## 2024-06-13 RX ADMIN — FAMOTIDINE 20 MG: 20 TABLET, FILM COATED ORAL at 08:39

## 2024-06-13 RX ADMIN — HYDROMORPHONE HYDROCHLORIDE 0.8 MG: 1 INJECTION, SOLUTION INTRAMUSCULAR; INTRAVENOUS; SUBCUTANEOUS at 00:08

## 2024-06-13 RX ADMIN — FLUCONAZOLE 400 MG: 2 INJECTION, SOLUTION INTRAVENOUS at 10:09

## 2024-06-13 RX ADMIN — SODIUM CHLORIDE, PRESERVATIVE FREE 20 MG: 5 INJECTION INTRAVENOUS at 21:20

## 2024-06-13 RX ADMIN — HYDROMORPHONE HYDROCHLORIDE 0.8 MG: 1 INJECTION, SOLUTION INTRAMUSCULAR; INTRAVENOUS; SUBCUTANEOUS at 08:38

## 2024-06-13 ASSESSMENT — PAIN SCALES - GENERAL
PAINLEVEL_OUTOF10: 5
PAINLEVEL_OUTOF10: 4
PAINLEVEL_OUTOF10: 0
PAINLEVEL_OUTOF10: 4
PAINLEVEL_OUTOF10: 4
PAINLEVEL_OUTOF10: 8
PAINLEVEL_OUTOF10: 4
PAINLEVEL_OUTOF10: 7
PAINLEVEL_OUTOF10: 8
PAINLEVEL_OUTOF10: 5
PAINLEVEL_OUTOF10: 8
PAINLEVEL_OUTOF10: 0
PAINLEVEL_OUTOF10: 4
PAINLEVEL_OUTOF10: 0
PAINLEVEL_OUTOF10: 8
PAINLEVEL_OUTOF10: 7
PAINLEVEL_OUTOF10: 8

## 2024-06-13 ASSESSMENT — PAIN DESCRIPTION - LOCATION
LOCATION: ABDOMEN
LOCATION: ABDOMEN;HIP
LOCATION: ABDOMEN

## 2024-06-13 ASSESSMENT — PAIN DESCRIPTION - ORIENTATION
ORIENTATION: MID
ORIENTATION: ANTERIOR
ORIENTATION: MID

## 2024-06-13 ASSESSMENT — PAIN DESCRIPTION - DESCRIPTORS
DESCRIPTORS: SORE
DESCRIPTORS: SORE
DESCRIPTORS: ACHING
DESCRIPTORS: SORE

## 2024-06-13 ASSESSMENT — PAIN - FUNCTIONAL ASSESSMENT: PAIN_FUNCTIONAL_ASSESSMENT: ACTIVITIES ARE NOT PREVENTED

## 2024-06-13 ASSESSMENT — PAIN DESCRIPTION - PAIN TYPE
TYPE: SURGICAL PAIN

## 2024-06-13 ASSESSMENT — PAIN SCALES - WONG BAKER: WONGBAKER_NUMERICALRESPONSE: HURTS A LITTLE BIT

## 2024-06-13 ASSESSMENT — PAIN DESCRIPTION - FREQUENCY
FREQUENCY: CONTINUOUS

## 2024-06-13 ASSESSMENT — PAIN DESCRIPTION - ONSET
ONSET: ON-GOING

## 2024-06-13 NOTE — PLAN OF CARE
PHYSICAL THERAPY REEVALUATION  Patient: Maddie Goldberg (63 y.o. female)  Date: 6/13/2024  Primary Diagnosis: Cecal volvulus (HCC) [K56.2]  Large bowel ischemia (HCC) [K55.9]  Procedure(s) (LRB):  EXPLORATORY LAPAROTOMY WITH ILEOSTOMY AND WOUND VAC PLACEMENT (N/A) 4 Days Post-Op   Precautions: Surgical Protocols, NPO, Bed Alarm, General Precautions                      Recommendations for nursing mobility: Encourage HEP in prep for ADLs/mobility; see handout for details, Frequent repositioning to prevent skin breakdown, Use of bed/chair alarm for safety, LE elevation for management of edema, and Assist x2    In place during session: Peripheral IV, PICC line, Nasal Cannula 2.5L, Quiroga Catheter, Wound vac  , Nasogastric Tube, and 3 drains  Chart, physical therapy assessment, plan of care, and goals were reviewed.      ASSESSMENT  Patient initially seen for PT evaluation 6/6/2024(patient was on [facility] sx floor) and 1 skilled PT sessions since evalution. Patient seen today for PT reevaluation s/t patient had Sx 6/8 and due to medical declined she is in ICU s/p re exploratory lap with ileostomy,Wound vac placed . Patient A&O x4. Pt semi supine  upon arrival, agreeable to session.   Patient required max assist x 2 (jose nails PT assisted ) for rolling and supine<->sit.BP elevated after sitting up for diastolic pressure to 113 which was below 70s at baseline. ,Systolic 146 at baseline. Patient able to sit at eob ~6 mins.Patient performed exs in bed and educated for performing all exs during the day to get stronger.sit to supine and scooting up in bed required more assist,bed required to be in trendelenburg position.     Based on the objective data described, the patient currently presents with impaired ability to perform high-level IADLs, impaired strength, poor body mechanics, decreased activity tolerance, impaired balance, and impaired posture. (See below for objective details and assist levels).    Overall pt tolerated    Ankle Pumps 1 10 [x] [] [] []    Quad Sets/Glut Sets 1 7 [x] [] [] []    Hamstring Sets   [] [] [] []    Short Arc Quads   [] [] [] []    Heel Slides 1 5 [x] [] [] []    Straight Leg Raises   [] [] [] []    Hip abd/add   [] [] [] []    Long Arc Quads   [] [] [] []    Marching   [] [] [] []       [] [] [] []       Functional Measure:  [facility]-PAC™ “6 Clicks”         Basic Mobility Inpatient Short Form  How much difficulty does the patient currently have... Unable A Lot A Little None   1.  Turning over in bed (including adjusting bedclothes, sheets and blankets)?   [] 1   [x] 2   [] 3   [] 4   2.  Sitting down on and standing up from a chair with arms ( e.g., wheelchair, bedside commode, etc.)   [x] 1   [] 2   [] 3   [] 4   3.  Moving from lying on back to sitting on the side of the bed?   [] 1   [x] 2   [] 3   [] 4          How much help from another person does the patient currently need... Total A Lot A Little None   4.  Moving to and from a bed to a chair (including a wheelchair)?   [x] 1   [] 2   [] 3   [] 4   5.  Need to walk in hospital room?   [x] 1   [] 2   [] 3   [] 4   6.  Climbing 3-5 steps with a railing?   [x] 1   [] 2   [] 3   [] 4   © , Trustees of [facility], under license to [employer]. All rights reserved     Score:  Initial:  Most Recent: (Date: 2024 )   Interpretation of Tool:  Represents activities that are increasingly more difficult (i.e. Bed mobility, Transfers, Gait).  Score 24 23 22-20 19-15 14-10 9-7 6   Modifier CH CI CJ CK CL CM CN     Pain Ratin/10 abd reported  Pain Intervention(s):       Activity Tolerance:   Good and Fair     After treatment patient left in no apparent distress:   Bed locked and in lowest position Patient left in no apparent distress in bed, Call bell within reach, Bed/ chair alarm activated, and Side rails x3 and nsg updated.    COMMUNICATION/EDUCATION:   The patient’s plan of care was discussed with: Physical therapist and

## 2024-06-14 LAB
ALBUMIN SERPL-MCNC: 1.4 G/DL (ref 3.5–5)
ALBUMIN/GLOB SERPL: 0.4 (ref 1.1–2.2)
ALP SERPL-CCNC: 76 U/L (ref 45–117)
ALT SERPL-CCNC: 13 U/L (ref 12–78)
ANION GAP SERPL CALC-SCNC: 2 MMOL/L (ref 5–15)
APPEARANCE UR: CLEAR
AST SERPL W P-5'-P-CCNC: 14 U/L (ref 15–37)
BACTERIA URNS QL MICRO: NEGATIVE /HPF
BASOPHILS # BLD: 0 K/UL (ref 0–0.1)
BASOPHILS NFR BLD: 0 % (ref 0–1)
BILIRUB DIRECT SERPL-MCNC: 0.1 MG/DL (ref 0–0.2)
BILIRUB SERPL-MCNC: 0.2 MG/DL (ref 0.2–1)
BILIRUB UR QL: NEGATIVE
BUN SERPL-MCNC: 18 MG/DL (ref 6–20)
BUN/CREAT SERPL: 42 (ref 12–20)
CA-I BLD-MCNC: 7.8 MG/DL (ref 8.5–10.1)
CHLORIDE SERPL-SCNC: 103 MMOL/L (ref 97–108)
CO2 SERPL-SCNC: 32 MMOL/L (ref 21–32)
COLOR UR: ABNORMAL
CREAT SERPL-MCNC: 0.43 MG/DL (ref 0.55–1.02)
CRP SERPL-MCNC: 22.1 MG/DL (ref 0–0.3)
DIFFERENTIAL METHOD BLD: ABNORMAL
EOSINOPHIL # BLD: 0 K/UL (ref 0–0.4)
EOSINOPHIL NFR BLD: 0 % (ref 0–7)
EPITH CASTS URNS QL MICRO: ABNORMAL /LPF
ERYTHROCYTE [DISTWIDTH] IN BLOOD BY AUTOMATED COUNT: 13.9 % (ref 11.5–14.5)
GLOBULIN SER CALC-MCNC: 3.4 G/DL (ref 2–4)
GLUCOSE BLD STRIP.AUTO-MCNC: 102 MG/DL (ref 65–100)
GLUCOSE BLD STRIP.AUTO-MCNC: 113 MG/DL (ref 65–100)
GLUCOSE BLD STRIP.AUTO-MCNC: 127 MG/DL (ref 65–100)
GLUCOSE BLD STRIP.AUTO-MCNC: 133 MG/DL (ref 65–100)
GLUCOSE SERPL-MCNC: 121 MG/DL (ref 65–100)
GLUCOSE UR STRIP.AUTO-MCNC: NEGATIVE MG/DL
HCT VFR BLD AUTO: 29.3 % (ref 35–47)
HGB BLD-MCNC: 9.5 G/DL (ref 11.5–16)
HGB UR QL STRIP: NEGATIVE
IMM GRANULOCYTES # BLD AUTO: 0 K/UL
IMM GRANULOCYTES NFR BLD AUTO: 0 %
KETONES UR QL STRIP.AUTO: NEGATIVE MG/DL
LEUKOCYTE ESTERASE UR QL STRIP.AUTO: NEGATIVE
LYMPHOCYTES # BLD: 3.3 K/UL (ref 0.8–3.5)
LYMPHOCYTES NFR BLD: 17 % (ref 12–49)
MAGNESIUM SERPL-MCNC: 1.8 MG/DL (ref 1.6–2.4)
MCH RBC QN AUTO: 34.3 PG (ref 26–34)
MCHC RBC AUTO-ENTMCNC: 32.4 G/DL (ref 30–36.5)
MCV RBC AUTO: 105.8 FL (ref 80–99)
MONOCYTES # BLD: 1.5 K/UL (ref 0–1)
MONOCYTES NFR BLD: 8 % (ref 5–13)
MUCOUS THREADS URNS QL MICRO: ABNORMAL /LPF
NEUTS BAND NFR BLD MANUAL: 1 % (ref 0–6)
NEUTS SEG # BLD: 14.5 K/UL (ref 1.8–8)
NEUTS SEG NFR BLD: 74 % (ref 32–75)
NITRITE UR QL STRIP.AUTO: NEGATIVE
NRBC # BLD: 0 K/UL (ref 0–0.01)
NRBC BLD-RTO: 0 PER 100 WBC
PERFORMED BY:: ABNORMAL
PH UR STRIP: 7 (ref 5–8)
PHOSPHATE SERPL-MCNC: 2.4 MG/DL (ref 2.6–4.7)
PLATELET # BLD AUTO: 422 K/UL (ref 150–400)
PMV BLD AUTO: 10 FL (ref 8.9–12.9)
POTASSIUM SERPL-SCNC: 3.4 MMOL/L (ref 3.5–5.1)
PROCALCITONIN SERPL-MCNC: 0.14 NG/ML
PROT SERPL-MCNC: 4.8 G/DL (ref 6.4–8.2)
PROT UR STRIP-MCNC: NEGATIVE MG/DL
RBC # BLD AUTO: 2.77 M/UL (ref 3.8–5.2)
RBC #/AREA URNS HPF: ABNORMAL /HPF (ref 0–5)
RBC MORPH BLD: ABNORMAL
SODIUM SERPL-SCNC: 137 MMOL/L (ref 136–145)
SP GR UR REFRACTOMETRY: 1.02 (ref 1–1.03)
UROBILINOGEN UR QL STRIP.AUTO: 2 EU/DL (ref 0.1–1)
WBC # BLD AUTO: 19.3 K/UL (ref 3.6–11)
WBC URNS QL MICRO: ABNORMAL /HPF (ref 0–4)

## 2024-06-14 PROCEDURE — 2580000003 HC RX 258: Performed by: SURGERY

## 2024-06-14 PROCEDURE — 84100 ASSAY OF PHOSPHORUS: CPT

## 2024-06-14 PROCEDURE — 86140 C-REACTIVE PROTEIN: CPT

## 2024-06-14 PROCEDURE — 83735 ASSAY OF MAGNESIUM: CPT

## 2024-06-14 PROCEDURE — 87449 NOS EACH ORGANISM AG IA: CPT

## 2024-06-14 PROCEDURE — 6360000002 HC RX W HCPCS: Performed by: SURGERY

## 2024-06-14 PROCEDURE — 80048 BASIC METABOLIC PNL TOTAL CA: CPT

## 2024-06-14 PROCEDURE — 80076 HEPATIC FUNCTION PANEL: CPT

## 2024-06-14 PROCEDURE — 2580000003 HC RX 258: Performed by: INTERNAL MEDICINE

## 2024-06-14 PROCEDURE — 94761 N-INVAS EAR/PLS OXIMETRY MLT: CPT

## 2024-06-14 PROCEDURE — 6360000002 HC RX W HCPCS: Performed by: INTERNAL MEDICINE

## 2024-06-14 PROCEDURE — 84145 PROCALCITONIN (PCT): CPT

## 2024-06-14 PROCEDURE — 6370000000 HC RX 637 (ALT 250 FOR IP): Performed by: SURGERY

## 2024-06-14 PROCEDURE — 2500000003 HC RX 250 WO HCPCS: Performed by: SURGERY

## 2024-06-14 PROCEDURE — 99233 SBSQ HOSP IP/OBS HIGH 50: CPT | Performed by: INTERNAL MEDICINE

## 2024-06-14 PROCEDURE — 36415 COLL VENOUS BLD VENIPUNCTURE: CPT

## 2024-06-14 PROCEDURE — 97606 NEG PRS WND THER DME>50 SQCM: CPT

## 2024-06-14 PROCEDURE — 82962 GLUCOSE BLOOD TEST: CPT

## 2024-06-14 PROCEDURE — 2000000000 HC ICU R&B

## 2024-06-14 PROCEDURE — 2700000000 HC OXYGEN THERAPY PER DAY

## 2024-06-14 PROCEDURE — 85025 COMPLETE CBC W/AUTO DIFF WBC: CPT

## 2024-06-14 RX ORDER — HYDROMORPHONE HYDROCHLORIDE 1 MG/ML
1 INJECTION, SOLUTION INTRAMUSCULAR; INTRAVENOUS; SUBCUTANEOUS
Status: DISPENSED | OUTPATIENT
Start: 2024-06-14 | End: 2024-06-17

## 2024-06-14 RX ORDER — LIDOCAINE 4 G/G
1 PATCH TOPICAL DAILY
Status: DISCONTINUED | OUTPATIENT
Start: 2024-06-14 | End: 2024-06-26 | Stop reason: HOSPADM

## 2024-06-14 RX ORDER — HYDROMORPHONE HYDROCHLORIDE 1 MG/ML
0.5 INJECTION, SOLUTION INTRAMUSCULAR; INTRAVENOUS; SUBCUTANEOUS
Status: ACTIVE | OUTPATIENT
Start: 2024-06-14 | End: 2024-06-17

## 2024-06-14 RX ADMIN — SODIUM CHLORIDE, PRESERVATIVE FREE 20 MG: 5 INJECTION INTRAVENOUS at 19:31

## 2024-06-14 RX ADMIN — HYDROMORPHONE HYDROCHLORIDE 1 MG: 1 INJECTION, SOLUTION INTRAMUSCULAR; INTRAVENOUS; SUBCUTANEOUS at 22:34

## 2024-06-14 RX ADMIN — PIPERACILLIN AND TAZOBACTAM 4500 MG: 4; .5 INJECTION, POWDER, LYOPHILIZED, FOR SOLUTION INTRAVENOUS at 19:30

## 2024-06-14 RX ADMIN — SODIUM CHLORIDE, PRESERVATIVE FREE 10 ML: 5 INJECTION INTRAVENOUS at 20:40

## 2024-06-14 RX ADMIN — SODIUM CHLORIDE, POTASSIUM CHLORIDE, SODIUM LACTATE AND CALCIUM CHLORIDE: 600; 310; 30; 20 INJECTION, SOLUTION INTRAVENOUS at 02:26

## 2024-06-14 RX ADMIN — HYDROMORPHONE HYDROCHLORIDE 1 MG: 1 INJECTION, SOLUTION INTRAMUSCULAR; INTRAVENOUS; SUBCUTANEOUS at 01:22

## 2024-06-14 RX ADMIN — FLUCONAZOLE 400 MG: 2 INJECTION, SOLUTION INTRAVENOUS at 08:26

## 2024-06-14 RX ADMIN — ONDANSETRON 4 MG: 2 INJECTION INTRAMUSCULAR; INTRAVENOUS at 19:29

## 2024-06-14 RX ADMIN — PIPERACILLIN AND TAZOBACTAM 4500 MG: 4; .5 INJECTION, POWDER, LYOPHILIZED, FOR SOLUTION INTRAVENOUS at 05:46

## 2024-06-14 RX ADMIN — FUROSEMIDE 20 MG: 10 INJECTION, SOLUTION INTRAMUSCULAR; INTRAVENOUS at 08:27

## 2024-06-14 RX ADMIN — FUROSEMIDE 20 MG: 10 INJECTION, SOLUTION INTRAMUSCULAR; INTRAVENOUS at 16:17

## 2024-06-14 RX ADMIN — LEVOTHYROXINE SODIUM 150 MCG: 0.07 TABLET ORAL at 05:26

## 2024-06-14 RX ADMIN — ENOXAPARIN SODIUM 30 MG: 100 INJECTION SUBCUTANEOUS at 20:40

## 2024-06-14 RX ADMIN — ASCORBIC ACID, VITAMIN A PALMITATE, CHOLECALCIFEROL, THIAMINE HYDROCHLORIDE, RIBOFLAVIN-5 PHOSPHATE SODIUM, PYRIDOXINE HYDROCHLORIDE, NIACINAMIDE, DEXPANTHENOL, ALPHA-TOCOPHEROL ACETATE, VITAMIN K1, FOLIC ACID, BIOTIN, CYANOCOBALAMIN: 200; 3300; 200; 6; 3.6; 6; 40; 15; 10; 150; 600; 60; 5 INJECTION, SOLUTION INTRAVENOUS at 22:38

## 2024-06-14 RX ADMIN — HYDROMORPHONE HYDROCHLORIDE 1 MG: 1 INJECTION, SOLUTION INTRAMUSCULAR; INTRAVENOUS; SUBCUTANEOUS at 14:17

## 2024-06-14 RX ADMIN — SODIUM CHLORIDE, POTASSIUM CHLORIDE, SODIUM LACTATE AND CALCIUM CHLORIDE: 600; 310; 30; 20 INJECTION, SOLUTION INTRAVENOUS at 22:37

## 2024-06-14 RX ADMIN — SMOFLIPID 500 ML: 6; 6; 5; 3 INJECTION, EMULSION INTRAVENOUS at 22:50

## 2024-06-14 RX ADMIN — ENOXAPARIN SODIUM 30 MG: 100 INJECTION SUBCUTANEOUS at 08:32

## 2024-06-14 RX ADMIN — HYDROMORPHONE HYDROCHLORIDE 1 MG: 1 INJECTION, SOLUTION INTRAMUSCULAR; INTRAVENOUS; SUBCUTANEOUS at 08:33

## 2024-06-14 RX ADMIN — HYDROMORPHONE HYDROCHLORIDE 1 MG: 1 INJECTION, SOLUTION INTRAMUSCULAR; INTRAVENOUS; SUBCUTANEOUS at 19:22

## 2024-06-14 RX ADMIN — HYDROMORPHONE HYDROCHLORIDE 1 MG: 1 INJECTION, SOLUTION INTRAMUSCULAR; INTRAVENOUS; SUBCUTANEOUS at 05:10

## 2024-06-14 RX ADMIN — PIPERACILLIN AND TAZOBACTAM 4500 MG: 4; .5 INJECTION, POWDER, LYOPHILIZED, FOR SOLUTION INTRAVENOUS at 11:43

## 2024-06-14 RX ADMIN — SODIUM CHLORIDE, PRESERVATIVE FREE 20 MG: 5 INJECTION INTRAVENOUS at 08:27

## 2024-06-14 ASSESSMENT — PAIN SCALES - GENERAL
PAINLEVEL_OUTOF10: 0
PAINLEVEL_OUTOF10: 0
PAINLEVEL_OUTOF10: 8
PAINLEVEL_OUTOF10: 8
PAINLEVEL_OUTOF10: 0
PAINLEVEL_OUTOF10: 8
PAINLEVEL_OUTOF10: 8
PAINLEVEL_OUTOF10: 0
PAINLEVEL_OUTOF10: 8
PAINLEVEL_OUTOF10: 0
PAINLEVEL_OUTOF10: 9

## 2024-06-14 ASSESSMENT — PAIN DESCRIPTION - DESCRIPTORS
DESCRIPTORS: ACHING

## 2024-06-14 ASSESSMENT — PAIN DESCRIPTION - ORIENTATION
ORIENTATION: ANTERIOR

## 2024-06-14 ASSESSMENT — PAIN - FUNCTIONAL ASSESSMENT
PAIN_FUNCTIONAL_ASSESSMENT: PREVENTS OR INTERFERES SOME ACTIVE ACTIVITIES AND ADLS

## 2024-06-14 ASSESSMENT — PAIN DESCRIPTION - LOCATION
LOCATION: ABDOMEN

## 2024-06-14 NOTE — PROGRESS NOTES
PT treatment session attempted at 0845, however pt working with wound care at this time. Will continue to check on pt and see them for treatment session at a later time. Thank you.

## 2024-06-14 NOTE — PROGRESS NOTES
Progress Note  Date:2024       Room:[facility]  Patient Name:Maddie Goldberg     YOB: 1960     Age:63 y.o.        Subjective    Subjective  Patient followed for Candida UTI on high dose Fluconazole. Still with low grade temperatures.  Repeat urinalysis shows improvement. Body fluid culture pending. Afebrile with persistent leukocytosis and elevated procal and CRP.  Currently on Fluconazole and Zosyn. Patient is awake and responsive.  She affirms having abdominal pain.     Objective         Vitals Last 24 Hours:  TEMPERATURE:  Temp  Av.6 °F (37 °C)  Min: 97.6 °F (36.4 °C)  Max: 99.7 °F (37.6 °C)  RESPIRATIONS RANGE: Resp  Av.2  Min: 12  Max: 18  PULSE OXIMETRY RANGE: SpO2  Av.6 %  Min: 97 %  Max: 100 %  PULSE RANGE: Pulse  Av.6  Min: 72  Max: 99  BLOOD PRESSURE RANGE: Systolic (24hrs), Av , Min:120 , Max:138   ; Diastolic (24hrs), Av, Min:61, Max:78         Objective Vitals and nursing note reviewed.   Constitutional:       General: She is not in acute distress.     Appearance: She is ill-appearing.   HENT:      Head: Atraumatic.      Right Ear: External ear normal.      Left Ear: External ear normal.      Nose: Nose normal.      Mouth/Throat:      Pharynx: Oropharynx is clear.   Eyes:      Extraocular Movements: Extraocular movements intact.      Pupils: Pupils are equal, round, and reactive to light.   Cardiovascular:      Rate and Rhythm: Normal rate and regular rhythm.      Heart sounds: Normal heart sounds. No murmur heard.  Pulmonary:      Effort: Pulmonary effort is normal.      Breath sounds: Normal breath sounds.   Abdominal:      General: There is no distension.      Palpations: There is no mass.      Tenderness: There is no abdominal tenderness. There is no guarding.           Comments: Decreased BS     Midline wound vac in place  Right sided LIONEL drains #1 and #2 with serous effluent  Right sided colostomy with dark stool  Left sided LIONEL drain #3 with cloudy

## 2024-06-14 NOTE — PROGRESS NOTES
Covering for Dr. Pineda      POD #5     Chief Complaint: none         Subjective:  Ms. Maddie Goldberg is a 63 y.o. year old  female who had an anastomotic leak following right hemicolectomy performed by Dr. Rice, and reexploratory laparotomy with sigmoid colon resection and end ileostomy with mucous fistula creationby Dr. Pineda for sigmoid colon mass.     Today, she is doing the same. No nausea or vomiting.   She says she has left shoulder pain that has been there for awhile and has been bothering her.     Review of Systems:   Constitutional:  no fever, no chills,  no sweats, No weakness, No fatigue, No decreased activity.  Respiratory: No shortness of breath, No cough, No sputum production, No hemoptysis, No wheezing, No cyanosis.  Cardiovascular: No chest pain, No palpitations, No bradycardia, No tachycardia, No peripheral edema, No syncope.  Gastrointestinal: No nausea, No vomiting, No diarrhea, No constipation, No heartburn, No abdominal pain.  Genitourinary: No dysuria, No hematuria, No change in urine stream, No urethral discharge, No lesions.  Hematology/Lymphatics: No bruising tendency, No bleeding tendency, No petechiae, No swollen lymph glands.  Endocrine: No excessive thirst, No polyuria, No cold intolerance, No heat intolerance, No excessive hunger.  Musculoskeletal: No back pain, No neck pain, No joint pain, No muscle pain, No claudication, No decreased range of motion, No trauma. Left shoulder pain.   Integumentary: No rash, No pruritus, No abrasions.  Neurologic: Alert and oriented X4, No abnormal balance, No headache, No confusion, No numbness, No tingling.  Psychiatric: No anxiety, No depression, No jigna.      Physical Exam:     Vitals & Measurements:    Wt Readings from Last 3 Encounters:   06/12/24 135 kg (297 lb 9.9 oz)     Temp Readings from Last 3 Encounters:   06/14/24 98.3 °F (36.8 °C) (Oral)     BP Readings from Last 3 Encounters:   06/14/24 126/71     Pulse Readings from Last 3 Encounters:  BSR Training          Vascular duplex lower extremity venous bilateral   Final Result      XR CHEST PORTABLE   Final Result   No acute cardiopulmonary abnormalities.         Electronically signed by Second Porchq      XR CHEST PORTABLE   Final Result   No acute cardiopulmonary abnormalities.         Electronically signed by Second Porchq      XR CHEST PORTABLE   Final Result   Mild pulmonary edema. Unchanged.      Electronically signed by Chapincito Espino      XR CHEST PORTABLE   Final Result   Satisfactory intubation and PICC line placement.      Electronically signed by SHARMILA Loku      XR CHEST PORTABLE   Final Result   Satisfactory NG tube placement. No Acute Disease.         Electronically signed by SHARMILA PACNAN      CT ABDOMEN PELVIS W IV CONTRAST Additional Contrast? None   Final Result      1. Moderate small bowel distention likely representing postoperative ileus.   2. Mild free intraperitoneal air likely postoperative change. Small amount of   focal air and fluid anterior to the surgical anastomosis may represent   postoperative change. However, small leak is not excluded.   3. Significant fecal retention in the remaining large bowel with focal   stricturing in the rectosigmoid colon.   4. Cholelithiasis.   5. Bibasilar atelectasis with 4 mm right middle lobe lung nodule.      Guidelines by the Fleischner society (Radiology 2017 special report) suggest   that patients with low risk for lung cancer and solid nodule(s) less than or   equal to 6 mm in diameter require no follow-up.  In patients with a higher risk,   such as smokers, follow-up noncontrast chest CT should be considered at 12   months.  Patients with a known malignancy are at increased risk for metastasis   and should receive a three month follow-up.               Electronically signed by Obi Lcay      CT ABDOMEN PELVIS W IV CONTRAST Additional Contrast? None   Final Result   Addendum (preliminary) 1 of 1   Addendum:  Correction: Dilatation of

## 2024-06-14 NOTE — PROGRESS NOTES
Piperacillin-tazobactam (Zosyn) Extended-Infusion Dosing/Monitoring  Current regimen: 3.375 g IV every 8 hours    Recent Labs     06/13/24  0315 06/13/24  1743 06/14/24  0221   CREATININE 0.44* 0.44* 0.43*   BUN 13 19 18     Estimated CrCl: >100 mL/min    Plan:  Change to 4.5 gm IV over 240 minutes every 8 hours  per [facility] P&T Committee Protocol with respect to extended-infusion ?-lactam antibiotics. Pharmacy will continue to monitor patient daily and will make dosage adjustments based upon changing renal function.

## 2024-06-14 NOTE — CARE COORDINATION
IV Zosyn.  Continues to be followed by Ken for IRF.  Insurance will require auth.  Auth has not been started.        NEGRITO Pandey

## 2024-06-14 NOTE — PROGRESS NOTES
Covering for Dr. Pineda     POD #4    Chief Complaint: none       Subjective:  Ms. Maddie Goldberg is a 63 y.o. year old  female who had an anastomotic leak following right hemicolectomy performed by Dr. Rice, and reexploratory laparotomy with sigmoid colon resection and end ileostomy with mucous fistula creationby Dr. Pineda for sigmoid colon mass.   Today, she is doing ok. She does not have nausea or vomiting.   No new issues overnight.    Review of Systems:   Constitutional:  no fever, no chills,  no sweats, No weakness, No fatigue, No decreased activity.  Respiratory: No shortness of breath, No cough, No sputum production, No hemoptysis, No wheezing, No cyanosis.  Cardiovascular: No chest pain, No palpitations, No bradycardia, No tachycardia, No peripheral edema, No syncope.  Gastrointestinal: No nausea, No vomiting, No diarrhea, No constipation, No heartburn, abdominal pain.  Genitourinary: No dysuria, No hematuria, No change in urine stream, No urethral discharge, No lesions.  Hematology/Lymphatics: No bruising tendency, No bleeding tendency, No petechiae, No swollen lymph glands.  Endocrine: No excessive thirst, No polyuria, No cold intolerance, No heat intolerance, No excessive hunger.  Musculoskeletal: No back pain, No neck pain, No joint pain, No muscle pain, No claudication, No decreased range of motion, No trauma.  Integumentary: No rash, No pruritus, No abrasions.  Neurologic: Alert and oriented X4, No abnormal balance, No headache, No confusion, No numbness, No tingling.  Psychiatric: No anxiety, No depression, No jigna.      Physical Exam:     Vitals & Measurements:    Wt Readings from Last 3 Encounters:   06/12/24 135 kg (297 lb 9.9 oz)     Temp Readings from Last 3 Encounters:   06/13/24 98.8 °F (37.1 °C) (Axillary)     BP Readings from Last 3 Encounters:   06/14/24 120/67     Pulse Readings from Last 3 Encounters:   06/14/24 86      Ht Readings from Last 3 Encounters:   06/12/24 1.727 m (5' 8\")  Medications    lisinopril (PRINIVIL;ZESTRIL) 5 MG tablet    furosemide (LASIX) injection 20 mg (Completed)    enoxaparin Sodium (LOVENOX) injection 30 mg    furosemide (LASIX) injection 20 mg    Other Relevant Orders    Vascular duplex lower extremity venous bilateral (Completed)             Plan:    Admission: ICU   Diet: NPO except for ice chips.  IV fluids  SCD  IS  Pain medications  Nausea medication  Quiroga  NG to low continuous wall suction  Continue TPN.  Wound vac to mid abdominal incision site to be changed biweekly.  ID consultation.  Plan discussed with patient and family and answered all their questions.     Thank you for allowing me to participate in the care of this patient.

## 2024-06-14 NOTE — CONSULTS
Consult Note            Date:6/14/2024        Patient Name:Maddie Goldberg     YOB: 1960     Age:63 y.o.    Consults  Infectious Disease    Chief Complaint     Chief Complaint   Patient presents with    Abdominal Pain    Emesis    Diarrhea      Fungal growth in urine    History Obtained From   reason patient could not give history:  altered mental status    History of Present Illness    This is a 63 year old female with an apparent  anastomotic leak following right hemicolectomy and reexploratory laparotomy with sigmoid colon resection and end ileostomy with mucous fistula for sigmoid colon mass. Urine culture from 6/8/24 grew Jael lusitaniae  and two additional ?wound cultures from the abdomen sent for fungus culture.  She has been on high dose Fluconazole.  ID has been consulted because of funguria.  She is having low grade temperatures.  WBC remains elevated.   Patient seen in the ICU where she is somnolent and unresponsive.  Family member at bedside.     Past Medical History     Past Medical History:   Diagnosis Date    Arthritis     left knee    GERD (gastroesophageal reflux disease)     Hypertension     Morbid obesity (HCC)     Thyroid disease         Past Surgical History     Past Surgical History:   Procedure Laterality Date    BLADDER SUSPENSION      CHEST SURGERY  1/7/14    RESECTION OF REMAINING THYROID    GYN      uterine ablation about 10 years ago no menses since then    HEENT      thyroidectomy    HEMICOLECTOMY N/A 6/2/2024    EXPLORATORY LAPAROTOMY; RIGHT COLON RESECTION performed by Kylah Rice MD at [facility] MAIN OR    LAPAROTOMY N/A 6/8/2024    EXPLORATORY LAPAROTOMY, PARTIAL COLON RESECTION performed by Tommie Pineda MD at [facility] MAIN OR    LAPAROTOMY N/A 6/9/2024    EXPLORATORY LAPAROTOMY WITH ILEOSTOMY AND WOUND VAC PLACEMENT performed by Tommie Pineda MD at [facility] MAIN OR    ORTHOPEDIC SURGERY      left knee arthroscopy    ORTHOPEDIC SURGERY      left knee meniscus repair     and reactive to light.   Cardiovascular:      Rate and Rhythm: Normal rate and regular rhythm.      Heart sounds: Normal heart sounds. No murmur heard.  Pulmonary:      Effort: Pulmonary effort is normal.      Breath sounds: Normal breath sounds.   Abdominal:      General: There is no distension.      Palpations: There is no mass.      Tenderness: There is no abdominal tenderness. There is no guarding.          Comments: Decreased BS    Midline wound vac in place  Right sided LIONEL drains #1 and #2 with serous effluent  Right sided colostomy with dark stool  Left sided LIONEL drain #3 with cloudy serosanguinous fluid  Left sided colostomy with dark stool   Genitourinary:     Comments: Indwelling Quiroga catheter with clear urine  Musculoskeletal:      Cervical back: Neck supple.      Right lower leg: Edema present.      Left lower leg: Edema present.   Skin:     Findings: No rash.   Neurological:      Comments: somnolent   Psychiatric:      Comments: somnolent         Labs       CHEMISTRIES:  Recent Labs     06/13/24  0315 06/13/24  1743 06/14/24  0221   * 140 137   K 3.4* 3.3* 3.4*    104 103   CO2 31 32 32   BUN 13 19 18   CREATININE 0.44* 0.44* 0.43*   GLUCOSE Lipemic specimen 125* 121*   PHOS Lipemic specimen 2.9 2.4*   MG 1.6 1.7 1.8     Urine culture (6/8) 50,000 col/ml Candida lusitaniae  Wound culture surgical excision (6/8) Heavy >2 organisms including anaerobic Gram negative rods FINAL  Wound culture (6/8) Heavy BMIX anaerobic Gram negative rods  Fungus culture Abdomen (6/8) in process  Fungus culture Abdomen (6/8) in process    Imaging/Diagnostics   Personally reviewed by me.    XR CHEST PORTABLE    Result Date: 6/10/2024  No acute cardiopulmonary abnormalities. Electronically signed by CLARIBEL Pascal     CT Abdomen Pelvis (6/6) IMPRESSION:     1. Moderate small bowel distention likely representing postoperative ileus.  2. Mild free intraperitoneal air likely postoperative change. Small amount of  focal  air and fluid anterior to the surgical anastomosis may represent  postoperative change. However, small leak is not excluded.  3. Significant fecal retention in the remaining large bowel with focal  stricturing in the rectosigmoid colon.  4. Cholelithiasis.  5. Bibasilar atelectasis with 4 mm right middle lobe lung nodule.         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 Yes    Large bowel ischemia (HCC) 6/2/2024 Yes    Hypotension due to hypovolemia 6/10/2024 Yes    Mass of colon 6/10/2024 Yes    Leak of anastomosis between gastrointestinal structures 6/10/2024 Yes    Adhesion of intestine 6/10/2024 Yes    Open wound of abdomen 6/10/2024 Yes    Acute respiratory failure with hypoxia (HCC) 6/10/2024 Yes     Candida UTI secondary to Candida lusitaniae, Day #5 IV Fluconazole  Cecal volvulus  Status post exploratory laparotomy with midline wound vac, bilateral colostomies, LIONEL drains x 3  Hepatorenal syndrome  SIRS/Sepsis with persistent leukocytosis    Comment:  Though C. Lusitaniae is known for its intrinsic resistance to Amphotericin B, Fluconazole expected to be effective treatment. There are two other fungus cultures but not clear about their origins except for the abdomen.  In addition, there are other wound cultures with mixed viri that may benefit from treatment as well.     Plan   1. Continue IV Fluconazole  2. Repeat urinalysis  3. Follow-up pending fungus cultures   4. Start IV Zosyn  5. In am, repeat CBC, check procal, CRP and fungitell (for deep tissue infection)  6. Send body fluid culture from LIONEL drain #3    Electronically signed by Antonio Butler MD

## 2024-06-14 NOTE — PROGRESS NOTES
OSTOMY Assessment     Surgery Date:___6/8/24__     Stoma Tissue Assessment: __x_Post-op ___Follow-up        Type of Diversion: _x__New___ Established ___Revision___Permanent____Temporary     ___x__  Ileostomy   _____  Colostomy: ____ Ascending, ____ Transverse, _____. Sigmoid   _____  Urostomy   _____  Ileal Conduit   _____  Mucous Fistul        Pressure Injury Interventions:  Sensory Interventions: Keep linens dry and wrinkle-free,Maintain/enhance activity level,Minimize linen layers,Pressure redistribution bed/mattress (bed type),Turn and reposition approx. every two hours (pillows and wedges if needed)     Moisture Interventions: Apply protective barrier, creams and emollients,Internal/External urinary devices,Limit adult briefs,Minimize layers,Moisture barrier     Activity Interventions: Pressure redistribution bed/mattress(bed type)     Mobility Interventions: Pressure redistribution bed/mattress (bed type)     Nutrition Interventions: Discuss nutritional consult with provider     Friction and Shear Interventions: HOB 30 degrees or less,Minimize layers,Transferring/repositioning devices                 Appearance of Ostomy:   __ Bright Red /Moist/Viable __x_ Dark Red ___ Pink __x_ Sloughing ___Necrotic____Pallor ____Red  ___Dry ____Moist     Stoma Size: ________ (mm)       ___Round ___x Oval ___Irregular      Stoma Height:   ____Flush ___x_Retracted ____Budded ____Edematous ____Prolapse      Stoma Location  ____ Left Side          __x__Right Side     _____Umbilicus  _____Incision Line  ____ Above Belt       ____ Below Belt     Abdominal Contours  ____ Firm ____ Flat __x__Flabby ___Soft ___Round ___ Hard ___ Other        Peristomal skin: Pink but intact  ___ Intact ___ Irritant Dermatitis ___ Allergic Contact Dermatitis ___Candidiasis ___Caput Medusae ___Folliculitis ___Mechanical Trauma ___Mucosal Transplantation    ___Pyoderma Gangrenosum ___Hyperplasia ___Radiation Trauma ___Allergies mpling  ___ Dimpling

## 2024-06-14 NOTE — PROGRESS NOTES
OT eval order received and acknowledged. OT eval attempted at 845 however pt with wound care. Will continue to follow patient and attempt OT eval at a later time. Thank you.

## 2024-06-15 ENCOUNTER — APPOINTMENT (OUTPATIENT)
Facility: HOSPITAL | Age: 64
DRG: 329 | End: 2024-06-15
Payer: COMMERCIAL

## 2024-06-15 LAB
ANION GAP SERPL CALC-SCNC: 5 MMOL/L (ref 5–15)
BASOPHILS # BLD: 0.3 K/UL (ref 0–0.1)
BASOPHILS NFR BLD: 2 % (ref 0–1)
BUN SERPL-MCNC: 19 MG/DL (ref 6–20)
BUN/CREAT SERPL: 40 (ref 12–20)
CA-I BLD-MCNC: 7.7 MG/DL (ref 8.5–10.1)
CHLORIDE SERPL-SCNC: 101 MMOL/L (ref 97–108)
CO2 SERPL-SCNC: 31 MMOL/L (ref 21–32)
CREAT SERPL-MCNC: 0.48 MG/DL (ref 0.55–1.02)
CRP SERPL-MCNC: 23 MG/DL (ref 0–0.3)
DIFFERENTIAL METHOD BLD: ABNORMAL
EOSINOPHIL # BLD: 0.2 K/UL (ref 0–0.4)
EOSINOPHIL NFR BLD: 1 % (ref 0–7)
ERYTHROCYTE [DISTWIDTH] IN BLOOD BY AUTOMATED COUNT: 13.6 % (ref 11.5–14.5)
GLUCOSE BLD STRIP.AUTO-MCNC: 113 MG/DL (ref 65–100)
GLUCOSE BLD STRIP.AUTO-MCNC: 116 MG/DL (ref 65–100)
GLUCOSE BLD STRIP.AUTO-MCNC: 120 MG/DL (ref 65–100)
GLUCOSE SERPL-MCNC: 112 MG/DL (ref 65–100)
HCT VFR BLD AUTO: 29.6 % (ref 35–47)
HGB BLD-MCNC: 9.3 G/DL (ref 11.5–16)
IMM GRANULOCYTES # BLD AUTO: 0 K/UL
IMM GRANULOCYTES NFR BLD AUTO: 0 %
LYMPHOCYTES # BLD: 1.9 K/UL (ref 0.8–3.5)
LYMPHOCYTES NFR BLD: 11 % (ref 12–49)
MAGNESIUM SERPL-MCNC: 1.8 MG/DL (ref 1.6–2.4)
MCH RBC QN AUTO: 32.7 PG (ref 26–34)
MCHC RBC AUTO-ENTMCNC: 31.4 G/DL (ref 30–36.5)
MCV RBC AUTO: 104.2 FL (ref 80–99)
METAMYELOCYTES NFR BLD MANUAL: 1 %
MONOCYTES # BLD: 1.6 K/UL (ref 0–1)
MONOCYTES NFR BLD: 9 % (ref 5–13)
MYELOCYTES NFR BLD MANUAL: 3 %
NEUTS BAND NFR BLD MANUAL: 3 % (ref 0–6)
NEUTS SEG # BLD: 12.7 K/UL (ref 1.8–8)
NEUTS SEG NFR BLD: 70 % (ref 32–75)
NRBC # BLD: 0 K/UL (ref 0–0.01)
NRBC BLD-RTO: 0 PER 100 WBC
PERFORMED BY:: ABNORMAL
PHOSPHATE SERPL-MCNC: 2.7 MG/DL (ref 2.6–4.7)
PLATELET # BLD AUTO: 418 K/UL (ref 150–400)
PMV BLD AUTO: 9.9 FL (ref 8.9–12.9)
POTASSIUM SERPL-SCNC: 3.2 MMOL/L (ref 3.5–5.1)
PROCALCITONIN SERPL-MCNC: 0.11 NG/ML
RBC # BLD AUTO: 2.84 M/UL (ref 3.8–5.2)
RBC MORPH BLD: ABNORMAL
SODIUM SERPL-SCNC: 137 MMOL/L (ref 136–145)
WBC # BLD AUTO: 17.4 K/UL (ref 3.6–11)

## 2024-06-15 PROCEDURE — 6360000002 HC RX W HCPCS: Performed by: INTERNAL MEDICINE

## 2024-06-15 PROCEDURE — 2580000003 HC RX 258: Performed by: INTERNAL MEDICINE

## 2024-06-15 PROCEDURE — 2580000003 HC RX 258: Performed by: SURGERY

## 2024-06-15 PROCEDURE — 97530 THERAPEUTIC ACTIVITIES: CPT

## 2024-06-15 PROCEDURE — 84100 ASSAY OF PHOSPHORUS: CPT

## 2024-06-15 PROCEDURE — 82962 GLUCOSE BLOOD TEST: CPT

## 2024-06-15 PROCEDURE — 2000000000 HC ICU R&B

## 2024-06-15 PROCEDURE — 86140 C-REACTIVE PROTEIN: CPT

## 2024-06-15 PROCEDURE — 6360000002 HC RX W HCPCS: Performed by: SURGERY

## 2024-06-15 PROCEDURE — 80048 BASIC METABOLIC PNL TOTAL CA: CPT

## 2024-06-15 PROCEDURE — 6370000000 HC RX 637 (ALT 250 FOR IP): Performed by: SURGERY

## 2024-06-15 PROCEDURE — 84145 PROCALCITONIN (PCT): CPT

## 2024-06-15 PROCEDURE — 94761 N-INVAS EAR/PLS OXIMETRY MLT: CPT

## 2024-06-15 PROCEDURE — 83735 ASSAY OF MAGNESIUM: CPT

## 2024-06-15 PROCEDURE — 99232 SBSQ HOSP IP/OBS MODERATE 35: CPT | Performed by: INTERNAL MEDICINE

## 2024-06-15 PROCEDURE — 71045 X-RAY EXAM CHEST 1 VIEW: CPT

## 2024-06-15 PROCEDURE — 36415 COLL VENOUS BLD VENIPUNCTURE: CPT

## 2024-06-15 PROCEDURE — 2500000003 HC RX 250 WO HCPCS: Performed by: SURGERY

## 2024-06-15 PROCEDURE — 2700000000 HC OXYGEN THERAPY PER DAY

## 2024-06-15 PROCEDURE — 74018 RADEX ABDOMEN 1 VIEW: CPT

## 2024-06-15 PROCEDURE — 85025 COMPLETE CBC W/AUTO DIFF WBC: CPT

## 2024-06-15 RX ORDER — POTASSIUM CHLORIDE 7.45 MG/ML
10 INJECTION INTRAVENOUS
Status: COMPLETED | OUTPATIENT
Start: 2024-06-15 | End: 2024-06-15

## 2024-06-15 RX ADMIN — PIPERACILLIN AND TAZOBACTAM 4500 MG: 4; .5 INJECTION, POWDER, LYOPHILIZED, FOR SOLUTION INTRAVENOUS at 20:09

## 2024-06-15 RX ADMIN — HYDROMORPHONE HYDROCHLORIDE 1 MG: 1 INJECTION, SOLUTION INTRAMUSCULAR; INTRAVENOUS; SUBCUTANEOUS at 12:54

## 2024-06-15 RX ADMIN — SODIUM CHLORIDE, PRESERVATIVE FREE 20 MG: 5 INJECTION INTRAVENOUS at 20:09

## 2024-06-15 RX ADMIN — SODIUM CHLORIDE, PRESERVATIVE FREE 10 ML: 5 INJECTION INTRAVENOUS at 10:14

## 2024-06-15 RX ADMIN — HYDROMORPHONE HYDROCHLORIDE 1 MG: 1 INJECTION, SOLUTION INTRAMUSCULAR; INTRAVENOUS; SUBCUTANEOUS at 16:22

## 2024-06-15 RX ADMIN — FLUCONAZOLE 400 MG: 2 INJECTION, SOLUTION INTRAVENOUS at 10:10

## 2024-06-15 RX ADMIN — HYDROMORPHONE HYDROCHLORIDE 1 MG: 1 INJECTION, SOLUTION INTRAMUSCULAR; INTRAVENOUS; SUBCUTANEOUS at 20:12

## 2024-06-15 RX ADMIN — SODIUM CHLORIDE, PRESERVATIVE FREE 20 MG: 5 INJECTION INTRAVENOUS at 10:10

## 2024-06-15 RX ADMIN — FUROSEMIDE 20 MG: 10 INJECTION, SOLUTION INTRAMUSCULAR; INTRAVENOUS at 17:37

## 2024-06-15 RX ADMIN — PIPERACILLIN AND TAZOBACTAM 4500 MG: 4; .5 INJECTION, POWDER, LYOPHILIZED, FOR SOLUTION INTRAVENOUS at 03:38

## 2024-06-15 RX ADMIN — HYDROMORPHONE HYDROCHLORIDE 1 MG: 1 INJECTION, SOLUTION INTRAMUSCULAR; INTRAVENOUS; SUBCUTANEOUS at 05:39

## 2024-06-15 RX ADMIN — POTASSIUM CHLORIDE 10 MEQ: 7.46 INJECTION, SOLUTION INTRAVENOUS at 05:45

## 2024-06-15 RX ADMIN — HYDROMORPHONE HYDROCHLORIDE 1 MG: 1 INJECTION, SOLUTION INTRAMUSCULAR; INTRAVENOUS; SUBCUTANEOUS at 09:53

## 2024-06-15 RX ADMIN — POTASSIUM CHLORIDE 10 MEQ: 7.46 INJECTION, SOLUTION INTRAVENOUS at 04:44

## 2024-06-15 RX ADMIN — PIPERACILLIN AND TAZOBACTAM 4500 MG: 4; .5 INJECTION, POWDER, LYOPHILIZED, FOR SOLUTION INTRAVENOUS at 12:50

## 2024-06-15 RX ADMIN — ASCORBIC ACID, VITAMIN A PALMITATE, CHOLECALCIFEROL, THIAMINE HYDROCHLORIDE, RIBOFLAVIN-5 PHOSPHATE SODIUM, PYRIDOXINE HYDROCHLORIDE, NIACINAMIDE, DEXPANTHENOL, ALPHA-TOCOPHEROL ACETATE, VITAMIN K1, FOLIC ACID, BIOTIN, CYANOCOBALAMIN: 200; 3300; 200; 6; 3.6; 6; 40; 15; 10; 150; 600; 60; 5 INJECTION, SOLUTION INTRAVENOUS at 22:21

## 2024-06-15 RX ADMIN — SODIUM CHLORIDE, PRESERVATIVE FREE 10 ML: 5 INJECTION INTRAVENOUS at 20:10

## 2024-06-15 RX ADMIN — ENOXAPARIN SODIUM 30 MG: 100 INJECTION SUBCUTANEOUS at 10:09

## 2024-06-15 RX ADMIN — ONDANSETRON 4 MG: 2 INJECTION INTRAMUSCULAR; INTRAVENOUS at 10:09

## 2024-06-15 RX ADMIN — ENOXAPARIN SODIUM 30 MG: 100 INJECTION SUBCUTANEOUS at 20:08

## 2024-06-15 RX ADMIN — HYDROMORPHONE HYDROCHLORIDE 1 MG: 1 INJECTION, SOLUTION INTRAMUSCULAR; INTRAVENOUS; SUBCUTANEOUS at 02:07

## 2024-06-15 RX ADMIN — FUROSEMIDE 20 MG: 10 INJECTION, SOLUTION INTRAMUSCULAR; INTRAVENOUS at 10:09

## 2024-06-15 ASSESSMENT — PAIN SCALES - GENERAL
PAINLEVEL_OUTOF10: 8
PAINLEVEL_OUTOF10: 0
PAINLEVEL_OUTOF10: 0
PAINLEVEL_OUTOF10: 8

## 2024-06-15 ASSESSMENT — PAIN DESCRIPTION - ORIENTATION
ORIENTATION: ANTERIOR

## 2024-06-15 ASSESSMENT — PAIN DESCRIPTION - DESCRIPTORS
DESCRIPTORS: ACHING

## 2024-06-15 ASSESSMENT — PAIN DESCRIPTION - LOCATION
LOCATION: ABDOMEN
LOCATION: ABDOMEN;INCISION
LOCATION: ABDOMEN
LOCATION: BACK

## 2024-06-15 ASSESSMENT — PAIN - FUNCTIONAL ASSESSMENT
PAIN_FUNCTIONAL_ASSESSMENT: PREVENTS OR INTERFERES SOME ACTIVE ACTIVITIES AND ADLS
PAIN_FUNCTIONAL_ASSESSMENT: PREVENTS OR INTERFERES SOME ACTIVE ACTIVITIES AND ADLS

## 2024-06-15 NOTE — CONSULTS
Pulmonology and Critical Care Consult    Subjective:   Consult Note: 6/15/2024 2:46 PM    Chief Complaint:   Chief Complaint   Patient presents with    Abdominal Pain    Emesis    Diarrhea        This patient has been seen and evaluated at the request of Dr. Salgado.     Patient is a 63-year-old  female with a history of morbid obesity, hypothyroidism, and hypertension who presented to the hospital on 6/2/2024 with abdominal pain and obstipation and was found to have a cecal volvulus requiring abdominal surgery.  Patient seen and examined in her room in the ICU this afternoon, case discussed in detail with the respiratory therapist, the bedside nursing staff, and her son at the bedside as well.  Apparently the pulmonary consult was placed last week by the anesthesia team for ventilator management, but was not seen by any pulmonary team due to the fact that a physician's name was not attached to the actual order.  By this time, she has since been extubated and is doing very well on 2 L nasal cannula, saturating in the high 90s.  We have now weaned her to room air and we will continue to keep her sats greater than 90% on room air.  She presented to the hospital on 6/2 with the above symptoms, CT of the abdomen/pelvis on 6/2 demonstrated small bowel obstruction with possible early pneumatosis.  Follow-up CT of the abdomen/pelvis on 6/6 showed mild free intraperitoneal air and fecal retention.  She underwent surgical management on 6/8 with an exploratory laparotomy and lysis of adhesions, resection of ileocolonic anastomosis, small bowel lavage, and resection of sigmoid rectal junction with lymphadenectomy.  Patient then underwent a second look exploratory laparotomy on 6/9 with washout of the abdominal cavity and lavage, creation of terminal ileostomy, and creation of mucous fistula using descending colon.  She is maintained in the ICU since that time; she was extubated and liberated from the ventilator on  Potassium 3.2 (L) 3.5 - 5.1 mmol/L    Chloride 101 97 - 108 mmol/L    CO2 31 21 - 32 mmol/L    Anion Gap 5 5 - 15 mmol/L    Glucose 112 (H) 65 - 100 mg/dL    BUN 19 6 - 20 mg/dL    Creatinine 0.48 (L) 0.55 - 1.02 mg/dL    BUN/Creatinine Ratio 40 (H) 12 - 20      Est, Glom Filt Rate >90 >60 ml/min/1.73m2    Calcium 7.7 (L) 8.5 - 10.1 mg/dL   Magnesium    Collection Time: 06/15/24  2:14 AM   Result Value Ref Range    Magnesium 1.8 1.6 - 2.4 mg/dL   Phosphorus    Collection Time: 06/15/24  2:14 AM   Result Value Ref Range    Phosphorus 2.7 2.6 - 4.7 mg/dL   C-Reactive Protein    Collection Time: 06/15/24  2:14 AM   Result Value Ref Range    CRP 23.00 (H) 0.00 - 0.30 mg/dL   Procalcitonin    Collection Time: 06/15/24  2:14 AM   Result Value Ref Range    Procalcitonin 0.11 (H) 0 ng/mL   CBC with Auto Differential    Collection Time: 06/15/24  2:14 AM   Result Value Ref Range    WBC 17.4 (H) 3.6 - 11.0 K/uL    RBC 2.84 (L) 3.80 - 5.20 M/uL    Hemoglobin 9.3 (L) 11.5 - 16.0 g/dL    Hematocrit 29.6 (L) 35.0 - 47.0 %    .2 (H) 80.0 - 99.0 FL    MCH 32.7 26.0 - 34.0 PG    MCHC 31.4 30.0 - 36.5 g/dL    RDW 13.6 11.5 - 14.5 %    Platelets 418 (H) 150 - 400 K/uL    MPV 9.9 8.9 - 12.9 FL    Nucleated RBCs 0.0 0.0  WBC    nRBC 0.00 0.00 - 0.01 K/uL    Neutrophils % 70 32 - 75 %    Band Neutrophils 3 0 - 6 %    Lymphocytes % 11 (L) 12 - 49 %    Monocytes % 9 5 - 13 %    Eosinophils % 1 0 - 7 %    Basophils % 2 (H) 0 - 1 %    Metamyelocytes 1 (H) 0 %    Myelocytes 3 (H) 0 %    Immature Granulocytes % 0 %    Neutrophils Absolute 12.7 (H) 1.8 - 8.0 K/UL    Lymphocytes Absolute 1.9 0.8 - 3.5 K/UL    Monocytes Absolute 1.6 (H) 0.0 - 1.0 K/UL    Eosinophils Absolute 0.2 0.0 - 0.4 K/UL    Basophils Absolute 0.3 (H) 0.0 - 0.1 K/UL    Immature Granulocytes Absolute 0.0 K/UL    Differential Type Manual      RBC Comment Macrocytosis  1+       POCT Glucose    Collection Time: 06/15/24  6:14 AM   Result Value Ref Range    POC

## 2024-06-15 NOTE — PROGRESS NOTES
Covering for Dr. Pineda      POD #6     Chief Complaint: none         Subjective:  Ms. Maddie Goldberg is a 63 y.o. year old  female who had an anastomotic leak following right hemicolectomy performed by Dr. Rice, and reexploratory laparotomy with sigmoid colon resection and end ileostomy with mucous fistula creationby Dr. Pineda for sigmoid colon mass.  The pathology turned out to be sigmoid diverticulitis with phlegmon.    Today, she is feeling better.  No nausea or vomiting.       Review of Systems:   Constitutional:  no fever, no chills,  no sweats, No weakness, No fatigue, No decreased activity.  Respiratory: No shortness of breath, No cough, No sputum production, No hemoptysis, No wheezing, No cyanosis.  Cardiovascular: No chest pain, No palpitations, No bradycardia, No tachycardia, No peripheral edema, No syncope.  Gastrointestinal: No nausea, No vomiting, No diarrhea, No constipation, No heartburn, No abdominal pain.  Genitourinary: No dysuria, No hematuria, No change in urine stream, No urethral discharge, No lesions.  Hematology/Lymphatics: No bruising tendency, No bleeding tendency, No petechiae, No swollen lymph glands.  Endocrine: No excessive thirst, No polyuria, No cold intolerance, No heat intolerance, No excessive hunger.  Musculoskeletal: No back pain, No neck pain, No joint pain, No muscle pain, No claudication, No decreased range of motion, No trauma. Left shoulder pain.   Integumentary: No rash, No pruritus, No abrasions.  Neurologic: Alert and oriented X4, No abnormal balance, No headache, No confusion, No numbness, No tingling.  Psychiatric: No anxiety, No depression, No jigna.      Physical Exam:     Vitals & Measurements:    Wt Readings from Last 3 Encounters:   06/12/24 135 kg (297 lb 9.9 oz)     Temp Readings from Last 3 Encounters:   06/15/24 98.5 °F (36.9 °C) (Axillary)     BP Readings from Last 3 Encounters:   06/15/24 116/69     Pulse Readings from Last 3 Encounters:   06/15/24 81      Ht    Final Result   No acute cardiopulmonary abnormalities.         Electronically signed by SANFORD Gwyn      XR CHEST PORTABLE   Final Result   Mild pulmonary edema. Unchanged.      Electronically signed by Chapincito Espino      XR CHEST PORTABLE   Final Result   Satisfactory intubation and PICC line placement.      Electronically signed by GLSS      XR CHEST PORTABLE   Final Result   Satisfactory NG tube placement. No Acute Disease.         Electronically signed by GLSS      CT ABDOMEN PELVIS W IV CONTRAST Additional Contrast? None   Final Result      1. Moderate small bowel distention likely representing postoperative ileus.   2. Mild free intraperitoneal air likely postoperative change. Small amount of   focal air and fluid anterior to the surgical anastomosis may represent   postoperative change. However, small leak is not excluded.   3. Significant fecal retention in the remaining large bowel with focal   stricturing in the rectosigmoid colon.   4. Cholelithiasis.   5. Bibasilar atelectasis with 4 mm right middle lobe lung nodule.      Guidelines by the Fleischner society (Radiology 2017 special report) suggest   that patients with low risk for lung cancer and solid nodule(s) less than or   equal to 6 mm in diameter require no follow-up.  In patients with a higher risk,   such as smokers, follow-up noncontrast chest CT should be considered at 12   months.  Patients with a known malignancy are at increased risk for metastasis   and should receive a three month follow-up.               Electronically signed by Obi Lacy      CT ABDOMEN PELVIS W IV CONTRAST Additional Contrast? None   Final Result   Addendum (preliminary) 1 of 1   Addendum:  Correction: Dilatation of the loop in the lower pelvis is    actually   the cecum with the cecum located in the left lower quadrant and oriented   horizontally. The area with pneumatosis is the cecum. This is concerning    for   cecal bascule, rather than  small bowel obstruction. Maximal diameter 11    cm.   Dr. Sin has seen the patient.       Final   Small bowel obstruction secondary to an effusion or internal hernia.   Concern for early developing pneumatosis.      Incidental cholelithiasis.      This result was called by me at 1048 hours to Nikky RN      789        Assessment:  Problem List Items Addressed This Visit          Digestive    * (Principal) Cecal volvulus (HCC) - Primary    Relevant Orders    Culture, Urine (Completed)    Surgical Pathology (Completed)     Other Visit Diagnoses       Colon distention        Relevant Orders    Culture, Urine (Completed)    Surgical Pathology (Completed)    Hepatorenal syndrome due to a procedure        Relevant Orders    Culture, Fungus    Culture, Fungus    Culture, Wound (Completed)    Culture, Urine (Completed)    Surgical Pathology    Surgical Pathology    Surgical Pathology    Edema, unspecified type        Relevant Medications    lisinopril (PRINIVIL;ZESTRIL) 5 MG tablet    furosemide (LASIX) injection 20 mg (Completed)    enoxaparin Sodium (LOVENOX) injection 30 mg    furosemide (LASIX) injection 20 mg    Other Relevant Orders    Vascular duplex lower extremity venous bilateral (Completed)        Status post exploratory laparotomy with end ileostomy and sigmoid colectomy with mucous fistula, POD #6      Plan:     Admission: ICU   Diet: NPO except for ice chips, sips of ginger ale  IV fluids  SCD  IS  Pain medications  Nausea medication  Quiroga  NG to low continuous wall suction  Continue TPN.  Wound vac to mid abdominal incision site changed today   ID consultation.  Stat KUB  Plan discussed with patient and family and answered all their questions.      Thank you for allowing me to participate in the care of this patient.

## 2024-06-15 NOTE — PLAN OF CARE
PHYSICAL THERAPY TREATMENT     Patient: Maddie Goldberg (63 y.o. female)  Date: 6/15/2024  Diagnosis: Cecal volvulus (HCC) [K56.2]  Large bowel ischemia (HCC) [K55.9] Cecal volvulus (HCC)  Procedure(s) (LRB):  EXPLORATORY LAPAROTOMY WITH ILEOSTOMY AND WOUND VAC PLACEMENT (N/A) 6 Days Post-Op  Precautions: Surgical Protocols, NPO, Bed Alarm, General Precautions                      Recommendations for nursing mobility: Encourage HEP in prep for ADLs/mobility; see handout for details, Frequent repositioning to prevent skin breakdown, Assist x2, and Spinal precautions; no bending, lifting, or twisting    In place during session: Peripheral IV, PICC line, Nasal Cannula 2L, Quiroga Catheter, LIONEL drain 3#, Wound vac abdomen, EKG/telemetry , Pulse ox, and Nasogastric Tube  Chart, physical therapy assessment, plan of care and goals were reviewed.  ASSESSMENT  Patient continues with skilled PT services and is slowly progressing towards goals. Pt semi supine upon PT arrival, agreeable to session. Pt A&O x 4. (See below for objective details and assist levels).     Overall pt tolerated session fair today with supine therex and rolling, mobility limited due to pt requiring max A x2 for bed mobility. Participated in supine therex with cuing for proper form and full ROM. Educated on log rolling technique, demo'd x2 and continues to require max A for activity. Pt also educated on turning and performing mobility to reduce risk for pressure ulcers, demo's verbal understanding. Pt left R sideling with all needs in reach.  Will continue to benefit from skilled PT services, and will continue to progress as tolerated. Potential barriers for safe discharge: pts current support system is unable to meet their requirements for physical assistance, pt is not safe to be alone, and concern for pt safely navigating or managing the home environment. Current PT DC recommendation Inpatient Rehabilitation Facility  once medically  appropriate.    GOALS:    Problem: Physical Therapy - Adult  Goal: By Discharge: Performs mobility at highest level of function for planned discharge setting.  See evaluation for individualized goals.  Description: FUNCTIONAL STATUS PRIOR TO ADMISSION: Patient was independent and active without use of DME.    HOME SUPPORT PRIOR TO ADMISSION: The patient lived alone but did not require assistance.    Physical Therapy Goals  Initiated 6/6/2024  Reassess 6/13 after abd sx  Pt stated goal: To go home  Pt will be I with LE HEP in 7 days.  Pt will perform bed mobility with min A in 7 days.  Pt will perform transfers with min-mod A in 7 days.   Pt will amb 150 feet with LRAD safely with Hardy in 7 days.  Outcome: Progressing          PLAN :  Patient continues to benefit from skilled intervention to address functional impairments. Continue treatment per established plan of care to address goals.    Recommendation for discharge: (in order for the patient to meet his/her long term goals)  Inpatient Rehabilitation Facility     IF patient discharges home will need the following DME:continuing to assess with progress     SUBJECTIVE:   Patient stated “I'll do it.”    OBJECTIVE DATA SUMMARY:   Critical Behavior:  Orientation  Orientation Level: Oriented X4  Cognition  Arousal/Alertness: Appears intact  Following Commands: Follows one step commands consistently  Attention Span: Appears intact  Memory: Appears intact  Safety Judgement: Appears intact  Initiation: Requires cues for some  Sequencing: Requires cues for some    Functional Mobility Training:  Bed Mobility:  Bed Mobility Training  Rolling: Maximum assistance;Assist X1;Additional time  Transfers:     Balance:     Wheelchair Mobility:     Ambulation/Gait Training:                                                      Therapeutic Exercises:       EXERCISE   Sets   Reps   Active Active Assist   Passive Self ROM   Comments   Ankle Pumps 1 15 [x] [] [] []    Quad

## 2024-06-15 NOTE — PROGRESS NOTES
Infectious Disease Progress Note          Subjective:   Pt seen on follow up for Dr Butler. She is being followed by ID for sepsis due to UTI w isolation of C.Lusitaniae from Cx and post-operative intra-abdominal infection. She is afebrile, off pressor support and leukocytosis trending down on todays labs. GNR was isolated from abdominal wound Cx, abdominal fluid drainage Cx from  is neg. Abdominal wound Swabs for fungus collected on  are pending. Pt was alert and following commands during my assessment, denied acute complaints.   Objective:   Physical Exam:     /63   Pulse 88   Temp 97.9 °F (36.6 °C) (Axillary)   Resp 18   Ht 1.727 m (5' 8\")   Wt 135 kg (297 lb 9.9 oz)   SpO2 99%   BMI 45.25 kg/m²  O2 Flow Rate (L/min): 1 L/min O2 Device: Nasal cannula    Temp (24hrs), Av.3 °F (36.8 °C), Min:97.6 °F (36.4 °C), Max:98.9 °F (37.2 °C)    06/15 0701 - 06/15 1900  In: -   Out: 950 [Urine:950]   1901 - 06/15 0700  In: 8113.5 [I.V.:1750]  Out: 7305 [Urine:5950; Drains:800]    General: NAD, AAO x 4, slows to respond   HEENT: GARRISON, Moist mucosa, +NGT   Lungs: decreased at the bases, no wheeze/rhonchi   Heart: S1S2+, RRR, no murmur  Abdo: Soft, RLQ ileostomy, wound Vac in place, L mucous fistula, LIONEL drains in place   : indwelling echavarria cath   Exts: trace edema, + pulses b/l   Skin: No obvious pressure ulcers, sacrum not examined     Data Review:       Recent Days:    Recent Labs     06/14/24  0221 06/15/24  0214   WBC 19.3* 17.4*   HGB 9.5* 9.3*   HCT 29.3* 29.6*   * 418*     Recent Labs     24  1743 24  0221 06/15/24  0214   BUN 19 18 19   CREATININE 0.44* 0.43* 0.48*       Lab Results   Component Value Date/Time    CRP 23.00 06/15/2024 02:14 AM          Microbiology     Results       Procedure Component Value Units Date/Time    Culture, Body Fluid [[id number]] Collected: 24    Order Status: Completed Specimen: Body Fluid Updated: 06/15/24  0709     Special Requests No Special Requests        Gram Stain 1+ WBCs seen         No definite organism seen        Culture No growth thus far       Culture, Wound [[id number]] Collected: 06/08/24 1530    Order Status: Canceled Specimen: Abdomen     Culture, Fungus [[id number]] Collected: 06/08/24 1528    Order Status: Sent Specimen: Swab from Abdomen Updated: 06/09/24 0416    Culture, Wound [[id number]] Collected: 06/08/24 1528    Order Status: Canceled Specimen: Swab from Abdomen     Culture, Fungus [[id number]] Collected: 06/08/24 1525    Order Status: Sent Specimen: Swab from Abdomen Updated: 06/09/24 0417    Culture, Wound [[id number]]  (Abnormal) Collected: 06/08/24 1525    Order Status: Completed Specimen: Swab from Abdomen Updated: 06/11/24 1310     Special Requests No Special Requests        Gram Stain 1+ WBCs seen         1+ Gram Negative Rods        Culture       Heavy BMIX Anaerobic gram negative rods          Culture, Wound [[id number]]  (Abnormal) Collected: 06/08/24 1524    Order Status: Completed Specimen: Wound Updated: 06/11/24 1309     Special Requests No Special Requests        Gram Stain 2+ WBCs seen         2+ GRAM VARIABLE RODS        Culture       Heavy >2 organisms - likely contaminated specimen. Anaerobic gram negative rods          Culture, Urine [[id number]] Collected: 06/08/24 1515    Order Status: Canceled Specimen: Urine     Culture, Urine [[id number]]  (Abnormal) Collected: 06/08/24 1454    Order Status: Completed Specimen: Urine, indwelling catheter Updated: 06/12/24 0928     Special Requests No Special Requests        [place] count 50,000        [place]am count colonies/ml        Culture Candida lusitaniae       Culture, Urine [[id number]] Collected: 06/02/24 1550    Order Status: Completed Specimen: Urine, indwelling catheter Updated: 06/03/24 1847     Special Requests No Special Requests        Culture No Growth (<1000 cfu/mL)       Blood Culture 2 [[id number]] Collected: 06/02/24  lymphadenectomy      Abdominal wash out was performed on 06/09 w creation of terminal ileostomy and left mucous fistula using descending colon and application of wound Vac     4. SIRS/sepsis due to above, afebrile, leukocytosis trending down on serial labs       CRP 23.0 from 22.10, and Procal 0.11 from 0.14. Afebrile, off pressor support        Continue antimicrobials as above. Repeat Abdominal imaging if febrile or rising WBC     5. Abdominal pain due to above, subjectively better, continue symptomatic mgt          Dalia Mclean MD    6/15/2024

## 2024-06-16 ENCOUNTER — APPOINTMENT (OUTPATIENT)
Facility: HOSPITAL | Age: 64
DRG: 329 | End: 2024-06-16
Payer: COMMERCIAL

## 2024-06-16 LAB
ALBUMIN SERPL-MCNC: 1.3 G/DL (ref 3.5–5)
ALBUMIN SERPL-MCNC: 1.4 G/DL (ref 3.5–5)
ALBUMIN/GLOB SERPL: 0.4 (ref 1.1–2.2)
ALBUMIN/GLOB SERPL: 0.4 (ref 1.1–2.2)
ALP SERPL-CCNC: 90 U/L (ref 45–117)
ALP SERPL-CCNC: 97 U/L (ref 45–117)
ALT SERPL-CCNC: 14 U/L (ref 12–78)
ALT SERPL-CCNC: 16 U/L (ref 12–78)
ANION GAP SERPL CALC-SCNC: 2 MMOL/L (ref 5–15)
ANION GAP SERPL CALC-SCNC: 2 MMOL/L (ref 5–15)
AST SERPL W P-5'-P-CCNC: 18 U/L (ref 15–37)
AST SERPL W P-5'-P-CCNC: 19 U/L (ref 15–37)
BILIRUB SERPL-MCNC: 0.2 MG/DL (ref 0.2–1)
BILIRUB SERPL-MCNC: 0.3 MG/DL (ref 0.2–1)
BUN SERPL-MCNC: 19 MG/DL (ref 6–20)
BUN SERPL-MCNC: 20 MG/DL (ref 6–20)
BUN/CREAT SERPL: 28 (ref 12–20)
BUN/CREAT SERPL: 39 (ref 12–20)
CA-I BLD-MCNC: 7.8 MG/DL (ref 8.5–10.1)
CA-I BLD-MCNC: 7.8 MG/DL (ref 8.5–10.1)
CHLORIDE SERPL-SCNC: 103 MMOL/L (ref 97–108)
CHLORIDE SERPL-SCNC: 96 MMOL/L (ref 97–108)
CO2 SERPL-SCNC: 29 MMOL/L (ref 21–32)
CO2 SERPL-SCNC: 32 MMOL/L (ref 21–32)
CREAT SERPL-MCNC: 0.51 MG/DL (ref 0.55–1.02)
CREAT SERPL-MCNC: 0.67 MG/DL (ref 0.55–1.02)
CRP SERPL-MCNC: 15.9 MG/DL (ref 0–0.3)
ERYTHROCYTE [DISTWIDTH] IN BLOOD BY AUTOMATED COUNT: 13.6 % (ref 11.5–14.5)
GLOBULIN SER CALC-MCNC: 3.6 G/DL (ref 2–4)
GLOBULIN SER CALC-MCNC: 3.8 G/DL (ref 2–4)
GLUCOSE BLD STRIP.AUTO-MCNC: 109 MG/DL (ref 65–100)
GLUCOSE BLD STRIP.AUTO-MCNC: 112 MG/DL (ref 65–100)
GLUCOSE BLD STRIP.AUTO-MCNC: 92 MG/DL (ref 65–100)
GLUCOSE BLD STRIP.AUTO-MCNC: 96 MG/DL (ref 65–100)
GLUCOSE SERPL-MCNC: 111 MG/DL (ref 65–100)
GLUCOSE SERPL-MCNC: 681 MG/DL (ref 65–100)
HCT VFR BLD AUTO: 28.1 % (ref 35–47)
HGB BLD-MCNC: 9 G/DL (ref 11.5–16)
MAGNESIUM SERPL-MCNC: 2.2 MG/DL (ref 1.6–2.4)
MCH RBC QN AUTO: 33.8 PG (ref 26–34)
MCHC RBC AUTO-ENTMCNC: 32 G/DL (ref 30–36.5)
MCV RBC AUTO: 105.6 FL (ref 80–99)
NRBC # BLD: 0 K/UL (ref 0–0.01)
NRBC BLD-RTO: 0 PER 100 WBC
PERFORMED BY:: ABNORMAL
PERFORMED BY:: ABNORMAL
PERFORMED BY:: NORMAL
PERFORMED BY:: NORMAL
PHOSPHATE SERPL-MCNC: 5.4 MG/DL (ref 2.6–4.7)
PLATELET # BLD AUTO: 455 K/UL (ref 150–400)
PMV BLD AUTO: 10.1 FL (ref 8.9–12.9)
POTASSIUM SERPL-SCNC: 3.5 MMOL/L (ref 3.5–5.1)
POTASSIUM SERPL-SCNC: 5 MMOL/L (ref 3.5–5.1)
PROCALCITONIN SERPL-MCNC: 0.09 NG/ML
PROT SERPL-MCNC: 4.9 G/DL (ref 6.4–8.2)
PROT SERPL-MCNC: 5.2 G/DL (ref 6.4–8.2)
RBC # BLD AUTO: 2.66 M/UL (ref 3.8–5.2)
SODIUM SERPL-SCNC: 127 MMOL/L (ref 136–145)
SODIUM SERPL-SCNC: 137 MMOL/L (ref 136–145)
WBC # BLD AUTO: 15.9 K/UL (ref 3.6–11)

## 2024-06-16 PROCEDURE — 6370000000 HC RX 637 (ALT 250 FOR IP): Performed by: SURGERY

## 2024-06-16 PROCEDURE — 6360000002 HC RX W HCPCS: Performed by: SURGERY

## 2024-06-16 PROCEDURE — 86140 C-REACTIVE PROTEIN: CPT

## 2024-06-16 PROCEDURE — 84145 PROCALCITONIN (PCT): CPT

## 2024-06-16 PROCEDURE — 6360000002 HC RX W HCPCS: Performed by: INTERNAL MEDICINE

## 2024-06-16 PROCEDURE — 80053 COMPREHEN METABOLIC PANEL: CPT

## 2024-06-16 PROCEDURE — 2500000003 HC RX 250 WO HCPCS: Performed by: SURGERY

## 2024-06-16 PROCEDURE — 1100000000 HC RM PRIVATE

## 2024-06-16 PROCEDURE — 83735 ASSAY OF MAGNESIUM: CPT

## 2024-06-16 PROCEDURE — 2580000003 HC RX 258: Performed by: SURGERY

## 2024-06-16 PROCEDURE — 94761 N-INVAS EAR/PLS OXIMETRY MLT: CPT

## 2024-06-16 PROCEDURE — 36415 COLL VENOUS BLD VENIPUNCTURE: CPT

## 2024-06-16 PROCEDURE — 99232 SBSQ HOSP IP/OBS MODERATE 35: CPT | Performed by: INTERNAL MEDICINE

## 2024-06-16 PROCEDURE — 82962 GLUCOSE BLOOD TEST: CPT

## 2024-06-16 PROCEDURE — 2580000003 HC RX 258: Performed by: INTERNAL MEDICINE

## 2024-06-16 PROCEDURE — 85027 COMPLETE CBC AUTOMATED: CPT

## 2024-06-16 PROCEDURE — 84100 ASSAY OF PHOSPHORUS: CPT

## 2024-06-16 PROCEDURE — 97606 NEG PRS WND THER DME>50 SQCM: CPT

## 2024-06-16 RX ADMIN — HYDROMORPHONE HYDROCHLORIDE 1 MG: 1 INJECTION, SOLUTION INTRAMUSCULAR; INTRAVENOUS; SUBCUTANEOUS at 12:27

## 2024-06-16 RX ADMIN — HYDROMORPHONE HYDROCHLORIDE 1 MG: 1 INJECTION, SOLUTION INTRAMUSCULAR; INTRAVENOUS; SUBCUTANEOUS at 00:14

## 2024-06-16 RX ADMIN — SODIUM CHLORIDE, PRESERVATIVE FREE 10 ML: 5 INJECTION INTRAVENOUS at 20:55

## 2024-06-16 RX ADMIN — HYDROMORPHONE HYDROCHLORIDE 1 MG: 1 INJECTION, SOLUTION INTRAMUSCULAR; INTRAVENOUS; SUBCUTANEOUS at 03:15

## 2024-06-16 RX ADMIN — ONDANSETRON 4 MG: 2 INJECTION INTRAMUSCULAR; INTRAVENOUS at 03:15

## 2024-06-16 RX ADMIN — ENOXAPARIN SODIUM 30 MG: 100 INJECTION SUBCUTANEOUS at 20:55

## 2024-06-16 RX ADMIN — SODIUM CHLORIDE, PRESERVATIVE FREE 20 MG: 5 INJECTION INTRAVENOUS at 20:54

## 2024-06-16 RX ADMIN — HYDROMORPHONE HYDROCHLORIDE 1 MG: 1 INJECTION, SOLUTION INTRAMUSCULAR; INTRAVENOUS; SUBCUTANEOUS at 18:04

## 2024-06-16 RX ADMIN — HYDROMORPHONE HYDROCHLORIDE 1 MG: 1 INJECTION, SOLUTION INTRAMUSCULAR; INTRAVENOUS; SUBCUTANEOUS at 06:35

## 2024-06-16 RX ADMIN — HYDROMORPHONE HYDROCHLORIDE 1 MG: 1 INJECTION, SOLUTION INTRAMUSCULAR; INTRAVENOUS; SUBCUTANEOUS at 20:55

## 2024-06-16 RX ADMIN — PIPERACILLIN AND TAZOBACTAM 4500 MG: 4; .5 INJECTION, POWDER, LYOPHILIZED, FOR SOLUTION INTRAVENOUS at 12:27

## 2024-06-16 RX ADMIN — PIPERACILLIN AND TAZOBACTAM 4500 MG: 4; .5 INJECTION, POWDER, LYOPHILIZED, FOR SOLUTION INTRAVENOUS at 03:15

## 2024-06-16 RX ADMIN — HYDROMORPHONE HYDROCHLORIDE 1 MG: 1 INJECTION, SOLUTION INTRAMUSCULAR; INTRAVENOUS; SUBCUTANEOUS at 09:30

## 2024-06-16 RX ADMIN — PIPERACILLIN AND TAZOBACTAM 4500 MG: 4; .5 INJECTION, POWDER, LYOPHILIZED, FOR SOLUTION INTRAVENOUS at 20:54

## 2024-06-16 RX ADMIN — LEVOTHYROXINE SODIUM 150 MCG: 0.07 TABLET ORAL at 06:35

## 2024-06-16 RX ADMIN — SODIUM CHLORIDE, PRESERVATIVE FREE 10 ML: 5 INJECTION INTRAVENOUS at 08:13

## 2024-06-16 RX ADMIN — ASCORBIC ACID, VITAMIN A PALMITATE, CHOLECALCIFEROL, THIAMINE HYDROCHLORIDE, RIBOFLAVIN-5 PHOSPHATE SODIUM, PYRIDOXINE HYDROCHLORIDE, NIACINAMIDE, DEXPANTHENOL, ALPHA-TOCOPHEROL ACETATE, VITAMIN K1, FOLIC ACID, BIOTIN, CYANOCOBALAMIN: 200; 3300; 200; 6; 3.6; 6; 40; 15; 10; 150; 600; 60; 5 INJECTION, SOLUTION INTRAVENOUS at 23:00

## 2024-06-16 RX ADMIN — FUROSEMIDE 20 MG: 10 INJECTION, SOLUTION INTRAMUSCULAR; INTRAVENOUS at 18:04

## 2024-06-16 RX ADMIN — FLUCONAZOLE 400 MG: 2 INJECTION, SOLUTION INTRAVENOUS at 08:30

## 2024-06-16 RX ADMIN — SODIUM CHLORIDE, PRESERVATIVE FREE 20 MG: 5 INJECTION INTRAVENOUS at 08:11

## 2024-06-16 RX ADMIN — FUROSEMIDE 20 MG: 10 INJECTION, SOLUTION INTRAMUSCULAR; INTRAVENOUS at 08:12

## 2024-06-16 RX ADMIN — ENOXAPARIN SODIUM 30 MG: 100 INJECTION SUBCUTANEOUS at 08:30

## 2024-06-16 ASSESSMENT — PAIN SCALES - GENERAL
PAINLEVEL_OUTOF10: 7
PAINLEVEL_OUTOF10: 5
PAINLEVEL_OUTOF10: 8
PAINLEVEL_OUTOF10: 5
PAINLEVEL_OUTOF10: 8

## 2024-06-16 ASSESSMENT — PAIN DESCRIPTION - LOCATION
LOCATION: ABDOMEN;INCISION

## 2024-06-16 NOTE — PROGRESS NOTES
Infectious Disease Progress Note          Subjective:   Pt seen examined at bedside, doing well, alert and following commands.  Remains afebrile, on parenteral nutrition, off pressor support, and on RA.  WBC, CRP and Pro-Maverick are all trending down on today's labs. Lab results were discussed in details w fmly member at bedside.   Objective:   Physical Exam:     BP 97/62   Pulse 67   Temp 98.1 °F (36.7 °C) (Oral)   Resp 14   Ht 1.727 m (5' 8\")   Wt 127.1 kg (280 lb 3.3 oz)   SpO2 97%   BMI 42.60 kg/m²  O2 Flow Rate (L/min): 1 L/min O2 Device: None (Room air)    Temp (24hrs), Av.1 °F (36.7 °C), Min:97.7 °F (36.5 °C), Max:98.3 °F (36.8 °C)     07 -  190  In: 1120 [I.V.:300]  Out: 1475 [Urine:1425; Drains:50]   1901 -  0700  In: 5025.6 [I.V.:1650]  Out: 5140 [Urine:3675; Drains:740]    General: NAD, alert and following commands appropriately  HEENT: GARRISON, Moist mucosa, +NGT   Lungs: decreased at the bases, no wheeze/rhonchi   Heart: S1S2+, RRR, no murmur  Abdo: Soft, RLQ ileostomy, wound Vac in place, L mucous fistula, 2 LIONEL drains on right and 1 on left all with serosanguineous drainage  : indwelling echavarria cath   Exts: trace edema, + pulses b/l, right arm PICC line   Skin: No obvious pressure ulcers    Data Review:       Recent Days:    Recent Labs     24  0221 06/15/24  0214 24  0338   WBC 19.3* 17.4* 15.9*   HGB 9.5* 9.3* 9.0*   HCT 29.3* 29.6* 28.1*   * 418* 455*       Recent Labs     06/15/24  0214 24  0338 24  0800   BUN 19 19 20   CREATININE 0.48* 0.67 0.51*         Lab Results   Component Value Date/Time    CRP 15.90 2024 03:38 AM          Microbiology     Results       Procedure Component Value Units Date/Time    Culture, Body Fluid [[id number]] Collected: 24 222    Order Status: Completed Specimen: Body Fluid Updated: 06/15/24 0709     Special Requests No Special Requests        Gram Stain 1+ WBCs seen         No

## 2024-06-16 NOTE — PROGRESS NOTES
Pulmonology and Critical Care Progress Note    Subjective:     Chief Complaint:   Chief Complaint   Patient presents with    Abdominal Pain    Emesis    Diarrhea        Patient seen and examined in her room in the ICU this afternoon, no acute events overnight.  Case discussed in detail with the respiratory therapist and the bedside nursing staff, saturating well on room air.  Appears much less sick today, feeling much better.  Net -1.3 L yesterday with Lasix 20 mg IV twice daily.  Continue on TPN and IV Diflucan/Zosyn.  Discontinue lactated Ringer's.  Sodium level improved to 137, white blood cell count down to 15.9 from 17.4.  Per the family at the bedside, the general surgery team will be repeating her CT of the abdomen/pelvis sometime within the next 24 hours.    Timing of transfer out of the ICU per general surgery team                Current Facility-Administered Medications   Medication Dose Route Frequency Provider Last Rate Last Admin    PN-Adult Premix 8/14 - Standard Electrolytes - Central Line   IntraVENous Continuous TPN Javi Lopez MD 62 mL/hr at 06/16/24 0800 Rate Verify at 06/16/24 0800    piperacillin-tazobactam (ZOSYN) 4,500 mg in sodium chloride 0.9 % 100 mL IVPB (mini-bag)  4,500 mg IntraVENous Q8H Antonio Butler MD 25 mL/hr at 06/16/24 1227 4,500 mg at 06/16/24 1227    HYDROmorphone HCl PF (DILAUDID) injection 1 mg  1 mg IntraVENous Q3H PRN Javi Lopez MD   1 mg at 06/16/24 1227    HYDROmorphone HCl PF (DILAUDID) injection 0.5 mg  0.5 mg IntraVENous Q3H PRN Javi Lopez MD        lidocaine 4 % external patch 1 patch  1 patch TransDERmal Daily Javi Lopez MD   1 patch at 06/16/24 0900    potassium chloride (KLOR-CON M) extended release tablet 40 mEq  40 mEq Oral PRN Javi Lopez MD        Or    potassium bicarb-citric acid (EFFER-K) effervescent tablet 40 mEq  40 mEq Oral PRN Javi Lopez MD        Or    potassium chloride 10  L5-S1 with  vacuum phenomena.  ABDOMINAL WALL: No mass or hernia.  ADDITIONAL COMMENTS: N/A     IMPRESSION:  Small bowel obstruction secondary to an effusion or internal hernia.  Concern for early developing pneumatosis.        CT abdomen/pelvis (6/6/2024):  FINDINGS:      LIVER: No abnormal mass. Mild biliary ductal prominence is again seen.  GALLBLADDER: Large calcified stones  SPLEEN: Unremarkable  PANCREAS: No mass or ductal dilatation.  ADRENALS: Unremarkable.  KIDNEYS/URETERS: No abnormal enhancing mass or hydronephrosis.  PERITONEUM: Mild to moderate free intraperitoneal air. No abdominal  lymphadenopathy. Left anterior abdominal surgical drain. There is small focal  air and fluid anterior to the surgical anastomosis (201-40).  COLON: Sequelae of right hemicolectomy. Significant fecal retention in the  remaining large bowel with focal stricturing in the rectosigmoid colon.  APPENDIX: Not visualized  SMALL BOWEL: Moderate small bowel dilatation likely ileus.  STOMACH: Moderately distended  PELVIS: No pelvic lymphadenopathy.  BONES: Severe degenerative changes at L4-5.  VISUALIZED THORAX: Bibasilar atelectasis. 4 mm right middle lobe lung nodule.  ADDITIONAL COMMENTS: N/A     IMPRESSION:     1. Moderate small bowel distention likely representing postoperative ileus.  2. Mild free intraperitoneal air likely postoperative change. Small amount of  focal air and fluid anterior to the surgical anastomosis may represent  postoperative change. However, small leak is not excluded.  3. Significant fecal retention in the remaining large bowel with focal  stricturing in the rectosigmoid colon.  4. Cholelithiasis.  5. Bibasilar atelectasis with 4 mm right middle lobe lung nodule.      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

## 2024-06-16 NOTE — PLAN OF CARE
Problem: Safety - Adult  Goal: Free from fall injury  Outcome: Progressing     Problem: Skin/Tissue Integrity  Goal: Absence of new skin breakdown  Description: 1.  Monitor for areas of redness and/or skin breakdown  2.  Assess vascular access sites hourly  3.  Every 4-6 hours minimum:  Change oxygen saturation probe site  4.  Every 4-6 hours:  If on nasal continuous positive airway pressure, respiratory therapy assess nares and determine need for appliance change or resting period.  Outcome: Progressing     Problem: Coping  Goal: Pt/Family able to verbalize concerns and demonstrate effective coping strategies  Description: INTERVENTIONS:  1. Assist patient/family to identify coping skills, available support systems and cultural and spiritual values  2. Provide emotional support, including active listening and acknowledgement of concerns of patient and caregivers  3. Reduce environmental stimuli, as able  4. Instruct patient/family in relaxation techniques, as appropriate  5. Assess for spiritual pain/suffering and initiate Spiritual Care, Psychosocial Clinical Specialist consults as needed  Outcome: Progressing     Problem: Respiratory - Adult  Goal: Achieves optimal ventilation and oxygenation  Outcome: Progressing

## 2024-06-17 ENCOUNTER — APPOINTMENT (OUTPATIENT)
Facility: HOSPITAL | Age: 64
DRG: 329 | End: 2024-06-17
Payer: COMMERCIAL

## 2024-06-17 LAB
ALBUMIN SERPL-MCNC: 1.5 G/DL (ref 3.5–5)
ALBUMIN SERPL-MCNC: 1.5 G/DL (ref 3.5–5)
ALBUMIN/GLOB SERPL: 0.3 (ref 1.1–2.2)
ALP SERPL-CCNC: 137 U/L (ref 45–117)
ALT SERPL-CCNC: 21 U/L (ref 12–78)
ANION GAP SERPL CALC-SCNC: 5 MMOL/L (ref 5–15)
AST SERPL W P-5'-P-CCNC: 22 U/L (ref 15–37)
BILIRUB DIRECT SERPL-MCNC: 0.2 MG/DL (ref 0–0.2)
BILIRUB SERPL-MCNC: 0.3 MG/DL (ref 0.2–1)
BUN SERPL-MCNC: 21 MG/DL (ref 6–20)
BUN/CREAT SERPL: 40 (ref 12–20)
CA-I BLD-MCNC: 8.2 MG/DL (ref 8.5–10.1)
CHLORIDE SERPL-SCNC: 100 MMOL/L (ref 97–108)
CO2 SERPL-SCNC: 31 MMOL/L (ref 21–32)
CREAT SERPL-MCNC: 0.53 MG/DL (ref 0.55–1.02)
CRP SERPL-MCNC: 16.7 MG/DL (ref 0–0.3)
ERYTHROCYTE [DISTWIDTH] IN BLOOD BY AUTOMATED COUNT: 13.7 % (ref 11.5–14.5)
GLOBULIN SER CALC-MCNC: 4.5 G/DL (ref 2–4)
GLUCOSE BLD STRIP.AUTO-MCNC: 105 MG/DL (ref 65–100)
GLUCOSE BLD STRIP.AUTO-MCNC: 116 MG/DL (ref 65–100)
GLUCOSE BLD STRIP.AUTO-MCNC: 122 MG/DL (ref 65–100)
GLUCOSE SERPL-MCNC: 91 MG/DL (ref 65–100)
HCT VFR BLD AUTO: 30.6 % (ref 35–47)
HGB BLD-MCNC: 10.1 G/DL (ref 11.5–16)
MAGNESIUM SERPL-MCNC: 2.1 MG/DL (ref 1.6–2.4)
MCH RBC QN AUTO: 33.8 PG (ref 26–34)
MCHC RBC AUTO-ENTMCNC: 33 G/DL (ref 30–36.5)
MCV RBC AUTO: 102.3 FL (ref 80–99)
NRBC # BLD: 0 K/UL (ref 0–0.01)
NRBC BLD-RTO: 0 PER 100 WBC
PERFORMED BY:: ABNORMAL
PHOSPHATE SERPL-MCNC: 3.1 MG/DL (ref 2.6–4.7)
PLATELET # BLD AUTO: 645 K/UL (ref 150–400)
PMV BLD AUTO: 10.1 FL (ref 8.9–12.9)
POTASSIUM SERPL-SCNC: 3.4 MMOL/L (ref 3.5–5.1)
PROCALCITONIN SERPL-MCNC: 0.08 NG/ML
PROT SERPL-MCNC: 6 G/DL (ref 6.4–8.2)
RBC # BLD AUTO: 2.99 M/UL (ref 3.8–5.2)
SODIUM SERPL-SCNC: 136 MMOL/L (ref 136–145)
WBC # BLD AUTO: 17.6 K/UL (ref 3.6–11)

## 2024-06-17 PROCEDURE — 2500000003 HC RX 250 WO HCPCS: Performed by: SURGERY

## 2024-06-17 PROCEDURE — 86140 C-REACTIVE PROTEIN: CPT

## 2024-06-17 PROCEDURE — 1100000000 HC RM PRIVATE

## 2024-06-17 PROCEDURE — 80076 HEPATIC FUNCTION PANEL: CPT

## 2024-06-17 PROCEDURE — 84145 PROCALCITONIN (PCT): CPT

## 2024-06-17 PROCEDURE — 6370000000 HC RX 637 (ALT 250 FOR IP): Performed by: SURGERY

## 2024-06-17 PROCEDURE — 80069 RENAL FUNCTION PANEL: CPT

## 2024-06-17 PROCEDURE — 2580000003 HC RX 258: Performed by: SURGERY

## 2024-06-17 PROCEDURE — 6360000004 HC RX CONTRAST MEDICATION: Performed by: INTERNAL MEDICINE

## 2024-06-17 PROCEDURE — 36415 COLL VENOUS BLD VENIPUNCTURE: CPT

## 2024-06-17 PROCEDURE — 97530 THERAPEUTIC ACTIVITIES: CPT

## 2024-06-17 PROCEDURE — 6360000002 HC RX W HCPCS: Performed by: INTERNAL MEDICINE

## 2024-06-17 PROCEDURE — 6360000002 HC RX W HCPCS: Performed by: SURGERY

## 2024-06-17 PROCEDURE — 94761 N-INVAS EAR/PLS OXIMETRY MLT: CPT

## 2024-06-17 PROCEDURE — 6360000004 HC RX CONTRAST MEDICATION: Performed by: SURGERY

## 2024-06-17 PROCEDURE — 83735 ASSAY OF MAGNESIUM: CPT

## 2024-06-17 PROCEDURE — 2580000003 HC RX 258: Performed by: INTERNAL MEDICINE

## 2024-06-17 PROCEDURE — 97165 OT EVAL LOW COMPLEX 30 MIN: CPT

## 2024-06-17 PROCEDURE — 74177 CT ABD & PELVIS W/CONTRAST: CPT

## 2024-06-17 PROCEDURE — 99024 POSTOP FOLLOW-UP VISIT: CPT | Performed by: SURGERY

## 2024-06-17 PROCEDURE — 99232 SBSQ HOSP IP/OBS MODERATE 35: CPT | Performed by: INTERNAL MEDICINE

## 2024-06-17 PROCEDURE — 85027 COMPLETE CBC AUTOMATED: CPT

## 2024-06-17 PROCEDURE — 82962 GLUCOSE BLOOD TEST: CPT

## 2024-06-17 RX ORDER — HYDROMORPHONE HYDROCHLORIDE 1 MG/ML
1 INJECTION, SOLUTION INTRAMUSCULAR; INTRAVENOUS; SUBCUTANEOUS
Status: DISCONTINUED | OUTPATIENT
Start: 2024-06-17 | End: 2024-06-19

## 2024-06-17 RX ORDER — HYDROMORPHONE HYDROCHLORIDE 1 MG/ML
0.5 INJECTION, SOLUTION INTRAMUSCULAR; INTRAVENOUS; SUBCUTANEOUS
Status: DISCONTINUED | OUTPATIENT
Start: 2024-06-17 | End: 2024-06-19

## 2024-06-17 RX ADMIN — DIATRIZOATE MEGLUMINE AND DIATRIZOATE SODIUM 30 ML: 660; 100 LIQUID ORAL; RECTAL at 10:13

## 2024-06-17 RX ADMIN — POTASSIUM BICARBONATE 40 MEQ: 782 TABLET, EFFERVESCENT ORAL at 17:27

## 2024-06-17 RX ADMIN — IOPAMIDOL 100 ML: 755 INJECTION, SOLUTION INTRAVENOUS at 15:12

## 2024-06-17 RX ADMIN — HYDROMORPHONE HYDROCHLORIDE 1 MG: 1 INJECTION, SOLUTION INTRAMUSCULAR; INTRAVENOUS; SUBCUTANEOUS at 14:16

## 2024-06-17 RX ADMIN — HYDROMORPHONE HYDROCHLORIDE 1 MG: 1 INJECTION, SOLUTION INTRAMUSCULAR; INTRAVENOUS; SUBCUTANEOUS at 10:25

## 2024-06-17 RX ADMIN — HYDROMORPHONE HYDROCHLORIDE 1 MG: 1 INJECTION, SOLUTION INTRAMUSCULAR; INTRAVENOUS; SUBCUTANEOUS at 02:09

## 2024-06-17 RX ADMIN — FLUCONAZOLE 400 MG: 2 INJECTION, SOLUTION INTRAVENOUS at 10:13

## 2024-06-17 RX ADMIN — SODIUM CHLORIDE, PRESERVATIVE FREE 10 ML: 5 INJECTION INTRAVENOUS at 20:54

## 2024-06-17 RX ADMIN — ASCORBIC ACID, VITAMIN A PALMITATE, CHOLECALCIFEROL, THIAMINE HYDROCHLORIDE, RIBOFLAVIN-5 PHOSPHATE SODIUM, PYRIDOXINE HYDROCHLORIDE, NIACINAMIDE, DEXPANTHENOL, ALPHA-TOCOPHEROL ACETATE, VITAMIN K1, FOLIC ACID, BIOTIN, CYANOCOBALAMIN: 200; 3300; 200; 6; 3.6; 6; 40; 15; 10; 150; 600; 60; 5 INJECTION, SOLUTION INTRAVENOUS at 21:03

## 2024-06-17 RX ADMIN — HYDROMORPHONE HYDROCHLORIDE 1 MG: 1 INJECTION, SOLUTION INTRAMUSCULAR; INTRAVENOUS; SUBCUTANEOUS at 17:30

## 2024-06-17 RX ADMIN — SMOFLIPID 500 ML: 6; 6; 5; 3 INJECTION, EMULSION INTRAVENOUS at 23:00

## 2024-06-17 RX ADMIN — ENOXAPARIN SODIUM 30 MG: 100 INJECTION SUBCUTANEOUS at 20:49

## 2024-06-17 RX ADMIN — PIPERACILLIN AND TAZOBACTAM 4500 MG: 4; .5 INJECTION, POWDER, LYOPHILIZED, FOR SOLUTION INTRAVENOUS at 04:30

## 2024-06-17 RX ADMIN — HYDROMORPHONE HYDROCHLORIDE 1 MG: 1 INJECTION, SOLUTION INTRAMUSCULAR; INTRAVENOUS; SUBCUTANEOUS at 05:01

## 2024-06-17 RX ADMIN — FAMOTIDINE 20 MG: 20 TABLET, FILM COATED ORAL at 20:50

## 2024-06-17 RX ADMIN — SODIUM CHLORIDE, PRESERVATIVE FREE 10 ML: 5 INJECTION INTRAVENOUS at 10:14

## 2024-06-17 RX ADMIN — HYDROMORPHONE HYDROCHLORIDE 1 MG: 1 INJECTION, SOLUTION INTRAMUSCULAR; INTRAVENOUS; SUBCUTANEOUS at 20:50

## 2024-06-17 RX ADMIN — PIPERACILLIN AND TAZOBACTAM 4500 MG: 4; .5 INJECTION, POWDER, LYOPHILIZED, FOR SOLUTION INTRAVENOUS at 13:05

## 2024-06-17 RX ADMIN — PIPERACILLIN AND TAZOBACTAM 4500 MG: 4; .5 INJECTION, POWDER, LYOPHILIZED, FOR SOLUTION INTRAVENOUS at 20:52

## 2024-06-17 RX ADMIN — FUROSEMIDE 20 MG: 10 INJECTION, SOLUTION INTRAMUSCULAR; INTRAVENOUS at 17:28

## 2024-06-17 RX ADMIN — ENOXAPARIN SODIUM 30 MG: 100 INJECTION SUBCUTANEOUS at 10:13

## 2024-06-17 RX ADMIN — SODIUM CHLORIDE, PRESERVATIVE FREE 20 MG: 5 INJECTION INTRAVENOUS at 10:15

## 2024-06-17 RX ADMIN — FUROSEMIDE 20 MG: 10 INJECTION, SOLUTION INTRAMUSCULAR; INTRAVENOUS at 10:14

## 2024-06-17 ASSESSMENT — PAIN SCALES - GENERAL
PAINLEVEL_OUTOF10: 8
PAINLEVEL_OUTOF10: 5
PAINLEVEL_OUTOF10: 8
PAINLEVEL_OUTOF10: 10
PAINLEVEL_OUTOF10: 7
PAINLEVEL_OUTOF10: 3
PAINLEVEL_OUTOF10: 3
PAINLEVEL_OUTOF10: 0
PAINLEVEL_OUTOF10: 0

## 2024-06-17 ASSESSMENT — PAIN DESCRIPTION - DESCRIPTORS
DESCRIPTORS: ACHING

## 2024-06-17 ASSESSMENT — PAIN DESCRIPTION - LOCATION
LOCATION: ABDOMEN

## 2024-06-17 ASSESSMENT — PAIN - FUNCTIONAL ASSESSMENT
PAIN_FUNCTIONAL_ASSESSMENT: ACTIVITIES ARE NOT PREVENTED

## 2024-06-17 ASSESSMENT — PAIN SCALES - WONG BAKER: WONGBAKER_NUMERICALRESPONSE: NO HURT

## 2024-06-17 ASSESSMENT — PAIN DESCRIPTION - ORIENTATION
ORIENTATION: MID
ORIENTATION: OTHER (COMMENT)
ORIENTATION: LOWER

## 2024-06-17 NOTE — CARE COORDINATION
CM reviewed chart. Patient transfer out of ICU. Wound vac in place, NG tube in place. Current recommendations are for [facility]. [facility] following for discharge. Will require insurance auth.

## 2024-06-17 NOTE — PLAN OF CARE
Problem: Nutrition Deficit:  Goal: Optimize nutritional status  Outcome: Progressing  Flowsheets (Taken 6/17/2024 4797)  Nutrient intake appropriate for improving, restoring, or maintaining nutritional needs:   Assess nutritional status and recommend course of action   Recommend, monitor, and adjust tube feedings and TPN/PPN based on assessed needs

## 2024-06-17 NOTE — PROGRESS NOTES
PROGRESS NOTE      Chief Complaints:  Patient examined this morning.  HPI and  Objective:    Patient has no fever.  Ileostomy and mucous fistula has a stool.  Abdomen soft.  Wound VAC is intact.  Review of Systems:  Rest of review of system reviewed personally and they are negative.    EXAM:  BP 98/69   Pulse 79   Temp 98 °F (36.7 °C) (Oral)   Resp 18   Ht 1.727 m (5' 8\")   Wt 127.1 kg (280 lb 3.3 oz)   SpO2 95%   BMI 42.60 kg/m²     Patient is awake   Head and neck atraumatic, normocephalic.  ENT: No hoarse voice, gaze appropriate.  Cardiac system regular rate rhythm.  Pulmonary no audible wheeze, no cyanosis.  Chest wall excursion normal with respiration cycle  Abdomen is soft not particularly distended.  Neurologically nonfocal.  Hematology system: No bruising noted.  Musculoskeletal system: No joint deformity noted.  Genitourinary system: No hematuria noted.  Skin is warm and moist.  Psychosocial: Cooperative.  Vascular examination as previously noted no changes.    Current Facility-Administered Medications   Medication Dose Route Frequency Provider Last Rate Last Admin    PN-Adult Premix 8/14 - Standard Electrolytes - Central Line   IntraVENous Continuous TPN Javi Lopez MD 62 mL/hr at 06/16/24 2300 New Bag at 06/16/24 2300    diatrizoate meglumine-sodium (GASTROGRAFIN) 66-10 % solution 30 mL  30 mL Per NG tube ONCE PRN Javi Lopez MD        piperacillin-tazobactam (ZOSYN) 4,500 mg in sodium chloride 0.9 % 100 mL IVPB (mini-bag)  4,500 mg IntraVENous Q8H Antonio Butler MD 25 mL/hr at 06/17/24 0430 4,500 mg at 06/17/24 0430    HYDROmorphone HCl PF (DILAUDID) injection 1 mg  1 mg IntraVENous Q3H PRN Javi Lopez MD   1 mg at 06/17/24 0501    HYDROmorphone HCl PF (DILAUDID) injection 0.5 mg  0.5 mg IntraVENous Q3H PRN Javi Lopez MD        lidocaine 4 % external patch 1 patch  1 patch TransDERmal Daily Javi Lopez MD   1 patch at 06/16/24 0900

## 2024-06-17 NOTE — PLAN OF CARE
Problem: Pain  Goal: Verbalizes/displays adequate comfort level or baseline comfort level  Outcome: Progressing  Flowsheets (Taken 6/17/2024 1145)  Verbalizes/displays adequate comfort level or baseline comfort level:   Encourage patient to monitor pain and request assistance   Assess pain using appropriate pain scale   Administer analgesics based on type and severity of pain and evaluate response     Problem: Safety - Adult  Goal: Free from fall injury  Outcome: Progressing  Flowsheets (Taken 6/17/2024 1145)  Free From Fall Injury: Instruct family/caregiver on patient safety

## 2024-06-17 NOTE — PROGRESS NOTES
Covering for Dr. Pineda      POD #7     Chief Complaint: none         Subjective:  Ms. Maddie Goldberg is a 63 y.o. year old  female who had an anastomotic leak following right hemicolectomy performed by Dr. Rice, and reexploratory laparotomy with sigmoid colon resection and end ileostomy with mucous fistula creationby Dr. Pineda for sigmoid colon mass.  The pathology turned out to be sigmoid diverticulitis with phlegmon.    Today, she is feeling better.    No nausea or vomiting.   No fever or chills.  WBC trending down.    Review of Systems:   Constitutional:  no fever, no chills,  no sweats, No weakness, No fatigue, No decreased activity.  Respiratory: No shortness of breath, No cough, No sputum production, No hemoptysis, No wheezing, No cyanosis.  Cardiovascular: No chest pain, No palpitations, No bradycardia, No tachycardia, No peripheral edema, No syncope.  Gastrointestinal: No nausea, No vomiting, No diarrhea, No constipation, No heartburn, No abdominal pain.  Genitourinary: No dysuria, No hematuria, No change in urine stream, No urethral discharge, No lesions.  Hematology/Lymphatics: No bruising tendency, No bleeding tendency, No petechiae, No swollen lymph glands.  Endocrine: No excessive thirst, No polyuria, No cold intolerance, No heat intolerance, No excessive hunger.  Musculoskeletal: No back pain, No neck pain, No joint pain, No muscle pain, No claudication, No decreased range of motion, No trauma. Left shoulder pain.   Integumentary: No rash, No pruritus, No abrasions.  Neurologic: Alert and oriented X4, No abnormal balance, No headache, No confusion, No numbness, No tingling.  Psychiatric: No anxiety, No depression, No jigna.      Physical Exam:     Vitals & Measurements:    Wt Readings from Last 3 Encounters:   06/16/24 127.1 kg (280 lb 3.3 oz)     Temp Readings from Last 3 Encounters:   06/16/24 98.3 °F (36.8 °C) (Oral)     BP Readings from Last 3 Encounters:   06/16/24 115/71     Pulse Readings from Last

## 2024-06-17 NOTE — PROGRESS NOTES
Comprehensive Nutrition Assessment    Type and Reason for Visit:  Reassess    Nutrition Recommendations/Plan:   NPO, advance as medically appropriate.  Continue TPN as D14, AA8 at goal rate of 62 mL/hr(1500 mL/d).  SMOF Lipids 20%, 500 mL 3 times/week(429 kcal/d; 26% Fat).  Provides: 1623 kcal(80%), 120 gm protein(94%), 210 gm CHO.  Continue to monitor and record PN rate, tolerance, BM in I/Os.     Malnutrition Assessment:  Malnutrition Status:  Mild malnutrition (06/10/24 1415)    Context:  Acute Illness     Findings of the 6 clinical characteristics of malnutrition:  Energy Intake:  50% or less of estimated energy requirements for 5 or more days  Weight Loss:  No significant weight loss     Body Fat Loss:  No significant body fat loss     Muscle Mass Loss:  No significant muscle mass loss    Fluid Accumulation:  No significant fluid accumulation     Strength:  Not Performed    Nutrition Assessment:    Nutrition Assessment  Admitted for cecal volvulus, large bowel ischemia, complete bowel obstruction. (6/8) POD 6 ex-lap colon resection. Transferred from [facility] d/t decline in medical status. Pt returned to OR for resection of the ileal-colonic anastomosis; temporary abdominal closure on 6/. Pt returned to OR on 6/9 for abdominal washout, wound vac placement and abd closure. (6/10) Screened for NPO/CLD x8 days. POD 2 ex-lap partial colon resection. Pt transferred to ICU s/p sx, intubated but undergoing vent weaning. Pt on minimal pressure support, sedated on propofol and fentanyl. Pt is hemodynamically stable for NST. Given nonfunctional gut and prolonged NPO/Clear status, RD rec's PN. MD Pineda agreeable to starting PN, concern for fluid balance. RD to provide regimen above. (6/12) Pt extubated on 6/11, off propofol. Today, Pt awake, able to deny N/V. Pt reported wanting to eat d/t prolonged NPO status- no hunger cues. TPN infusing and tolerated at goal rate. RD rec's continue regimen. Labs: K 3.4, Cr 0.49, Ca 7.9,

## 2024-06-17 NOTE — PLAN OF CARE
Problem: Pain  Goal: Verbalizes/displays adequate comfort level or baseline comfort level  Outcome: Progressing  Flowsheets (Taken 6/16/2024 2000)  Verbalizes/displays adequate comfort level or baseline comfort level: Assess pain using appropriate pain scale     Problem: Safety - Adult  Goal: Free from fall injury  Outcome: Progressing     Problem: Coping  Goal: Pt/Family able to verbalize concerns and demonstrate effective coping strategies  Description: INTERVENTIONS:  1. Assist patient/family to identify coping skills, available support systems and cultural and spiritual values  2. Provide emotional support, including active listening and acknowledgement of concerns of patient and caregivers  3. Reduce environmental stimuli, as able  4. Instruct patient/family in relaxation techniques, as appropriate  5. Assess for spiritual pain/suffering and initiate Spiritual Care, Psychosocial Clinical Specialist consults as needed  Outcome: Progressing  Flowsheets (Taken 6/16/2024 2000)  Patient/family able to verbalize anxieties, fears, and concerns, and demonstrate effective coping: Assist patient/family to identify coping skills, available support systems and cultural and spiritual values

## 2024-06-17 NOTE — PLAN OF CARE
PHYSICAL THERAPY TREATMENT     Patient: Maddie Goldberg (63 y.o. 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  ASSESSMENT  Patient continues with skilled PT services and is slowly progressing towards goals. Pt semi supine  upon PT arrival, agreeable to session. Pt A&O x 4. (See below for objective details and assist levels).     Overall pt tolerated session fair today with PT.Patient was seen with OT for safety,requiring Ax2 for mobility.Patient able to transfer to sitting and stood once took 2 side steps with assist  Patient sat at eob for long time.BP  97/62 sitting at eob so deferred transfers in the recliner.Will continue to benefit from skilled PT services, and will continue to progress as tolerated. Potential barriers for safe discharge: . Current PT DC recommendation Inpatient Rehabilitation Facility vs snf pending progress and ability to tolerate therapy. once medically appropriate.      GOALS:    Problem: Physical Therapy - Adult  Goal: By Discharge: Performs mobility at highest level of function for planned discharge setting.  See evaluation for individualized goals.  Description: FUNCTIONAL STATUS PRIOR TO ADMISSION: Patient was independent and active without use of DME.    HOME SUPPORT PRIOR TO ADMISSION: The patient lived alone but did not require assistance.    Physical Therapy Goals  Initiated  6/6/2024  Reassess 6/13 after abd sx  Pt stated goal: To go home  Pt will be I with LE HEP in 7 days.  Pt will perform bed mobility with min A in 7 days.  Pt will perform transfers with min-mod A in 7 days.   Pt will amb 150 feet with LRAD safely with [place] in 7 days.  Outcome: Progressing          PLAN :  Patient continues to benefit from skilled intervention to address functional impairments. Continue treatment per established plan of care to address goals.    Recommendation for discharge: (in order for the patient to meet his/her long term goals)  Skilled Nursing Facility vs [facility]     IF patient discharges home will need the following DME:continuing to assess with progress     SUBJECTIVE:   Patient stated “I am ok.”    OBJECTIVE DATA SUMMARY:   Critical Behavior:  Orientation  Orientation Level: Oriented X4  Cognition  Arousal/Alertness: Appears intact  Following Commands: Follows one step commands consistently  Attention Span: Appears intact  Memory: Appears intact  Safety Judgement: Appears intact  Initiation: Requires cues for some  Sequencing: Requires cues for some    Functional Mobility Training:  Bed Mobility:  Bed Mobility Training  Bed Mobility Training: Yes  Overall Level of Assistance: Maximum assistance;Assist X2;Additional time  Interventions: Manual cues;Tactile cues;Verbal cues;Safety awareness training  Rolling: Maximum assistance;Assist X2;Additional time  Supine to Sit: Maximum assistance;Assist X2;Additional time  Sit to Supine: Maximum assistance;Assist X2;Additional time  Scooting: Maximum assistance;Assist X2;Additional time  Transfers:  Transfer Training  Transfer Training: Yes  Overall Level of Assistance: Moderate assistance;Assist X2;Additional time  Interventions: Demonstration;Manual cues;Safety awareness training;Verbal cues;Weight shifting training/pressure relief  Sit to Stand: Minimum assistance;Assist X2  Stand to Sit: Minimum assistance;Assist X2  Balance:  Balance  Sitting:

## 2024-06-17 NOTE — PROGRESS NOTES
Pulmonology and Critical Care Progress Note  Patient is 63 y.o. with diagnosis of :     Cecal volvulus     Large bowel ischemia     Hypotension due to hypovolemia    Mass of colon    Leak of anastomosis between gastrointestinal structures    Adhesion of intestine    Open wound of abdomen    Acute respiratory failure with hypoxia   Subjective:       Patient seen and examined at bedside.  She got transferred to floor last evening.    Has NG tube in for intermittent suction.  Evaluated by surgery and was allowed for trial of clear liquids.     on TPN and IV Diflucan/Zosyn.  Discontinue lactated Ringer's.    Family present at bedside       Current Facility-Administered Medications   Medication Dose Route Frequency Provider Last Rate Last Admin    HYDROmorphone HCl PF (DILAUDID) injection 0.5 mg  0.5 mg IntraVENous Q3H PRN Tommie Pineda MD        HYDROmorphone HCl PF (DILAUDID) injection 1 mg  1 mg IntraVENous Q3H PRN Tommie Pineda MD        PN-Adult Premix 8/14 - Standard Electrolytes - Central Line   IntraVENous Continuous TPN Javi oLpez MD 62 mL/hr at 06/16/24 2300 New Bag at 06/16/24 2300    diatrizoate meglumine-sodium (GASTROGRAFIN) 66-10 % solution 30 mL  30 mL Per NG tube ONCE PRN Javi Lopez MD   30 mL at 06/17/24 1013    piperacillin-tazobactam (ZOSYN) 4,500 mg in sodium chloride 0.9 % 100 mL IVPB (mini-bag)  4,500 mg IntraVENous Q8H Antonio Butler MD   Stopped at 06/17/24 0847    lidocaine 4 % external patch 1 patch  1 patch TransDERmal Daily Javi Lopez MD   1 patch at 06/17/24 1013    potassium chloride (KLOR-CON M) extended release tablet 40 mEq  40 mEq Oral PRN Javi Lopez MD        Or    potassium bicarb-citric acid (EFFER-K) effervescent tablet 40 mEq  40 mEq Oral PRN Javi Lopez MD        Or    potassium chloride 10 mEq/100 mL IVPB (Peripheral Line)  10 mEq IntraVENous PRN Javi Lopez MD        fat emul fish oil/plant based  (SMOFLIPID) 20 % infusion 500 mL  500 mL IntraVENous Once per day on Mon Wed Fri Tommie Pineda MD   500 mL at 06/14/24 2250    naloxone 0.4 mg in 10 mL sodium chloride syringe   IntraVENous PRN Tommie Pineda MD        HYDROmorphone HCl PF (DILAUDID) injection 1 mg  1 mg IntraVENous Once Tommie Pineda MD        furosemide (LASIX) injection 20 mg  20 mg IntraVENous BID Tommie Pineda MD   20 mg at 06/17/24 1014    fluconazole (DIFLUCAN) in 0.9 % sodium chloride IVPB 400 mg  400 mg IntraVENous Q24H Tommie Pineda  mL/hr at 06/17/24 1013 400 mg at 06/17/24 1013    sodium chloride flush 0.9 % injection 5-40 mL  5-40 mL IntraVENous 2 times per day Tommie Pineda MD   10 mL at 06/17/24 1014    0.9 % sodium chloride infusion   IntraVENous PRN Tommie Pineda MD   Stopped at 06/09/24 2145    enoxaparin Sodium (LOVENOX) injection 30 mg  30 mg SubCUTAneous BID Tommie Pineda MD   30 mg at 06/17/24 1013    ondansetron (ZOFRAN-ODT) disintegrating tablet 4 mg  4 mg Oral Q8H PRN Tommie Pineda MD        Or    ondansetron (ZOFRAN) injection 4 mg  4 mg IntraVENous Q6H PRN Tommie Pineda MD   4 mg at 06/16/24 0315    lidocaine (XYLOCAINE) 2 % uro-jet   Topical Once Tommie Pineda MD        levothyroxine (SYNTHROID) tablet 150 mcg  150 mcg Oral QAM AC Kylah Rice MD   150 mcg at 06/16/24 0635    sodium chloride flush 0.9 % injection 5-40 mL  5-40 mL IntraVENous PRN Kylah Rice MD   10 mL at 06/02/24 2038    famotidine (PEPCID) tablet 20 mg  20 mg Oral BID Kylah Rice MD   20 mg at 06/13/24 0839    Or    famotidine (PEPCID) 20 mg in sodium chloride (PF) 0.9 % 10 mL injection  20 mg IntraVENous BID Kylah Rice MD   20 mg at 06/17/24 1015            No Known Allergies    Review of Systems:  Pertinent items are noted in HPI.    Objective:     Blood pressure 110/60, pulse 75, temperature 98.4 °F (36.9 °C), temperature source Oral, resp. rate 17, height 1.727 m (5' 8\"), weight 127.1 kg (280 lb 3.3 oz), SpO2 97 %.  likely representing postoperative ileus.  2. Mild free intraperitoneal air likely postoperative change. Small amount of  focal air and fluid anterior to the surgical anastomosis may represent  postoperative change. However, small leak is not excluded.  3. Significant fecal retention in the remaining large bowel with focal  stricturing in the rectosigmoid colon.  4. Cholelithiasis.  5. Bibasilar atelectasis with 4 mm right middle lobe lung nodule.      Assessment:     Patient is a 63-year-old  female with a history of morbid obesity, hypothyroidism, and hypertension who presented to the hospital on 6/2/2024 with abdominal pain and obstipation and was found to have a cecal volvulus requiring abdominal surgery.    Plan:     1.)  Acute hypoxemic respiratory failure  -Secondary to atelectasis and mild postoperative pulmonary edema, extubated and liberated from the ventilator on 6/11.  -Saturating in the high 90s on room air, wean off for goal sats greater than 90% on room air  -Needs outpatient KIRSTEN evaluation  --Antibiotics as outlined below    2.)  Cecal volvulus with bowel obstruction  -Status post surgery on 6/8 and 6/10, now with LIONEL drain as well as terminal ileostomy  -Tolerated surgery well, currently on TPN   I was told by family that she will be started on clear liquid by surgical service today -Further changes in diet per general surgery team  -Repeat CT/abdomen pelvis anticipated in the next 24 hours    3.)  Candidal UTI  -Urine culture growing Candida on 6/8, continue on IV Diflucan  -Appreciate assistance from infectious disease    4.)  Abdominal wound infection  -Wound culture on 6/11 growing heavy anaerobic GNR's  -Continue IV Zosyn  -Appreciate assistance from infectious disease    5.)  Hypokalemia  -Potassium level normalized today following repletion yesterday, likely due to poor oral intake in the setting of acute illness and bowel surgery  -Magnesium/phosphorus levels normal today, repeat

## 2024-06-18 PROBLEM — B37.49 CANDIDA UTI: Status: ACTIVE | Noted: 2024-06-18

## 2024-06-18 PROBLEM — B37.7 SEPSIS DUE TO CANDIDA SPECIES WITHOUT ACUTE ORGAN DYSFUNCTION (HCC): Status: ACTIVE | Noted: 2024-06-18

## 2024-06-18 PROBLEM — R65.20 SEPSIS DUE TO CANDIDA SPECIES WITHOUT ACUTE ORGAN DYSFUNCTION (HCC): Status: ACTIVE | Noted: 2024-06-18

## 2024-06-18 PROBLEM — R65.10 SIRS (SYSTEMIC INFLAMMATORY RESPONSE SYNDROME) (HCC): Status: ACTIVE | Noted: 2024-06-18

## 2024-06-18 LAB
ALBUMIN SERPL-MCNC: 1.6 G/DL (ref 3.5–5)
ANION GAP SERPL CALC-SCNC: 6 MMOL/L (ref 5–15)
BACTERIA SPEC CULT: NORMAL
BACTERIA SPEC CULT: NORMAL
BASOPHILS # BLD: 0 K/UL (ref 0–0.1)
BASOPHILS NFR BLD: 0 % (ref 0–1)
BUN SERPL-MCNC: 23 MG/DL (ref 6–20)
BUN/CREAT SERPL: 37 (ref 12–20)
CA-I BLD-MCNC: 8.2 MG/DL (ref 8.5–10.1)
CHLORIDE SERPL-SCNC: 101 MMOL/L (ref 97–108)
CO2 SERPL-SCNC: 29 MMOL/L (ref 21–32)
CREAT SERPL-MCNC: 0.62 MG/DL (ref 0.55–1.02)
CRP SERPL-MCNC: 14.4 MG/DL (ref 0–0.3)
DIFFERENTIAL METHOD BLD: ABNORMAL
EOSINOPHIL # BLD: 0.4 K/UL (ref 0–0.4)
EOSINOPHIL NFR BLD: 2 % (ref 0–7)
ERYTHROCYTE [DISTWIDTH] IN BLOOD BY AUTOMATED COUNT: 13.8 % (ref 11.5–14.5)
GLUCOSE BLD STRIP.AUTO-MCNC: 106 MG/DL (ref 65–100)
GLUCOSE BLD STRIP.AUTO-MCNC: 112 MG/DL (ref 65–100)
GLUCOSE BLD STRIP.AUTO-MCNC: 114 MG/DL (ref 65–100)
GLUCOSE BLD STRIP.AUTO-MCNC: 124 MG/DL (ref 65–100)
GLUCOSE BLD STRIP.AUTO-MCNC: 132 MG/DL (ref 65–100)
GLUCOSE BLD STRIP.AUTO-MCNC: 148 MG/DL (ref 65–100)
GLUCOSE SERPL-MCNC: 114 MG/DL (ref 65–100)
GRAM STN SPEC: NORMAL
GRAM STN SPEC: NORMAL
HCT VFR BLD AUTO: 29.1 % (ref 35–47)
HGB BLD-MCNC: 9.3 G/DL (ref 11.5–16)
IMM GRANULOCYTES # BLD AUTO: 0 K/UL
IMM GRANULOCYTES NFR BLD AUTO: 0 %
LYMPHOCYTES # BLD: 2.4 K/UL (ref 0.8–3.5)
LYMPHOCYTES NFR BLD: 13 % (ref 12–49)
Lab: NORMAL
MAGNESIUM SERPL-MCNC: 2.2 MG/DL (ref 1.6–2.4)
MCH RBC QN AUTO: 33 PG (ref 26–34)
MCHC RBC AUTO-ENTMCNC: 32 G/DL (ref 30–36.5)
MCV RBC AUTO: 103.2 FL (ref 80–99)
MONOCYTES # BLD: 1.7 K/UL (ref 0–1)
MONOCYTES NFR BLD: 9 % (ref 5–13)
NEUTS BAND NFR BLD MANUAL: 2 % (ref 0–6)
NEUTS SEG # BLD: 13.9 K/UL (ref 1.8–8)
NEUTS SEG NFR BLD: 74 % (ref 32–75)
NRBC # BLD: 0 K/UL (ref 0–0.01)
NRBC BLD-RTO: 0 PER 100 WBC
PERFORMED BY:: ABNORMAL
PHOSPHATE SERPL-MCNC: 2.6 MG/DL (ref 2.6–4.7)
PLATELET # BLD AUTO: 788 K/UL (ref 150–400)
PMV BLD AUTO: 10.2 FL (ref 8.9–12.9)
POTASSIUM SERPL-SCNC: 3.4 MMOL/L (ref 3.5–5.1)
PROCALCITONIN SERPL-MCNC: 0.22 NG/ML
RBC # BLD AUTO: 2.82 M/UL (ref 3.8–5.2)
RBC MORPH BLD: ABNORMAL
RBC MORPH BLD: ABNORMAL
SODIUM SERPL-SCNC: 136 MMOL/L (ref 136–145)
WBC # BLD AUTO: 18.4 K/UL (ref 3.6–11)

## 2024-06-18 PROCEDURE — 97606 NEG PRS WND THER DME>50 SQCM: CPT

## 2024-06-18 PROCEDURE — 99232 SBSQ HOSP IP/OBS MODERATE 35: CPT | Performed by: INTERNAL MEDICINE

## 2024-06-18 PROCEDURE — 2580000003 HC RX 258: Performed by: SURGERY

## 2024-06-18 PROCEDURE — 94761 N-INVAS EAR/PLS OXIMETRY MLT: CPT

## 2024-06-18 PROCEDURE — 80069 RENAL FUNCTION PANEL: CPT

## 2024-06-18 PROCEDURE — 6360000002 HC RX W HCPCS: Performed by: INTERNAL MEDICINE

## 2024-06-18 PROCEDURE — 36415 COLL VENOUS BLD VENIPUNCTURE: CPT

## 2024-06-18 PROCEDURE — 99024 POSTOP FOLLOW-UP VISIT: CPT | Performed by: SURGERY

## 2024-06-18 PROCEDURE — 84145 PROCALCITONIN (PCT): CPT

## 2024-06-18 PROCEDURE — 97530 THERAPEUTIC ACTIVITIES: CPT

## 2024-06-18 PROCEDURE — 85025 COMPLETE CBC W/AUTO DIFF WBC: CPT

## 2024-06-18 PROCEDURE — 6370000000 HC RX 637 (ALT 250 FOR IP): Performed by: SURGERY

## 2024-06-18 PROCEDURE — 83735 ASSAY OF MAGNESIUM: CPT

## 2024-06-18 PROCEDURE — 87449 NOS EACH ORGANISM AG IA: CPT

## 2024-06-18 PROCEDURE — 2580000003 HC RX 258: Performed by: INTERNAL MEDICINE

## 2024-06-18 PROCEDURE — 86140 C-REACTIVE PROTEIN: CPT

## 2024-06-18 PROCEDURE — 1100000000 HC RM PRIVATE

## 2024-06-18 PROCEDURE — 6360000002 HC RX W HCPCS: Performed by: SURGERY

## 2024-06-18 PROCEDURE — 2500000003 HC RX 250 WO HCPCS: Performed by: SURGERY

## 2024-06-18 PROCEDURE — 82962 GLUCOSE BLOOD TEST: CPT

## 2024-06-18 RX ADMIN — HYDROMORPHONE HYDROCHLORIDE 1 MG: 1 INJECTION, SOLUTION INTRAMUSCULAR; INTRAVENOUS; SUBCUTANEOUS at 05:31

## 2024-06-18 RX ADMIN — ENOXAPARIN SODIUM 30 MG: 100 INJECTION SUBCUTANEOUS at 09:28

## 2024-06-18 RX ADMIN — FAMOTIDINE 20 MG: 20 TABLET, FILM COATED ORAL at 09:29

## 2024-06-18 RX ADMIN — PIPERACILLIN AND TAZOBACTAM 4500 MG: 4; .5 INJECTION, POWDER, LYOPHILIZED, FOR SOLUTION INTRAVENOUS at 20:28

## 2024-06-18 RX ADMIN — PIPERACILLIN AND TAZOBACTAM 4500 MG: 4; .5 INJECTION, POWDER, LYOPHILIZED, FOR SOLUTION INTRAVENOUS at 04:30

## 2024-06-18 RX ADMIN — HYDROMORPHONE HYDROCHLORIDE 1 MG: 1 INJECTION, SOLUTION INTRAMUSCULAR; INTRAVENOUS; SUBCUTANEOUS at 20:27

## 2024-06-18 RX ADMIN — HYDROMORPHONE HYDROCHLORIDE 1 MG: 1 INJECTION, SOLUTION INTRAMUSCULAR; INTRAVENOUS; SUBCUTANEOUS at 10:17

## 2024-06-18 RX ADMIN — POTASSIUM CHLORIDE 40 MEQ: 1500 TABLET, EXTENDED RELEASE ORAL at 09:28

## 2024-06-18 RX ADMIN — SODIUM CHLORIDE, PRESERVATIVE FREE 20 MG: 5 INJECTION INTRAVENOUS at 20:28

## 2024-06-18 RX ADMIN — ENOXAPARIN SODIUM 30 MG: 100 INJECTION SUBCUTANEOUS at 20:35

## 2024-06-18 RX ADMIN — ONDANSETRON 4 MG: 2 INJECTION INTRAMUSCULAR; INTRAVENOUS at 12:59

## 2024-06-18 RX ADMIN — LEVOTHYROXINE SODIUM 150 MCG: 0.07 TABLET ORAL at 05:31

## 2024-06-18 RX ADMIN — SODIUM CHLORIDE, PRESERVATIVE FREE 10 ML: 5 INJECTION INTRAVENOUS at 20:53

## 2024-06-18 RX ADMIN — FUROSEMIDE 20 MG: 10 INJECTION, SOLUTION INTRAMUSCULAR; INTRAVENOUS at 18:07

## 2024-06-18 RX ADMIN — PIPERACILLIN AND TAZOBACTAM 4500 MG: 4; .5 INJECTION, POWDER, LYOPHILIZED, FOR SOLUTION INTRAVENOUS at 12:45

## 2024-06-18 RX ADMIN — HYDROMORPHONE HYDROCHLORIDE 1 MG: 1 INJECTION, SOLUTION INTRAMUSCULAR; INTRAVENOUS; SUBCUTANEOUS at 12:59

## 2024-06-18 RX ADMIN — FLUCONAZOLE 400 MG: 2 INJECTION, SOLUTION INTRAVENOUS at 09:28

## 2024-06-18 RX ADMIN — HYDROMORPHONE HYDROCHLORIDE 1 MG: 1 INJECTION, SOLUTION INTRAMUSCULAR; INTRAVENOUS; SUBCUTANEOUS at 16:46

## 2024-06-18 RX ADMIN — FUROSEMIDE 20 MG: 10 INJECTION, SOLUTION INTRAMUSCULAR; INTRAVENOUS at 09:29

## 2024-06-18 RX ADMIN — ASCORBIC ACID, VITAMIN A PALMITATE, CHOLECALCIFEROL, THIAMINE HYDROCHLORIDE, RIBOFLAVIN-5 PHOSPHATE SODIUM, PYRIDOXINE HYDROCHLORIDE, NIACINAMIDE, DEXPANTHENOL, ALPHA-TOCOPHEROL ACETATE, VITAMIN K1, FOLIC ACID, BIOTIN, CYANOCOBALAMIN: 200; 3300; 200; 6; 3.6; 6; 40; 15; 10; 150; 600; 60; 5 INJECTION, SOLUTION INTRAVENOUS at 22:21

## 2024-06-18 ASSESSMENT — PAIN DESCRIPTION - LOCATION
LOCATION: ABDOMEN

## 2024-06-18 ASSESSMENT — PAIN DESCRIPTION - DESCRIPTORS
DESCRIPTORS: ACHING
DESCRIPTORS: DISCOMFORT;ACHING
DESCRIPTORS: ACHING

## 2024-06-18 ASSESSMENT — PAIN SCALES - GENERAL
PAINLEVEL_OUTOF10: 8
PAINLEVEL_OUTOF10: 10
PAINLEVEL_OUTOF10: 8
PAINLEVEL_OUTOF10: 10
PAINLEVEL_OUTOF10: 8
PAINLEVEL_OUTOF10: 8
PAINLEVEL_OUTOF10: 0
PAINLEVEL_OUTOF10: 5

## 2024-06-18 ASSESSMENT — PAIN - FUNCTIONAL ASSESSMENT: PAIN_FUNCTIONAL_ASSESSMENT: ACTIVITIES ARE NOT PREVENTED

## 2024-06-18 ASSESSMENT — PAIN DESCRIPTION - ORIENTATION
ORIENTATION: LOWER
ORIENTATION: MID
ORIENTATION: MID

## 2024-06-18 ASSESSMENT — PAIN SCALES - WONG BAKER
WONGBAKER_NUMERICALRESPONSE: HURTS EVEN MORE
WONGBAKER_NUMERICALRESPONSE: NO HURT

## 2024-06-18 NOTE — PROGRESS NOTES
Pulmonology and Critical Care Progress Note  Patient is 63 y.o. with diagnosis of :     Cecal volvulus     Large bowel ischemia     Hypotension due to hypovolemia    Mass of colon    Leak of anastomosis between gastrointestinal structures    Adhesion of intestine    Open wound of abdomen    Acute respiratory failure with hypoxia   Status post ex lap  Subjective:       Patient seen and examined at bedside.    NG tube was pulled out.  Started on clear liquids.   on TPN and IV Diflucan/Zosyn.  Family present at bedside  Has LIONEL drain, colostomy and ileostomy       Current Facility-Administered Medications   Medication Dose Route Frequency Provider Last Rate Last Admin    HYDROmorphone HCl PF (DILAUDID) injection 0.5 mg  0.5 mg IntraVENous Q3H PRN Tommie Pineda MD        HYDROmorphone HCl PF (DILAUDID) injection 1 mg  1 mg IntraVENous Q3H PRN Tommie Pineda MD   1 mg at 06/18/24 0531    PN-Adult Premix 8/14 - Standard Electrolytes - Central Line   IntraVENous Continuous TPN Tommie Pineda MD 62 mL/hr at 06/17/24 2103 New Bag at 06/17/24 2103    diatrizoate meglumine-sodium (GASTROGRAFIN) 66-10 % solution 30 mL  30 mL Per NG tube ONCE PRN Javi Lopez MD   30 mL at 06/17/24 1013    piperacillin-tazobactam (ZOSYN) 4,500 mg in sodium chloride 0.9 % 100 mL IVPB (mini-bag)  4,500 mg IntraVENous Q8H Antonio Butler MD   Stopped at 06/18/24 0830    lidocaine 4 % external patch 1 patch  1 patch TransDERmal Daily Javi Lopez MD   1 patch at 06/18/24 0929    potassium chloride (KLOR-CON M) extended release tablet 40 mEq  40 mEq Oral PRN Javi Lopez MD   40 mEq at 06/18/24 0928    Or    potassium bicarb-citric acid (EFFER-K) effervescent tablet 40 mEq  40 mEq Oral PRN Javi Lopez MD        Or    potassium chloride 10 mEq/100 mL IVPB (Peripheral Line)  10 mEq IntraVENous PRN Javi Lopez MD        fat emul fish oil/plant based (SMOFLIPID) 20 % infusion 500 mL  500 mL

## 2024-06-18 NOTE — CARE COORDINATION
Chart reviewed. Patient remains on TPN at this time. [facility] IRF is following for rehab at discharge. CM requested they initiate auth since patient's insurance often takes several days. Per liaison with [facility] they are submitting to their physician and if approved they will initiate auth.

## 2024-06-18 NOTE — PROGRESS NOTES
Negative Pressure    NAME:  Maddie Goldberg  YOB: 1960  MEDICAL RECORD NUMBER:  [id number]  DATE:  6/2/2024    Applied Negative Pressure to surgical incision to midline abdomen  [x] Applied skin barrier prep to mike-wound.   [x] Cut strips of plastic drape to picture frame wound so that mike-wound is     covered with the drape.   [] If bridging dressing to less prominent site, cover any intact skin that will come in contact with the Negative Pressure Therapy sponge, gauze or channel drain with plastic drape.  The sponge should never touch intact skin.   [x] Cut sponge, gauze or channel drain to size which will fit into the wound/ulcer bed without being forced.   [x] Be sure the sponge is large enough to hold the entire round plastic flange which is attached to the tubing.  Never allow flange to be larger than the sponge or it will produce suction damaging intact skin.  Total number of individual pieces of foam used within the wound bed: 3 x oil emulsion gauze and 2 x black foam  [] If bridging the dressing away from the primary site, be sure the bridge leads to a piece of sponge large enough to hold the entire flange without allowing any of the flange to overlap onto intact skin.   [x] Covered sponge, gauze or channel drain with plastic drape.   [x] Cut a hole in this plastic drape directly over the sponge the same size as the plastic drain tubing.   [x] Removed plastic liner from flange and apply it directly over the hole you cut.   [x] Removed the plastic cover from the flange.   [x] Attached the tubing to the wound/ulcer Negative Pressure Therapy and turn it on to be sure a vacuum is created and that there are no leaks.   [] If air leaks occur, use plastic drape to patch them.   [] Secured Negative Pressure Therapy dressing with ace wrap loosely if located on an extremity. Maintain tubing outside of ace wrap. Tubing must not exert pressure on intact skin.    Applied per   Guidelines  Patient tolerated dressing change well.  Patient was pre-medicated with 1 mg IV dilaudid.  No tissue necrosis or foul odor.  Granulation tissue noted.  Moderate amount of serosanguineous drainage.  Bleeding noted to wound margins.  Hemostasis achieved prior to applying new dressing.  Applied 3 x oil emulsion gauze to wound bed and packed lightly with 2 pieces of black foam.  Applied drape and track pad.  Initiated seal at 125 mmHg and no leaks noted.  Next dressing change while in-patient will be on Friday 6/21/24 by WCN.  For discharge, Rn is to remove NPWT dressing and apply a wet/dry.  If wound vac dressing becomes compromised with stool beneath film, remove NPWT and irrigate abdominal incision with saline copiously until no stool noted.  Apply a wet / dry dressing and inform Surgeon and WCN.       Ostomy pouch to RLQ ileostomy and LLQ mucous fistula changed.  Brown scant stool with mucous noted to LLQ.  200 mL of watery brown stool emptied from pouch to RLQ.  Applied a high-flow ostomy pouch to RLQ.  Demonstration of pouch change performed while patient's son, Krishna, observed process, see separate note.        If wound vac is not working properly: 1) Check to see if track pad tubing is clogged.  If so, remove track pad, apply film over exposed foam, cut dime size hole in film, and apply a new track pad.  2) If film is leaking, find area with leak and reinforce with film.   3) If leak cannot be fixed, remove dressing, apply wet/dry dressing, and inform the WCN.

## 2024-06-18 NOTE — PROGRESS NOTES
Progress Note  Date:2024       Room:[facility]  Patient Name:Maddie Goldberg     YOB: 1960     Age:63 y.o.        Subjective    Subjective  Patient followed for Candida UTI on high dose Fluconazole. Still with low grade temperatures.  Repeat urinalysis showed improvement. Body fluid culture still pending. Afebrile with persistent leukocytosis and elevated procal and CRP.  Currently on Fluconazole and Zosyn. Transferred out of the ICU.  Repeat CT scan showed no drainable fluid.  Patient lying in bed with no new complaints. Family at bedside.  Objective         Vitals Last 24 Hours:  TEMPERATURE:  Temp  Av.4 °F (36.9 °C)  Min: 98.2 °F (36.8 °C)  Max: 98.8 °F (37.1 °C)  RESPIRATIONS RANGE: Resp  Av.4  Min: 16  Max: 18  PULSE OXIMETRY RANGE: SpO2  Av.5 %  Min: 6 %  Max: 99 %  PULSE RANGE: Pulse  Av.7  Min: 76  Max: 81  BLOOD PRESSURE RANGE: Systolic (24hrs), Av , Min:103 , Max:118   ; Diastolic (24hrs), Av, Min:61, Max:65         Objective:  Vital signs: (most recent): Blood pressure 114/64, pulse 78, temperature 98.3 °F (36.8 °C), temperature source Oral, resp. rate 18, height 1.727 m (5' 8\"), weight 127.1 kg (280 lb 3.3 oz), SpO2 97 %.     Vitals and nursing note reviewed.   Constitutional:       General: She is not in acute distress.     Appearance: She is ill-appearing.   HENT: Unremarkable   Eyes:      Extraocular Movements: Extraocular movements intact.      Pupils: Pupils are equal, round, and reactive to light.   Cardiovascular:      Rate and Rhythm: Normal rate and regular rhythm.      Heart sounds: Normal heart sounds. No murmur heard.  Pulmonary:      Effort: Pulmonary effort is normal.      Breath sounds: Normal breath sounds.   Abdominal:      General: There is no distension.      Palpations: There is no mass.      Tenderness: There is no abdominal tenderness. There is no guarding.           Comments: Decreased BS     Midline wound vac in place  Right sided LIONEL  drains #1 and #2 with serous effluent  Right sided colostomy with dark stool  Left sided LIONEL drain #3 NOW WITH SEROUS EFFLUENT  Left sided colostomy with dark stool      Media Information    Document Information    Wound Care Image: Wound   Mid Abdomen   06/18/2024 16:39   Attached To:   Hospital Encounter on 6/2/24   Source Information    Tracy Sweeney RN  [facility]      Media Information    Document Information    Wound Care Image: Wound   LLQ   06/18/2024 13:32   Attached To:   Hospital Encounter on 6/2/24   Source Information    Tracy Sweeney RN  [facility]     Genitourinary:     Comments: Indwelling Quiroga catheter with clear urine  Musculoskeletal:      Cervical back: Neck supple.      Right lower leg: Edema present.      Left lower leg: Edema present.   Skin:     Findings: No rash.   Neurological: nonfocal, no confusion   Psychiatric: normal behavior, little interaction     Labs/Imaging/Diagnostics    Labs:  CBC:  Recent Labs     06/16/24  0338 06/17/24  0738 06/18/24  0819   WBC 15.9* 17.6* 18.4*   RBC 2.66* 2.99* 2.82*   HGB 9.0* 10.1* 9.3*   HCT 28.1* 30.6* 29.1*   .6* 102.3* 103.2*   RDW 13.6 13.7 13.8   * 645* 788*       CHEMISTRIES:  Recent Labs     06/16/24  0338 06/16/24  0800 06/17/24  0738 06/18/24  0819   * 137 136 136   K 5.0 3.5 3.4* 3.4*   CL 96* 103 100 101   CO2 29 32 31 29   BUN 19 20 21* 23*   CREATININE 0.67 0.51* 0.53* 0.62   GLUCOSE 681* 111* 91 114*   PHOS 5.4*  --  3.1 2.6   MG 2.2  --  2.1 2.2        Latest Reference Range & Units 06/02/24 09:50 06/13/24 22:22   Color, UA -   Yellow Yellow/Straw   Glucose, Ur Negative mg/dL Negative Negative   Bilirubin, Urine Negative   Small ! Negative   Ketones, Urine Negative mg/dL 15 ! Negative   Specific Gravity, UA 1.003 - 1.030   1.020 1.016   Blood, Urine Negative   Small ! Negative   Protein, UA Negative mg/dL Trace ! Negative   Urobilinogen 0.1 - 1.0 EU/dL 4.0 (H) 2.0 (H)   Nitrite, Urine  Negative   Negative Negative   Leukocyte Esterase, Urine Negative   Small ! Negative   Appearance Clear   Cloudy ! Clear   pH, Urine 5.0 - 8.0   6.0 7.0   Mucus, UA /lpf  Trace   WBC, UA 0 - 4 /hpf 10-20 0-4   RBC, UA 0 - 5 /hpf 10-20 0-5   Epithelial Cells, UA Few /lpf Few Few   Bacteria, UA Negative /hpf Negative Negative      Procal 0.22 <0.08 <0.09 < 0.14  CRP 14.40 <16.70 <15.90 <23.00 <22.10    Urine culture (6/8) 50,000 col/ml Candida lusitaniae  Wound culture surgical excision (6/8) Heavy >2 organisms including anaerobic Gram negative rods FINAL  Wound culture (6/8) Heavy BMIX anaerobic Gram negative rods FINAL  Fungus culture Abdomen (6/8) No fungus isolated in 8 days  Fungus culture Abdomen (6/8) No fungus isolated in 8 days  Body fluid (6/13) No growth FINAL    Imaging Last 24 Hours:     CT Abdomen Pelvis (6/17)    IMPRESSION:  Mesenteric inflammation with trace scattered postsurgical fluid and gas but no  drainable abscess.  Incidental cholelithiasis without gallbladder inflammation.  No significant bowel distention or inflammation.        CT Abdomen Pelvis (6/6) IMPRESSION:     1. Moderate small bowel distention likely representing postoperative ileus.  2. Mild free intraperitoneal air likely postoperative change. Small amount of  focal air and fluid anterior to the surgical anastomosis may represent  postoperative change. However, small leak is not excluded.  3. Significant fecal retention in the remaining large bowel with focal  stricturing in the rectosigmoid colon.  4. Cholelithiasis.  5. Bibasilar atelectasis with 4 mm right middle lobe lung nodule.          Assessment//Plan           Hospital Problems             Last Modified POA    * (Principal) Cecal volvulus (HCC) 6/2/2024 Yes    Large bowel ischemia (HCC) 6/2/2024 Yes    Hypotension due to hypovolemia 6/10/2024 Yes    Mass of colon 6/10/2024 Yes    Leak of anastomosis between gastrointestinal structures 6/10/2024 Yes    Adhesion of intestine

## 2024-06-18 NOTE — ADT AUTH CERT
RDW  13.1  11.5 - 14.5 %     Platelets  450 (H)  150 - 400 K/uL     MPV  9.8  8.9 - 12.9 FL     Nucleated RBCs  0.0  0.0  WBC     nRBC  0.00  0.00 - 0.01 K/uL     Neutrophils %  57  32 - 75 %     Band Neutrophils  18 (H)  0 - 6 %     Lymphocytes %  13  12 - 49 %     Monocytes %  9  5 - 13 %     Eosinophils %  0  0 - 7 %     Basophils %  0  0 - 1 %     Metamyelocytes  3 (H)  0 %     Immature Granulocytes %  0  %     Neutrophils Absolute  5.6  1.8 - 8.0 K/UL     Lymphocytes Absolute  1.0  0.8 - 3.5 K/UL     Monocytes Absolute  0.7  0.0 - 1.0 K/UL     Eosinophils Absolute  0.0  0.0 - 0.4 K/UL     Basophils Absolute  0.0  0.0 - 0.1 K/UL     Immature Granulocytes Absolute  0.0  K/UL     Differential Type  Manual        RBC Comment  Macrocytosis  1+        Comprehensive Metabolic Panel     Collection Time: 06/08/24  8:35 PM  Result  Value  Ref Range     Sodium  141  136 - 145 mmol/L     Potassium  3.1 (L)  3.5 - 5.1 mmol/L     Chloride  108  97 - 108 mmol/L     CO2  25  21 - 32 mmol/L     Anion Gap  8  5 - 15 mmol/L     Glucose  76  65 - 100 mg/dL     BUN  13  6 - 20 mg/dL     Creatinine  0.53 (L)  0.55 - 1.02 mg/dL     BUN/Creatinine Ratio  25 (H)  12 - 20       Est, Glom Filt Rate  >90  >60 ml/min/1.73m2     Calcium  7.2 (L)  8.5 - 10.1 mg/dL     Total Bilirubin  0.6  0.2 - 1.0 mg/dL     AST  9 (L)  15 - 37 U/L     ALT  19  12 - 78 U/L     Alk Phosphatase  106  45 - 117 U/L     Total Protein  4.1 (L)  6.4 - 8.2 g/dL     Albumin  1.4 (L)  3.5 - 5.0 g/dL     Globulin  2.7  2.0 - 4.0 g/dL     Albumin/Globulin Ratio  0.5 (L)  1.1 - 2.2    Protime-INR     Collection Time: 06/08/24  8:35 PM  Result  Value  Ref Range     Protime  15.2 (H)  11.9 - 14.6 sec     INR  1.1  0.9 - 1.1    POCT Glucose     Collection Time: 06/08/24 10:50 PM  Result  Value  Ref Range     POC Glucose  81  65 - 100 mg/dL     Performed by:  Stefan Denton (PCT)     POCT Glucose     Collection Time: 06/09/24  4:45 AM  Result  Value  Ref Range    far           Culture, Wound [[id number]]  Collected: 06/08/24 1530     Order Status: Canceled  Specimen: Abdomen        Culture, Fungus [[id number]]  Collected: 06/08/24 1528     Order Status: Sent  Specimen: Swab from Abdomen  Updated: 06/09/24 0416     Culture, Wound [[id number]]  Collected: 06/08/24 1528     Order Status: Canceled  Specimen: Swab from Abdomen        Culture, Fungus [[id number]]  Collected: 06/08/24 1525     Order Status: Sent  Specimen: Swab from Abdomen  Updated: 06/09/24 0417     Culture, Wound [[id number]]  (Abnormal)  Collected: 06/08/24 1525     Order Status: Completed  Specimen: Swab from Abdomen  Updated: 06/11/24 1310        Special Requests  No Special Requests              Gram Stain  1+ WBCs seen                 1+ Gram Negative Rods              Culture              Heavy BMIX Anaerobic gram negative rods              Culture, Wound [[id number]]  (Abnormal)  Collected: 06/08/24 1524     Order Status: Completed  Specimen: Wound  Updated: 06/11/24 1309        Special Requests  No Special Requests              Gram Stain  2+ WBCs seen                 2+ GRAM VARIABLE RODS              Culture              Heavy >2 organisms - likely contaminated specimen. Anaerobic gram negative rods              Culture, Urine [[id number]]  Collected: 06/08/24 1515     Order Status: Canceled  Specimen: Urine        Culture, Urine [[id number]]  (Abnormal)  Collected: 06/08/24 1454     Order Status: Completed  Specimen: Urine, indwelling catheter  Updated: 06/12/24 0928        Special Requests  No Special Requests              [place] count  50,000              [place] count  colonies/ml              Culture  Jael lusitaniae           Culture, Urine [[id number]]  Collected: 06/02/24 1550     Order Status: Completed  Specimen: Urine, indwelling catheter  Updated: 06/03/24 1847        Special Requests  No Special Requests              Culture  No Growth (<1000 cfu/mL)                    Diagnostic   XR

## 2024-06-18 NOTE — FLOWSHEET NOTE
06/18/24 1101   Treatment Team Notification   Reason for Communication Change in status  (Sepsis Risk score >=62)   Name of Team Member Notified Dr Pineda   Treatment Team Role Attending Provider   Method of Communication Call   Response No new orders   Notification Time 1102

## 2024-06-18 NOTE — PROGRESS NOTES
PROGRESS NOTE      Chief Complaints:  No new complaints.  HPI and  Objective:    No fever.  Review of Systems:  Rest of review of system reviewed personally and they are negative.    EXAM:  /61   Pulse 76   Temp 98.2 °F (36.8 °C) (Oral)   Resp 18   Ht 1.727 m (5' 8\")   Wt 127.1 kg (280 lb 3.3 oz)   SpO2 99%   BMI 42.60 kg/m²     Patient is awake   Head and neck atraumatic, normocephalic.  ENT: No hoarse voice, gaze appropriate.  Cardiac system regular rate rhythm.  Pulmonary no audible wheeze, no cyanosis.  Chest wall excursion normal with respiration cycle  Abdomen is soft not particularly distended.  Neurologically nonfocal.  Hematology system: No bruising noted.  Musculoskeletal system: No joint deformity noted.  Genitourinary system: No hematuria noted.  Skin is warm and moist.  Psychosocial: Cooperative.  Vascular examination as previously noted no changes.    Current Facility-Administered Medications   Medication Dose Route Frequency Provider Last Rate Last Admin    HYDROmorphone HCl PF (DILAUDID) injection 0.5 mg  0.5 mg IntraVENous Q3H PRN Tommie Pineda MD        HYDROmorphone HCl PF (DILAUDID) injection 1 mg  1 mg IntraVENous Q3H PRN Tommie Pineda MD   1 mg at 06/18/24 0531    PN-Adult Premix 8/14 - Standard Electrolytes - Central Line   IntraVENous Continuous TPN Tommie Pineda MD 62 mL/hr at 06/17/24 2103 New Bag at 06/17/24 2103    diatrizoate meglumine-sodium (GASTROGRAFIN) 66-10 % solution 30 mL  30 mL Per NG tube ONCE PRN Javi Loepz MD   30 mL at 06/17/24 1013    piperacillin-tazobactam (ZOSYN) 4,500 mg in sodium chloride 0.9 % 100 mL IVPB (mini-bag)  4,500 mg IntraVENous Q8H Antonio Butler MD 25 mL/hr at 06/18/24 0430 4,500 mg at 06/18/24 0430    lidocaine 4 % external patch 1 patch  1 patch TransDERmal Daily Javi Lopez MD   1 patch at 06/17/24 1013    potassium chloride (KLOR-CON M) extended release tablet 40 mEq  40 mEq Oral PRN Javi Lopez

## 2024-06-18 NOTE — PLAN OF CARE
OCCUPATIONAL THERAPY TREATMENT  Patient: Maddie Goldberg (63 y.o. 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]  Large bowel ischemia (HCC) [K55.9]  Procedure(s) (LRB):  EXPLORATORY LAPAROTOMY WITH ILEOSTOMY AND WOUND VAC PLACEMENT (N/A) 9 Days Post-Op   Precautions: Fall Risk, Surgical Protocols                Recommendations for nursing mobility: Encourage HEP in prep for ADLs/mobility; see handout for details, Frequent repositioning to prevent skin breakdown, Use of bed/chair alarm for safety, and Assist x2    In place during session: Peripheral IV, Quiroga Catheter, LIONEL drain 3#, Wound vac abdominal area, and EKG/telemetry   Chart, occupational therapy assessment, plan of care, and goals were reviewed.    ASSESSMENT  Patient continues with skilled OT services and is progressing towards goals.  Pt received semi-supine in bed upon arrival, AXO x 4 and agreeable to HAMMONDS/PTA tx at this time. Pt cooperative and demonstrated fair effort during activities with additional time d/t pain and weakness. Pt req'd mod A x2 for al bed mobility<>EOB with noted improvement since Eval. Pt req'd verbal/manual/tactile cues for long rolling to decreased pain in abdominal area. Pt sat EOB for ~10-15 minutes with fair sitting balance while performing self care with CGA/set-up. Pt engaged in unilateral UE exercises with education and cueing provided regarding joint protection/proper technique/pacing self needed to maintain/improve strength to increase performance in ADL'S and functional tf's, see grid below.  In long sitting , pt req'd TA for feeding jello d/t decreased activity tolerance however anticipate pt will improve to SBA/set-up.  (See below for objective details and assist levels)     Overall, pt limited by generalized weakness that interferes with performance in ADL's and functional tf's safely.  Will continue to progress. Potential barriers for safe discharge pts current support system is unable to  meet their requirements for physical assistance, pt is a high fall risk, pt is not safe to be alone, and concern for pt safely navigating or managing the home environment. Current OT recommendations for discharge [facility] .           Start of session End of session   SPO2 (%) 97    Heart Rate (BPM) 78          GOALS:    Problem: Occupational Therapy - Adult  Goal: By Discharge: Performs self-care activities at highest level of function for planned discharge setting.  See evaluation for individualized goals.  Description: FUNCTIONAL STATUS PRIOR TO ADMISSION:  Pt reports IND w/ ADLs, IADLs and mobility tasks prior to admission. 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     HOME SUPPORT: The patient lived alone with no local support.    Occupational Therapy Goals:  Initiated 6/17/2024  Patient/Family stated goal: Improve strength for increased engagement in oob mobility tasks  1.  Patient will perform grooming with [place] within 7 day(s).  2.  Patient will perform upper body dressing with [place] within 7 day(s).  3.  Patient will perform lower body dressing with Modified [place] within 7 day(s).  4.  Patient will perform toilet transfers with Modified [place]  within 7 day(s).  5.  Patient will perform all aspects of toileting with Modified [place] within 7 day(s).  6.  Patient will participate in upper extremity therapeutic exercise/activities with [place] within 7 day(s).      Outcome: Progressing            PLAN :  Patient continues to benefit from skilled intervention to address functional impairments.  Continue treatment per established plan of care to address goals.    Recommend next OT session: UB dressing, LB dressing, and Seated grooming    Recommendation for discharge: (in order for the patient to meet his/her long term goals): Inpatient Rehabilitation Facility     IF patient discharges home will need the following DME:  continuing to assess with progress       SUBJECTIVE:   Patient stated “My back hurts.”    OBJECTIVE DATA SUMMARY:   Cognitive/Behavioral Status:  Orientation  Orientation Level: Oriented X4  Cognition  Overall Cognitive Status: Exceptions  Arousal/Alertness: Appears intact  Following Commands: Follows one step commands consistently  Attention Span: Attends with cues to redirect  Memory: Appears intact  Safety Judgement: Appears intact  Insights: Fully aware of deficits  Initiation: Requires cues for some  Sequencing: Requires cues for some    Functional Mobility and Transfers for ADLs:  Bed Mobility:  Bed Mobility Training  Bed Mobility Training: Yes  Rolling: Moderate assistance;Assist X2  Supine to Sit: Moderate assistance;Assist X2  Sit to Supine: Moderate assistance;Assist X2  Scooting: Contact-guard assistance;Assist X2    Transfers:  Transfer Training  Transfer Training: No      Balance:  Balance  Sitting: Impaired  Sitting - Static: Fair (occasional)  Sitting - Dynamic: Fair (occasional)      ADL Intervention:         Feeding: Dependent/Total   Feeding Skilled Clinical Factors: Assist with eating jello d/t decreased activity tolerance, however anticipate pt will be SBA/set-up     Grooming: Contact guard assistance;Setup   Grooming Skilled Clinical Factors: EOB, facial care and combing hair       LE Dressing: Dependent/Total  LE Dressing Skilled Clinical Factors: Dependent to don socks seated EOB       Therapeutic exercise:    Exercise Sets Reps AROM AAROM PROM Self PROM Comments   Chest press 1 10 [x] [] [] [] EOB        Pain Ratin/10 lower back  Pain Intervention(s):   rest and repositioning    Activity Tolerance:   Fair  and requires frequent rest breaks    After treatment patient left in no apparent distress:   Bed locked and returned to lowest position, Patient left in no apparent distress in bed, Call bell within reach, Bed/ chair alarm activated, Caregiver / family present, and Side rails x3, and

## 2024-06-18 NOTE — PROGRESS NOTES
Progress Note  Date:2024       Room:[facility]  Patient Name:Maddie Goldberg     YOB: 1960     Age:63 y.o.        Subjective    Subjective  Patient followed for Candida UTI on high dose Fluconazole. Still with low grade temperatures.  Repeat urinalysis showed improvement. Body fluid culture still pending. Afebrile with persistent leukocytosis and elevated procal and CRP.  Currently on Fluconazole and Zosyn. Transferred out of the ICU.  Repeat CT scan showed no drainable fluid.   Objective         Vitals Last 24 Hours:  TEMPERATURE:  Temp  Av.5 °F (36.9 °C)  Min: 98.4 °F (36.9 °C)  Max: 98.8 °F (37.1 °C)  RESPIRATIONS RANGE: Resp  Av.6  Min: 14  Max: 18  PULSE OXIMETRY RANGE: SpO2  Av %  Min: 95 %  Max: 97 %  PULSE RANGE: Pulse  Av.7  Min: 68  Max: 81  BLOOD PRESSURE RANGE: Systolic (24hrs), Av , Min:103 , Max:110   ; Diastolic (24hrs), Av, Min:60, Max:63         Objective:  Vital signs: (most recent): Blood pressure 103/63, pulse 81, temperature 98.8 °F (37.1 °C), resp. rate 16, height 1.727 m (5' 8\"), weight 127.1 kg (280 lb 3.3 oz), SpO2 95 %.     Vitals and nursing note reviewed.   Constitutional:       General: She is not in acute distress.     Appearance: She is ill-appearing.   HENT:      Head: Atraumatic.      Right Ear: External ear normal.      Left Ear: External ear normal.      Nose: Nose normal.      Mouth/Throat:      Pharynx: Oropharynx is clear.   Eyes:      Extraocular Movements: Extraocular movements intact.      Pupils: Pupils are equal, round, and reactive to light.   Cardiovascular:      Rate and Rhythm: Normal rate and regular rhythm.      Heart sounds: Normal heart sounds. No murmur heard.  Pulmonary:      Effort: Pulmonary effort is normal.      Breath sounds: Normal breath sounds.   Abdominal:      General: There is no distension.      Palpations: There is no mass.      Tenderness: There is no abdominal tenderness. There is no guarding.            Comments: Decreased BS     Midline wound vac in place  Right sided LIONEL drains #1 and #2 with serous effluent  Right sided colostomy with dark stool  Left sided LIONEL drain #3 with cloudy serosanguinous fluid  Left sided colostomy with dark stool   Genitourinary:     Comments: Indwelling Quiroga catheter with clear urine  Musculoskeletal:      Cervical back: Neck supple.      Right lower leg: Edema present.      Left lower leg: Edema present.   Skin:     Findings: No rash.   Neurological:      Comments: somnolent   Psychiatric:      Comments: somnolent   Labs/Imaging/Diagnostics    Labs:  CBC:  Recent Labs     06/15/24  0214 06/16/24  0338 06/17/24  0738   WBC 17.4* 15.9* 17.6*   RBC 2.84* 2.66* 2.99*   HGB 9.3* 9.0* 10.1*   HCT 29.6* 28.1* 30.6*   .2* 105.6* 102.3*   RDW 13.6 13.6 13.7   * 455* 645*       CHEMISTRIES:  Recent Labs     06/15/24  0214 06/16/24  0338 06/16/24  0800 06/17/24  0738    127* 137 136   K 3.2* 5.0 3.5 3.4*    96* 103 100   CO2 31 29 32 31   BUN 19 19 20 21*   CREATININE 0.48* 0.67 0.51* 0.53*   GLUCOSE 112* 681* 111* 91   PHOS 2.7 5.4*  --  3.1   MG 1.8 2.2  --  2.1        Latest Reference Range & Units 06/02/24 09:50 06/13/24 22:22   Color, UA -   Yellow Yellow/Straw   Glucose, Ur Negative mg/dL Negative Negative   Bilirubin, Urine Negative   Small ! Negative   Ketones, Urine Negative mg/dL 15 ! Negative   Specific Gravity, UA 1.003 - 1.030   1.020 1.016   Blood, Urine Negative   Small ! Negative   Protein, UA Negative mg/dL Trace ! Negative   Urobilinogen 0.1 - 1.0 EU/dL 4.0 (H) 2.0 (H)   Nitrite, Urine Negative   Negative Negative   Leukocyte Esterase, Urine Negative   Small ! Negative   Appearance Clear   Cloudy ! Clear   pH, Urine 5.0 - 8.0   6.0 7.0   Mucus, UA /lpf  Trace   WBC, UA 0 - 4 /hpf 10-20 0-4   RBC, UA 0 - 5 /hpf 10-20 0-5   Epithelial Cells, UA Few /lpf Few Few   Bacteria, UA Negative /hpf Negative Negative      Procal 0.08 <0.09 < 0.14  CRP 16.70 <15.90  <23.00 <22.10    Urine culture (6/8) 50,000 col/ml Candida lusitaniae  Wound culture surgical excision (6/8) Heavy >2 organisms including anaerobic Gram negative rods FINAL  Wound culture (6/8) Heavy BMIX anaerobic Gram negative rods FINAL  Fungus culture Abdomen (6/8) No fungus isolated in 8 days  Fungus culture Abdomen (6/8) No fungus isolated in 8 days  Body fluid (6/13) No growth thus far    Imaging Last 24 Hours:     CT Abdomen Pelvis (6/17)    IMPRESSION:  Mesenteric inflammation with trace scattered postsurgical fluid and gas but no  drainable abscess.  Incidental cholelithiasis without gallbladder inflammation.  No significant bowel distention or inflammation.        CT Abdomen Pelvis (6/6) IMPRESSION:     1. Moderate small bowel distention likely representing postoperative ileus.  2. Mild free intraperitoneal air likely postoperative change. Small amount of  focal air and fluid anterior to the surgical anastomosis may represent  postoperative change. However, small leak is not excluded.  3. Significant fecal retention in the remaining large bowel with focal  stricturing in the rectosigmoid colon.  4. Cholelithiasis.  5. Bibasilar atelectasis with 4 mm right middle lobe lung nodule.          Assessment//Plan           Hospital Problems             Last Modified POA    * (Principal) Cecal volvulus (HCC) 6/2/2024 Yes    Large bowel ischemia (HCC) 6/2/2024 Yes    Hypotension due to hypovolemia 6/10/2024 Yes    Mass of colon 6/10/2024 Yes    Leak of anastomosis between gastrointestinal structures 6/10/2024 Yes    Adhesion of intestine 6/10/2024 Yes    Open wound of abdomen 6/10/2024 Yes    Acute respiratory failure with hypoxia (HCC) 6/10/2024 Yes    Candida UTI secondary to Candida lusitaniae, Day #9 IV Fluconazole   Rule out intra-abdominal infection  Cecal volvulus  Status post exploratory laparotomy with midline wound vac, bilateral colostomies, LIONEL drains x 3  Hepatorenal syndrome  SIRS/Sepsis with

## 2024-06-18 NOTE — PLAN OF CARE
PHYSICAL THERAPY TREATMENT     Patient: Maddie Goldberg (63 y.o. 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]  Large bowel ischemia (HCC) [K55.9] Cecal volvulus (HCC)  Procedure(s) (LRB):  EXPLORATORY LAPAROTOMY WITH ILEOSTOMY AND WOUND VAC PLACEMENT (N/A) 9 Days Post-Op  Precautions: Fall Risk, Surgical Protocols                      Recommendations for nursing mobility: Encourage HEP in prep for ADLs/mobility; see handout for details, Frequent repositioning to prevent skin breakdown, Use of bed/chair alarm for safety, Assist x1, and Assist x2    In place during session: PICC line, Quiroga Catheter, LIONEL drain 3#, Wound vac abdomin, and EKG/telemetry   Chart, physical therapy assessment, plan of care and goals were reviewed.  ASSESSMENT  Patient continues with skilled PT services and is progressing towards goals. Pt supine in bed upon PT arrival, agreeable to session. (See below for objective details and assist levels).     Overall pt tolerated session fair today. Pt showed improvement with overall mobility today. Pt performed log roll to get to eob and required mod Ax2. Pt sat eob ~10-15 mins with fair sitting balance requiring occasional assistance to maintain proper sitting balance. Pt performed seated heel raises with no complaints of pain or discomfort. Pt laid back down in bed requiring mod Ax2. Pt left supine in bed with all needs met. Will continue to benefit from skilled PT services, and will continue to progress as tolerated. Potential barriers for safe discharge:. Current PT DC recommendation  SNF vs IRF  once medically appropriate.    GOALS:  Problem: Physical Therapy - Adult  Goal: By Discharge: Performs mobility at highest level of function for planned discharge setting.  See evaluation for individualized goals.  Description: FUNCTIONAL STATUS PRIOR TO ADMISSION: Patient was independent and active without use of DME.    HOME SUPPORT PRIOR TO ADMISSION: The patient lived alone  but did not require assistance.    Physical Therapy Goals  Initiated 6/6/2024  Reassess 6/13 after abd sx  Pt stated goal: To go home  Pt will be I with LE HEP in 7 days.  Pt will perform bed mobility with min A in 7 days.  Pt will perform transfers with min-mod A in 7 days.   Pt will amb 150 feet with LRAD safely with [place] in 7 days.  Outcome: Progressing     PLAN :  Patient continues to benefit from skilled intervention to address functional impairments. Continue treatment per established plan of care to address goals.    Recommendation for discharge: (in order for the patient to meet his/her long term goals)  SNF vs IRF    IF patient discharges home will need the following DME:continuing to assess with progress     SUBJECTIVE:   Patient stated “I did enough therapy today.”    OBJECTIVE DATA SUMMARY:   Critical Behavior:  Orientation  Orientation Level: Oriented X4  Cognition  Overall Cognitive Status: Exceptions  Arousal/Alertness: Appears intact  Following Commands: Follows one step commands consistently  Attention Span: Attends with cues to redirect  Memory: Appears intact  Safety Judgement: Appears intact  Insights: Fully aware of deficits  Initiation: Requires cues for some  Sequencing: Requires cues for some  Functional Mobility Training:  Bed Mobility:  Bed Mobility Training  Bed Mobility Training: Yes  Rolling: Moderate assistance;Assist X2  Supine to Sit: Moderate assistance;Assist X2  Sit to Supine: Moderate assistance;Assist X2  Scooting: Contact-guard assistance;Assist X2  Transfers:  Transfer Training  Transfer Training: No  Balance:  Balance  Sitting: Impaired  Sitting - Static: Fair (occasional)  Sitting - Dynamic: Fair (occasional)  Wheelchair Mobility:  Wheelchair Management  Wheelchair Management: No  Ambulation/Gait Training:  Gait  Gait Training: No    Therapeutic Exercises:     EXERCISE   Sets   Reps   Active Active Assist   Passive Self ROM   Comments   Ankle Pumps   [] [] [] []

## 2024-06-18 NOTE — PROGRESS NOTES
Demonstrated pouch change for RLQ ileostomy to patient's son, Krishna, and his wife.  Reviewed/demonstrated the following:  -Measuring the stoma  -Cutting wafer to fit stoma and wafer and pouch application.  -Empty / burp pouch when 1/3-1/2 full of stool or gas.  -Change appliance (wafer and pouch) 2-3 per week.  If leaking, change as soon as possible to prevent skin irritation.  -Best time for routine change of appliance is first thing in the morning before consuming food or liquids (depending on which type of ostomy).  -Clean stoma and peristomal skin with plain water or NS, may use a mild soap.  No soaps with lotions as they may prevent adhesive from adhering to skin.  May bathe with appliance on or off.  -Purposes and how to use barrier rings, stoma paste, and elastic barrier strips.   -Stoma should always be red and moist.  If stoma appears dry and dusky, notify surgeon immediately.     All questions posed related to ostomy care were answered.  Will continue to follow for pouching needs and ostomy care education.      Pouch was changed to LLQ mucous fistula.

## 2024-06-19 LAB
ALBUMIN SERPL-MCNC: 1.7 G/DL (ref 3.5–5)
ALBUMIN SERPL-MCNC: 1.7 G/DL (ref 3.5–5)
ALBUMIN/GLOB SERPL: 0.4 (ref 1.1–2.2)
ALP SERPL-CCNC: 183 U/L (ref 45–117)
ALT SERPL-CCNC: 40 U/L (ref 12–78)
ANION GAP SERPL CALC-SCNC: 5 MMOL/L (ref 5–15)
AST SERPL W P-5'-P-CCNC: 47 U/L (ref 15–37)
BASOPHILS # BLD: 0.2 K/UL (ref 0–0.1)
BASOPHILS NFR BLD: 1 % (ref 0–1)
BILIRUB DIRECT SERPL-MCNC: 0.2 MG/DL (ref 0–0.2)
BILIRUB SERPL-MCNC: 0.3 MG/DL (ref 0.2–1)
BUN SERPL-MCNC: 23 MG/DL (ref 6–20)
BUN/CREAT SERPL: 40 (ref 12–20)
CA-I BLD-MCNC: 8 MG/DL (ref 8.5–10.1)
CHLORIDE SERPL-SCNC: 103 MMOL/L (ref 97–108)
CO2 SERPL-SCNC: 26 MMOL/L (ref 21–32)
CREAT SERPL-MCNC: 0.57 MG/DL (ref 0.55–1.02)
CRP SERPL-MCNC: 14.8 MG/DL (ref 0–0.3)
DIFFERENTIAL METHOD BLD: ABNORMAL
EOSINOPHIL # BLD: 0.3 K/UL (ref 0–0.4)
EOSINOPHIL NFR BLD: 2 % (ref 0–7)
ERYTHROCYTE [DISTWIDTH] IN BLOOD BY AUTOMATED COUNT: 13.9 % (ref 11.5–14.5)
FUNGITELL INTERPRETATION: NORMAL
FUNGITELL: <31.25 PG/ML
GLOBULIN SER CALC-MCNC: 4 G/DL (ref 2–4)
GLUCOSE BLD STRIP.AUTO-MCNC: 112 MG/DL (ref 65–100)
GLUCOSE BLD STRIP.AUTO-MCNC: 116 MG/DL (ref 65–100)
GLUCOSE BLD STRIP.AUTO-MCNC: 124 MG/DL (ref 65–100)
GLUCOSE BLD STRIP.AUTO-MCNC: 127 MG/DL (ref 65–100)
GLUCOSE SERPL-MCNC: 110 MG/DL (ref 65–100)
HCT VFR BLD AUTO: 29.2 % (ref 35–47)
HGB BLD-MCNC: 9.6 G/DL (ref 11.5–16)
IMM GRANULOCYTES # BLD AUTO: 0 K/UL
IMM GRANULOCYTES NFR BLD AUTO: 0 %
LYMPHOCYTES # BLD: 4.6 K/UL (ref 0.8–3.5)
LYMPHOCYTES NFR BLD: 27 % (ref 12–49)
Lab: NORMAL
MAGNESIUM SERPL-MCNC: 2.2 MG/DL (ref 1.6–2.4)
MCH RBC QN AUTO: 33.6 PG (ref 26–34)
MCHC RBC AUTO-ENTMCNC: 32.9 G/DL (ref 30–36.5)
MCV RBC AUTO: 102.1 FL (ref 80–99)
MONOCYTES # BLD: 1.4 K/UL (ref 0–1)
MONOCYTES NFR BLD: 8 % (ref 5–13)
MYELOCYTES NFR BLD MANUAL: 1 %
NEUTS SEG # BLD: 10.4 K/UL (ref 1.8–8)
NEUTS SEG NFR BLD: 61 % (ref 32–75)
NRBC # BLD: 0 K/UL (ref 0–0.01)
NRBC BLD-RTO: 0 PER 100 WBC
PERFORMED BY:: ABNORMAL
PHOSPHATE SERPL-MCNC: 3 MG/DL (ref 2.6–4.7)
PHOSPHATE SERPL-MCNC: 3.1 MG/DL (ref 2.6–4.7)
PLATELET # BLD AUTO: 795 K/UL (ref 150–400)
PMV BLD AUTO: 9.9 FL (ref 8.9–12.9)
POTASSIUM SERPL-SCNC: 4.7 MMOL/L (ref 3.5–5.1)
PROCALCITONIN SERPL-MCNC: 0.07 NG/ML
PROT SERPL-MCNC: 5.7 G/DL (ref 6.4–8.2)
RBC # BLD AUTO: 2.86 M/UL (ref 3.8–5.2)
RBC MORPH BLD: ABNORMAL
REFERENCE VALUE: NORMAL
RESULT: NEGATIVE
SODIUM SERPL-SCNC: 134 MMOL/L (ref 136–145)
TRIGL SERPL-MCNC: 122 MG/DL
WBC # BLD AUTO: 17 K/UL (ref 3.6–11)

## 2024-06-19 PROCEDURE — 6370000000 HC RX 637 (ALT 250 FOR IP): Performed by: SURGERY

## 2024-06-19 PROCEDURE — 97530 THERAPEUTIC ACTIVITIES: CPT

## 2024-06-19 PROCEDURE — 82962 GLUCOSE BLOOD TEST: CPT

## 2024-06-19 PROCEDURE — 80069 RENAL FUNCTION PANEL: CPT

## 2024-06-19 PROCEDURE — 1100000000 HC RM PRIVATE

## 2024-06-19 PROCEDURE — 2580000003 HC RX 258: Performed by: SURGERY

## 2024-06-19 PROCEDURE — 2500000003 HC RX 250 WO HCPCS: Performed by: SURGERY

## 2024-06-19 PROCEDURE — 99232 SBSQ HOSP IP/OBS MODERATE 35: CPT | Performed by: INTERNAL MEDICINE

## 2024-06-19 PROCEDURE — 2580000003 HC RX 258: Performed by: INTERNAL MEDICINE

## 2024-06-19 PROCEDURE — 86140 C-REACTIVE PROTEIN: CPT

## 2024-06-19 PROCEDURE — 99024 POSTOP FOLLOW-UP VISIT: CPT | Performed by: SURGERY

## 2024-06-19 PROCEDURE — 36415 COLL VENOUS BLD VENIPUNCTURE: CPT

## 2024-06-19 PROCEDURE — 6360000002 HC RX W HCPCS: Performed by: INTERNAL MEDICINE

## 2024-06-19 PROCEDURE — 84100 ASSAY OF PHOSPHORUS: CPT

## 2024-06-19 PROCEDURE — 84478 ASSAY OF TRIGLYCERIDES: CPT

## 2024-06-19 PROCEDURE — 83735 ASSAY OF MAGNESIUM: CPT

## 2024-06-19 PROCEDURE — 80076 HEPATIC FUNCTION PANEL: CPT

## 2024-06-19 PROCEDURE — 85025 COMPLETE CBC W/AUTO DIFF WBC: CPT

## 2024-06-19 PROCEDURE — 6360000002 HC RX W HCPCS: Performed by: SURGERY

## 2024-06-19 PROCEDURE — 94761 N-INVAS EAR/PLS OXIMETRY MLT: CPT

## 2024-06-19 PROCEDURE — 84145 PROCALCITONIN (PCT): CPT

## 2024-06-19 RX ORDER — IBUPROFEN 400 MG/1
600 TABLET ORAL EVERY 6 HOURS PRN
Status: DISCONTINUED | OUTPATIENT
Start: 2024-06-19 | End: 2024-06-26 | Stop reason: HOSPADM

## 2024-06-19 RX ORDER — OXYCODONE AND ACETAMINOPHEN 10; 325 MG/1; MG/1
1 TABLET ORAL EVERY 4 HOURS PRN
Status: DISCONTINUED | OUTPATIENT
Start: 2024-06-19 | End: 2024-06-21

## 2024-06-19 RX ADMIN — PIPERACILLIN AND TAZOBACTAM 4500 MG: 4; .5 INJECTION, POWDER, LYOPHILIZED, FOR SOLUTION INTRAVENOUS at 03:56

## 2024-06-19 RX ADMIN — FLUCONAZOLE 400 MG: 2 INJECTION, SOLUTION INTRAVENOUS at 09:41

## 2024-06-19 RX ADMIN — HYDROMORPHONE HYDROCHLORIDE 1 MG: 1 INJECTION, SOLUTION INTRAMUSCULAR; INTRAVENOUS; SUBCUTANEOUS at 00:19

## 2024-06-19 RX ADMIN — ENOXAPARIN SODIUM 30 MG: 100 INJECTION SUBCUTANEOUS at 20:09

## 2024-06-19 RX ADMIN — ENOXAPARIN SODIUM 30 MG: 100 INJECTION SUBCUTANEOUS at 09:39

## 2024-06-19 RX ADMIN — HYDROMORPHONE HYDROCHLORIDE 1 MG: 1 INJECTION, SOLUTION INTRAMUSCULAR; INTRAVENOUS; SUBCUTANEOUS at 06:45

## 2024-06-19 RX ADMIN — SODIUM CHLORIDE, PRESERVATIVE FREE 10 ML: 5 INJECTION INTRAVENOUS at 09:39

## 2024-06-19 RX ADMIN — FAMOTIDINE 20 MG: 20 TABLET, FILM COATED ORAL at 09:39

## 2024-06-19 RX ADMIN — MEROPENEM 1000 MG: 1 INJECTION, POWDER, FOR SOLUTION INTRAVENOUS at 20:09

## 2024-06-19 RX ADMIN — HYDROMORPHONE HYDROCHLORIDE 1 MG: 1 INJECTION, SOLUTION INTRAMUSCULAR; INTRAVENOUS; SUBCUTANEOUS at 03:56

## 2024-06-19 RX ADMIN — IBUPROFEN 600 MG: 400 TABLET, FILM COATED ORAL at 17:20

## 2024-06-19 RX ADMIN — LEVOTHYROXINE SODIUM 150 MCG: 0.07 TABLET ORAL at 06:24

## 2024-06-19 RX ADMIN — HYDROMORPHONE HYDROCHLORIDE 1 MG: 1 INJECTION, SOLUTION INTRAMUSCULAR; INTRAVENOUS; SUBCUTANEOUS at 12:02

## 2024-06-19 RX ADMIN — FUROSEMIDE 20 MG: 10 INJECTION, SOLUTION INTRAMUSCULAR; INTRAVENOUS at 17:19

## 2024-06-19 RX ADMIN — OXYCODONE HYDROCHLORIDE AND ACETAMINOPHEN 1 TABLET: 10; 325 TABLET ORAL at 20:08

## 2024-06-19 RX ADMIN — IBUPROFEN 600 MG: 400 TABLET, FILM COATED ORAL at 22:48

## 2024-06-19 RX ADMIN — SODIUM CHLORIDE, PRESERVATIVE FREE 20 MG: 5 INJECTION INTRAVENOUS at 20:08

## 2024-06-19 RX ADMIN — FUROSEMIDE 20 MG: 10 INJECTION, SOLUTION INTRAMUSCULAR; INTRAVENOUS at 09:39

## 2024-06-19 RX ADMIN — SODIUM CHLORIDE, PRESERVATIVE FREE 10 ML: 5 INJECTION INTRAVENOUS at 20:20

## 2024-06-19 RX ADMIN — OXYCODONE HYDROCHLORIDE AND ACETAMINOPHEN 1 TABLET: 10; 325 TABLET ORAL at 15:06

## 2024-06-19 RX ADMIN — PIPERACILLIN AND TAZOBACTAM 4500 MG: 4; .5 INJECTION, POWDER, LYOPHILIZED, FOR SOLUTION INTRAVENOUS at 12:05

## 2024-06-19 ASSESSMENT — PAIN DESCRIPTION - ORIENTATION
ORIENTATION: MID

## 2024-06-19 ASSESSMENT — PAIN DESCRIPTION - DESCRIPTORS
DESCRIPTORS: ACHING
DESCRIPTORS: ACHING;DISCOMFORT
DESCRIPTORS: ACHING
DESCRIPTORS: ACHING;DISCOMFORT
DESCRIPTORS: ACHING
DESCRIPTORS: ACHING;DISCOMFORT
DESCRIPTORS: ACHING;DISCOMFORT

## 2024-06-19 ASSESSMENT — PAIN - FUNCTIONAL ASSESSMENT
PAIN_FUNCTIONAL_ASSESSMENT: PREVENTS OR INTERFERES SOME ACTIVE ACTIVITIES AND ADLS

## 2024-06-19 ASSESSMENT — PAIN SCALES - GENERAL
PAINLEVEL_OUTOF10: 5
PAINLEVEL_OUTOF10: 8
PAINLEVEL_OUTOF10: 4
PAINLEVEL_OUTOF10: 3
PAINLEVEL_OUTOF10: 8
PAINLEVEL_OUTOF10: 4
PAINLEVEL_OUTOF10: 4
PAINLEVEL_OUTOF10: 8
PAINLEVEL_OUTOF10: 8
PAINLEVEL_OUTOF10: 7
PAINLEVEL_OUTOF10: 8
PAINLEVEL_OUTOF10: 8

## 2024-06-19 ASSESSMENT — PAIN SCALES - WONG BAKER
WONGBAKER_NUMERICALRESPONSE: NO HURT
WONGBAKER_NUMERICALRESPONSE: HURTS LITTLE MORE
WONGBAKER_NUMERICALRESPONSE: NO HURT
WONGBAKER_NUMERICALRESPONSE: NO HURT
WONGBAKER_NUMERICALRESPONSE: HURTS WHOLE LOT

## 2024-06-19 ASSESSMENT — PAIN DESCRIPTION - LOCATION
LOCATION: ABDOMEN

## 2024-06-19 NOTE — PROGRESS NOTES
PROGRESS NOTE      Chief Complaints:  Patient examined this morning.  HPI and  Objective:    Patient doing well.  Tolerating liquid diet.  No fever.  Wound VAC is intact.  Review of Systems:  Rest of review of system reviewed personally and they are negative.    EXAM:  /72   Pulse 79   Temp 98.4 °F (36.9 °C) (Oral)   Resp 18   Ht 1.727 m (5' 8\")   Wt 128.5 kg (283 lb 4.7 oz)   SpO2 99%   BMI 43.07 kg/m²     Patient is awake   Head and neck atraumatic, normocephalic.  ENT: No hoarse voice, gaze appropriate.  Cardiac system regular rate rhythm.  Pulmonary no audible wheeze, no cyanosis.  Chest wall excursion normal with respiration cycle  Abdomen is soft not particularly distended.  Neurologically nonfocal.  Hematology system: No bruising noted.  Musculoskeletal system: No joint deformity noted.  Genitourinary system: No hematuria noted.  Skin is warm and moist.  Psychosocial: Cooperative.  Vascular examination as previously noted no changes.    Current Facility-Administered Medications   Medication Dose Route Frequency Provider Last Rate Last Admin    HYDROmorphone HCl PF (DILAUDID) injection 0.5 mg  0.5 mg IntraVENous Q3H PRN Tommie Pineda MD        HYDROmorphone HCl PF (DILAUDID) injection 1 mg  1 mg IntraVENous Q3H PRN Tommie Pineda MD   1 mg at 06/19/24 0645    diatrizoate meglumine-sodium (GASTROGRAFIN) 66-10 % solution 30 mL  30 mL Per NG tube ONCE PRN Javi Lopez MD   30 mL at 06/17/24 1013    piperacillin-tazobactam (ZOSYN) 4,500 mg in sodium chloride 0.9 % 100 mL IVPB (mini-bag)  4,500 mg IntraVENous Q8H Antonio Butler MD 25 mL/hr at 06/19/24 0356 4,500 mg at 06/19/24 0356    lidocaine 4 % external patch 1 patch  1 patch TransDERmal Daily Javi Lopez MD   1 patch at 06/18/24 0929    potassium chloride (KLOR-CON M) extended release tablet 40 mEq  40 mEq Oral PRN Javi Lopez MD   40 mEq at 06/18/24 0928    Or    potassium bicarb-citric acid (EFFER-K)  effervescent tablet 40 mEq  40 mEq Oral PRN Javi Lopez MD        Or    potassium chloride 10 mEq/100 mL IVPB (Peripheral Line)  10 mEq IntraVENous PRN Javi Lopez MD        naloxone 0.4 mg in 10 mL sodium chloride syringe   IntraVENous PRN Tommie Pineda MD        HYDROmorphone HCl PF (DILAUDID) injection 1 mg  1 mg IntraVENous Once Tommie Pineda MD        furosemide (LASIX) injection 20 mg  20 mg IntraVENous BID Tommie Pineda MD   20 mg at 06/18/24 1807    fluconazole (DIFLUCAN) in 0.9 % sodium chloride IVPB 400 mg  400 mg IntraVENous Q24H Tommie Pineda  mL/hr at 06/18/24 0928 400 mg at 06/18/24 0928    sodium chloride flush 0.9 % injection 5-40 mL  5-40 mL IntraVENous 2 times per day Tommie Pineda MD   10 mL at 06/18/24 2053    0.9 % sodium chloride infusion   IntraVENous PRN Tommie Pineda MD   Stopped at 06/09/24 2145    enoxaparin Sodium (LOVENOX) injection 30 mg  30 mg SubCUTAneous BID Tommie Pineda MD   30 mg at 06/18/24 2035    ondansetron (ZOFRAN-ODT) disintegrating tablet 4 mg  4 mg Oral Q8H PRN Tommie Pineda MD        Or    ondansetron (ZOFRAN) injection 4 mg  4 mg IntraVENous Q6H PRN Tommie Pineda MD   4 mg at 06/18/24 1259    lidocaine (XYLOCAINE) 2 % uro-jet   Topical Once Tommie Pineda MD        levothyroxine (SYNTHROID) tablet 150 mcg  150 mcg Oral QAM AC Kylah Rice MD   150 mcg at 06/19/24 0624    sodium chloride flush 0.9 % injection 5-40 mL  5-40 mL IntraVENous PRN Kylah Rice MD   10 mL at 06/02/24 2038    famotidine (PEPCID) tablet 20 mg  20 mg Oral BID Kylah Rice MD   20 mg at 06/18/24 0929    Or    famotidine (PEPCID) 20 mg in sodium chloride (PF) 0.9 % 10 mL injection  20 mg IntraVENous BID Kylah Rice MD   20 mg at 06/18/24 2028           Recent Results (from the past 24 hour(s))   Magnesium    Collection Time: 06/18/24  8:19 AM   Result Value Ref Range    Magnesium 2.2 1.6 - 2.4 mg/dL   Renal Function Panel    Collection Time:

## 2024-06-19 NOTE — PROGRESS NOTES
OCCUPATIONAL THERAPY TREATMENT  Patient: Maddie Goldberg (63 y.o. 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]  Large bowel ischemia (HCC) [K55.9]  Procedure(s) (LRB):  EXPLORATORY LAPAROTOMY WITH ILEOSTOMY AND WOUND VAC PLACEMENT (N/A) 10 Days Post-Op   Precautions: Fall Risk, Surgical Protocols                Recommendations for nursing mobility: Out of bed to chair for meals, Encourage HEP in prep for ADLs/mobility; see handout for details, Frequent repositioning to prevent skin breakdown, Use of bed/chair alarm for safety, Use of BSC for toileting , AD and gt belt for bed to chair , and Assist x2    In place during session: Peripheral IV, LIONEL drain 3#, and Wound vac Abdomen  Chart, occupational therapy assessment, plan of care, and goals were reviewed.  ASSESSMENT  Patient continues with skilled OT services and is slowly progressing towards goals. Pt presented in recliner upon HAMMONDS/PTA arrival, agreeable to session. Pt A&O x 4. Pt demonstrated difficulty following simple one step commands.  Pt wanting to sit after  taking a few steps (when correction between recliner and bed).  Pt required max verbal cues for safety and to get to EOB along with management of walker.  Pt instructed in log rolling but kept  pushing herself back up when instructed to go down on her elbow and then shoulder.   Once supine in bed pt kept falling asleep when therapist educating on HEP.  Pt's family member verbalized understanding and will work with pt when awake.  Activities completed in prep for functional transfers and to increase strength/endurance for basic self care. Pt left supine in bed with call bell and all needs met. (See below for objective details and assist levels).     Overall pt tolerated session fair/poor today with increase difficulty following simple commands.  Potential barriers for safe discharge: pts current support system is unable to meet their requirements for physical assistance, pt has  assess with progress     SUBJECTIVE:   Patient stated “Can I sit.”      OBJECTIVE DATA SUMMARY:   Cognitive/Behavioral Status:  Orientation  Orientation Level: Oriented X4  Cognition  Overall Cognitive Status: Exceptions  Arousal/Alertness: Appears intact  Following Commands: Follows one step commands with increased time;Follows one step commands with repetition  Attention Span: Attends with cues to redirect;Difficulty attending to directions  Safety Judgement: Appears intact  Problem Solving: Decreased awareness of errors;Assistance required to generate solutions;Assistance required to identify errors made;Assistance required to implement solutions;Assistance required to correct errors made  Insights: Fully aware of deficits  Initiation: Requires cues for some  Sequencing: Requires cues for some    Functional Mobility and Transfers for ADLs:  Bed Mobility:  Bed Mobility Training  Bed Mobility Training: Yes  Sit to Supine: Maximum assistance;Assist X2  Scooting: Contact-guard assistance;Assist X2    Transfers:  Transfer Training  Transfer Training: Yes  Sit to Stand: Moderate assistance;Maximum assistance;Assist X2  Stand to Sit: Moderate assistance;Maximum assistance;Assist X2  Stand Pivot Transfers: Moderate assistance;Assist X2      Balance:  Balance  Sitting: Impaired  Sitting - Static: Fair (occasional)  Standing: Impaired  Standing - Static: Fair;Constant support  Standing - Dynamic: Poor;Constant support      ADL Intervention:        Grooming Skilled Clinical Factors: set up facial care         Pain Ratin/10   Pain Intervention(s):   nursing notified and repositioning    Activity Tolerance:   Fair  and Poor    After treatment patient left in no apparent distress:   Bed locked and returned to lowest position, Patient left in no apparent distress in bed, Call bell within reach, Bed/ chair alarm activated, Caregiver / family present, Side rails x3, and Updated patient's board on functional status and

## 2024-06-19 NOTE — ADT AUTH CERT
[facility]  [facility]R [facility] SURGICAL  [address] [place] [id number]  @NPI [id number]  @TID [id number]    Auth number: [id number]  MEMBER ID#  [id number]    RETURN CONTACT:  Pastora Ocampo  Lety. [phone]  Fax: [phone]      ADMITTING:  Kylah Rice MD  NPI:[id number]  HOSPITALIST                         [address] [place] [address]  [id number]     6/2/24-6/10/24     Last edited by Pastora Ocampo on 06/19/24 at 0952     Patient Demographics    Name Patient ID SSN Gender Identity Birth Date   Maddie Goldberg [id number]  Female [birth year] (63 yrs)     Address Phone Email Employer    [address] [employer] [facility] [employer] [address] [phone] (H)  [phone] (M) [email or web address]  [address]      [employer] Race Occupation Emp Status    [employer] (non-) -- Full Time      Reg Status PCP Date Last Verified Next Review Date    Verified -- 06/02/24 07/02/24      Admission Date Discharge Date Admitting Provider     06/02/24 -- Kylah Rice MD       Marital Status Hinduism       None        Emergency Contact 1 Emergency Contact 2 Emergency Contact 3   Shekhar Goldberg (C)  [place]  [phone] (H) [place] Goldberg (Daughter-in-)  [phone] (M) Tracy Chatterjee (Friend)  [phone] (M)     Utilization Reviews       6/3  by Mallory Mccormick RN   Last Updated by Mallory Mccormick, RN on 6/3/2024 1300     Review Status Review Type Created By   In Primary -- Mallory Mccormick RN      Criteria Review   DATE: 6/3/2024  TYPE OF BED: acute surgery     PERTINENT UPDATES:  Patient is POD#1 s/p Procedure(s) (LRB):  EXPLORATORY LAPAROTOMY; RIGHT COLON RESECTION (N/A).      Doing well. Pain well controlled with current pain medications.  Nausea well controlled.  Adequate urine output via Quiroga catheter.  NG tube in place with moderate bilious output.  Patient denies nausea or vomiting.  She denies any flatus or bowel movement.     Receiving IV fluids, iv zosyn, iv toradolo, iv  patient on 6/4/2024, as outlined below:  PHYSICAL EXAM:  General:          Alert, cooperative  EENT:              EOMI. Anicteric sclerae.  Resp:               CTA bilaterally, no wheezing or rales.  No accessory muscle use  CV:                  Regular  rhythm,  No edema  GI:                   Soft, Non distended, Non tender.  +Bowel sounds  Neurologic:       Alert and oriented X 3, normal speech,   Psych:   Good insight. Not anxious nor agitated  Skin:                No rashes.  No jaundice     Reviewed most current lab test results and cultures  YES  Reviewed most current radiology test results   YES  Review and summation of old records today    NO  Reviewed patient's current orders and MAR    YES  PMH/SH reviewed - no change compared to H&P     Procedures: see electronic medical records for all procedures/Xrays and details which were not copied into this note but were reviewed prior to creation of Plan.       LABS:  I reviewed today's most current labs and imaging studies.  Pertinent labs include:        Recent Labs     06/02/24  0949 06/03/24  1300 06/04/24  0919   WBC 8.3 9.8 13.3*   HGB 13.5 12.7 12.9   HCT 39.5 39.2 41.2    335 279           Recent Labs     06/02/24  0949 06/03/24  1300    140   K 3.6 5.0    108   CO2 27 27   GLUCOSE 165* 117*   BUN 15 17   CREATININE 0.74 0.92   CALCIUM 9.2 8.7   MG  --  1.9   PHOS  --  3.9   BILITOT 0.5  --    AST 11*  --    ALT 31  --          Signed: JOSE MARLEY MD                 Electronically signed by Jose Marley MD at 6/4/2024  3:52 PM     Progress Notes by Alejandra Sigala DO at 6/5/2024  3:34 PM    Author: Alejandra Sigala DO Service: General Surgery Author Type: Physician   Filed: 6/5/2024  3:37 PM Date of Service: 6/5/2024  3:34 PM Status: Signed   : Alejandra Sigala DO (Physician)   Expand All Collapse All  Progress Note     Patient: Maddie Goldberg MRN: [id number]  SSN: [id number]    YOB: 1960  Age: 63 y.o.   MCHC 32.9 30.0 - 36.5 g/dL     RDW 13.4 11.5 - 14.5 %     Platelets 542 (H) 150 - 400 K/uL     MPV 10.0 8.9 - 12.9 FL     Nucleated RBCs 0.0 0.0  WBC     nRBC 0.00 0.00 - 0.01 K/uL     Neutrophils % PENDING %     Lymphocytes % PENDING %     Monocytes % PENDING %     Eosinophils % PENDING %     Basophils % PENDING %     Immature Granulocytes % PENDING %     Neutrophils Absolute PENDING K/UL     Lymphocytes Absolute PENDING K/UL     Monocytes Absolute PENDING K/UL     Eosinophils Absolute PENDING K/UL     Basophils Absolute PENDING K/UL     Immature Granulocytes Absolute PENDING K/UL     Differential Type PENDING     Comprehensive Metabolic Panel     Collection Time: 06/10/24  4:17 AM   Result Value Ref Range     Sodium 138 136 - 145 mmol/L     Potassium 4.2 3.5 - 5.1 mmol/L     Chloride 107 97 - 108 mmol/L     CO2 24 21 - 32 mmol/L     Anion Gap 7 5 - 15 mmol/L     Glucose 139 (H) 65 - 100 mg/dL     BUN 14 6 - 20 mg/dL     Creatinine 0.52 (L) 0.55 - 1.02 mg/dL     BUN/Creatinine Ratio 27 (H) 12 - 20       Est, Glom Filt Rate >90 >60 ml/min/1.73m2     Calcium 7.4 (L) 8.5 - 10.1 mg/dL     Total Bilirubin 0.6 0.2 - 1.0 mg/dL     AST 15 15 - 37 U/L     ALT 15 12 - 78 U/L     Alk Phosphatase 88 45 - 117 U/L     Total Protein 4.2 (L) 6.4 - 8.2 g/dL     Albumin 1.3 (L) 3.5 - 5.0 g/dL     Globulin 2.9 2.0 - 4.0 g/dL     Albumin/Globulin Ratio 0.4 (L) 1.1 - 2.2     POC Blood Gas & Ionized Calcium     Collection Time: 06/10/24  4:28 AM   Result Value Ref Range     pH, Arterial 7.41 7.35 - 7.45       pCO2, Arterial 37 35 - 45 mmHg     pO2, Arterial 107 (H) 80 - 100 mmHg     HCO3, Arterial 23 22 - 26 mmol/L     Base deficit, arterial blood 1.5 mmol/L     O2 Sat, Arterial 98 95 - 99 %     Calcium, Ionized 1.01 (L) 1.13 - 1.32 mmol/L     O2 Method VENT       FIO2 Arterial 0.35 %     Mode ASSIST CONTROL       POC TIDAL VOLUME 450.0       Set Rate, POC 16       POC PEEP/CPA 5.0       Source Arterial       Site DRAWN FROM

## 2024-06-19 NOTE — PLAN OF CARE
Problem: Pain  Goal: Verbalizes/displays adequate comfort level or baseline comfort level  Outcome: Progressing     Problem: Safety - Adult  Goal: Free from fall injury  Outcome: Progressing     Problem: Skin/Tissue Integrity  Goal: Absence of new skin breakdown  Description: 1.  Monitor for areas of redness and/or skin breakdown  2.  Assess vascular access sites hourly  3.  Every 4-6 hours minimum:  Change oxygen saturation probe site  4.  Every 4-6 hours:  If on nasal continuous positive airway pressure, respiratory therapy assess nares and determine need for appliance change or resting period.  Outcome: Progressing     Problem: Discharge Planning  Goal: Discharge to home or other facility with appropriate resources  Outcome: Progressing

## 2024-06-19 NOTE — ADT AUTH CERT
[facility]   SSR [facility] E[facility] SURGICAL   [address] [facility] [place] [address]   @NPI [id number]   @TID [id number]     Auth number: [id number]   MEMBER ID#  [id number]     RETURN CONTACT:   Pastora Ocampo Ph. [phone]   Fax: [phone]       ADMITTING:  Kylah Rice MD  NPI:[id number]  HOSPITALIST                          [address] [place] [address]  [address]       6/11/24-6/16/24     Progress Notes by Tommie Pineda MD at 6/11/2024  8:08 AM    Author: Tommie Pineda MD Service: General Surgery Author Type: Physician   Filed: 6/11/2024  8:12 AM Date of Service: 6/11/2024  8:08 AM Status: Signed   : Tommie Pineda MD (Physician)   Expand All Collapse All  Surgery critical care Progress Note     Subjective:   Patient examined in ICU.     Hospital Course:  Patient examined ICU.  Patient is awake and alert.  Patient has been breathing trial since 3:00 this morning.  We were able to extubate her this morning.     Subjective:  Patient is following commands.  No temperature.        Review of Systems  Unable to assess.   Objective:    /64   Pulse 89   Temp 97.8 °F (36.6 °C) (Bladder)   Resp 15   Ht 1.727 m (5' 8\")   Wt 136 kg (299 lb 13.2 oz)   SpO2 98%   BMI 45.59 kg/m²         Head and neck atraumatic, normocephalic.  ENT: No hoarse voice  Cardiac system regular rate rhythm.  Pulmonary no audible wheeze  Chest wall excursion normal with respiration cycle  Abdomen is soft not particularly distended.  Neurologically unable to assess  Skin is warm and moist.  Psychosocial: Unable to assess  Vascular examination as previously noted no changes.     Current Facility-Administered Medications             Current Facility-Administered Medications   Medication Dose Route Frequency Provider Last Rate Last Admin    naloxone 0.4 mg in 10 mL sodium chloride syringe   IntraVENous PRN Tommie Pineda MD        polyethylene glycol (GLYCOLAX) packet 17 g  17 g Oral Daily PRN Edwin  carotid bruit, no masses  Chest: Normal.  Respiratory: normal chest wall expansion, CTA B, no r/r/w, no rubs  Cardiovascular: RRR, no m/r/g, Normal S1 and S2  Abdomen: Soft, right lower quadrant tenderness, mildly distended, normal bowel sounds in all quadrants, no hepatosplenomegaly, no tympany. Incision scar: wound vac in place.  Right lower quadrant ileostomy looks dark with no gas or stool, and mucous fistula looks dark with no output.  Genitourinary: No inguinal hernia, normal external gentalia, no renal angle tenderness  Rectal: deferred  Musculoskeletal: Normal ROM in upper and lower extremities, No joint swelling.  Integumentary: Warm, dry, and pink, with no rash, purpura, or petechia  Heme/Lymph: No lymphadenopathy, no bruises  Neurological: Cranial Nerves II-XII grossly intact, No gross sensory or motor deficit.  Psychiatric: Cooperative with normal mood, affect, and cognition     Laboratory Values:   Recent Results          Recent Results (from the past 24 hour(s))   Basic Metabolic Panel     Collection Time: 06/13/24  3:15 AM   Result Value Ref Range     Sodium 134 (L) 136 - 145 mmol/L     Potassium 3.4 (L) 3.5 - 5.1 mmol/L     Chloride 100 97 - 108 mmol/L     CO2 31 21 - 32 mmol/L     Anion Gap 3 (L) 5 - 15 mmol/L     Glucose Lipemic specimen 65 - 100 mg/dL     BUN 13 6 - 20 mg/dL     Creatinine 0.44 (L) 0.55 - 1.02 mg/dL     BUN/Creatinine Ratio 30 (H) 12 - 20       Est, Glom Filt Rate >90 >60 ml/min/1.73m2     Calcium Lipemic specimen 8.5 - 10.1 mg/dL   Magnesium     Collection Time: 06/13/24  3:15 AM   Result Value Ref Range     Magnesium 1.6 1.6 - 2.4 mg/dL   Phosphorus     Collection Time: 06/13/24  3:15 AM   Result Value Ref Range     Phosphorus Lipemic specimen 2.6 - 4.7 mg/dL   Basic Metabolic Panel     Collection Time: 06/13/24  5:43 PM   Result Value Ref Range     Sodium 140 136 - 145 mmol/L     Potassium 3.3 (L) 3.5 - 5.1 mmol/L     Chloride 104 97 - 108 mmol/L     CO2 32 21 - 32 mmol/L

## 2024-06-19 NOTE — PLAN OF CARE
PHYSICAL THERAPY TREATMENT     Patient: Maddie Goldberg (63 y.o. 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]  Large bowel ischemia (HCC) [K55.9] Cecal volvulus (HCC)  Procedure(s) (LRB):  EXPLORATORY LAPAROTOMY WITH ILEOSTOMY AND WOUND VAC PLACEMENT (N/A) 10 Days Post-Op  Precautions: Fall Risk, Surgical Protocols                      Recommendations for nursing mobility: Out of bed to chair for meals, Encourage HEP in prep for ADLs/mobility; see handout for details, Frequent repositioning to prevent skin breakdown, Use of bed/chair alarm for safety, Use of BSC for toileting , AD and gt belt for bed to chair , Mary Stedy for bed to chair transfers , and Assist x2    In place during session: Peripheral IV, LIONEL drain 3#, and Wound vac abdomen  Chart, physical therapy assessment, plan of care and goals were reviewed.  ASSESSMENT  Patient continues with skilled PT services and is slowly progressing towards goals. Pt sitting in recliner chair upon PT arrival, agreeable to session. (See below for objective details and assist levels).     Overall pt tolerated session fair/poor today. RN reported getting pt up to recliner chair this morning ax2. Pt stood from chair mod/max Ax2 and ambulated ~4 ft with RW. Gait was unsteady and required verbal and tactile cues for sequencing, equipment usage, and safety. Pt sat eob and performed seated heel raises with no complaints of pain or discomfort. Pt required additional time and demonstration for sequencing to lay down on L side to perform log roll to lay back down in bed. Pt required max Ax2 for sit>sup. Pt was laid back down in bed and educated to perform AP and heel slides to promote blood flow and strengthening the LE, pt verbalized understanding. Pt left supine with all needs met and family present. Will continue to benefit from skilled PT services, and will continue to progress as tolerated. Potential barriers for safe discharge:. Current PT DC  Mobility Training:  Bed Mobility:  Bed Mobility Training  Bed Mobility Training: Yes  Sit to Supine: Maximum assistance;Assist X2  Transfers:  Transfer Training  Transfer Training: Yes  Sit to Stand: Moderate assistance;Maximum assistance;Assist X2  Stand to Sit: Moderate assistance;Maximum assistance;Assist X2  Stand Pivot Transfers: Moderate assistance;Assist X2  Balance:  Balance  Sitting: Impaired  Standing: Impaired  Standing - Static: Fair;Constant support  Standing - Dynamic: Poor;Constant support  Wheelchair Mobility:  Wheelchair Management  Wheelchair Management: No  Ambulation/Gait Training:  Gait  Gait Training: Yes  Overall Level of Assistance: Moderate assistance;Assist X2  Distance (ft): 4 Feet  Assistive Device: Gait belt;Walker, rolling  Base of Support: Widened  Speed/Leonor: Slow;Shuffled  Step Length: Left shortened;Right shortened  Gait Abnormalities: Decreased step clearance    Therapeutic Exercises:     EXERCISE   Sets   Reps   Active Active Assist   Passive Self ROM   Comments   Ankle Pumps  10 [x] [] [] []    Quad Sets/Glut Sets   [] [] [] []    Hamstring Sets   [] [] [] []    Short Arc Quads   [] [] [] []    Heel Slides   [] [] [] [] educated   Straight Leg Raises   [] [] [] []    Hip abd/add   [] [] [] []    Long Arc Quads  10 [x] [] [] []    Marching   [] [] [] []    Seated HR/TR   [] [] [] []       [] [] [] []       Pain Ratin/10 reported    Activity Tolerance:   Fair     After treatment patient left in no apparent distress:   Bed locked and returned to lowest position, Patient left in no apparent distress in bed, Call bell within reach, Bed/ chair alarm activated, Caregiver / family present, and Side rails x3, and nsg updated     COMMUNICATION/COLLABORATION:   The patient’s plan of care was discussed with: Occupational therapy assistant and Registered nurse    Patient Education  Education Given To: Patient;Family  Education Provided: Role of Therapy;Home Exercise Program;Plan of

## 2024-06-19 NOTE — CARE COORDINATION
CM reviewed chart. Requesting auth to be started at [facility]. [facility], needing signature from Dr. Pineda. Form being faxed to Edda.    Current discharge plan is IRF at [facility].       1430: [facility] informed me they do not have a bed available for patient at [place] office. They do in [place]. Discussed option for [place] with patient. Patient and family are unsure and want to discuss. Will follow up in [place] with patient and family.

## 2024-06-19 NOTE — PROGRESS NOTES
Progress Note  Date:2024       Room:[facility] [place]  Patient Name:Maddie Goldberg     YOB: 1960     Age:63 y.o.        Subjective    Subjective  Patient followed for Candida UTI on high dose Fluconazole. Still with low grade temperatures.  Repeat urinalysis showed improvement. Body fluid culture is negative. Afebrile with persistent leukocytosis and elevated procal and CRP.  Currently on Fluconazole and Zosyn. Repeat CT scan showed no drainable fluid.  Patient lying in bed with no new complaints. Family at bedside.  Objective         Vitals Last 24 Hours:  TEMPERATURE:  Temp  Av.4 °F (36.9 °C)  Min: 98.1 °F (36.7 °C)  Max: 98.8 °F (37.1 °C)  RESPIRATIONS RANGE: Resp  Av.2  Min: 16  Max: 18  PULSE OXIMETRY RANGE: SpO2  Av %  Min: 97 %  Max: 99 %  PULSE RANGE: Pulse  Av.7  Min: 79  Max: 88  BLOOD PRESSURE RANGE: Systolic (24hrs), Av , Min:112 , Max:128   ; Diastolic (24hrs), Av, Min:63, Max:75         Objective:  Vital signs: (most recent): Blood pressure 112/63, pulse 84, temperature 98.1 °F (36.7 °C), temperature source Oral, resp. rate 16, height 1.727 m (5' 8\"), weight 128.5 kg (283 lb 4.7 oz), SpO2 97 %.     Vitals and nursing note reviewed.   Constitutional:       General: She is not in acute distress.     Appearance: She is ill-appearing.   HENT: Unremarkable   Eyes:      Extraocular Movements: Extraocular movements intact.      Pupils: Pupils are equal, round, and reactive to light.   Cardiovascular:      Rate and Rhythm: Normal rate and regular rhythm.      Heart sounds: Normal heart sounds. No murmur heard.  Pulmonary:      Effort: Pulmonary effort is normal.      Breath sounds: Normal breath sounds.   Abdominal:      General: There is no distension.      Palpations: There is no mass.      Tenderness: There is no abdominal tenderness. There is no guarding.           Comments: Decreased BS     Midline wound vac in place  Right sided LIONEL drains #1 and #2 with serous

## 2024-06-19 NOTE — PROGRESS NOTES
Pulmonology and Critical Care Progress Note  Patient is 63 y.o. with diagnosis of :     Cecal volvulus     Large bowel ischemia     Hypotension due to hypovolemia    Mass of colon    Leak of anastomosis between gastrointestinal structures    Adhesion of intestine    Open wound of abdomen    Acute respiratory failure with hypoxia   Status post ex lap  Subjective:       Patient seen and examined at bedside.    NG tube was pulled out.  Started on clear liquids.   on IV Diflucan/Zosyn.  Family present at bedside  Has LIONEL drain, colostomy and ileostomy  Getting physical therapy, came out of bed sat in reclining chair.       Current Facility-Administered Medications   Medication Dose Route Frequency Provider Last Rate Last Admin    HYDROmorphone HCl PF (DILAUDID) injection 0.5 mg  0.5 mg IntraVENous Q3H PRN Tommie Pineda MD        HYDROmorphone HCl PF (DILAUDID) injection 1 mg  1 mg IntraVENous Q3H PRN Tommie Pineda MD   1 mg at 06/19/24 0645    diatrizoate meglumine-sodium (GASTROGRAFIN) 66-10 % solution 30 mL  30 mL Per NG tube ONCE PRN Javi Lopez MD   30 mL at 06/17/24 1013    piperacillin-tazobactam (ZOSYN) 4,500 mg in sodium chloride 0.9 % 100 mL IVPB (mini-bag)  4,500 mg IntraVENous Q8H Antonio Butler MD   Stopped at 06/19/24 0756    lidocaine 4 % external patch 1 patch  1 patch TransDERmal Daily Javi Lopez MD   1 patch at 06/19/24 0939    potassium chloride (KLOR-CON M) extended release tablet 40 mEq  40 mEq Oral PRN Javi Lopez MD   40 mEq at 06/18/24 0928    Or    potassium bicarb-citric acid (EFFER-K) effervescent tablet 40 mEq  40 mEq Oral PRN Javi Lopez MD        Or    potassium chloride 10 mEq/100 mL IVPB (Peripheral Line)  10 mEq IntraVENous PRN Javi Lopez MD        naloxone 0.4 mg in 10 mL sodium chloride syringe   IntraVENous PRN Tommie Pineda MD        HYDROmorphone HCl PF (DILAUDID) injection 1 mg  1 mg IntraVENous Once Tommie Pineda  MD iMchael        furosemide (LASIX) injection 20 mg  20 mg IntraVENous BID Tommie Pineda MD   20 mg at 24    fluconazole (DIFLUCAN) in 0.9 % sodium chloride IVPB 400 mg  400 mg IntraVENous Q24H Tommie Pineda  mL/hr at 24 0941 400 mg at 24 09    sodium chloride flush 0.9 % injection 5-40 mL  5-40 mL IntraVENous 2 times per day Tommie Pineda MD   10 mL at 24 09    0.9 % sodium chloride infusion   IntraVENous PRN Tommie Pineda MD   Stopped at 24 214    enoxaparin Sodium (LOVENOX) injection 30 mg  30 mg SubCUTAneous BID Tommie Pineda MD   30 mg at 24    ondansetron (ZOFRAN-ODT) disintegrating tablet 4 mg  4 mg Oral Q8H PRN Tommie Pineda MD        Or    ondansetron (ZOFRAN) injection 4 mg  4 mg IntraVENous Q6H PRN Tommie Pineda MD   4 mg at 24 1259    lidocaine (XYLOCAINE) 2 % uro-jet   Topical Once Tommie Pineda MD        levothyroxine (SYNTHROID) tablet 150 mcg  150 mcg Oral QAM AC Kylah Rice MD   150 mcg at 24    sodium chloride flush 0.9 % injection 5-40 mL  5-40 mL IntraVENous PRN Kylah Rice MD   10 mL at 24    famotidine (PEPCID) tablet 20 mg  20 mg Oral BID Kylah Rice MD   20 mg at 24    Or    famotidine (PEPCID) 20 mg in sodium chloride (PF) 0.9 % 10 mL injection  20 mg IntraVENous BID Kylah Rice MD   20 mg at 24            No Known Allergies    Review of Systems:  Pertinent items are noted in HPI.    Objective:     Blood pressure 112/63, pulse 84, temperature 98.1 °F (36.7 °C), temperature source Oral, resp. rate 18, height 1.727 m (5' 8\"), weight 128.5 kg (283 lb 4.7 oz), SpO2 97 %. Temp (24hrs), Av.4 °F (36.9 °C), Min:98.1 °F (36.7 °C), Max:98.8 °F (37.1 °C)      Intake and Output:  Current Shift: No intake/output data recorded.  Last 3 Shifts:  1901 -  0700  In: 2752.6 [P.O.:100; I.V.:10]  Out: 4560 [Urine:3600; Drains:400]    Physical Exam:   General appearance:

## 2024-06-20 LAB
ANION GAP SERPL CALC-SCNC: 6 MMOL/L (ref 5–15)
BASOPHILS # BLD: 0 K/UL (ref 0–0.1)
BASOPHILS NFR BLD: 0 % (ref 0–1)
BUN SERPL-MCNC: 20 MG/DL (ref 6–20)
BUN/CREAT SERPL: 31 (ref 12–20)
CA-I BLD-MCNC: 8.1 MG/DL (ref 8.5–10.1)
CHLORIDE SERPL-SCNC: 103 MMOL/L (ref 97–108)
CO2 SERPL-SCNC: 28 MMOL/L (ref 21–32)
CREAT SERPL-MCNC: 0.64 MG/DL (ref 0.55–1.02)
CRP SERPL-MCNC: 13.3 MG/DL (ref 0–0.3)
DIFFERENTIAL METHOD BLD: ABNORMAL
EOSINOPHIL # BLD: 0.5 K/UL (ref 0–0.4)
EOSINOPHIL NFR BLD: 4 % (ref 0–7)
ERYTHROCYTE [DISTWIDTH] IN BLOOD BY AUTOMATED COUNT: 14.2 % (ref 11.5–14.5)
GLUCOSE SERPL-MCNC: 92 MG/DL (ref 65–100)
HCT VFR BLD AUTO: 28.7 % (ref 35–47)
HGB BLD-MCNC: 9.5 G/DL (ref 11.5–16)
IMM GRANULOCYTES # BLD AUTO: 0 K/UL
IMM GRANULOCYTES NFR BLD AUTO: 0 %
LYMPHOCYTES # BLD: 2.3 K/UL (ref 0.8–3.5)
LYMPHOCYTES NFR BLD: 19 % (ref 12–49)
MAGNESIUM SERPL-MCNC: 2.2 MG/DL (ref 1.6–2.4)
MCH RBC QN AUTO: 33.5 PG (ref 26–34)
MCHC RBC AUTO-ENTMCNC: 33.1 G/DL (ref 30–36.5)
MCV RBC AUTO: 101.1 FL (ref 80–99)
METAMYELOCYTES NFR BLD MANUAL: 3 %
MONOCYTES # BLD: 1 K/UL (ref 0–1)
MONOCYTES NFR BLD: 8 % (ref 5–13)
MYELOCYTES NFR BLD MANUAL: 2 %
NEUTS BAND NFR BLD MANUAL: 3 % (ref 0–6)
NEUTS SEG # BLD: 7.9 K/UL (ref 1.8–8)
NEUTS SEG NFR BLD: 61 % (ref 32–75)
NRBC # BLD: 0 K/UL (ref 0–0.01)
NRBC BLD-RTO: 0 PER 100 WBC
PHOSPHATE SERPL-MCNC: 2.9 MG/DL (ref 2.6–4.7)
PLATELET # BLD AUTO: 785 K/UL (ref 150–400)
PMV BLD AUTO: 9.9 FL (ref 8.9–12.9)
POTASSIUM SERPL-SCNC: 3.5 MMOL/L (ref 3.5–5.1)
PROCALCITONIN SERPL-MCNC: 0.06 NG/ML
RBC # BLD AUTO: 2.84 M/UL (ref 3.8–5.2)
RBC MORPH BLD: ABNORMAL
RBC MORPH BLD: ABNORMAL
SODIUM SERPL-SCNC: 137 MMOL/L (ref 136–145)
WBC # BLD AUTO: 12.3 K/UL (ref 3.6–11)

## 2024-06-20 PROCEDURE — 83735 ASSAY OF MAGNESIUM: CPT

## 2024-06-20 PROCEDURE — 85025 COMPLETE CBC W/AUTO DIFF WBC: CPT

## 2024-06-20 PROCEDURE — 99232 SBSQ HOSP IP/OBS MODERATE 35: CPT | Performed by: INTERNAL MEDICINE

## 2024-06-20 PROCEDURE — 6370000000 HC RX 637 (ALT 250 FOR IP): Performed by: SURGERY

## 2024-06-20 PROCEDURE — 97530 THERAPEUTIC ACTIVITIES: CPT

## 2024-06-20 PROCEDURE — 2500000003 HC RX 250 WO HCPCS: Performed by: SURGERY

## 2024-06-20 PROCEDURE — 2580000003 HC RX 258: Performed by: INTERNAL MEDICINE

## 2024-06-20 PROCEDURE — 80048 BASIC METABOLIC PNL TOTAL CA: CPT

## 2024-06-20 PROCEDURE — 84100 ASSAY OF PHOSPHORUS: CPT

## 2024-06-20 PROCEDURE — 6360000002 HC RX W HCPCS: Performed by: SURGERY

## 2024-06-20 PROCEDURE — 84145 PROCALCITONIN (PCT): CPT

## 2024-06-20 PROCEDURE — 1100000000 HC RM PRIVATE

## 2024-06-20 PROCEDURE — 36415 COLL VENOUS BLD VENIPUNCTURE: CPT

## 2024-06-20 PROCEDURE — 94761 N-INVAS EAR/PLS OXIMETRY MLT: CPT

## 2024-06-20 PROCEDURE — 97606 NEG PRS WND THER DME>50 SQCM: CPT

## 2024-06-20 PROCEDURE — 99024 POSTOP FOLLOW-UP VISIT: CPT | Performed by: SURGERY

## 2024-06-20 PROCEDURE — 2580000003 HC RX 258: Performed by: SURGERY

## 2024-06-20 PROCEDURE — 86140 C-REACTIVE PROTEIN: CPT

## 2024-06-20 PROCEDURE — 6360000002 HC RX W HCPCS: Performed by: INTERNAL MEDICINE

## 2024-06-20 RX ORDER — OXYCODONE HCL 10 MG/1
10 TABLET, FILM COATED, EXTENDED RELEASE ORAL EVERY 12 HOURS SCHEDULED
Status: DISCONTINUED | OUTPATIENT
Start: 2024-06-20 | End: 2024-06-21

## 2024-06-20 RX ADMIN — SODIUM CHLORIDE, PRESERVATIVE FREE 10 ML: 5 INJECTION INTRAVENOUS at 09:07

## 2024-06-20 RX ADMIN — FAMOTIDINE 20 MG: 20 TABLET, FILM COATED ORAL at 20:21

## 2024-06-20 RX ADMIN — FUROSEMIDE 20 MG: 10 INJECTION, SOLUTION INTRAMUSCULAR; INTRAVENOUS at 09:07

## 2024-06-20 RX ADMIN — ENOXAPARIN SODIUM 30 MG: 100 INJECTION SUBCUTANEOUS at 20:30

## 2024-06-20 RX ADMIN — SODIUM CHLORIDE, PRESERVATIVE FREE 10 ML: 5 INJECTION INTRAVENOUS at 20:26

## 2024-06-20 RX ADMIN — FLUCONAZOLE 400 MG: 2 INJECTION, SOLUTION INTRAVENOUS at 09:07

## 2024-06-20 RX ADMIN — MEROPENEM 1000 MG: 1 INJECTION INTRAVENOUS at 12:17

## 2024-06-20 RX ADMIN — SODIUM CHLORIDE, PRESERVATIVE FREE 20 MG: 5 INJECTION INTRAVENOUS at 09:06

## 2024-06-20 RX ADMIN — ENOXAPARIN SODIUM 30 MG: 100 INJECTION SUBCUTANEOUS at 09:07

## 2024-06-20 RX ADMIN — LEVOTHYROXINE SODIUM 150 MCG: 0.07 TABLET ORAL at 05:50

## 2024-06-20 RX ADMIN — OXYCODONE HYDROCHLORIDE AND ACETAMINOPHEN 1 TABLET: 10; 325 TABLET ORAL at 01:21

## 2024-06-20 RX ADMIN — OXYCODONE HYDROCHLORIDE AND ACETAMINOPHEN 1 TABLET: 10; 325 TABLET ORAL at 14:59

## 2024-06-20 RX ADMIN — ONDANSETRON 4 MG: 4 TABLET, ORALLY DISINTEGRATING ORAL at 01:21

## 2024-06-20 RX ADMIN — FUROSEMIDE 20 MG: 10 INJECTION, SOLUTION INTRAMUSCULAR; INTRAVENOUS at 17:37

## 2024-06-20 RX ADMIN — OXYCODONE HYDROCHLORIDE AND ACETAMINOPHEN 1 TABLET: 10; 325 TABLET ORAL at 22:39

## 2024-06-20 RX ADMIN — OXYCODONE HYDROCHLORIDE 10 MG: 10 TABLET, FILM COATED, EXTENDED RELEASE ORAL at 09:06

## 2024-06-20 RX ADMIN — MEROPENEM 1000 MG: 1 INJECTION INTRAVENOUS at 20:30

## 2024-06-20 RX ADMIN — MEROPENEM 1000 MG: 1 INJECTION INTRAVENOUS at 03:47

## 2024-06-20 RX ADMIN — OXYCODONE HYDROCHLORIDE 10 MG: 10 TABLET, FILM COATED, EXTENDED RELEASE ORAL at 20:22

## 2024-06-20 ASSESSMENT — PAIN SCALES - GENERAL
PAINLEVEL_OUTOF10: 5
PAINLEVEL_OUTOF10: 8
PAINLEVEL_OUTOF10: 4
PAINLEVEL_OUTOF10: 8

## 2024-06-20 ASSESSMENT — PAIN - FUNCTIONAL ASSESSMENT
PAIN_FUNCTIONAL_ASSESSMENT: ACTIVITIES ARE NOT PREVENTED
PAIN_FUNCTIONAL_ASSESSMENT: ACTIVITIES ARE NOT PREVENTED

## 2024-06-20 ASSESSMENT — PAIN DESCRIPTION - LOCATION
LOCATION: ABDOMEN

## 2024-06-20 ASSESSMENT — PAIN DESCRIPTION - ORIENTATION
ORIENTATION: ANTERIOR
ORIENTATION: OTHER (COMMENT)
ORIENTATION: MID

## 2024-06-20 ASSESSMENT — PAIN DESCRIPTION - DESCRIPTORS
DESCRIPTORS: ACHING
DESCRIPTORS: ACHING;DISCOMFORT
DESCRIPTORS: ACHING

## 2024-06-20 ASSESSMENT — PAIN SCALES - WONG BAKER: WONGBAKER_NUMERICALRESPONSE: NO HURT

## 2024-06-20 NOTE — PROGRESS NOTES
Patient is alert.  A&O x 4 but slightly drowsy.  I provided the patient with a 2-piece ostomy pouch and demonstrated how to open the closure to drain the stool and then how to re-secure the closure.  I explained that the pouch to RLQ is a high-flow pouch for watery stool, but that as her stools begins to thicken she will transition to a wide-drainable outlet pouch.  I requested that patient practice opening and securing the closure of the pouch provided and to watch the nurses with next emptying of pouch (pouch had recently been emptied).  Pouch to RLQ and LLQ are both well-fitting and without leaks.  Advised patient that after she observes the next draining/emptying of pouch, she is to don gloves and begin emptying her pouch with Rn supervision.  Also recommended patient read through her eduction booklet provided on ostomy care.  Education material left on bedside table.  Patient verbalized understanding.  Informed Alicia Duncan of ostomy care plan.  WCN will continue to follow for education review and pouching needs.

## 2024-06-20 NOTE — PROGRESS NOTES
PROGRESS NOTE      Chief Complaints:  No new complaints.  HPI and  Objective:    No fever.  Some nausea noted.  Ileostomy is working well.  Pain is still 7 out of 10 when she moves.  Review of Systems:  Rest of review of system reviewed personally and they are negative.    EXAM:  /65   Pulse 71   Temp 98.1 °F (36.7 °C) (Oral)   Resp 16   Ht 1.727 m (5' 8\")   Wt 128.5 kg (283 lb 4.7 oz)   SpO2 97%   BMI 43.07 kg/m²     Patient is awake   Head and neck atraumatic, normocephalic.  ENT: No hoarse voice, gaze appropriate.  Cardiac system regular rate rhythm.  Pulmonary no audible wheeze, no cyanosis.  Chest wall excursion normal with respiration cycle  Abdomen is soft not particularly distended.  Neurologically nonfocal.  Hematology system: No bruising noted.  Musculoskeletal system: No joint deformity noted.  Genitourinary system: No hematuria noted.  Skin is warm and moist.  Psychosocial: Cooperative.  Vascular examination as previously noted no changes.    Current Facility-Administered Medications   Medication Dose Route Frequency Provider Last Rate Last Admin    oxyCODONE-acetaminophen (PERCOCET)  MG per tablet 1 tablet  1 tablet Oral Q4H PRN Tommie Pineda MD   1 tablet at 06/20/24 0121    ibuprofen (ADVIL;MOTRIN) tablet 600 mg  600 mg Oral Q6H PRN Tommie Pineda MD   600 mg at 06/19/24 2248    meropenem (MERREM) 1,000 mg in sodium chloride 0.9 % 100 mL IVPB (mini-bag)  1,000 mg IntraVENous Q8H Antonio Butler MD   Stopped at 06/20/24 0657    diatrizoate meglumine-sodium (GASTROGRAFIN) 66-10 % solution 30 mL  30 mL Per NG tube ONCE PRN Javi Lopez MD   30 mL at 06/17/24 1013    lidocaine 4 % external patch 1 patch  1 patch TransDERmal Daily Javi Lopez MD   1 patch at 06/19/24 0939    potassium chloride (KLOR-CON M) extended release tablet 40 mEq  40 mEq Oral PRN Javi Lopez MD   40 mEq at 06/18/24 0928    Or    potassium bicarb-citric acid (EFFER-K)  effervescent tablet 40 mEq  40 mEq Oral PRN Javi Lopez MD        Or    potassium chloride 10 mEq/100 mL IVPB (Peripheral Line)  10 mEq IntraVENous PRN Javi Lopez MD        naloxone 0.4 mg in 10 mL sodium chloride syringe   IntraVENous PRN Tommie Pineda MD        furosemide (LASIX) injection 20 mg  20 mg IntraVENous BID Tommie Pineda MD   20 mg at 06/19/24 1719    fluconazole (DIFLUCAN) in 0.9 % sodium chloride IVPB 400 mg  400 mg IntraVENous Q24H Tommie Pineda  mL/hr at 06/19/24 0941 400 mg at 06/19/24 0941    sodium chloride flush 0.9 % injection 5-40 mL  5-40 mL IntraVENous 2 times per day Tommie Pineda MD   10 mL at 06/19/24 2020    0.9 % sodium chloride infusion   IntraVENous PRN Tommie Pineda MD   Stopped at 06/09/24 2145    enoxaparin Sodium (LOVENOX) injection 30 mg  30 mg SubCUTAneous BID Tommie Pineda MD   30 mg at 06/19/24 2009    ondansetron (ZOFRAN-ODT) disintegrating tablet 4 mg  4 mg Oral Q8H PRN Tommie Pineda MD   4 mg at 06/20/24 0121    Or    ondansetron (ZOFRAN) injection 4 mg  4 mg IntraVENous Q6H PRN Tommie Pineda MD   4 mg at 06/18/24 1259    lidocaine (XYLOCAINE) 2 % uro-jet   Topical Once Tommie Pineda MD        levothyroxine (SYNTHROID) tablet 150 mcg  150 mcg Oral QAM AC Kylah Rice MD   150 mcg at 06/20/24 0550    sodium chloride flush 0.9 % injection 5-40 mL  5-40 mL IntraVENous PRN Kylah Rice MD   10 mL at 06/02/24 2038    famotidine (PEPCID) tablet 20 mg  20 mg Oral BID Kylah Rice MD   20 mg at 06/19/24 0939    Or    famotidine (PEPCID) 20 mg in sodium chloride (PF) 0.9 % 10 mL injection  20 mg IntraVENous BID Kylah Rice MD   20 mg at 06/19/24 2008           Recent Results (from the past 24 hour(s))   POCT Glucose    Collection Time: 06/19/24 12:01 PM   Result Value Ref Range    POC Glucose 116 (H) 65 - 100 mg/dL    Performed by: Brisa Garcia    Basic Metabolic Panel    Collection Time: 06/20/24  5:48 AM   Result Value Ref  with hypoxia (HCC)    Candida UTI    SIRS (systemic inflammatory response syndrome) (HCC)    Sepsis due to Candida species without acute organ dysfunction (HCC)  Resolved Problems:    * No resolved hospital problems. *      PLAN:                 Wound VAC is due to change today.    I will add oxycodone extended release 10 mg p.o. twice daily.    Continue PT OT.  GI function normalizing.    White blood cell count down to 12,000.    Patient has not participated physical therapy much yet.  PT recommendation pending.

## 2024-06-20 NOTE — PROGRESS NOTES
Comprehensive Nutrition Assessment    Type and Reason for Visit:  Reassess    Nutrition Recommendations/Plan:   Monitor po intake,weight and labs     Malnutrition Assessment:  Malnutrition Status:  Mild malnutrition (06/10/24 1415)    Context:  Acute Illness     Findings of the 6 clinical characteristics of malnutrition:  Energy Intake:  50% or less of estimated energy requirements for 5 or more days  Weight Loss:  No significant weight loss     Body Fat Loss:  No significant body fat loss     Muscle Mass Loss:  No significant muscle mass loss    Fluid Accumulation:  No significant fluid accumulation     Strength:  Not Performed    Nutrition Assessment:    Nutrition Assessment Admitted for cecal volvulus, large bowel ischemia, complete bowel obstruction. (6/8) POD 6 ex-lap colon resection. Transferred from [facility]/t decline in medical status. Pt returned to OR for resection of the ileal-colonic anastomosis; temporary abdominal closure on 6/. Pt returned to OR on 6/9 for abdominal washout, wound vac placement and abd closure. (6/10) Screened for NPO/CLD x8 days. POD 2 ex-lap partial colon resection. Pt transferred to ICU s/p sx, intubated but undergoing vent weaning. Pt on minimal pressure support, sedated on propofol and fentanyl. Pt is hemodynamically stable for NST. Given nonfunctional gut and prolonged NPO/Clear status, RD rec's PN. MD Pineda agreeable to starting PN, concern for fluid balance. RD to provide regimen above. (6/12) Pt extubated on 6/11, off propofol. Today, Pt awake, able to deny N/V. Pt reported wanting to eat d/t prolonged NPO status- no hunger cues. TPN infusing and tolerated at goal rate. RD rec's continue regimen. Labs: K 3.4, Cr 0.49, Ca 7.9, Phos 2.2, ; Phos trending low- will monitor. Meds: LCR, dilaudid, lasix, synthroid, pepcid, lovenox. 6/17/24 Follow up: Patient continues on TPN, NG to suction. Current weight down from previous review, weight continues above acceptable BMI.  Review of labs, K level depleted. Meds reviewed. 06/20/24 Diet advanced to Regular, PTN discontinued, no documented po intake. Current weight without change since last review, continues with Obesity. Diet appropriate for needs, meds and labs reviewed.    Nutrition Related Findings:    NFPE w/o acute findings, well nourished. No N/V/D reported. Last BM PTA.  NPO w/ NGT for AEK=190 mL x24 hrs. LIONEL drainx3= 80 mL x24 hrs per I/Os. Wound vac= 125 mL x24 hrs. +1 pitting BLE edema. Wound Type: Surgical Incision       Current Nutrition Intake & Therapies:    Average Meal Intake: Unable to assess  Average Supplements Intake: None Ordered  ADULT DIET; Regular    Anthropometric Measures:  Height: 172.7 cm (5' 8\")  Ideal Body Weight (IBW): 140 lbs (64 kg)       Current Body Weight: 127.1 kg (280 lb 3.3 oz), 212.6 % IBW. Weight Source: Bed Scale  Current BMI (kg/m2): 42.6  Usual Body Weight:  (UTO)     Weight Adjustment For: No Adjustment                 BMI Categories: Obese Class 3 (BMI 40.0 or greater)    Estimated Daily Nutrient Needs:  Energy Requirements Based On: Kcal/kg  Weight Used for Energy Requirements: Current  Energy (kcal/day): 1191-6550 kcal/d (25-30 kcal/kg AdjBW, ERAS postop)  Weight Used for Protein Requirements: Current  Protein (g/day): 122 gm/d (1.5 gm/kg AdjBW ERAS postop)  Method Used for Fluid Requirements: Other (Comment)  Fluid (ml/day): ~1500 mL/d per MD    Nutrition Diagnosis:   Predicted inadequate energy intake related to altered GI structure as evidenced by GI abnormality    Nutrition Interventions:   Food and/or Nutrient Delivery: Continue Current Diet  Nutrition Education/Counseling: No recommendation at this time  Coordination of Nutrition Care: Continue to monitor while inpatient  Plan of Care discussed with: RN, Pt, Pharmacist    Goals:  Previous Goal Met: Goal(s) Achieved  Goals: PO intake 75% or greater, by next RD assessment       Nutrition Monitoring and Evaluation:

## 2024-06-20 NOTE — PLAN OF CARE
Problem: Nutrition Deficit:  Goal: Optimize nutritional status  Outcome: Progressing  Flowsheets (Taken 6/20/2024 1512)  Nutrient intake appropriate for improving, restoring, or maintaining nutritional needs:   Assess nutritional status and recommend course of action   Monitor oral intake, labs, and treatment plans   Recommend appropriate diets, oral nutritional supplements, and vitamin/mineral supplements      18

## 2024-06-20 NOTE — PLAN OF CARE
PHYSICAL THERAPY TREATMENT     Patient: Maddie Goldberg (63 y.o. female)  Date: 6/20/2024  Diagnosis: Cecal volvulus (HCC) [K56.2]  Large bowel ischemia (HCC) [K55.9] Cecal volvulus (HCC)  Procedure(s) (LRB):  EXPLORATORY LAPAROTOMY WITH ILEOSTOMY AND WOUND VAC PLACEMENT (N/A) 11 Days Post-Op  Precautions: General Precautions, Surgical Protocols                      Recommendations for nursing mobility: Encourage HEP in prep for ADLs/mobility; see handout for details, Frequent repositioning to prevent skin breakdown, Use of BSC for toileting , and AD and gt belt for bed to chair     In place during session: Peripheral IV, External Catheter, and EKG/telemetry   Chart, physical therapy assessment, plan of care and goals were reviewed.  ASSESSMENT  Patient continues with skilled PT services and is slowly progressing towards goals. Pt semi supine  upon PT arrival, agreeable to session. Pt A&O x 4. (See below for objective details and assist levels).     Overall pt tolerated session fair today with PT. Patient able to perform mobility with very less assist today.Now can be Ax1.Able to get in the recliner with RW and min Assist.~4 ft .Able to perform AP,LAQ,Hip abd/add,marching  in sitting. Will continue to benefit from skilled PT services, and will continue to progress as tolerated. Potential barriers for safe discharge: . Current PT DC recommendation Inpatient Rehabilitation Facility  once medically appropriate.      GOALS:         PLAN :  Patient continues to benefit from skilled intervention to address functional impairments. Continue treatment per established plan of care to address goals.    Recommendation for discharge: (in order for the patient to meet his/her long term goals)  Inpatient Rehabilitation Facility     IF patient discharges home will need the following DME:continuing to assess with progress     SUBJECTIVE:   Patient stated “I amok.”    OBJECTIVE DATA SUMMARY:   Critical  Behavior:  Orientation  Orientation Level: Oriented X4  Cognition  Overall Cognitive Status: WNL    Functional Mobility Training:  Bed Mobility:  Bed Mobility Training  Bed Mobility Training: Yes  Supine to Sit: Stand-by assistance;Minimum assistance;Assist X1  Scooting: Contact-guard assistance  Transfers:  Transfer Training  Transfer Training: Yes  Sit to Stand: Minimum assistance;Moderate assistance;Assist X1  Stand to Sit: Minimum assistance;Moderate assistance;Assist X1  Balance:  Balance  Sitting: Impaired  Sitting - Static: Good (unsupported)  Sitting - Dynamic: Fair (occasional)  Standing: Impaired  Standing - Static: Fair;Constant support  Standing - Dynamic: Fair;Constant support  Wheelchair Mobility:     Ambulation/Gait Training:                                Gait  Gait Training: Yes  Overall Level of Assistance: Minimum assistance;Assist X1;Assist X2  Distance (ft): 4 Feet  Assistive Device: Gait belt;Walker, rolling  Interventions: Demonstration;Safety awareness training  Base of Support: Narrowed  Gait Abnormalities: Shuffling gait                     Therapeutic Exercises:       EXERCISE   Sets   Reps   Active Active Assist   Passive Self ROM   Comments   Ankle Pumps   [] [] [] []    Quad Sets/Glut Sets   [] [] [] []    Hamstring Sets   [] [] [] []    Short Arc Quads   [] [] [] []    Heel Slides   [] [] [] []    Straight Leg Raises   [] [] [] []    Hip abd/add   [] [] [] []    Long Arc Quads   [] [] [] []    Marching   [] [] [] []    Seated HR/TR   [] [] [] []       [] [] [] []       Pain Rating:  3/10 abd reported  Pain Intervention(s):       Activity Tolerance:   Good and Fair     After treatment patient left in no apparent distress:   Bed locked and returned to lowest position, Patient left in no apparent distress sitting up in chair and Call bell within reach, and nsg updated     COMMUNICATION/COLLABORATION:   The patient’s plan of care was discussed with: Occupational therapy assistant,

## 2024-06-20 NOTE — PLAN OF CARE
OCCUPATIONAL THERAPY TREATMENT  Patient: Maddie Goldberg (63 y.o. female)  Date: 6/20/2024  Primary Diagnosis: Cecal volvulus (HCC) [K56.2]  Large bowel ischemia (HCC) [K55.9]  Procedure(s) (LRB):  EXPLORATORY LAPAROTOMY WITH ILEOSTOMY AND WOUND VAC PLACEMENT (N/A) 11 Days Post-Op   Precautions: General Precautions, Surgical Protocols                Recommendations for nursing mobility: Out of bed to chair for meals, Encourage HEP in prep for ADLs/mobility; see handout for details, Frequent repositioning to prevent skin breakdown, Use of bed/chair alarm for safety, Use of BSC for toileting , AD and gt belt for bed to chair , and Assist x1-2    In place during session: Peripheral IV, External Catheter, and EKG/telemetry   Chart, occupational therapy assessment, plan of care, and goals were reviewed.  ASSESSMENT  Patient continues with skilled OT services and is progressing towards goals. Pt semi supine upon HAMMONDS arrival, agreeable to session. Pt A&O x 4. Pt with improvement with changing hospital gown from Max A to Min A. Pt also with improvement with bed mobility (head of bed elevated and use of bed rails) and sit <>stands. Pt continues to fatigue easily and requires frequent rest breaks. Pt was left in recliner with PT at end of session.  (See below for objective details and assist levels).     Overall pt tolerated session fair today with frequent rest breaks.  Potential barriers for safe discharge: pts current support system is unable to meet their requirements for physical assistance, pt has impaired cognition, pt is a high fall risk, pt is not safe to be alone, and concern for pt safely navigating or managing the home environment. Current OT recommendations for discharge Inpatient Rehabilitation Facility . Will continue to benefit from skilled OT services, and will continue to progress as tolerated.       GOALS:  Problem: Occupational Therapy - Adult  Goal: By Discharge: Performs self-care activities at highest  level of function for planned discharge setting.  See evaluation for individualized goals.  Description: FUNCTIONAL STATUS PRIOR TO ADMISSION:  Pt reports IND w/ ADLs, IADLs and mobility tasks prior to admission. 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     HOME SUPPORT: The patient lived alone with no local support.    Occupational Therapy Goals:  Initiated 6/17/2024  Patient/Family stated goal: Improve strength for increased engagement in oob mobility tasks  1.  Patient will perform grooming with [place] within 7 day(s).  2.  Patient will perform upper body dressing with [place] within 7 day(s).  3.  Patient will perform lower body dressing with Modified [place] within 7 day(s).  4.  Patient will perform toilet transfers with Modified [place]  within 7 day(s).  5.  Patient will perform all aspects of toileting with Modified [place] within 7 day(s).  6.  Patient will participate in upper extremity therapeutic exercise/activities with [place] within 7 day(s).       Outcome: Progressing        PLAN :  Patient continues to benefit from skilled intervention to address functional impairments. Continue treatment per established plan of care to address goals.    Recommend next OT session: LB dressing and standing tolerance    Recommendation for discharge: (in order for the patient to meet his/her long term goals): Inpatient Rehabilitation Facility     IF patient discharges home will need the following DME: continuing to assess with progress     SUBJECTIVE:   Patient stated “What do you want me to do.”      OBJECTIVE DATA SUMMARY:   Cognitive/Behavioral Status:  Orientation  Orientation Level: Oriented X4  Cognition  Overall Cognitive Status: WNL  Arousal/Alertness: Appears intact  Following Commands: Follows one step commands with increased time;Follows one step commands with repetition  Attention Span: Attends with cues to redirect;Difficulty attending to

## 2024-06-20 NOTE — PROGRESS NOTES
Pulmonology and Critical Care Progress Note  Patient is 63 y.o. with diagnosis of :     Cecal volvulus     Large bowel ischemia     Hypotension due to hypovolemia    Mass of colon    Leak of anastomosis between gastrointestinal structures    Adhesion of intestine    Open wound of abdomen    Acute respiratory failure with hypoxia   Status post ex lap  Subjective:       Patient seen and examined at bedside.    Tolerating regular consistency diet.     on IV Diflucan/Zosyn.  Family present at bedside  Has LIONEL drain, colostomy and ileostomy  Getting physical therapy, came out of bed sat in reclining chair.       Current Facility-Administered Medications   Medication Dose Route Frequency Provider Last Rate Last Admin    oxyCODONE (OXYCONTIN) extended release tablet 10 mg  10 mg Oral 2 times per day Tommie Pineda MD   10 mg at 06/20/24 0906    oxyCODONE-acetaminophen (PERCOCET)  MG per tablet 1 tablet  1 tablet Oral Q4H PRN Tommie Pineda MD   1 tablet at 06/20/24 0121    ibuprofen (ADVIL;MOTRIN) tablet 600 mg  600 mg Oral Q6H PRN Tommie Pineda MD   600 mg at 06/19/24 2248    meropenem (MERREM) 1,000 mg in sodium chloride 0.9 % 100 mL IVPB (mini-bag)  1,000 mg IntraVENous Q8H Antonio Butler MD   Stopped at 06/20/24 0657    diatrizoate meglumine-sodium (GASTROGRAFIN) 66-10 % solution 30 mL  30 mL Per NG tube ONCE PRN Javi Lopez MD   30 mL at 06/17/24 1013    lidocaine 4 % external patch 1 patch  1 patch TransDERmal Daily Javi Lopez MD   1 patch at 06/20/24 0907    potassium chloride (KLOR-CON M) extended release tablet 40 mEq  40 mEq Oral PRN Javi Lopez MD   40 mEq at 06/18/24 0928    Or    potassium bicarb-citric acid (EFFER-K) effervescent tablet 40 mEq  40 mEq Oral PRN Javi Lopez MD        Or    potassium chloride 10 mEq/100 mL IVPB (Peripheral Line)  10 mEq IntraVENous PRN Javi Lopez MD        naloxone 0.4 mg in 10 mL sodium chloride syringe

## 2024-06-20 NOTE — PROGRESS NOTES
Progress Note  Date:2024       Room:[facility]  Patient Name:Maddie Goldberg     YOB: 1960     Age:63 y.o.        Subjective    Subjective  Patient followed for Candida UTI on high dose Fluconazole. Still with low grade temperatures.  Repeat urinalysis showed improvement. Body fluid culture is negative. Afebrile with persistent leukocytosis and elevated procal and CRP.  Currently on Fluconazole and Zosyn. Repeat CT scan showed no drainable fluid.  Patient sitting up on edge of bed, subjectively improved.  Pending disposition once PT recommendations are available.  Sister at bedside.   Objective         Vitals Last 24 Hours:  TEMPERATURE:  Temp  Av °F (36.7 °C)  Min: 97.7 °F (36.5 °C)  Max: 98.2 °F (36.8 °C)  RESPIRATIONS RANGE: Resp  Av.6  Min: 16  Max: 18  PULSE OXIMETRY RANGE: SpO2  Av.8 %  Min: 97 %  Max: 98 %  PULSE RANGE: Pulse  Av.3  Min: 71  Max: 83  BLOOD PRESSURE RANGE: Systolic (24hrs), Av , Min:109 , Max:117   ; Diastolic (24hrs), Av, Min:61, Max:67         Objective:  Vital signs: (most recent): Blood pressure 109/67, pulse 82, temperature 97.7 °F (36.5 °C), temperature source Oral, resp. rate 18, height 1.727 m (5' 8\"), weight 128.5 kg (283 lb 4.7 oz), SpO2 98 %.     Vitals and nursing note reviewed.   Constitutional:       General: She is not in acute distress.     Appearance: She is ill-appearing.   HENT: Unremarkable   Eyes:      Extraocular Movements: Extraocular movements intact.      Pupils: Pupils are equal, round, and reactive to light.   Cardiovascular:      Rate and Rhythm: Normal rate and regular rhythm.      Heart sounds: Normal heart sounds. No murmur heard.  Pulmonary:      Effort: Pulmonary effort is normal.      Breath sounds: Normal breath sounds.   Abdominal:      General: There is no distension.      Palpations: There is no mass.      Tenderness: There is no abdominal tenderness. There is no guarding.           Comments: Decreased BS    colon 6/10/2024 Yes    Leak of anastomosis between gastrointestinal structures 6/10/2024 Yes    Adhesion of intestine 6/10/2024 Yes    Open wound of abdomen 6/10/2024 Yes    Acute respiratory failure with hypoxia (HCC) 6/10/2024 Yes    Candida UTI 6/18/2024 Yes    SIRS (systemic inflammatory response syndrome) (HCC) 6/18/2024 Yes    Sepsis due to Candida species without acute organ dysfunction (HCC) 6/18/2024 Yes      Candida UTI secondary to Candida lusitaniae, Day #12 IV Fluconazole   Suspected intra-abdominal infection  Cecal volvulus  Status post exploratory laparotomy with midline wound vac, bilateral colostomies, LIONEL drains x 3  Hepatorenal syndrome  Sepsis with persistent leukocytosis, elevated procal and CRP, all decreasing     Comment:  WBC decreased significantly today, temporaly associated with starting Meropenem.      Plan   1. Continue IV Fluconazole for 2 more days  2. Continue IV Meropenem for 2 more weeks  3. In am, repeat CBC, procal, CRP  4. Cleared for discharge from ID standpoint       Electronically signed by Antonio Butler MD

## 2024-06-20 NOTE — CARE COORDINATION
CM reviewed chart. Spoke with patient and sister today to follow up with discharge plans. Currently [facility] does not have beds but [facility] is available. Family would prefer [facility] but if have to will consider [facility]

## 2024-06-21 LAB
ALBUMIN SERPL-MCNC: 1.7 G/DL (ref 3.5–5)
ALBUMIN/GLOB SERPL: 0.4 (ref 1.1–2.2)
ALP SERPL-CCNC: 184 U/L (ref 45–117)
ALT SERPL-CCNC: 42 U/L (ref 12–78)
ANION GAP SERPL CALC-SCNC: 6 MMOL/L (ref 5–15)
AST SERPL W P-5'-P-CCNC: 34 U/L (ref 15–37)
BASOPHILS # BLD: 0.2 K/UL (ref 0–0.1)
BASOPHILS NFR BLD: 1 % (ref 0–1)
BILIRUB DIRECT SERPL-MCNC: 0.1 MG/DL (ref 0–0.2)
BILIRUB SERPL-MCNC: 0.2 MG/DL (ref 0.2–1)
BUN SERPL-MCNC: 15 MG/DL (ref 6–20)
BUN/CREAT SERPL: 25 (ref 12–20)
CA-I BLD-MCNC: 8.2 MG/DL (ref 8.5–10.1)
CHLORIDE SERPL-SCNC: 102 MMOL/L (ref 97–108)
CO2 SERPL-SCNC: 28 MMOL/L (ref 21–32)
CREAT SERPL-MCNC: 0.6 MG/DL (ref 0.55–1.02)
CRP SERPL-MCNC: 12.3 MG/DL (ref 0–0.3)
DIFFERENTIAL METHOD BLD: ABNORMAL
EOSINOPHIL # BLD: 0.7 K/UL (ref 0–0.4)
EOSINOPHIL NFR BLD: 6 % (ref 0–7)
ERYTHROCYTE [DISTWIDTH] IN BLOOD BY AUTOMATED COUNT: 13.9 % (ref 11.5–14.5)
GLOBULIN SER CALC-MCNC: 3.9 G/DL (ref 2–4)
GLUCOSE SERPL-MCNC: 110 MG/DL (ref 65–100)
HCT VFR BLD AUTO: 28.6 % (ref 35–47)
HGB BLD-MCNC: 9.4 G/DL (ref 11.5–16)
IMM GRANULOCYTES # BLD AUTO: 0.6 K/UL (ref 0–0.04)
IMM GRANULOCYTES NFR BLD AUTO: 5 % (ref 0–0.5)
LYMPHOCYTES # BLD: 2.2 K/UL (ref 0.8–3.5)
LYMPHOCYTES NFR BLD: 19 % (ref 12–49)
MAGNESIUM SERPL-MCNC: 1.9 MG/DL (ref 1.6–2.4)
MCH RBC QN AUTO: 33.2 PG (ref 26–34)
MCHC RBC AUTO-ENTMCNC: 32.9 G/DL (ref 30–36.5)
MCV RBC AUTO: 101.1 FL (ref 80–99)
MONOCYTES # BLD: 1.4 K/UL (ref 0–1)
MONOCYTES NFR BLD: 12 % (ref 5–13)
NEUTS SEG # BLD: 6.8 K/UL (ref 1.8–8)
NEUTS SEG NFR BLD: 57 % (ref 32–75)
NRBC # BLD: 0 K/UL (ref 0–0.01)
NRBC BLD-RTO: 0 PER 100 WBC
PHOSPHATE SERPL-MCNC: 2.8 MG/DL (ref 2.6–4.7)
PLATELET # BLD AUTO: 847 K/UL (ref 150–400)
PMV BLD AUTO: 9.8 FL (ref 8.9–12.9)
POTASSIUM SERPL-SCNC: 4.1 MMOL/L (ref 3.5–5.1)
PROCALCITONIN SERPL-MCNC: 0.09 NG/ML
PROT SERPL-MCNC: 5.6 G/DL (ref 6.4–8.2)
RBC # BLD AUTO: 2.83 M/UL (ref 3.8–5.2)
SODIUM SERPL-SCNC: 136 MMOL/L (ref 136–145)
WBC # BLD AUTO: 11.7 K/UL (ref 3.6–11)

## 2024-06-21 PROCEDURE — 2580000003 HC RX 258: Performed by: SURGERY

## 2024-06-21 PROCEDURE — 80048 BASIC METABOLIC PNL TOTAL CA: CPT

## 2024-06-21 PROCEDURE — 2580000003 HC RX 258: Performed by: INTERNAL MEDICINE

## 2024-06-21 PROCEDURE — 80076 HEPATIC FUNCTION PANEL: CPT

## 2024-06-21 PROCEDURE — 94761 N-INVAS EAR/PLS OXIMETRY MLT: CPT

## 2024-06-21 PROCEDURE — 86140 C-REACTIVE PROTEIN: CPT

## 2024-06-21 PROCEDURE — 6370000000 HC RX 637 (ALT 250 FOR IP): Performed by: SURGERY

## 2024-06-21 PROCEDURE — 6360000002 HC RX W HCPCS: Performed by: INTERNAL MEDICINE

## 2024-06-21 PROCEDURE — 1100000000 HC RM PRIVATE

## 2024-06-21 PROCEDURE — 84145 PROCALCITONIN (PCT): CPT

## 2024-06-21 PROCEDURE — 83735 ASSAY OF MAGNESIUM: CPT

## 2024-06-21 PROCEDURE — 97530 THERAPEUTIC ACTIVITIES: CPT

## 2024-06-21 PROCEDURE — 36415 COLL VENOUS BLD VENIPUNCTURE: CPT

## 2024-06-21 PROCEDURE — 99232 SBSQ HOSP IP/OBS MODERATE 35: CPT | Performed by: INTERNAL MEDICINE

## 2024-06-21 PROCEDURE — 85025 COMPLETE CBC W/AUTO DIFF WBC: CPT

## 2024-06-21 PROCEDURE — 99024 POSTOP FOLLOW-UP VISIT: CPT | Performed by: SURGERY

## 2024-06-21 PROCEDURE — 84100 ASSAY OF PHOSPHORUS: CPT

## 2024-06-21 PROCEDURE — 6360000002 HC RX W HCPCS: Performed by: SURGERY

## 2024-06-21 PROCEDURE — 97606 NEG PRS WND THER DME>50 SQCM: CPT

## 2024-06-21 RX ORDER — OXYCODONE HYDROCHLORIDE AND ACETAMINOPHEN 5; 325 MG/1; MG/1
1 TABLET ORAL EVERY 4 HOURS PRN
Status: DISCONTINUED | OUTPATIENT
Start: 2024-06-21 | End: 2024-06-26 | Stop reason: HOSPADM

## 2024-06-21 RX ADMIN — OXYCODONE HYDROCHLORIDE AND ACETAMINOPHEN 1 TABLET: 5; 325 TABLET ORAL at 17:32

## 2024-06-21 RX ADMIN — OXYCODONE HYDROCHLORIDE AND ACETAMINOPHEN 1 TABLET: 10; 325 TABLET ORAL at 04:44

## 2024-06-21 RX ADMIN — OXYCODONE HYDROCHLORIDE AND ACETAMINOPHEN 1 TABLET: 5; 325 TABLET ORAL at 21:28

## 2024-06-21 RX ADMIN — OXYCODONE HYDROCHLORIDE AND ACETAMINOPHEN 1 TABLET: 10; 325 TABLET ORAL at 08:28

## 2024-06-21 RX ADMIN — FAMOTIDINE 20 MG: 20 TABLET, FILM COATED ORAL at 08:28

## 2024-06-21 RX ADMIN — ENOXAPARIN SODIUM 30 MG: 100 INJECTION SUBCUTANEOUS at 08:30

## 2024-06-21 RX ADMIN — OXYCODONE HYDROCHLORIDE AND ACETAMINOPHEN 1 TABLET: 5; 325 TABLET ORAL at 12:22

## 2024-06-21 RX ADMIN — LEVOTHYROXINE SODIUM 150 MCG: 0.07 TABLET ORAL at 05:28

## 2024-06-21 RX ADMIN — FAMOTIDINE 20 MG: 20 TABLET, FILM COATED ORAL at 20:35

## 2024-06-21 RX ADMIN — MEROPENEM 1000 MG: 1 INJECTION INTRAVENOUS at 20:31

## 2024-06-21 RX ADMIN — FLUCONAZOLE 400 MG: 2 INJECTION, SOLUTION INTRAVENOUS at 08:30

## 2024-06-21 RX ADMIN — ENOXAPARIN SODIUM 30 MG: 100 INJECTION SUBCUTANEOUS at 20:38

## 2024-06-21 RX ADMIN — FUROSEMIDE 20 MG: 10 INJECTION, SOLUTION INTRAMUSCULAR; INTRAVENOUS at 08:27

## 2024-06-21 RX ADMIN — MEROPENEM 1000 MG: 1 INJECTION INTRAVENOUS at 11:52

## 2024-06-21 RX ADMIN — MEROPENEM 1000 MG: 1 INJECTION INTRAVENOUS at 04:30

## 2024-06-21 RX ADMIN — SODIUM CHLORIDE, PRESERVATIVE FREE 10 ML: 5 INJECTION INTRAVENOUS at 20:36

## 2024-06-21 ASSESSMENT — PAIN SCALES - GENERAL
PAINLEVEL_OUTOF10: 8
PAINLEVEL_OUTOF10: 6
PAINLEVEL_OUTOF10: 8

## 2024-06-21 ASSESSMENT — PAIN DESCRIPTION - ORIENTATION: ORIENTATION: OTHER (COMMENT)

## 2024-06-21 ASSESSMENT — PAIN DESCRIPTION - LOCATION
LOCATION: ABDOMEN

## 2024-06-21 ASSESSMENT — PAIN - FUNCTIONAL ASSESSMENT
PAIN_FUNCTIONAL_ASSESSMENT: ACTIVITIES ARE NOT PREVENTED
PAIN_FUNCTIONAL_ASSESSMENT: PREVENTS OR INTERFERES WITH MANY ACTIVE NOT PASSIVE ACTIVITIES

## 2024-06-21 ASSESSMENT — PAIN DESCRIPTION - DESCRIPTORS
DESCRIPTORS: ACHING
DESCRIPTORS: ACHING;THROBBING

## 2024-06-21 ASSESSMENT — PAIN SCALES - WONG BAKER: WONGBAKER_NUMERICALRESPONSE: NO HURT

## 2024-06-21 NOTE — CARE COORDINATION
CM reviewed chart. Met with patient and family at bedside. Informed them that [facility] unlikely to have a bed until next week near then end. They gave ok to start process with laisha borges. Per Eva Borges, she has been transferred to [facility] admin team and they will start auth.    Auth Ref# [id number]

## 2024-06-21 NOTE — PROGRESS NOTES
OCCUPATIONAL THERAPY TREATMENT  Patient: Maddie Goldberg (63 y.o. female)  Date: 6/21/2024  Primary Diagnosis: Cecal volvulus (HCC) [K56.2]  Large bowel ischemia (HCC) [K55.9]  Procedure(s) (LRB):  EXPLORATORY LAPAROTOMY WITH ILEOSTOMY AND WOUND VAC PLACEMENT (N/A) 12 Days Post-Op   Precautions: General Precautions, Surgical Protocols                Recommendations for nursing mobility: Out of bed to chair for meals, Encourage HEP in prep for ADLs/mobility; see handout for details, Frequent repositioning to prevent skin breakdown, Use of bed/chair alarm for safety, AD and gt belt for bed to chair , Amb to bathroom with AD and gait belt, and Assist x2    In place during session: PICC line, External Catheter, LIONEL drain 3#, and Wound vac abdomen  Chart, occupational therapy assessment, plan of care, and goals were reviewed.  ASSESSMENT  Patient continues with skilled OT services and is progressing towards goals. Pt presented seated EOB upon HAMMONDS/PTA arrival, agreeable to session. Pt A&O x 4. Pt with improvement with cognition, able to follow multi step commands.  Pt continues to have decrease insight into deficits. Pt ambulated into the bathroom and completed commode transfer with mod verbal cues for safety, secondary pt reaching for items outside her base of support and attempted to sit prior to backing up fully to commode.  Pt participated in B UE therex to increase strength and endurance. Pt required verbal cues for proper technique. Pt left in bedside chair. (See below for objective details and assist levels).     Overall pt tolerated session fair today with increase functional mobility and activity tolerance.  Potential barriers for safe discharge: pts current support system is unable to meet their requirements for physical assistance, pt has poor safety awareness, pt is a high fall risk, pt is not safe to be alone, and concern for pt safely navigating or managing the home environment. Current OT recommendations for  discharge Inpatient Rehabilitation Facility . Will continue to benefit from skilled OT services, and will continue to progress as tolerated.       GOALS:    Problem: Occupational Therapy - Adult  Goal: By Discharge: Performs self-care activities at highest level of function for planned discharge setting.  See evaluation for individualized goals.  Description: FUNCTIONAL STATUS PRIOR TO ADMISSION:  Pt reports IND w/ ADLs, IADLs and mobility tasks prior to admission. 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     HOME SUPPORT: The patient lived alone with no local support.    Occupational Therapy Goals:  Initiated 6/17/2024  Patient/Family stated goal: Improve strength for increased engagement in oob mobility tasks  1.  Patient will perform grooming with [place] within 7 day(s).  2.  Patient will perform upper body dressing with [place] within 7 day(s).  3.  Patient will perform lower body dressing with Modified [place] within 7 day(s).  4.  Patient will perform toilet transfers with Modified [place]  within 7 day(s).  5.  Patient will perform all aspects of toileting with Modified [place] within 7 day(s).  6.  Patient will participate in upper extremity therapeutic exercise/activities with [place] within 7 day(s).      Outcome: Progressing        PLAN :  Patient continues to benefit from skilled intervention to address functional impairments. Continue treatment per established plan of care to address goals.    Recommend next OT session: standing grooming    Recommendation for discharge: (in order for the patient to meet his/her long term goals): Inpatient Rehabilitation Facility     IF patient discharges home will need the following DME: continuing to assess with progress     SUBJECTIVE:   Patient stated “I want to walk in the lazo.”      OBJECTIVE DATA SUMMARY:   Cognitive/Behavioral Status:  Orientation  Orientation Level: Oriented X4  Cognition  Overall

## 2024-06-21 NOTE — PLAN OF CARE
PHYSICAL THERAPY TREATMENT     Patient: Maddie Goldberg (63 y.o. female)  Date: 6/21/2024  Diagnosis: Cecal volvulus (HCC) [K56.2]  Large bowel ischemia (HCC) [K55.9] Cecal volvulus (HCC)  Procedure(s) (LRB):  EXPLORATORY LAPAROTOMY WITH ILEOSTOMY AND WOUND VAC PLACEMENT (N/A) 12 Days Post-Op  Precautions: General Precautions, Surgical Protocols                      Recommendations for nursing mobility: Out of bed to chair for meals, Encourage HEP in prep for ADLs/mobility; see handout for details, Frequent repositioning to prevent skin breakdown, and Assist x2    In place during session: PICC line, External Catheter, LIONEL drain 3#, and Wound vac abdominal  Chart, physical therapy assessment, plan of care and goals were reviewed.  ASSESSMENT  Patient continues with skilled PT services and is progressing towards goals. Pt sitting eob upon PT arrival, agreeable to session. (See below for objective details and assist levels).     Overall pt tolerated session fair today. Pt stood from bed min Ax2 and ambulated into the bathroom. Pt reported her knees feel weak but no knee buckling noted. Pt transferred on and off the toilet min Ax2. Pt ambulated to sit in bedside recliner. Pt performed seated therex with no complaints of pain or discomfort. Pt left sitting up in chair with all needs met. Pt showed overall improvement this session. Will continue to benefit from skilled PT services, and will continue to progress as tolerated. Potential barriers for safe discharge:. Current PT DC recommendation Inpatient Rehabilitation Facility  once medically appropriate.    GOALS:  Problem: Physical Therapy - Adult  Goal: By Discharge: Performs mobility at highest level of function for planned discharge setting.  See evaluation for individualized goals.  Description: FUNCTIONAL STATUS PRIOR TO ADMISSION: Patient was independent and active without use of DME.    HOME SUPPORT PRIOR TO ADMISSION: The patient lived alone but did not

## 2024-06-21 NOTE — PROGRESS NOTES
Pulmonology and Critical Care Progress Note  Patient is 63 y.o. with diagnosis of :     Cecal volvulus     Large bowel ischemia     Hypotension due to hypovolemia    Mass of colon    Leak of anastomosis between gastrointestinal structures    Adhesion of intestine    Open wound of abdomen    Acute respiratory failure with hypoxia   Status post ex lap  Subjective:       Patient seen and examined at bedside.    Tolerating regular consistency diet.     on IV Diflucan/Zosyn.    Getting physical therapy, out of bed sitting in recliner doing leg exercises   has LIONEL drain, colostomy and ileostomy         Current Facility-Administered Medications   Medication Dose Route Frequency Provider Last Rate Last Admin    oxyCODONE-acetaminophen (PERCOCET) 5-325 MG per tablet 1 tablet  1 tablet Oral Q4H PRN Tommie Pineda MD        ibuprofen (ADVIL;MOTRIN) tablet 600 mg  600 mg Oral Q6H PRN Edwin, Tommie Rodriguez MD   600 mg at 06/19/24 2248    meropenem (MERREM) 1,000 mg in sodium chloride 0.9 % 100 mL IVPB (mini-bag)  1,000 mg IntraVENous Q8H Antonio Butler MD 33.3 mL/hr at 06/21/24 1152 1,000 mg at 06/21/24 1152    diatrizoate meglumine-sodium (GASTROGRAFIN) 66-10 % solution 30 mL  30 mL Per NG tube ONCE PRN Javi Lopez MD   30 mL at 06/17/24 1013    lidocaine 4 % external patch 1 patch  1 patch TransDERmal Daily Javi Lopez MD   1 patch at 06/21/24 0827    potassium chloride (KLOR-CON M) extended release tablet 40 mEq  40 mEq Oral PRN Javi Lopez MD   40 mEq at 06/18/24 0928    Or    potassium bicarb-citric acid (EFFER-K) effervescent tablet 40 mEq  40 mEq Oral PRN Javi Lopez MD        Or    potassium chloride 10 mEq/100 mL IVPB (Peripheral Line)  10 mEq IntraVENous PRN Javi Lopez MD        naloxone 0.4 mg in 10 mL sodium chloride syringe   IntraVENous PRN Tommie Pineda MD        fluconazole (DIFLUCAN) in 0.9 % sodium chloride IVPB 400 mg  400 mg IntraVENous Q24H Edwin,

## 2024-06-21 NOTE — PLAN OF CARE
Problem: Pain  Goal: Verbalizes/displays adequate comfort level or baseline comfort level  Outcome: Progressing     Problem: Discharge Planning  Goal: Discharge to home or other facility with appropriate resources  Outcome: Progressing     Problem: Safety - Adult  Goal: Free from fall injury  Outcome: Progressing     Problem: Skin/Tissue Integrity  Goal: Absence of new skin breakdown  Description: 1.  Monitor for areas of redness and/or skin breakdown  2.  Assess vascular access sites hourly  3.  Every 4-6 hours minimum:  Change oxygen saturation probe site  4.  Every 4-6 hours:  If on nasal continuous positive airway pressure, respiratory therapy assess nares and determine need for appliance change or resting period.  Outcome: Progressing     Problem: Physical Therapy - Adult  Goal: By Discharge: Performs mobility at highest level of function for planned discharge setting.  See evaluation for individualized goals.  Description: FUNCTIONAL STATUS PRIOR TO ADMISSION: Patient was independent and active without use of DME.    HOME SUPPORT PRIOR TO ADMISSION: The patient lived alone but did not require assistance.    Physical Therapy Goals  Initiated 6/6/2024  Reassess 6/13 after abd sx  Pt stated goal: To go home  Pt will be I with LE HEP in 7 days.  Pt will perform bed mobility with min A in 7 days.  Pt will perform transfers with min-mod A in 7 days.   Pt will amb 150 feet with LRAD safely with [place] in 7 days.  6/20/2024 1246 by Heavenly Griffith, PT  Outcome: Progressing     Problem: Coping  Goal: Pt/Family able to verbalize concerns and demonstrate effective coping strategies  Description: INTERVENTIONS:  1. Assist patient/family to identify coping skills, available support systems and cultural and spiritual values  2. Provide emotional support, including active listening and acknowledgement of concerns of patient and caregivers  3. Reduce environmental stimuli, as able  4. Instruct patient/family in

## 2024-06-21 NOTE — PROGRESS NOTES
Pt was taught how to empty  RLQ ileostomy and LLQ mucous bag. Left set up at bedside for pt to practice on.

## 2024-06-21 NOTE — PROGRESS NOTES
PROGRESS NOTE      Chief Complaints:  No new complaints.  HPI and  Objective:    Patient did  participate with physical therapy fairly well yesterday.  No fever overnight.  Tolerating diet.  Review of Systems:  Rest of review of system reviewed personally and they are negative.    EXAM:  /67   Pulse 73   Temp 97.5 °F (36.4 °C) (Oral)   Resp 18   Ht 1.727 m (5' 8\")   Wt 128.5 kg (283 lb 4.7 oz)   SpO2 98%   BMI 43.07 kg/m²     Patient is awake   Head and neck atraumatic, normocephalic.  ENT: No hoarse voice, gaze appropriate.  Cardiac system regular rate rhythm.  Pulmonary no audible wheeze, no cyanosis.  Chest wall excursion normal with respiration cycle  Abdomen is soft not particularly distended.  Neurologically nonfocal.  Hematology system: No bruising noted.  Musculoskeletal system: No joint deformity noted.  Genitourinary system: No hematuria noted.  Skin is warm and moist.  Psychosocial: Cooperative.  Vascular examination as previously noted no changes.    Current Facility-Administered Medications   Medication Dose Route Frequency Provider Last Rate Last Admin    oxyCODONE (OXYCONTIN) extended release tablet 10 mg  10 mg Oral 2 times per day Tommie Pineda MD   10 mg at 06/20/24 2022    oxyCODONE-acetaminophen (PERCOCET)  MG per tablet 1 tablet  1 tablet Oral Q4H PRN Tommie Pineda MD   1 tablet at 06/21/24 0444    ibuprofen (ADVIL;MOTRIN) tablet 600 mg  600 mg Oral Q6H PRN Tommie Pineda MD   600 mg at 06/19/24 2248    meropenem (MERREM) 1,000 mg in sodium chloride 0.9 % 100 mL IVPB (mini-bag)  1,000 mg IntraVENous Q8H Antonio Butler MD 33.3 mL/hr at 06/21/24 0430 1,000 mg at 06/21/24 0430    diatrizoate meglumine-sodium (GASTROGRAFIN) 66-10 % solution 30 mL  30 mL Per NG tube ONCE PRN Javi Lopez MD   30 mL at 06/17/24 1013    lidocaine 4 % external patch 1 patch  1 patch TransDERmal Daily Javi Lopez MD   1 patch at 06/20/24 0907    potassium chloride  with diagnosis of : Principal Problem:    Cecal volvulus (HCC)  Active Problems:    Large bowel ischemia (HCC)    Hypotension due to hypovolemia    Mass of colon    Leak of anastomosis between gastrointestinal structures    Adhesion of intestine    Open wound of abdomen    Acute respiratory failure with hypoxia (HCC)    Candida UTI    SIRS (systemic inflammatory response syndrome) (HCC)    Sepsis due to Candida species without acute organ dysfunction (HCC)  Resolved Problems:    * No resolved hospital problems. *      PLAN:                 Wound VAC was changed yesterday.  No fever.  GI function normalizing.    If white blood count is normalizing discharge planning to rehab.  Discharge antibiotics per Dr. Arnett's recommendation.  Continue PT OT.

## 2024-06-21 NOTE — PROGRESS NOTES
Progress Note  Date:2024       Room:[facility]  Patient Name:Maddie Goldberg     YOB: 1960     Age:63 y.o.        Subjective    Subjective  Patient followed for Candida UTI on high dose Fluconazole. Still with low grade temperatures.  Repeat urinalysis showed improvement. Body fluid culture was  negative. Afebrile with now decreasing WBC and CRP.  Currently on Fluconazole and Meropenem. Zosyn. Repeat CT scan showed no drainable fluid.   Pending disposition to [facility]. Patient somnolent.  Son at bedside  Objective         Vitals Last 24 Hours:  TEMPERATURE:  Temp  Av.9 °F (36.6 °C)  Min: 97.5 °F (36.4 °C)  Max: 98.4 °F (36.9 °C)  RESPIRATIONS RANGE: Resp  Av.6  Min: 16  Max: 19  PULSE OXIMETRY RANGE: SpO2  Av.3 %  Min: 98 %  Max: 99 %  PULSE RANGE: Pulse  Av.7  Min: 73  Max: 90  BLOOD PRESSURE RANGE: Systolic (24hrs), Av , Min:110 , Max:124   ; Diastolic (24hrs), Av, Min:67, Max:80         Objective:  Vital signs: (most recent): Blood pressure 110/72, pulse 73, temperature 98.4 °F (36.9 °C), temperature source Oral, resp. rate 18, height 1.727 m (5' 8\"), weight 128.5 kg (283 lb 4.7 oz), SpO2 99 %.     Vitals and nursing note reviewed.   Constitutional:       General: She is not in acute distress.     Appearance: She is ill-appearing.   HENT: Unremarkable   Eyes:      Extraocular Movements: Extraocular movements intact.      Pupils: Pupils are equal, round, and reactive to light.   Cardiovascular:      Rate and Rhythm: Normal rate and regular rhythm.      Heart sounds: Normal heart sounds. No murmur heard.  Pulmonary:      Effort: Pulmonary effort is normal.      Breath sounds: Normal breath sounds.   Abdominal:      General: There is no distension.      Palpations: There is no mass.      Tenderness: There is no abdominal tenderness. There is no guarding.           Comments: Decreased BS     Midline wound vac in place  Right sided LIONEL drains #1 and #2 with serous  effluent  Right sided colostomy with dark stool  Left sided LIONEL drain #3 NOW WITH SEROUS EFFLUENT  Left sided colostomy with dark stool      Media Information    Document Information    Wound Care Image: Wound   Mid Abdomen   06/18/2024 16:39   Attached To:   Hospital Encounter on 6/2/24   Source Information    Tracy Sweeney RN  [facility]      Media Information    Document Information    Wound Care Image: Wound   LLQ   06/18/2024 13:32   Attached To:   Hospital Encounter on 6/2/24   Source Information    Tracy Sweeney RN  [facility]     Genitourinary:     Comments: Indwelling Quiroga catheter with clear urine  Musculoskeletal:      Cervical back: Neck supple.      Right lower leg: Edema present.      Left lower leg: Edema present.   Skin:     Findings: No rash.   Neurological: nonfocal, no confusion   Psychiatric: normal behavior, little interaction     Labs/Imaging/Diagnostics    Labs:  CBC:  Recent Labs     06/19/24  0728 06/20/24  0548 06/21/24  0539   WBC 17.0* 12.3* 11.7*   RBC 2.86* 2.84* 2.83*   HGB 9.6* 9.5* 9.4*   HCT 29.2* 28.7* 28.6*   .1* 101.1* 101.1*   RDW 13.9 14.2 13.9   * 785* 847*     WBC           CHEMISTRIES:  Recent Labs     06/19/24  0728 06/19/24  0730 06/20/24  0548 06/21/24  0539   NA  --  134* 137 136   K  --  4.7 3.5 4.1   CL  --  103 103 102   CO2  --  26 28 28   BUN  --  23* 20 15   CREATININE  --  0.57 0.64 0.60   GLUCOSE  --  110* 92 110*   PHOS 3.1 3.0 2.9 2.8   MG 2.2  --  2.2 1.9        Latest Reference Range & Units 06/02/24 09:50 06/13/24 22:22   Color, UA -   Yellow Yellow/Straw   Glucose, Ur Negative mg/dL Negative Negative   Bilirubin, Urine Negative   Small ! Negative   Ketones, Urine Negative mg/dL 15 ! Negative   Specific Gravity, UA 1.003 - 1.030   1.020 1.016   Blood, Urine Negative   Small ! Negative   Protein, UA Negative mg/dL Trace ! Negative   Urobilinogen 0.1 - 1.0 EU/dL 4.0 (H) 2.0 (H)   Nitrite, Urine Negative   Negative

## 2024-06-21 NOTE — PROGRESS NOTES
4 Eyes Skin Assessment     NAME:  Maddie Goldberg  YOB: 1960  MEDICAL RECORD NUMBER:  [id number]    The patient is being assessed for  Transfer to New Unit    I agree that at least one RN has performed a thorough Head to Toe Skin Assessment on the patient. ALL assessment sites listed below have been assessed.      Areas assessed by both nurses:    Head, Face, Ears, Shoulders, Back, Chest, Arms, Elbows, Hands, Sacrum. Buttock, Coccyx, Ischium, Legs. Feet and Heels, Under Medical Devices , and Other ***        Does the Patient have a Wound? Yes wound(s) were present on assessment. LDA wound assessment was Initiated and completed by RN       Rashaad Prevention initiated by RN: Yes  Wound Care Orders initiated by RN: No    Pressure Injury (Stage 3,4, Unstageable, DTI, NWPT, and Complex wounds) if present, place Wound referral order by RN under : No    New Ostomies, if present place, Ostomy referral order under : Yes     Nurse 1 eSignature: Electronically signed by Alyssa Greer RN on 6/21/24 at 7:37 AM EDT    **SHARE this note so that the co-signing nurse can place an eSignature**    Nurse 2 eSignature: {Esignature:[id number]}

## 2024-06-22 LAB
BASOPHILS # BLD: 0.1 K/UL (ref 0–0.1)
BASOPHILS NFR BLD: 1 % (ref 0–1)
CRP SERPL-MCNC: 12 MG/DL (ref 0–0.3)
DIFFERENTIAL METHOD BLD: ABNORMAL
EOSINOPHIL # BLD: 0 K/UL (ref 0–0.4)
EOSINOPHIL NFR BLD: 0 % (ref 0–7)
ERYTHROCYTE [DISTWIDTH] IN BLOOD BY AUTOMATED COUNT: 14.3 % (ref 11.5–14.5)
HCT VFR BLD AUTO: 30.9 % (ref 35–47)
HGB BLD-MCNC: 9.3 G/DL (ref 11.5–16)
IMM GRANULOCYTES # BLD AUTO: 0 K/UL
IMM GRANULOCYTES NFR BLD AUTO: 0 %
LYMPHOCYTES # BLD: 1.7 K/UL (ref 0.8–3.5)
LYMPHOCYTES NFR BLD: 18 % (ref 12–49)
MCH RBC QN AUTO: 32.4 PG (ref 26–34)
MCHC RBC AUTO-ENTMCNC: 30.1 G/DL (ref 30–36.5)
MCV RBC AUTO: 107.7 FL (ref 80–99)
MONOCYTES # BLD: 0.7 K/UL (ref 0–1)
MONOCYTES NFR BLD: 8 % (ref 5–13)
MYELOCYTES NFR BLD MANUAL: 3 %
NEUTS BAND NFR BLD MANUAL: 1 % (ref 0–6)
NEUTS SEG # BLD: 6.5 K/UL (ref 1.8–8)
NEUTS SEG NFR BLD: 69 % (ref 32–75)
NRBC # BLD: 0 K/UL (ref 0–0.01)
NRBC BLD-RTO: 0 PER 100 WBC
PLATELET # BLD AUTO: 715 K/UL (ref 150–400)
PMV BLD AUTO: 9.7 FL (ref 8.9–12.9)
PROCALCITONIN SERPL-MCNC: <0.05 NG/ML
RBC # BLD AUTO: 2.87 M/UL (ref 3.8–5.2)
RBC MORPH BLD: ABNORMAL
WBC # BLD AUTO: 9.3 K/UL (ref 3.6–11)

## 2024-06-22 PROCEDURE — 84145 PROCALCITONIN (PCT): CPT

## 2024-06-22 PROCEDURE — 36415 COLL VENOUS BLD VENIPUNCTURE: CPT

## 2024-06-22 PROCEDURE — 85025 COMPLETE CBC W/AUTO DIFF WBC: CPT

## 2024-06-22 PROCEDURE — 6370000000 HC RX 637 (ALT 250 FOR IP): Performed by: SURGERY

## 2024-06-22 PROCEDURE — 1100000000 HC RM PRIVATE

## 2024-06-22 PROCEDURE — 2580000003 HC RX 258: Performed by: INTERNAL MEDICINE

## 2024-06-22 PROCEDURE — 6360000002 HC RX W HCPCS: Performed by: SURGERY

## 2024-06-22 PROCEDURE — 99024 POSTOP FOLLOW-UP VISIT: CPT | Performed by: SURGERY

## 2024-06-22 PROCEDURE — 86140 C-REACTIVE PROTEIN: CPT

## 2024-06-22 PROCEDURE — 6360000002 HC RX W HCPCS: Performed by: INTERNAL MEDICINE

## 2024-06-22 PROCEDURE — 2580000003 HC RX 258: Performed by: SURGERY

## 2024-06-22 PROCEDURE — 94761 N-INVAS EAR/PLS OXIMETRY MLT: CPT

## 2024-06-22 RX ADMIN — OXYCODONE HYDROCHLORIDE AND ACETAMINOPHEN 1 TABLET: 5; 325 TABLET ORAL at 01:31

## 2024-06-22 RX ADMIN — ENOXAPARIN SODIUM 30 MG: 100 INJECTION SUBCUTANEOUS at 10:18

## 2024-06-22 RX ADMIN — MEROPENEM 1000 MG: 1 INJECTION INTRAVENOUS at 21:12

## 2024-06-22 RX ADMIN — FAMOTIDINE 20 MG: 20 TABLET, FILM COATED ORAL at 10:18

## 2024-06-22 RX ADMIN — SODIUM CHLORIDE, PRESERVATIVE FREE 10 ML: 5 INJECTION INTRAVENOUS at 21:13

## 2024-06-22 RX ADMIN — MEROPENEM 1000 MG: 1 INJECTION INTRAVENOUS at 12:40

## 2024-06-22 RX ADMIN — OXYCODONE HYDROCHLORIDE AND ACETAMINOPHEN 1 TABLET: 5; 325 TABLET ORAL at 18:42

## 2024-06-22 RX ADMIN — FAMOTIDINE 20 MG: 20 TABLET, FILM COATED ORAL at 21:18

## 2024-06-22 RX ADMIN — OXYCODONE HYDROCHLORIDE AND ACETAMINOPHEN 1 TABLET: 5; 325 TABLET ORAL at 06:08

## 2024-06-22 RX ADMIN — SODIUM CHLORIDE, PRESERVATIVE FREE 10 ML: 5 INJECTION INTRAVENOUS at 21:12

## 2024-06-22 RX ADMIN — LEVOTHYROXINE SODIUM 150 MCG: 0.07 TABLET ORAL at 06:08

## 2024-06-22 RX ADMIN — OXYCODONE HYDROCHLORIDE AND ACETAMINOPHEN 1 TABLET: 5; 325 TABLET ORAL at 14:40

## 2024-06-22 RX ADMIN — SODIUM CHLORIDE, PRESERVATIVE FREE 10 ML: 5 INJECTION INTRAVENOUS at 10:30

## 2024-06-22 RX ADMIN — ENOXAPARIN SODIUM 30 MG: 100 INJECTION SUBCUTANEOUS at 21:13

## 2024-06-22 RX ADMIN — OXYCODONE HYDROCHLORIDE AND ACETAMINOPHEN 1 TABLET: 5; 325 TABLET ORAL at 22:25

## 2024-06-22 RX ADMIN — FLUCONAZOLE 400 MG: 2 INJECTION, SOLUTION INTRAVENOUS at 10:18

## 2024-06-22 RX ADMIN — MEROPENEM 1000 MG: 1 INJECTION INTRAVENOUS at 04:14

## 2024-06-22 ASSESSMENT — PAIN SCALES - GENERAL
PAINLEVEL_OUTOF10: 7
PAINLEVEL_OUTOF10: 8
PAINLEVEL_OUTOF10: 6
PAINLEVEL_OUTOF10: 6
PAINLEVEL_OUTOF10: 8
PAINLEVEL_OUTOF10: 0

## 2024-06-22 ASSESSMENT — PAIN - FUNCTIONAL ASSESSMENT
PAIN_FUNCTIONAL_ASSESSMENT: PREVENTS OR INTERFERES WITH MANY ACTIVE NOT PASSIVE ACTIVITIES
PAIN_FUNCTIONAL_ASSESSMENT: PREVENTS OR INTERFERES WITH MANY ACTIVE NOT PASSIVE ACTIVITIES
PAIN_FUNCTIONAL_ASSESSMENT: PREVENTS OR INTERFERES SOME ACTIVE ACTIVITIES AND ADLS

## 2024-06-22 ASSESSMENT — PAIN DESCRIPTION - LOCATION
LOCATION: ABDOMEN

## 2024-06-22 ASSESSMENT — PAIN DESCRIPTION - DESCRIPTORS
DESCRIPTORS: SHARP;SHOOTING
DESCRIPTORS: ACHING;SHOOTING;SHARP

## 2024-06-22 ASSESSMENT — PAIN DESCRIPTION - ORIENTATION
ORIENTATION: RIGHT;LEFT;LOWER;MID
ORIENTATION: RIGHT;LEFT;ANTERIOR;LOWER;UPPER
ORIENTATION: RIGHT;LEFT;UPPER;LOWER

## 2024-06-22 ASSESSMENT — PAIN SCALES - WONG BAKER
WONGBAKER_NUMERICALRESPONSE: NO HURT
WONGBAKER_NUMERICALRESPONSE: NO HURT

## 2024-06-22 NOTE — PROGRESS NOTES
Pulmonology and Critical Care Progress Note  Patient is 63 y.o. with diagnosis of :     Cecal volvulus     Large bowel ischemia     Hypotension due to hypovolemia    Mass of colon    Leak of anastomosis between gastrointestinal structures    Adhesion of intestine    Open wound of abdomen    Acute respiratory failure with hypoxia   Status post ex lap  Subjective:       Patient seen and examined at bedside.    Tolerating regular consistency diet.     on IV Diflucan/Zosyn.    Getting physical therapy, out of bed sitting in recliner doing leg exercises   has LIONEL drain, colostomy and ileostomy  Family present at bedside.         Current Facility-Administered Medications   Medication Dose Route Frequency Provider Last Rate Last Admin    oxyCODONE-acetaminophen (PERCOCET) 5-325 MG per tablet 1 tablet  1 tablet Oral Q4H PRN Tommie Pineda MD   1 tablet at 06/22/24 1440    ibuprofen (ADVIL;MOTRIN) tablet 600 mg  600 mg Oral Q6H PRN Tommie Pineda MD   600 mg at 06/19/24 2248    meropenem (MERREM) 1,000 mg in sodium chloride 0.9 % 100 mL IVPB (mini-bag)  1,000 mg IntraVENous Q8H Antonio Butler MD   Stopped at 06/22/24 1506    diatrizoate meglumine-sodium (GASTROGRAFIN) 66-10 % solution 30 mL  30 mL Per NG tube ONCE PRN Javi Lopez MD   30 mL at 06/17/24 1013    lidocaine 4 % external patch 1 patch  1 patch TransDERmal Daily Javi Lopez MD   1 patch at 06/22/24 1018    potassium chloride (KLOR-CON M) extended release tablet 40 mEq  40 mEq Oral PRN Javi Lopez MD   40 mEq at 06/18/24 0928    Or    potassium bicarb-citric acid (EFFER-K) effervescent tablet 40 mEq  40 mEq Oral PRN Javi Lopez MD        Or    potassium chloride 10 mEq/100 mL IVPB (Peripheral Line)  10 mEq IntraVENous PRN Javi Lopez MD        naloxone 0.4 mg in 10 mL sodium chloride syringe   IntraVENous PRN Tommie Pineda MD        fluconazole (DIFLUCAN) in 0.9 % sodium chloride IVPB 400 mg  400 mg  negative  Neck: no adenopathy and supple, symmetrical, trachea midline  Lungs: clear to auscultation bilaterally, saturating well on room air  Heart: regular rate and rhythm, S1, S2 normal, no murmur, click, rub or gallop  Abdomen:  Soft, mildly tender, NG tube in place, LIONEL drain in place , has colostomy  Extremities: extremities normal, atraumatic, no cyanosis or edema  Pulses: 2+ and symmetric  Skin: Skin color, texture, turgor normal. No rashes or lesions  Lymph nodes: Cervical, supraclavicular, and axillary nodes normal.  Neurologic: Grossly normal, alert and oriented x 3, mildly lethargic but no focal neurologic abnormalities    Additional comments:None    Lab/Data Review:  Recent Results (from the past 24 hour(s))   C-Reactive Protein    Collection Time: 06/22/24  8:10 AM   Result Value Ref Range    CRP 12.00 (H) 0.00 - 0.30 mg/dL   Procalcitonin    Collection Time: 06/22/24  8:10 AM   Result Value Ref Range    Procalcitonin <0.05 (H) 0 ng/mL   CBC with Auto Differential    Collection Time: 06/22/24  8:10 AM   Result Value Ref Range    WBC 9.3 3.6 - 11.0 K/uL    RBC 2.87 (L) 3.80 - 5.20 M/uL    Hemoglobin 9.3 (L) 11.5 - 16.0 g/dL    Hematocrit 30.9 (L) 35.0 - 47.0 %    .7 (H) 80.0 - 99.0 FL    MCH 32.4 26.0 - 34.0 PG    MCHC 30.1 30.0 - 36.5 g/dL    RDW 14.3 11.5 - 14.5 %    Platelets 715 (H) 150 - 400 K/uL    MPV 9.7 8.9 - 12.9 FL    Nucleated RBCs 0.0 0.0  WBC    nRBC 0.00 0.00 - 0.01 K/uL    Neutrophils % 69 32 - 75 %    Band Neutrophils 1 0 - 6 %    Lymphocytes % 18 12 - 49 %    Monocytes % 8 5 - 13 %    Eosinophils % 0 0 - 7 %    Basophils % 1 0 - 1 %    Myelocytes 3 (H) 0 %    Immature Granulocytes % 0 %    Neutrophils Absolute 6.5 1.8 - 8.0 K/UL    Lymphocytes Absolute 1.7 0.8 - 3.5 K/UL    Monocytes Absolute 0.7 0.0 - 1.0 K/UL    Eosinophils Absolute 0.0 0.0 - 0.4 K/UL    Basophils Absolute 0.1 0.0 - 0.1 K/UL    Immature Granulocytes Absolute 0.0 K/UL    Differential Type Manual      RBC

## 2024-06-22 NOTE — PROGRESS NOTES
Progress Note  Date:2024       Room:[facility]  Patient Name:Maddie Goldberg     YOB: 1960     Age:63 y.o.        Subjective    Subjective patient without complaint today denies fevers or chills.  She is tolerating diet  Review of Systems 10 point review of systems is negative  Objective         Vitals Last 24 Hours:  TEMPERATURE:  Temp  Av °F (36.7 °C)  Min: 97.9 °F (36.6 °C)  Max: 98.1 °F (36.7 °C)  RESPIRATIONS RANGE: Resp  Av.9  Min: 16  Max: 20  PULSE OXIMETRY RANGE: SpO2  Av.5 %  Min: 99 %  Max: 100 %  PULSE RANGE: Pulse  Av.2  Min: 67  Max: 85  BLOOD PRESSURE RANGE: Systolic (24hrs), Av , Min:91 , Max:124   ; Diastolic (24hrs), Av, Min:50, Max:64    I/O (24Hr):    Intake/Output Summary (Last 24 hours) at 2024 1709  Last data filed at 2024 1249  Gross per 24 hour   Intake --   Output 1466.5 ml   Net -1466.5 ml     Objective  Incision has an intact wound VAC covering it.  Skin edges are without erythema and seal is excellent.  Mucous fistula in the left again is healthy without evidence of erythema or bleeding.  Ileostomy on the right has semiliquid brown output without any leaks.  Labs/Imaging/Diagnostics    Labs:  CBC:  Recent Labs     24  0548 24  0539 24  0810   WBC 12.3* 11.7* 9.3   RBC 2.84* 2.83* 2.87*   HGB 9.5* 9.4* 9.3*   HCT 28.7* 28.6* 30.9*   .1* 101.1* 107.7*   RDW 14.2 13.9 14.3   * 847* 715*     CHEMISTRIES:  Recent Labs     24  0548 24  0539    136   K 3.5 4.1    102   CO2 28 28   BUN 20 15   CREATININE 0.64 0.60   GLUCOSE 92 110*   PHOS 2.9 2.8   MG 2.2 1.9     PT/INR:No results for input(s): \"PROTIME\", \"INR\" in the last 72 hours.  APTT:No results for input(s): \"APTT\" in the last 72 hours.  LIVER PROFILE:  Recent Labs     24  0539   AST 34   ALT 42   BILIDIR 0.1   BILITOT 0.2   ALKPHOS 184*       Imaging Last 24 Hours:  No results found.  Assessment//Plan           Hospital

## 2024-06-22 NOTE — PROGRESS NOTES
place  Right sided LIONEL drains #1 and #2 with serous effluent  Right sided colostomy with dark stool  Left sided LIONEL drain #3 NOW WITH SEROUS EFFLUENT  Left sided colostomy with dark stool      Media Information    Document Information    Wound Care Image: Wound   Mid Abdomen   06/18/2024 16:39   Attached To:   Hospital Encounter on 6/2/24   Source Information    Tracy Sweeney RN  [facility]      Media Information    Document Information    Wound Care Image: Wound   LLQ   06/18/2024 13:32   Attached To:   Hospital Encounter on 6/2/24   Source Information    Tracy Sweeney RN  [facility]     Genitourinary:     Comments: Indwelling Quiroga catheter with clear urine  Musculoskeletal:      Cervical back: Neck supple.      Right lower leg: Edema present.      Left lower leg: Edema present.   Skin:     Findings: No rash.   Neurological: nonfocal, no confusion   Psychiatric: normal behavior, little interaction     Labs/Imaging/Diagnostics    Labs:  CBC:  Recent Labs     06/20/24  0548 06/21/24  0539 06/22/24  0810   WBC 12.3* 11.7* 9.3   RBC 2.84* 2.83* 2.87*   HGB 9.5* 9.4* 9.3*   HCT 28.7* 28.6* 30.9*   .1* 101.1* 107.7*   RDW 14.2 13.9 14.3   * 847* 715*     WBC           CHEMISTRIES:  Recent Labs     06/20/24  0548 06/21/24  0539    136   K 3.5 4.1    102   CO2 28 28   BUN 20 15   CREATININE 0.64 0.60   GLUCOSE 92 110*   PHOS 2.9 2.8   MG 2.2 1.9           Procal 0.09 <0.07 <0.22 <0.08 <0.09 < 0.14      CRP 12.00 <12.30 <14.80 <14.40 <16.70 <15.90 <23.00 <22.10            Serum Fungitell <31.25    ine culture (6/8) 50,000 col/ml Candida lusitaniae  Wound culture surgical excision (6/8) Heavy >2 organisms including anaerobic Gram negative rods FINAL  Wound culture (6/8) Heavy BMIX anaerobic Gram negative rods FINAL  Fungus culture Abdomen (6/8) No fungus isolated in 8 days  Fungus culture Abdomen (6/8) No fungus isolated in 8 days  Body fluid (6/13) No growth  FINAL    Imaging Last 24 Hours:     CT Abdomen Pelvis (6/17)    IMPRESSION:  Mesenteric inflammation with trace scattered postsurgical fluid and gas but no  drainable abscess.  Incidental cholelithiasis without gallbladder inflammation.  No significant bowel distention or inflammation.        CT Abdomen Pelvis (6/6) IMPRESSION:     1. Moderate small bowel distention likely representing postoperative ileus.  2. Mild free intraperitoneal air likely postoperative change. Small amount of  focal air and fluid anterior to the surgical anastomosis may represent  postoperative change. However, small leak is not excluded.  3. Significant fecal retention in the remaining large bowel with focal  stricturing in the rectosigmoid colon.  4. Cholelithiasis.  5. Bibasilar atelectasis with 4 mm right middle lobe lung nodule.          Assessment//Plan           Hospital Problems             Last Modified POA    * (Principal) Cecal volvulus (HCC) 6/2/2024 Yes    Large bowel ischemia ([facility]) 6/2/2024 Yes    Hypotension due to hypovolemia 6/10/2024 Yes    Mass of colon 6/10/2024 Yes    Leak of anastomosis between gastrointestinal structures 6/10/2024 Yes    Adhesion of intestine 6/10/2024 Yes    Open wound of abdomen 6/10/2024 Yes    Acute respiratory failure with hypoxia ([facility]) 6/10/2024 Yes    Candida UTI 6/18/2024 Yes    SIRS (systemic inflammatory response syndrome) ([facility]) 6/18/2024 Yes    Sepsis due to Candida species without acute organ dysfunction ([facility]) 6/18/2024 Yes      Candida UTI secondary to Candida lusitaniae, Day #14 IV Fluconazole   Suspected intra-abdominal infection, on IV Meropenem  Cecal volvulus  Status post exploratory laparotomy with midline wound vac, bilateral colostomies, LIONEL drains x 3  Hepatorenal syndrome  Sepsis with persistent leukocytosis, elevated procal and CRP, all decreasing     Comment:  WBC now normal after making empiric switch to Meropenem, however, procal and CRP remain elevated.       Plan   1.  Discontinue IV Fluconazole  2. Continue IV Meropenem for 2 more weeks  3. In am, repeat procal, CRP  4. Cleared for discharge from ID standpoint       Electronically signed by Antonio Butler MD

## 2024-06-23 PROBLEM — R60.9 EDEMA: Status: ACTIVE | Noted: 2024-06-23

## 2024-06-23 LAB
BASOPHILS # BLD: 0 K/UL (ref 0–0.1)
BASOPHILS NFR BLD: 0 % (ref 0–1)
CRP SERPL-MCNC: 8.49 MG/DL (ref 0–0.3)
DIFFERENTIAL METHOD BLD: ABNORMAL
EOSINOPHIL # BLD: 0.3 K/UL (ref 0–0.4)
EOSINOPHIL NFR BLD: 3 % (ref 0–7)
ERYTHROCYTE [DISTWIDTH] IN BLOOD BY AUTOMATED COUNT: 14.2 % (ref 11.5–14.5)
HCT VFR BLD AUTO: 28.1 % (ref 35–47)
HGB BLD-MCNC: 9.1 G/DL (ref 11.5–16)
IMM GRANULOCYTES # BLD AUTO: 0 K/UL
IMM GRANULOCYTES NFR BLD AUTO: 0 %
LYMPHOCYTES # BLD: 2.8 K/UL (ref 0.8–3.5)
LYMPHOCYTES NFR BLD: 25 % (ref 12–49)
MCH RBC QN AUTO: 32.9 PG (ref 26–34)
MCHC RBC AUTO-ENTMCNC: 32.4 G/DL (ref 30–36.5)
MCV RBC AUTO: 101.4 FL (ref 80–99)
MONOCYTES # BLD: 1 K/UL (ref 0–1)
MONOCYTES NFR BLD: 9 % (ref 5–13)
NEUTS SEG # BLD: 6.8 K/UL (ref 1.8–8)
NEUTS SEG NFR BLD: 61 % (ref 32–75)
NRBC # BLD: 0 K/UL (ref 0–0.01)
NRBC BLD-RTO: 0 PER 100 WBC
PLATELET # BLD AUTO: 770 K/UL (ref 150–400)
PMV BLD AUTO: 9.6 FL (ref 8.9–12.9)
PROCALCITONIN SERPL-MCNC: 0.07 NG/ML
PROMYELOCYTES NFR BLD MANUAL: 2 %
RBC # BLD AUTO: 2.77 M/UL (ref 3.8–5.2)
RBC MORPH BLD: ABNORMAL
WBC # BLD AUTO: 11.1 K/UL (ref 3.6–11)

## 2024-06-23 PROCEDURE — 6370000000 HC RX 637 (ALT 250 FOR IP): Performed by: SURGERY

## 2024-06-23 PROCEDURE — 99232 SBSQ HOSP IP/OBS MODERATE 35: CPT | Performed by: INTERNAL MEDICINE

## 2024-06-23 PROCEDURE — 94761 N-INVAS EAR/PLS OXIMETRY MLT: CPT

## 2024-06-23 PROCEDURE — 85025 COMPLETE CBC W/AUTO DIFF WBC: CPT

## 2024-06-23 PROCEDURE — 86140 C-REACTIVE PROTEIN: CPT

## 2024-06-23 PROCEDURE — 6360000002 HC RX W HCPCS: Performed by: INTERNAL MEDICINE

## 2024-06-23 PROCEDURE — 36415 COLL VENOUS BLD VENIPUNCTURE: CPT

## 2024-06-23 PROCEDURE — 84145 PROCALCITONIN (PCT): CPT

## 2024-06-23 PROCEDURE — 1100000000 HC RM PRIVATE

## 2024-06-23 PROCEDURE — 99024 POSTOP FOLLOW-UP VISIT: CPT | Performed by: SURGERY

## 2024-06-23 PROCEDURE — 2580000003 HC RX 258: Performed by: INTERNAL MEDICINE

## 2024-06-23 PROCEDURE — 2580000003 HC RX 258: Performed by: SURGERY

## 2024-06-23 PROCEDURE — 6360000002 HC RX W HCPCS: Performed by: SURGERY

## 2024-06-23 RX ADMIN — OXYCODONE HYDROCHLORIDE AND ACETAMINOPHEN 1 TABLET: 5; 325 TABLET ORAL at 14:00

## 2024-06-23 RX ADMIN — ENOXAPARIN SODIUM 30 MG: 100 INJECTION SUBCUTANEOUS at 20:32

## 2024-06-23 RX ADMIN — OXYCODONE HYDROCHLORIDE AND ACETAMINOPHEN 1 TABLET: 5; 325 TABLET ORAL at 10:06

## 2024-06-23 RX ADMIN — FAMOTIDINE 20 MG: 20 TABLET, FILM COATED ORAL at 09:59

## 2024-06-23 RX ADMIN — MEROPENEM 1000 MG: 1 INJECTION INTRAVENOUS at 20:32

## 2024-06-23 RX ADMIN — ENOXAPARIN SODIUM 30 MG: 100 INJECTION SUBCUTANEOUS at 10:00

## 2024-06-23 RX ADMIN — SODIUM CHLORIDE, PRESERVATIVE FREE 10 ML: 5 INJECTION INTRAVENOUS at 13:35

## 2024-06-23 RX ADMIN — OXYCODONE HYDROCHLORIDE AND ACETAMINOPHEN 1 TABLET: 5; 325 TABLET ORAL at 18:12

## 2024-06-23 RX ADMIN — OXYCODONE HYDROCHLORIDE AND ACETAMINOPHEN 1 TABLET: 5; 325 TABLET ORAL at 02:29

## 2024-06-23 RX ADMIN — MEROPENEM 1000 MG: 1 INJECTION INTRAVENOUS at 04:01

## 2024-06-23 RX ADMIN — MEROPENEM 1000 MG: 1 INJECTION INTRAVENOUS at 13:35

## 2024-06-23 RX ADMIN — FAMOTIDINE 20 MG: 20 TABLET, FILM COATED ORAL at 20:32

## 2024-06-23 RX ADMIN — OXYCODONE HYDROCHLORIDE AND ACETAMINOPHEN 1 TABLET: 5; 325 TABLET ORAL at 06:16

## 2024-06-23 RX ADMIN — LEVOTHYROXINE SODIUM 150 MCG: 0.07 TABLET ORAL at 06:15

## 2024-06-23 RX ADMIN — OXYCODONE HYDROCHLORIDE AND ACETAMINOPHEN 1 TABLET: 5; 325 TABLET ORAL at 22:14

## 2024-06-23 ASSESSMENT — PAIN SCALES - WONG BAKER
WONGBAKER_NUMERICALRESPONSE: HURTS A LITTLE BIT
WONGBAKER_NUMERICALRESPONSE: NO HURT
WONGBAKER_NUMERICALRESPONSE: HURTS A LITTLE BIT
WONGBAKER_NUMERICALRESPONSE: HURTS A LITTLE BIT
WONGBAKER_NUMERICALRESPONSE: NO HURT

## 2024-06-23 ASSESSMENT — PAIN DESCRIPTION - LOCATION
LOCATION: ABDOMEN
LOCATION: ABDOMEN;BACK
LOCATION: ABDOMEN

## 2024-06-23 ASSESSMENT — PAIN DESCRIPTION - ORIENTATION
ORIENTATION: ANTERIOR;POSTERIOR
ORIENTATION: MID
ORIENTATION: ANTERIOR
ORIENTATION: ANTERIOR

## 2024-06-23 ASSESSMENT — PAIN SCALES - GENERAL
PAINLEVEL_OUTOF10: 0
PAINLEVEL_OUTOF10: 2
PAINLEVEL_OUTOF10: 6
PAINLEVEL_OUTOF10: 0
PAINLEVEL_OUTOF10: 6
PAINLEVEL_OUTOF10: 2
PAINLEVEL_OUTOF10: 2
PAINLEVEL_OUTOF10: 6

## 2024-06-23 ASSESSMENT — PAIN DESCRIPTION - DESCRIPTORS
DESCRIPTORS: ACHING
DESCRIPTORS: ACHING
DESCRIPTORS: ACHING;DISCOMFORT;DULL
DESCRIPTORS: ACHING;SHARP
DESCRIPTORS: ACHING;THROBBING;SHARP

## 2024-06-23 ASSESSMENT — PAIN - FUNCTIONAL ASSESSMENT
PAIN_FUNCTIONAL_ASSESSMENT: ACTIVITIES ARE NOT PREVENTED
PAIN_FUNCTIONAL_ASSESSMENT: PREVENTS OR INTERFERES WITH MANY ACTIVE NOT PASSIVE ACTIVITIES
PAIN_FUNCTIONAL_ASSESSMENT: PREVENTS OR INTERFERES WITH MANY ACTIVE NOT PASSIVE ACTIVITIES

## 2024-06-23 NOTE — PROGRESS NOTES
Pulmonology and Critical Care Progress Note  Patient is 63 y.o. with diagnosis of :     Cecal volvulus     Large bowel ischemia     Hypotension due to hypovolemia    Mass of colon    Leak of anastomosis between gastrointestinal structures    Adhesion of intestine    Open wound of abdomen    Acute respiratory failure with hypoxia   Status post ex lap  Subjective:       Patient seen and examined at bedside.    Tolerating regular consistency diet.     on IV Diflucan/Zosyn.    Getting physical therapy, out of bed sitting in recliner doing leg exercises   has LIONEL drain, colostomy and ileostomy  Family present at bedside.         Current Facility-Administered Medications   Medication Dose Route Frequency Provider Last Rate Last Admin    oxyCODONE-acetaminophen (PERCOCET) 5-325 MG per tablet 1 tablet  1 tablet Oral Q4H PRN Tommie Pineda MD   1 tablet at 06/23/24 1006    ibuprofen (ADVIL;MOTRIN) tablet 600 mg  600 mg Oral Q6H PRN Tommie Pineda MD   600 mg at 06/19/24 2248    meropenem (MERREM) 1,000 mg in sodium chloride 0.9 % 100 mL IVPB (mini-bag)  1,000 mg IntraVENous Q8H Antonio Butler MD   Stopped at 06/23/24 0748    diatrizoate meglumine-sodium (GASTROGRAFIN) 66-10 % solution 30 mL  30 mL Per NG tube ONCE PRN Javi Lopez MD   30 mL at 06/17/24 1013    lidocaine 4 % external patch 1 patch  1 patch TransDERmal Daily Javi Lopez MD   1 patch at 06/23/24 0959    potassium chloride (KLOR-CON M) extended release tablet 40 mEq  40 mEq Oral PRN Javi Lopez MD   40 mEq at 06/18/24 0928    Or    potassium bicarb-citric acid (EFFER-K) effervescent tablet 40 mEq  40 mEq Oral PRN Javi Lopez MD        Or    potassium chloride 10 mEq/100 mL IVPB (Peripheral Line)  10 mEq IntraVENous PRN Javi Lopez MD        naloxone 0.4 mg in 10 mL sodium chloride syringe   IntraVENous PRN Tommie Pineda MD        sodium chloride flush 0.9 % injection 5-40 mL  5-40 mL IntraVENous  lvmtcb   weight loss recommended  -Strongly recommend outpatient KIRSTEN evaluation    Patient getting physical therapy, out of bed and standing by the side of the bed   Patient will have 2 more days of IV antibiotics and then will be transferred to rehab facility  Discussed with patient  Stable from pulmonary perspective.  Will sign off.  Please call us again if need help    CODE STATUS: Full Code       Trenton Aquino MD  [facility] (Located within [facility])  6/23/2024  12:44 PM

## 2024-06-23 NOTE — PROGRESS NOTES
Decreased BS     Midline wound vac in place  Right sided LIONEL drains #1 and #2 with serous effluent  Right sided colostomy with dark stool  Left sided LIONEL drain #3 NOW WITH SEROUS EFFLUENT  Left sided colostomy with dark stool      Media Information    Document Information    Wound Care Image: Wound   Mid Abdomen   06/18/2024 16:39   Attached To:   Hospital Encounter on 6/2/24   Source Information    Tracy Sweeney RN  [facility]      Media Information    Document Information    Wound Care Image: Wound   LLQ   06/18/2024 13:32   Attached To:   Hospital Encounter on 6/2/24   Source Information    Tracy Sweeney RN  [facility]     Genitourinary:     Comments: Indwelling Quiroga catheter with clear urine  Musculoskeletal:      Cervical back: Neck supple.      Right lower leg: Edema present.      Left lower leg: Edema present.   Skin:     Findings: No rash.   Neurological: nonfocal, no confusion   Psychiatric: normal behavior, little interaction     Labs/Imaging/Diagnostics    Labs:  CBC:  Recent Labs     06/21/24  0539 06/22/24  0810 06/23/24  0829   WBC 11.7* 9.3 11.1*   RBC 2.83* 2.87* 2.77*   HGB 9.4* 9.3* 9.1*   HCT 28.6* 30.9* 28.1*   .1* 107.7* 101.4*   RDW 13.9 14.3 14.2   * 715* 770*     WBC            CHEMISTRIES:  Recent Labs     06/21/24  0539      K 4.1      CO2 28   BUN 15   CREATININE 0.60   GLUCOSE 110*   PHOS 2.8   MG 1.9           Procal 0.07 <0.09 <0.07 <0.22 <0.08 <0.09 < 0.14      CRP 8.49 <12.00 <12.30 <14.80 <14.40 <16.70 <15.90 <23.00 <22.10             Serum Fungitell <31.25    ine culture (6/8) 50,000 col/ml Candida lusitaniae  Wound culture surgical excision (6/8) Heavy >2 organisms including anaerobic Gram negative rods FINAL  Wound culture (6/8) Heavy BMIX anaerobic Gram negative rods FINAL  Fungus culture Abdomen (6/8) No fungus isolated in 8 days  Fungus culture Abdomen (6/8) No fungus isolated in 8 days  Body fluid (6/13) No growth  FINAL    Imaging Last 24 Hours:     CT Abdomen Pelvis (6/17)    IMPRESSION:  Mesenteric inflammation with trace scattered postsurgical fluid and gas but no  drainable abscess.  Incidental cholelithiasis without gallbladder inflammation.  No significant bowel distention or inflammation.        CT Abdomen Pelvis (6/6) IMPRESSION:     1. Moderate small bowel distention likely representing postoperative ileus.  2. Mild free intraperitoneal air likely postoperative change. Small amount of  focal air and fluid anterior to the surgical anastomosis may represent  postoperative change. However, small leak is not excluded.  3. Significant fecal retention in the remaining large bowel with focal  stricturing in the rectosigmoid colon.  4. Cholelithiasis.  5. Bibasilar atelectasis with 4 mm right middle lobe lung nodule.          Assessment//Plan           Hospital Problems             Last Modified POA    * (Principal) Cecal volvulus (HCC) 6/2/2024 Yes    Large bowel ischemia (HCC) 6/2/2024 Yes    Hypotension due to hypovolemia 6/10/2024 Yes    Mass of colon 6/10/2024 Yes    Leak of anastomosis between gastrointestinal structures 6/10/2024 Yes    Adhesion of intestine 6/10/2024 Yes    Open wound of abdomen 6/10/2024 Yes    Acute respiratory failure with hypoxia ([facility]) 6/10/2024 Yes    Candida UTI 6/18/2024 Yes    SIRS (systemic inflammatory response syndrome) ([facility]) 6/18/2024 Yes    Sepsis due to Candida species without acute organ dysfunction ([facility]) 6/18/2024 Yes    Edema 6/23/2024 Yes      Candida UTI secondary to Candida lusitaniae, status post IV Fluconazole x 14 days   Suspected intra-abdominal infection, on IV Meropenem  Cecal volvulus  Status post exploratory laparotomy with midline wound vac, bilateral colostomies, LIONEL drains x 3  Hepatorenal syndrome  Sepsis with persistent leukocytosis, elevated procal and CRP      Comment:  WBC increased slightly today but CRP continues to decrease.        Plan   1. Continue IV Meropenem  for 2 more weeks  2. In am, repeat CBC, procal, CRP  3. Cleared for discharge from ID standpoint       Electronically signed by Antonio Butler MD

## 2024-06-23 NOTE — PROGRESS NOTES
Progress Note  Date:2024       Room:[facility]  Patient Name:Maddie Goldberg     YOB: 1960     Age:63 y.o.        Subjective    Subjective no complaints.  Patient is tolerating diet denies abdominal pain  Review of Systems negative other than musculoskeletal weakness  Objective         Vitals Last 24 Hours:  TEMPERATURE:  Temp  Av °F (36.7 °C)  Min: 97.7 °F (36.5 °C)  Max: 98.1 °F (36.7 °C)  RESPIRATIONS RANGE: Resp  Av.9  Min: 15  Max: 20  PULSE OXIMETRY RANGE: SpO2  Av.8 %  Min: 97 %  Max: 100 %  PULSE RANGE: Pulse  Av.8  Min: 68  Max: 83  BLOOD PRESSURE RANGE: Systolic (24hrs), Av , Min:106 , Max:131   ; Diastolic (24hrs), Av, Min:57, Max:71    I/O (24Hr):    Intake/Output Summary (Last 24 hours) at 2024 1647  Last data filed at 2024 0618  Gross per 24 hour   Intake 720 ml   Output 1029.5 ml   Net -309.5 ml     Objective incision has an intact wound VAC placed over top of it.  Wound edges are free of any erythema.  Drainage from the wound VAC is primarily serous.  Mucous fistula and ileostomy are viable and patent.  No leaks from the stomas and LIONEL drains are draining scant serous fluid.  Labs/Imaging/Diagnostics    Labs:  CBC:  Recent Labs     24  0539 24  0810 24  0829   WBC 11.7* 9.3 11.1*   RBC 2.83* 2.87* 2.77*   HGB 9.4* 9.3* 9.1*   HCT 28.6* 30.9* 28.1*   .1* 107.7* 101.4*   RDW 13.9 14.3 14.2   * 715* 770*     CHEMISTRIES:  Recent Labs     24  0539      K 4.1      CO2 28   BUN 15   CREATININE 0.60   GLUCOSE 110*   PHOS 2.8   MG 1.9     PT/INR:No results for input(s): \"PROTIME\", \"INR\" in the last 72 hours.  APTT:No results for input(s): \"APTT\" in the last 72 hours.  LIVER PROFILE:  Recent Labs     24  0539   AST 34   ALT 42   BILIDIR 0.1   BILITOT 0.2   ALKPHOS 184*       Imaging Last 24 Hours:  No results found.  Assessment//Plan           Hospital Problems             Last Modified POA    *  (Principal) Cecal volvulus (HCC) 6/2/2024 Yes    Large bowel ischemia (HCC) 6/2/2024 Yes    Hypotension due to hypovolemia 6/10/2024 Yes    Mass of colon 6/10/2024 Yes    Leak of anastomosis between gastrointestinal structures 6/10/2024 Yes    Adhesion of intestine 6/10/2024 Yes    Open wound of abdomen 6/10/2024 Yes    Acute respiratory failure with hypoxia (HCC) 6/10/2024 Yes    Candida UTI 6/18/2024 Yes    SIRS (systemic inflammatory response syndrome) (HCC) 6/18/2024 Yes    Sepsis due to Candida species without acute organ dysfunction (HCC) 6/18/2024 Yes    Edema 6/23/2024 Yes     Assessment & Plan  Patient's status post exploratory laparotomy bowel resection ileostomy creation of mucous fistula and drainage of abscess.  Patient currently with candidiasis being followed by infectious disease.  Patient scheduled for transfer tomorrow and at present she is tolerating diet and ambulating within her room with family and therapy.  Recommend revisit by wound care nursing and once again orientation with ostomy supplies.  Also recommend dietary consultation and recommended supplementation as patient appears to be significantly protein malnourished.  Thrombocytosis most likely secondary to chronic inflammatory process and open wound.  Electronically signed by Sylvain Eddy MD on 6/23/24 at 4:47 PM EDT

## 2024-06-24 LAB
ALBUMIN SERPL-MCNC: 3 G/DL (ref 3.5–5)
ALBUMIN/GLOB SERPL: 0.9 (ref 1.1–2.2)
ALP SERPL-CCNC: 94 U/L (ref 45–117)
ALT SERPL-CCNC: 22 U/L (ref 12–78)
AST SERPL W P-5'-P-CCNC: 12 U/L (ref 15–37)
BACTERIA SPEC CULT: NORMAL
BACTERIA SPEC CULT: NORMAL
BASOPHILS # BLD: 0.4 K/UL (ref 0–0.1)
BASOPHILS NFR BLD: 4 % (ref 0–1)
BILIRUB DIRECT SERPL-MCNC: 0.1 MG/DL (ref 0–0.2)
BILIRUB SERPL-MCNC: 0.5 MG/DL (ref 0.2–1)
CRP SERPL-MCNC: 7.26 MG/DL (ref 0–0.3)
DIFFERENTIAL METHOD BLD: ABNORMAL
EOSINOPHIL # BLD: 0.4 K/UL (ref 0–0.4)
EOSINOPHIL NFR BLD: 4 % (ref 0–7)
ERYTHROCYTE [DISTWIDTH] IN BLOOD BY AUTOMATED COUNT: 14.3 % (ref 11.5–14.5)
GLOBULIN SER CALC-MCNC: 3.2 G/DL (ref 2–4)
HCT VFR BLD AUTO: 27.2 % (ref 35–47)
HGB BLD-MCNC: 8.9 G/DL (ref 11.5–16)
IMM GRANULOCYTES # BLD AUTO: 0 K/UL
IMM GRANULOCYTES NFR BLD AUTO: 0 %
LYMPHOCYTES # BLD: 3.5 K/UL (ref 0.8–3.5)
LYMPHOCYTES NFR BLD: 33 % (ref 12–49)
Lab: NORMAL
Lab: NORMAL
MCH RBC QN AUTO: 33 PG (ref 26–34)
MCHC RBC AUTO-ENTMCNC: 32.7 G/DL (ref 30–36.5)
MCV RBC AUTO: 100.7 FL (ref 80–99)
MONOCYTES # BLD: 0.9 K/UL (ref 0–1)
MONOCYTES NFR BLD: 8 % (ref 5–13)
NEUTS SEG # BLD: 5.5 K/UL (ref 1.8–8)
NEUTS SEG NFR BLD: 51 % (ref 32–75)
NRBC # BLD: 0 K/UL (ref 0–0.01)
NRBC BLD-RTO: 0 PER 100 WBC
PLATELET # BLD AUTO: 731 K/UL (ref 150–400)
PMV BLD AUTO: 9.6 FL (ref 8.9–12.9)
PROCALCITONIN SERPL-MCNC: <0.05 NG/ML
PROT SERPL-MCNC: 6.2 G/DL (ref 6.4–8.2)
RBC # BLD AUTO: 2.7 M/UL (ref 3.8–5.2)
RBC MORPH BLD: ABNORMAL
WBC # BLD AUTO: 10.7 K/UL (ref 3.6–11)

## 2024-06-24 PROCEDURE — 2500000003 HC RX 250 WO HCPCS: Performed by: SURGERY

## 2024-06-24 PROCEDURE — 6360000002 HC RX W HCPCS: Performed by: INTERNAL MEDICINE

## 2024-06-24 PROCEDURE — 80076 HEPATIC FUNCTION PANEL: CPT

## 2024-06-24 PROCEDURE — 6360000002 HC RX W HCPCS: Performed by: SURGERY

## 2024-06-24 PROCEDURE — 36415 COLL VENOUS BLD VENIPUNCTURE: CPT

## 2024-06-24 PROCEDURE — 97530 THERAPEUTIC ACTIVITIES: CPT

## 2024-06-24 PROCEDURE — 1100000000 HC RM PRIVATE

## 2024-06-24 PROCEDURE — 2580000003 HC RX 258: Performed by: SURGERY

## 2024-06-24 PROCEDURE — 6370000000 HC RX 637 (ALT 250 FOR IP): Performed by: SURGERY

## 2024-06-24 PROCEDURE — 99024 POSTOP FOLLOW-UP VISIT: CPT | Performed by: SURGERY

## 2024-06-24 PROCEDURE — 2580000003 HC RX 258: Performed by: INTERNAL MEDICINE

## 2024-06-24 PROCEDURE — 86140 C-REACTIVE PROTEIN: CPT

## 2024-06-24 PROCEDURE — 84145 PROCALCITONIN (PCT): CPT

## 2024-06-24 PROCEDURE — 99232 SBSQ HOSP IP/OBS MODERATE 35: CPT | Performed by: INTERNAL MEDICINE

## 2024-06-24 PROCEDURE — 85025 COMPLETE CBC W/AUTO DIFF WBC: CPT

## 2024-06-24 RX ADMIN — FAMOTIDINE 20 MG: 20 TABLET, FILM COATED ORAL at 09:14

## 2024-06-24 RX ADMIN — SODIUM CHLORIDE, PRESERVATIVE FREE 10 ML: 5 INJECTION INTRAVENOUS at 22:10

## 2024-06-24 RX ADMIN — OXYCODONE HYDROCHLORIDE AND ACETAMINOPHEN 1 TABLET: 5; 325 TABLET ORAL at 22:45

## 2024-06-24 RX ADMIN — ONDANSETRON 4 MG: 4 TABLET, ORALLY DISINTEGRATING ORAL at 22:46

## 2024-06-24 RX ADMIN — SODIUM CHLORIDE, PRESERVATIVE FREE 20 MG: 5 INJECTION INTRAVENOUS at 22:05

## 2024-06-24 RX ADMIN — MEROPENEM 1000 MG: 1 INJECTION INTRAVENOUS at 13:11

## 2024-06-24 RX ADMIN — OXYCODONE HYDROCHLORIDE AND ACETAMINOPHEN 1 TABLET: 5; 325 TABLET ORAL at 14:32

## 2024-06-24 RX ADMIN — LEVOTHYROXINE SODIUM 150 MCG: 0.07 TABLET ORAL at 05:52

## 2024-06-24 RX ADMIN — OXYCODONE HYDROCHLORIDE AND ACETAMINOPHEN 1 TABLET: 5; 325 TABLET ORAL at 10:13

## 2024-06-24 RX ADMIN — OXYCODONE HYDROCHLORIDE AND ACETAMINOPHEN 1 TABLET: 5; 325 TABLET ORAL at 02:07

## 2024-06-24 RX ADMIN — ENOXAPARIN SODIUM 30 MG: 100 INJECTION SUBCUTANEOUS at 09:14

## 2024-06-24 RX ADMIN — OXYCODONE HYDROCHLORIDE AND ACETAMINOPHEN 1 TABLET: 5; 325 TABLET ORAL at 18:59

## 2024-06-24 RX ADMIN — MEROPENEM 1000 MG: 1 INJECTION INTRAVENOUS at 21:57

## 2024-06-24 RX ADMIN — SODIUM CHLORIDE, PRESERVATIVE FREE 10 ML: 5 INJECTION INTRAVENOUS at 09:18

## 2024-06-24 RX ADMIN — OXYCODONE HYDROCHLORIDE AND ACETAMINOPHEN 1 TABLET: 5; 325 TABLET ORAL at 05:52

## 2024-06-24 RX ADMIN — MEROPENEM 1000 MG: 1 INJECTION INTRAVENOUS at 03:57

## 2024-06-24 RX ADMIN — ENOXAPARIN SODIUM 30 MG: 100 INJECTION SUBCUTANEOUS at 21:57

## 2024-06-24 ASSESSMENT — PAIN SCALES - GENERAL
PAINLEVEL_OUTOF10: 0
PAINLEVEL_OUTOF10: 8
PAINLEVEL_OUTOF10: 0
PAINLEVEL_OUTOF10: 8
PAINLEVEL_OUTOF10: 3
PAINLEVEL_OUTOF10: 8
PAINLEVEL_OUTOF10: 8
PAINLEVEL_OUTOF10: 6
PAINLEVEL_OUTOF10: 5
PAINLEVEL_OUTOF10: 0
PAINLEVEL_OUTOF10: 0
PAINLEVEL_OUTOF10: 3
PAINLEVEL_OUTOF10: 8
PAINLEVEL_OUTOF10: 6

## 2024-06-24 ASSESSMENT — PAIN DESCRIPTION - ORIENTATION
ORIENTATION: MID
ORIENTATION: ANTERIOR
ORIENTATION: ANTERIOR
ORIENTATION: MID
ORIENTATION: ANTERIOR

## 2024-06-24 ASSESSMENT — PAIN DESCRIPTION - DESCRIPTORS
DESCRIPTORS: ACHING;DISCOMFORT
DESCRIPTORS: ACHING
DESCRIPTORS: PRESSURE
DESCRIPTORS: DISCOMFORT;ACHING;DULL
DESCRIPTORS: PRESSURE
DESCRIPTORS: ACHING;CRUSHING

## 2024-06-24 ASSESSMENT — PAIN DESCRIPTION - LOCATION
LOCATION: ABDOMEN
LOCATION: ABDOMEN;BACK
LOCATION: ABDOMEN
LOCATION: ABDOMEN

## 2024-06-24 ASSESSMENT — PAIN SCALES - WONG BAKER
WONGBAKER_NUMERICALRESPONSE: NO HURT

## 2024-06-24 ASSESSMENT — PAIN - FUNCTIONAL ASSESSMENT
PAIN_FUNCTIONAL_ASSESSMENT: ACTIVITIES ARE NOT PREVENTED

## 2024-06-24 NOTE — PROGRESS NOTES
Progress Note  Date:2024       Room:[facility]  Patient Name:Maddie Goldberg     YOB: 1960     Age:63 y.o.        Subjective    Subjective  Patient followed for Candida UTI on high dose Fluconazole. Still with low grade temperatures.  Repeat urinalysis showed improvement. Body fluid culture was  negative. Afebrile with now normal WBC and procal with slowly decreasing CRP.  Currently  Meropenem alone. Repeat CT scan showed no drainable fluid.   Still pending disposition to [facility].   Objective         Vitals Last 24 Hours:  TEMPERATURE:  Temp  Av.3 °F (36.8 °C)  Min: 97.3 °F (36.3 °C)  Max: 98.9 °F (37.2 °C)  RESPIRATIONS RANGE: Resp  Av.7  Min: 16  Max: 18  PULSE OXIMETRY RANGE: SpO2  Av %  Min: 96 %  Max: 99 %  PULSE RANGE: Pulse  Av.5  Min: 71  Max: 85  BLOOD PRESSURE RANGE: Systolic (24hrs), Av , Min:112 , Max:126   ; Diastolic (24hrs), Av, Min:58, Max:73         Objective:  Vital signs: (most recent): Blood pressure 125/69, pulse 74, temperature 97.3 °F (36.3 °C), resp. rate 18, height 1.727 m (5' 8\"), weight 128.5 kg (283 lb 4.7 oz), SpO2 99 %.     Vitals and nursing note reviewed. Patient not re-examined today.  Constitutional:       General: She is not in acute distress.     Appearance: She is ill-appearing.   HENT: Unremarkable   Eyes:      Extraocular Movements: Extraocular movements intact.      Pupils: Pupils are equal, round, and reactive to light.   Cardiovascular:      Rate and Rhythm: Normal rate and regular rhythm.      Heart sounds: Normal heart sounds. No murmur heard.  Pulmonary:      Effort: Pulmonary effort is normal.      Breath sounds: Normal breath sounds.   Abdominal:      General: There is no distension.      Palpations: There is no mass.      Tenderness: There is no abdominal tenderness. There is no guarding.           Comments: Decreased BS     Midline wound vac in place  Right sided LIONEL drains #1 and #2 with serous effluent  Right sided

## 2024-06-24 NOTE — PROGRESS NOTES
Comprehensive Nutrition Assessment    Type and Reason for Visit:  Reassess    Nutrition Recommendations/Plan:   Monitor po intake,weight and labs     Malnutrition Assessment:  Malnutrition Status:  Mild malnutrition (06/10/24 1415)    Context:  Acute Illness     Findings of the 6 clinical characteristics of malnutrition:  Energy Intake:  50% or less of estimated energy requirements for 5 or more days  Weight Loss:  No significant weight loss     Body Fat Loss:  No significant body fat loss     Muscle Mass Loss:  No significant muscle mass loss    Fluid Accumulation:  No significant fluid accumulation     Strength:  Not Performed    Nutrition Assessment:    Nutrition Assessment Admitted for cecal volvulus, large bowel ischemia, complete bowel obstruction. (6/8) POD 6 ex-lap colon resection. Transferred from [facility] [place]/t decline in medical status. Pt returned to OR for resection of the ileal-colonic anastomosis; temporary abdominal closure on 6/. Pt returned to OR on 6/9 for abdominal washout, wound vac placement and abd closure. (6/10) Screened for NPO/CLD x8 days. POD 2 ex-lap partial colon resection. Pt transferred to ICU s/p sx, intubated but undergoing vent weaning. Pt on minimal pressure support, sedated on propofol and fentanyl. Pt is hemodynamically stable for NST. Given nonfunctional gut and prolonged NPO/Clear status, RD rec's PN. MD Pineda agreeable to starting PN, concern for fluid balance. RD to provide regimen above. (6/12) Pt extubated on 6/11, off propofol. Today, Pt awake, able to deny N/V. Pt reported wanting to eat d/t prolonged NPO status- no hunger cues. TPN infusing and tolerated at goal rate. RD rec's continue regimen. Labs: K 3.4, Cr 0.49, Ca 7.9, Phos 2.2, ; Phos trending low- will monitor. Meds: LCR, dilaudid, lasix, synthroid, pepcid, lovenox. 6/17/24 Follow up: Patient continues on TPN, NG to suction. Current weight down from previous review, weight continues above acceptable BMI.

## 2024-06-24 NOTE — PROGRESS NOTES
PROGRESS NOTE      Chief Complaints:  Patient examined this morning.  HPI and  Objective:    No issues overnight.  Temperature 98.9.  Pain is well under control.      Review of Systems:  Rest of review of system reviewed personally and they are negative.    EXAM:  BP (!) 117/58   Pulse 80   Temp 98.9 °F (37.2 °C) (Oral)   Resp 17   Ht 1.727 m (5' 8\")   Wt 128.5 kg (283 lb 4.7 oz)   SpO2 99%   BMI 43.07 kg/m²     Patient is awake   Head and neck atraumatic, normocephalic.  ENT: No hoarse voice, gaze appropriate.  Cardiac system regular rate rhythm.  Pulmonary no audible wheeze, no cyanosis.  Chest wall excursion normal with respiration cycle  Abdomen is soft not particularly distended.  Neurologically nonfocal.  Hematology system: No bruising noted.  Musculoskeletal system: No joint deformity noted.  Genitourinary system: No hematuria noted.  Skin is warm and moist.  Psychosocial: Cooperative.  Vascular examination as previously noted no changes.    Current Facility-Administered Medications   Medication Dose Route Frequency Provider Last Rate Last Admin    oxyCODONE-acetaminophen (PERCOCET) 5-325 MG per tablet 1 tablet  1 tablet Oral Q4H PRN Tommie Pineda MD   1 tablet at 06/24/24 0552    ibuprofen (ADVIL;MOTRIN) tablet 600 mg  600 mg Oral Q6H PRN Tommie Pineda MD   600 mg at 06/19/24 2248    meropenem (MERREM) 1,000 mg in sodium chloride 0.9 % 100 mL IVPB (mini-bag)  1,000 mg IntraVENous Q8H Antonio Butler MD 33.3 mL/hr at 06/24/24 0357 1,000 mg at 06/24/24 0357    diatrizoate meglumine-sodium (GASTROGRAFIN) 66-10 % solution 30 mL  30 mL Per NG tube ONCE PRN Javi Lopez MD   30 mL at 06/17/24 1013    lidocaine 4 % external patch 1 patch  1 patch TransDERmal Daily Javi Lopez MD   1 patch at 06/23/24 0959    potassium chloride (KLOR-CON M) extended release tablet 40 mEq  40 mEq Oral PRN Javi Lopez MD   40 mEq at 06/18/24 0928    Or    potassium bicarb-citric acid  (EFFER-K) effervescent tablet 40 mEq  40 mEq Oral PRN Javi Lopez MD        Or    potassium chloride 10 mEq/100 mL IVPB (Peripheral Line)  10 mEq IntraVENous PRN Javi Lopez MD        naloxone 0.4 mg in 10 mL sodium chloride syringe   IntraVENous PRN Tommie Pineda MD        sodium chloride flush 0.9 % injection 5-40 mL  5-40 mL IntraVENous 2 times per day Tommie Pineda MD   10 mL at 06/23/24 1335    0.9 % sodium chloride infusion   IntraVENous PRN Tommie Pineda MD   Stopped at 06/09/24 2145    enoxaparin Sodium (LOVENOX) injection 30 mg  30 mg SubCUTAneous BID Tommie Pineda MD   30 mg at 06/23/24 2032    ondansetron (ZOFRAN-ODT) disintegrating tablet 4 mg  4 mg Oral Q8H PRN Tommie Pineda MD   4 mg at 06/20/24 0121    Or    ondansetron (ZOFRAN) injection 4 mg  4 mg IntraVENous Q6H PRN Tommie Pineda MD   4 mg at 06/18/24 1259    lidocaine (XYLOCAINE) 2 % uro-jet   Topical Once Tommie Pineda MD        levothyroxine (SYNTHROID) tablet 150 mcg  150 mcg Oral QAM AC Kylah Rice MD   150 mcg at 06/24/24 0552    sodium chloride flush 0.9 % injection 5-40 mL  5-40 mL IntraVENous PRN Kylah Rice MD   10 mL at 06/02/24 2038    famotidine (PEPCID) tablet 20 mg  20 mg Oral BID Kylah Rice MD   20 mg at 06/23/24 2032    Or    famotidine (PEPCID) 20 mg in sodium chloride (PF) 0.9 % 10 mL injection  20 mg IntraVENous BID Kylah Rice MD   20 mg at 06/20/24 0906           Recent Results (from the past 24 hour(s))   C-Reactive Protein    Collection Time: 06/23/24  8:29 AM   Result Value Ref Range    CRP 8.49 (H) 0.00 - 0.30 mg/dL   Procalcitonin    Collection Time: 06/23/24  8:29 AM   Result Value Ref Range    Procalcitonin 0.07 (H) 0 ng/mL   CBC with Auto Differential    Collection Time: 06/23/24  8:29 AM   Result Value Ref Range    WBC 11.1 (H) 3.6 - 11.0 K/uL    RBC 2.77 (L) 3.80 - 5.20 M/uL    Hemoglobin 9.1 (L) 11.5 - 16.0 g/dL    Hematocrit 28.1 (L) 35.0 - 47.0 %    .4 (H) 80.0  - 99.0 FL    MCH 32.9 26.0 - 34.0 PG    MCHC 32.4 30.0 - 36.5 g/dL    RDW 14.2 11.5 - 14.5 %    Platelets 770 (H) 150 - 400 K/uL    MPV 9.6 8.9 - 12.9 FL    Nucleated RBCs 0.0 0.0  WBC    nRBC 0.00 0.00 - 0.01 K/uL    Neutrophils % 61 32 - 75 %    Lymphocytes % 25 12 - 49 %    Monocytes % 9 5 - 13 %    Eosinophils % 3 0 - 7 %    Basophils % 0 0 - 1 %    Promyelocytes 2 (H) 0 %    Immature Granulocytes % 0 %    Neutrophils Absolute 6.8 1.8 - 8.0 K/UL    Lymphocytes Absolute 2.8 0.8 - 3.5 K/UL    Monocytes Absolute 1.0 0.0 - 1.0 K/UL    Eosinophils Absolute 0.3 0.0 - 0.4 K/UL    Basophils Absolute 0.0 0.0 - 0.1 K/UL    Immature Granulocytes Absolute 0.0 K/UL    Differential Type Manual      RBC Comment Normocytic, Normochromic         ASSESSMENT:   Patient is 63 y.o. with diagnosis of : Principal Problem:    Cecal volvulus (HCC)  Active Problems:    Large bowel ischemia (HCC)    Hypotension due to hypovolemia    Mass of colon    Leak of anastomosis between gastrointestinal structures    Adhesion of intestine    Open wound of abdomen    Acute respiratory failure with hypoxia (HCC)    Candida UTI    SIRS (systemic inflammatory response syndrome) (HCC)    Sepsis due to Candida species without acute organ dysfunction (HCC)    Edema  Resolved Problems:    * No resolved hospital problems. *      PLAN:                 Patient is currently doing well.  No fever.  Ileostomy is working well.  White blood cell count has normalized.    Patient is a medically stable to transfer to rehab facility.  Antibiotics per Dr. Butler's recommendations.  Wound VAC change at rehab Monday Wednesday Friday.  And I will see her back to my office follow-up in 10 days.

## 2024-06-24 NOTE — PLAN OF CARE
Problem: Nutrition Deficit:  Goal: Optimize nutritional status  Outcome: Progressing  Flowsheets (Taken 6/24/2024 1212)  Nutrient intake appropriate for improving, restoring, or maintaining nutritional needs:   Assess nutritional status and recommend course of action   Monitor oral intake, labs, and treatment plans   Recommend appropriate diets, oral nutritional supplements, and vitamin/mineral supplements

## 2024-06-24 NOTE — PLAN OF CARE
PHYSICAL THERAPY TREATMENT     Patient: Maddie Goldberg (63 y.o. female)  Date: 6/24/2024  Diagnosis: Cecal volvulus (HCC) [K56.2]  Large bowel ischemia (HCC) [K55.9] Cecal volvulus (HCC)  Procedure(s) (LRB):  EXPLORATORY LAPAROTOMY WITH ILEOSTOMY AND WOUND VAC PLACEMENT (N/A) 15 Days Post-Op  Precautions: General Precautions, Surgical Protocols                      Recommendations for nursing mobility: Out of bed to chair for meals, Encourage HEP in prep for ADLs/mobility; see handout for details, AD and gt belt for bed to chair , Amb to bathroom with AD and gait belt, and Assist x1    In place during session: PICC line, LIONEL drain 3#, and Wound vac abdomin  Chart, physical therapy assessment, plan of care and goals were reviewed.  ASSESSMENT  Patient continues with skilled PT services and is progressing towards goals. Pt sitting up in recliner chair upon PT arrival, agreeable to session. (See below for objective details and assist levels).     Overall pt tolerated session fair today. Pt showed overall improvement during today's tx session. Pt performed seated LE therex with no complaints of pain or discomfort. Pt stood from bed requiring min Ax1 and ambulated ~50 ft with RW. Gait was slow and steady, but noted bilateral decreased step length. Pt returned to sitting in recliner chair with all needs met.Family stated they have been ambulating her to the bathroom; educated family on utilizing the call bell and asking for assistance for safety. Pt's family reported that they try to but sometimes the pt needs to use the bathroom before staff can get there, but pt's family verbalized understanding. Pt seen as a Ax2 today due to last tx session, pt has progressed to being an Ax1. Will continue to benefit from skilled PT services, and will continue to progress as tolerated. Potential barriers for safe discharge:. Current PT DC recommendation Inpatient Rehabilitation Facility  once medically  some  Sequencing: Requires cues for some  Functional Mobility Training:  Bed Mobility:  Transfers:  Balance:  Wheelchair Mobility:  Wheelchair Management  Wheelchair Management: No  Ambulation/Gait Training:  Gait  Gait Training: Yes  Overall Level of Assistance: Minimum assistance;Assist X1 (+ 1 for line management)  Distance (ft): 50 Feet  Assistive Device: Gait belt;Walker, rolling  Base of Support: Narrowed  Speed/Leonor: Slow;Shuffled  Step Length: Left shortened;Right shortened  Gait Abnormalities: Decreased step clearance;Shuffling gait    Therapeutic Exercises:     EXERCISE   Sets   Reps   Active Active Assist   Passive Self ROM   Comments   Ankle Pumps  10 [x] [] [] []    Quad Sets/Glut Sets   [] [] [] []    Hamstring Sets   [] [] [] []    Short Arc Quads   [] [] [] []    Heel Slides   [] [] [] []    Straight Leg Raises   [] [] [] []    Hip abd/add   [] [] [] []    Long Arc Quads  10 [x] [] [] []    Marching  10 [x] [] [] []    Seated HR/TR   [] [] [] []       [] [] [] []       Pain Ratin/10 abdomin and back pain reported    Activity Tolerance:   Fair     After treatment patient left in no apparent distress:   Bed locked and returned to lowest position, Patient left in no apparent distress sitting up in chair, Call bell within reach, and Caregiver / family present, and nsg updated     COMMUNICATION/COLLABORATION:   The patient’s plan of care was discussed with: Occupational therapy assistant and Registered nurse    Patient Education  Education Given To: Patient;Family  Education Provided: Role of Therapy;Home Exercise Program;Plan of Care;Energy Conservation;Fall Prevention Strategies;Transfer Training;Equipment;Precautions  Education Method: Demonstration;Verbal  Education Outcome: Verbalized understanding;Continued education needed    PT/OT sessions occurred partially together for increased safety of pt and clinician.     Amy Kennedy PTA  Minutes: 23

## 2024-06-24 NOTE — PLAN OF CARE
Problem: Pain  Goal: Verbalizes/displays adequate comfort level or baseline comfort level  Outcome: Progressing     Problem: Safety - Adult  Goal: Free from fall injury  Outcome: Progressing     Problem: Skin/Tissue Integrity  Goal: Absence of new skin breakdown  Description: 1.  Monitor for areas of redness and/or skin breakdown  2.  Assess vascular access sites hourly  3.  Every 4-6 hours minimum:  Change oxygen saturation probe site  4.  Every 4-6 hours:  If on nasal continuous positive airway pressure, respiratory therapy assess nares and determine need for appliance change or resting period.  Outcome: Progressing     Problem: Physical Therapy - Adult  Goal: By Discharge: Performs mobility at highest level of function for planned discharge setting.  See evaluation for individualized goals.  Description: FUNCTIONAL STATUS PRIOR TO ADMISSION: Patient was independent and active without use of DME.    HOME SUPPORT PRIOR TO ADMISSION: The patient lived alone but did not require assistance.    Physical Therapy Goals  Initiated 6/6/2024  Reassess 6/13 after abd sx  Pt stated goal: To go home  Pt will be I with LE HEP in 7 days.  Pt will perform bed mobility with min A in 7 days.  Pt will perform transfers with min-mod A in 7 days.   Pt will amb 150 feet with LRAD safely with [place] in 7 days.  6/24/2024 1127 by Amy Kennedy PTA  Outcome: Progressing     Problem: ABCDS Injury Assessment  Goal: Absence of physical injury  Outcome: Progressing     Problem: Nutrition Deficit:  Goal: Optimize nutritional status  6/24/2024 1217 by Toya Burton, ARIAS  Outcome: Progressing  Flowsheets (Taken 6/24/2024 1212)  Nutrient intake appropriate for improving, restoring, or maintaining nutritional needs:   Assess nutritional status and recommend course of action   Monitor oral intake, labs, and treatment plans   Recommend appropriate diets, oral nutritional supplements, and vitamin/mineral supplements     Problem:  Occupational Therapy - Adult  Goal: By Discharge: Performs self-care activities at highest level of function for planned discharge setting.  See evaluation for individualized goals.  Description: FUNCTIONAL STATUS PRIOR TO ADMISSION:  Pt reports IND w/ ADLs, IADLs and mobility tasks prior to admission. 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     HOME SUPPORT: The patient lived alone with no local support.    Occupational Therapy Goals:  Initiated 6/17/2024  Patient/Family stated goal: Improve strength for increased engagement in oob mobility tasks  1.  Patient will perform grooming with [place] within 7 day(s).  2.  Patient will perform upper body dressing with [place] within 7 day(s).  3.  Patient will perform lower body dressing with Modified [place] within 7 day(s).  4.  Patient will perform toilet transfers with Modified [place]  within 7 day(s).  5.  Patient will perform all aspects of toileting with Modified [place] within 7 day(s).  6.  Patient will participate in upper extremity therapeutic exercise/activities with [place] within 7 day(s).      6/24/2024 1124 by Marco Marie OTA  Outcome: Progressing

## 2024-06-24 NOTE — PROGRESS NOTES
OCCUPATIONAL THERAPY TREATMENT  Patient: Maddie Goldberg (63 y.o. female)  Date: 6/24/2024  Primary Diagnosis: Cecal volvulus (HCC) [K56.2]  Large bowel ischemia (HCC) [K55.9]  Procedure(s) (LRB):  EXPLORATORY LAPAROTOMY WITH ILEOSTOMY AND WOUND VAC PLACEMENT (N/A) 15 Days Post-Op   Precautions: General Precautions, Surgical Protocols                Recommendations for nursing mobility: Out of bed to chair for meals, Encourage HEP in prep for ADLs/mobility; see handout for details, Frequent repositioning to prevent skin breakdown, AD and gt belt for bed to chair , Amb to bathroom with AD and gait belt, and Assist x1    In place during session: PICC line, LIONEL drain 3#, and Wound vac abdomen  Chart, occupational therapy assessment, plan of care, and goals were reviewed.  ASSESSMENT  Patient continues with skilled OT services and is progressing towards goals. Pt presented in recliner upon OT arrival, agreeable to session. Pt A&O x 4. Pt reported being fatigued but willing to work with therapist.  Pt participated in B UE therex to increase UB strength and endurance to increase safety and independence with ADLs and functional transfers.  Pt required verbal cues for technique.  Pt required increase assistance this date to [facility] gown.  Pt has improved with sit <>stand and functional ambulation requiring A x1, this therapist assisted with functional ambulation managing IV lines. Pt was left in recliner with PTA. (See below for objective details and assist levels).     Overall pt tolerated session fair today with improvement in functional transfers. Potential barriers for safe discharge: pts current support system is unable to meet their requirements for physical assistance, pt is a high fall risk, pt is not safe to be alone, and concern for pt safely navigating or managing the home environment. Current OT recommendations for discharge Inpatient Rehabilitation Facility . Will continue to benefit from skilled OT  services, and will continue to progress as tolerated.       GOALS:    Problem: Occupational Therapy - Adult  Goal: By Discharge: Performs self-care activities at highest level of function for planned discharge setting.  See evaluation for individualized goals.  Description: FUNCTIONAL STATUS PRIOR TO ADMISSION:  Pt reports IND w/ ADLs, IADLs and mobility tasks prior to admission. 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     HOME SUPPORT: The patient lived alone with no local support.    Occupational Therapy Goals:  Initiated 6/17/2024  Patient/Family stated goal: Improve strength for increased engagement in oob mobility tasks  1.  Patient will perform grooming with [place] within 7 day(s).  2.  Patient will perform upper body dressing with [place] within 7 day(s).  3.  Patient will perform lower body dressing with Modified [place] within 7 day(s).  4.  Patient will perform toilet transfers with Modified [place]  within 7 day(s).  5.  Patient will perform all aspects of toileting with Modified [place] within 7 day(s).  6.  Patient will participate in upper extremity therapeutic exercise/activities with [place] within 7 day(s).      Outcome: Progressing        PLAN :  Patient continues to benefit from skilled intervention to address functional impairments. Continue treatment per established plan of care to address goals.    Recommend next OT session: standing grooming    Recommendation for discharge: (in order for the patient to meet his/her long term goals): Inpatient Rehabilitation Facility     IF patient discharges home will need the following DME: continuing to assess with progress     SUBJECTIVE:   Patient stated “I just sat down.”      OBJECTIVE DATA SUMMARY:   Cognitive/Behavioral Status:  Orientation  Orientation Level: Oriented X4  Cognition  Overall Cognitive Status: Exceptions  Arousal/Alertness: Inconsistent responses to stimuli  Following

## 2024-06-25 PROCEDURE — 6370000000 HC RX 637 (ALT 250 FOR IP): Performed by: SURGERY

## 2024-06-25 PROCEDURE — 99024 POSTOP FOLLOW-UP VISIT: CPT | Performed by: SURGERY

## 2024-06-25 PROCEDURE — 2500000003 HC RX 250 WO HCPCS: Performed by: SURGERY

## 2024-06-25 PROCEDURE — 6360000002 HC RX W HCPCS: Performed by: INTERNAL MEDICINE

## 2024-06-25 PROCEDURE — 2580000003 HC RX 258: Performed by: SURGERY

## 2024-06-25 PROCEDURE — 6360000002 HC RX W HCPCS: Performed by: SURGERY

## 2024-06-25 PROCEDURE — 97530 THERAPEUTIC ACTIVITIES: CPT

## 2024-06-25 PROCEDURE — 97535 SELF CARE MNGMENT TRAINING: CPT

## 2024-06-25 PROCEDURE — 2580000003 HC RX 258: Performed by: INTERNAL MEDICINE

## 2024-06-25 PROCEDURE — 99232 SBSQ HOSP IP/OBS MODERATE 35: CPT | Performed by: INTERNAL MEDICINE

## 2024-06-25 PROCEDURE — 97606 NEG PRS WND THER DME>50 SQCM: CPT

## 2024-06-25 PROCEDURE — 1100000000 HC RM PRIVATE

## 2024-06-25 RX ORDER — ONDANSETRON 2 MG/ML
4 INJECTION INTRAMUSCULAR; INTRAVENOUS EVERY 6 HOURS PRN
Qty: 84 ML | Refills: 1 | Status: SHIPPED | OUTPATIENT
Start: 2024-06-25

## 2024-06-25 RX ORDER — IBUPROFEN 600 MG/1
600 TABLET ORAL EVERY 6 HOURS PRN
Qty: 120 TABLET | Refills: 3 | Status: SHIPPED | OUTPATIENT
Start: 2024-06-25

## 2024-06-25 RX ORDER — OXYCODONE HYDROCHLORIDE AND ACETAMINOPHEN 5; 325 MG/1; MG/1
1 TABLET ORAL EVERY 8 HOURS PRN
Qty: 40 TABLET | Refills: 0 | Status: SHIPPED | OUTPATIENT
Start: 2024-06-25 | End: 2024-07-09

## 2024-06-25 RX ORDER — ENOXAPARIN SODIUM 100 MG/ML
30 INJECTION SUBCUTANEOUS 2 TIMES DAILY
Qty: 30 EACH | Refills: 1 | Status: SHIPPED | OUTPATIENT
Start: 2024-06-25

## 2024-06-25 RX ORDER — POTASSIUM CHLORIDE 20 MEQ/1
40 TABLET, EXTENDED RELEASE ORAL PRN
Qty: 60 TABLET | Refills: 3 | Status: SHIPPED | OUTPATIENT
Start: 2024-06-25

## 2024-06-25 RX ADMIN — FAMOTIDINE 20 MG: 20 TABLET, FILM COATED ORAL at 19:53

## 2024-06-25 RX ADMIN — LEVOTHYROXINE SODIUM 150 MCG: 0.07 TABLET ORAL at 06:55

## 2024-06-25 RX ADMIN — OXYCODONE HYDROCHLORIDE AND ACETAMINOPHEN 1 TABLET: 5; 325 TABLET ORAL at 04:56

## 2024-06-25 RX ADMIN — ENOXAPARIN SODIUM 30 MG: 100 INJECTION SUBCUTANEOUS at 10:08

## 2024-06-25 RX ADMIN — SODIUM CHLORIDE, PRESERVATIVE FREE 20 MG: 5 INJECTION INTRAVENOUS at 10:05

## 2024-06-25 RX ADMIN — ENOXAPARIN SODIUM 30 MG: 100 INJECTION SUBCUTANEOUS at 19:53

## 2024-06-25 RX ADMIN — SODIUM CHLORIDE, PRESERVATIVE FREE 10 ML: 5 INJECTION INTRAVENOUS at 10:12

## 2024-06-25 RX ADMIN — MEROPENEM 1000 MG: 1 INJECTION INTRAVENOUS at 04:56

## 2024-06-25 RX ADMIN — MEROPENEM 1000 MG: 1 INJECTION INTRAVENOUS at 19:52

## 2024-06-25 RX ADMIN — OXYCODONE HYDROCHLORIDE AND ACETAMINOPHEN 1 TABLET: 5; 325 TABLET ORAL at 18:07

## 2024-06-25 RX ADMIN — OXYCODONE HYDROCHLORIDE AND ACETAMINOPHEN 1 TABLET: 5; 325 TABLET ORAL at 22:18

## 2024-06-25 RX ADMIN — OXYCODONE HYDROCHLORIDE AND ACETAMINOPHEN 1 TABLET: 5; 325 TABLET ORAL at 14:02

## 2024-06-25 RX ADMIN — MEROPENEM 1000 MG: 1 INJECTION INTRAVENOUS at 12:31

## 2024-06-25 RX ADMIN — SODIUM CHLORIDE, PRESERVATIVE FREE 10 ML: 5 INJECTION INTRAVENOUS at 19:54

## 2024-06-25 RX ADMIN — OXYCODONE HYDROCHLORIDE AND ACETAMINOPHEN 1 TABLET: 5; 325 TABLET ORAL at 10:18

## 2024-06-25 ASSESSMENT — PAIN SCALES - GENERAL
PAINLEVEL_OUTOF10: 7
PAINLEVEL_OUTOF10: 8
PAINLEVEL_OUTOF10: 3
PAINLEVEL_OUTOF10: 4
PAINLEVEL_OUTOF10: 6
PAINLEVEL_OUTOF10: 3
PAINLEVEL_OUTOF10: 8

## 2024-06-25 ASSESSMENT — PAIN DESCRIPTION - LOCATION
LOCATION: ABDOMEN
LOCATION: ABDOMEN

## 2024-06-25 ASSESSMENT — PAIN DESCRIPTION - DESCRIPTORS
DESCRIPTORS: ACHING
DESCRIPTORS: ACHING

## 2024-06-25 ASSESSMENT — PAIN SCALES - WONG BAKER: WONGBAKER_NUMERICALRESPONSE: HURTS LITTLE MORE

## 2024-06-25 ASSESSMENT — PAIN DESCRIPTION - ORIENTATION
ORIENTATION: ANTERIOR
ORIENTATION: ANTERIOR

## 2024-06-25 NOTE — PROGRESS NOTES
Negative Pressure    NAME:  Maddie Goldberg  YOB: 1960  MEDICAL RECORD NUMBER:  [id number]  DATE:  6/2/2024    Applied Negative Pressure to surgical incision to midline abdomen  [x] Applied skin barrier prep to mike-wound.   [x] Cut strips of plastic drape to picture frame wound so that mike-wound is     covered with the drape.   [] If bridging dressing to less prominent site, cover any intact skin that will come in contact with the Negative Pressure Therapy sponge, gauze or channel drain with plastic drape.  The sponge should never touch intact skin.   [x] Cut sponge, gauze or channel drain to size which will fit into the wound/ulcer bed without being forced.   [x] Be sure the sponge is large enough to hold the entire round plastic flange which is attached to the tubing.  Never allow flange to be larger than the sponge or it will produce suction damaging intact skin.  Total number of individual pieces of foam used within the wound bed: applied 1 x oil emulsion gauze and 1 x black foam  [] If bridging the dressing away from the primary site, be sure the bridge leads to a piece of sponge large enough to hold the entire flange without allowing any of the flange to overlap onto intact skin.   [x] Covered sponge, gauze or channel drain with plastic drape.   [x] Cut a hole in this plastic drape directly over the sponge the same size as the plastic drain tubing.   [x] Removed plastic liner from flange and apply it directly over the hole you cut.   [x] Removed the plastic cover from the flange.   [x] Attached the tubing to the wound/ulcer Negative Pressure Therapy and turn it on to be sure a vacuum is created and that there are no leaks.   [] If air leaks occur, use plastic drape to patch them.   [] Secured Negative Pressure Therapy dressing with ace wrap loosely if located on an extremity. Maintain tubing outside of ace wrap. Tubing must not exert pressure on intact skin.    Applied per

## 2024-06-25 NOTE — PROGRESS NOTES
Progress Note  Date:2024       Room:[facility]  Patient Name:Maddie Goldberg     YOB: 1960     Age:63 y.o.        Subjective    Subjective  Patient followed for Candida UTI on high dose Fluconazole. Still with low grade temperatures.  Repeat urinalysis showed improvement. Body fluid culture was  negative. Afebrile with now normal WBC and procal with slowly decreasing CRP.  Currently  Meropenem alone. Repeat CT scan showed no drainable fluid.   Still pending disposition to [facility].   Objective         Vitals Last 24 Hours:  TEMPERATURE:  Temp  Av °F (36.7 °C)  Min: 97.7 °F (36.5 °C)  Max: 98.5 °F (36.9 °C)  RESPIRATIONS RANGE: Resp  Av.3  Min: 15  Max: 18  PULSE OXIMETRY RANGE: SpO2  Av %  Min: 96 %  Max: 100 %  PULSE RANGE: Pulse  Av.3  Min: 79  Max: 92  BLOOD PRESSURE RANGE: Systolic (24hrs), Av , Min:104 , Max:117   ; Diastolic (24hrs), Av, Min:64, Max:72         Objective:  Vital signs: (most recent): Blood pressure 114/72, pulse 92, temperature 97.8 °F (36.6 °C), temperature source Oral, resp. rate 16, height 1.727 m (5' 8\"), weight 128.9 kg (284 lb 2.8 oz), SpO2 100 %.     Vitals and nursing note reviewed. Patient not re-examined today.  Constitutional:       General: She is not in acute distress.     Appearance: She is ill-appearing.   HENT: Unremarkable   Eyes:      Extraocular Movements: Extraocular movements intact.      Pupils: Pupils are equal, round, and reactive to light.   Cardiovascular:      Rate and Rhythm: Normal rate and regular rhythm.      Heart sounds: Normal heart sounds. No murmur heard.  Pulmonary:      Effort: Pulmonary effort is normal.      Breath sounds: Normal breath sounds.   Abdominal:      General: There is no distension.      Palpations: There is no mass.      Tenderness: There is no abdominal tenderness. There is no guarding.           Comments: Decreased BS     Midline wound vac in place  Right sided LIONEL drains #1 and #2 with

## 2024-06-25 NOTE — PROGRESS NOTES
Attempted to remove pt's right LIONEL drain but was unsuccessful; pt was in extreme pain and discomfort; MD Pineda made aware at stated to hold pt there for tonight and he will reassess tomorrow

## 2024-06-25 NOTE — PROGRESS NOTES
OCCUPATIONAL THERAPY TREATMENT  Patient: Maddie Goldberg (63 y.o. 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]  Large bowel ischemia (HCC) [K55.9]  Procedure(s) (LRB):  EXPLORATORY LAPAROTOMY WITH ILEOSTOMY AND WOUND VAC PLACEMENT (N/A) 16 Days Post-Op   Precautions: General Precautions, Surgical Protocols                Recommendations for nursing mobility: Out of bed to chair for meals, Encourage HEP in prep for ADLs/mobility; see handout for details, Frequent repositioning to prevent skin breakdown, Use of BSC for toileting , AD and gt belt for bed to chair , and Assist x1    In place during session: PICC line, LIONEL drain 3#, and wound vac abdomninal  Chart, occupational therapy assessment, plan of care, and goals were reviewed.  ASSESSMENT  Patient continues with skilled OT services and is progressing towards goals. Pt presented asleep upon HAMMONDS arrival, agreeable to session. Pt A&O x 4. Pt able to get to EOB with HOB elevated 45 degrees with SBA.  Pt asked visitor to assist, therapist encouraged pt to do herself.   PT with fair sitting balance.  Pt educated on using AE for LB dressing.  Pt doffed slipper socks with dressing stick with verbal cues. Pt instructed in donning/doffing briefs using a reacher and dressing stick.  Pt used a sock aid to don slipper socks. Pt required demonstration, step by step verbal directions and physical assistance to complete.  Pt ambulated to bathroom and completed toilet transfers and hygiene with min A and verbal cues. Pt left in bedside chair with all needs met. (See below for objective details and assist levels).     Overall pt tolerated session fair today with difficulty recalling instructions for LB dressing .  Potential barriers for safe discharge: pts current support system is unable to meet their requirements for physical assistance, pt has poor safety awareness, pt is a high fall risk, pt is not safe to be alone, and concern for pt safely  navigating or managing the home environment. Current OT recommendations for discharge [facility]patient Rehabilitation Facility . Will continue to benefit from skilled OT services, and will continue to progress as tolerated.       GOALS:    Problem: Occupational Therapy - Adult  Goal: By Discharge: Performs self-care activities at highest level of function for planned discharge setting.  See evaluation for individualized goals.  Description: FUNCTIONAL STATUS PRIOR TO ADMISSION:  Pt reports IND w/ ADLs, IADLs and mobility tasks prior to admission. 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     HOME SUPPORT: The patient lived alone with no local support.    Occupational Therapy Goals:  Initiated 6/17/2024  Patient/Family stated goal: Improve strength for increased engagement in oob mobility tasks  1.  Patient will perform grooming with [place] within 7 day(s).  2.  Patient will perform upper body dressing with [place] within 7 day(s).  3.  Patient will perform lower body dressing with Modified [place] within 7 day(s).  4.  Patient will perform toilet transfers with Modified [place]  within 7 day(s).  5.  Patient will perform all aspects of toileting with Modified [place] within 7 day(s).  6.  Patient will participate in upper extremity therapeutic exercise/activities with [place] within 7 day(s).      Outcome: Progressing        PLAN :  Patient continues to benefit from skilled intervention to address functional impairments. Continue treatment per established plan of care to address goals.    Recommend next OT session: standing grooming    Recommendation for discharge: (in order for the patient to meet his/her long term goals): Inpatient Rehabilitation Facility     IF patient discharges home will need the following DME: continuing to assess with progress     SUBJECTIVE:   Patient stated “Oh, no not again.”      OBJECTIVE DATA SUMMARY:   Cognitive/Behavioral  functional status and mobility recommendations, and nsg updated     COMMUNICATION/EDUCATION:   The patient’s plan of care was discussed with: Physical therapy assistant and Registered nurse    Patient Education  Education Given To: Patient  Education Provided: Plan of Care;ADL Adaptive Strategies  Education Provided Comments: LB dressing with AE  Barriers to Learning: None  Education Outcome: Continued education needed;Demonstrated understanding    Thank you for this referral.  MATY Read  Minutes: 20

## 2024-06-25 NOTE — CARE COORDINATION
Patient discharging today, going to [facility]. Patient aware and in agreement, family at bedside. Transport set through [facility],  time 6pm/1800 est.    Nurse to call report at [phone]    Transition of Care Plan:    RUR: 14%  Prior Level of Functioning: ind  Disposition: irf  If SNF or IPR: Date FOC offered: 06/12/2024  Date FOC received: 06/12/2024  Accepting facility: [facility]  Date authorization started with reference number: 06/21  Date authorization received and expires: 06/25  Follow up appointments:   DME needed:   Transportation at discharge: [facility]  IM/IMM Medicare/ letter given: na  Is patient a [place] and connected with [facility]? na  If yes, was  transfer form completed and [facility] notified? na  Caregiver Contact: at bedside  Discharge Caregiver contacted prior to discharge? At bedside  Care Conference needed? na  Barriers to discharge: na

## 2024-06-25 NOTE — DISCHARGE SUMMARY
Discharge Summary     Date:6/25/2024        Patient Name:Maddie Goldberg     YOB: 1960     Age:63 y.o.    Admit Date:6/2/2024   Admission Condition:good   Discharged Condition:good  Discharge Date: 06/25/24     Discharge Diagnoses   Principal Problem:    Cecal volvulus (HCC)  Active Problems:    Large bowel ischemia (HCC)    Hypotension due to hypovolemia    Mass of colon    Leak of anastomosis between gastrointestinal structures    Adhesion of intestine    Open wound of abdomen    Acute respiratory failure with hypoxia (HCC)    Candida UTI    SIRS (systemic inflammatory response syndrome) (HCC)    Sepsis due to Candida species without acute organ dysfunction (HCC)    Edema  Resolved Problems:    * No resolved hospital problems. *      Hospital Stay   Narrative of Hospital Course: Ms. Goldberg is a very pleasant 63 years woman underwent emergent laparotomy for cecal volvulus during this initial hospitalization.  Patient had right colectomy and ileal transverse colon anastomosis.  Postop day 5 patient not doing well.  CT scan shows possible anastomotic leak and there is additional findings noted at the rectosigmoid colon junction area with obstructing mass.    Patient underwent emergent laparotomy with resection of ileal transverse colon anastomosis leaking area and also sigmoid colon mass was reexcised.  Patient was unstable during the surgery.  So patient was taken back to ICU.  Patient returned back to the OR 24 hours later after resuscitation.  Patient not had ileostomy and descending colon mucous fistula and abdomen was closed with wound VAC system.  After second surgery patient was started on IV nutritions, IV antibiotics.  Patient slowly recovered from multiple surgery with aggressive physical therapy and Occupational Therapy.  Patient currently getting wound VAC changes mid abdomen Tuesdays and Thursdays.  Patient will continue wound VAC therapy upon discharge to rehab.  Dr. Butler recommended

## 2024-06-25 NOTE — ADT AUTH CERT
g/dL     Hematocrit 29.1 (L) 35.0 - 47.0 %     .2 (H) 80.0 - 99.0 FL     MCH 33.0 26.0 - 34.0 PG     MCHC 32.0 30.0 - 36.5 g/dL     RDW 13.8 11.5 - 14.5 %     Platelets 788 (H) 150 - 400 K/uL     MPV 10.2 8.9 - 12.9 FL     Nucleated RBCs 0.0 0.0  WBC     nRBC 0.00 0.00 - 0.01 K/uL     Neutrophils % PENDING %     Lymphocytes % PENDING %     Monocytes % PENDING %     Eosinophils % PENDING %     Basophils % PENDING %     Immature Granulocytes % PENDING %     Neutrophils Absolute PENDING K/UL     Lymphocytes Absolute PENDING K/UL     Monocytes Absolute PENDING K/UL     Eosinophils Absolute PENDING K/UL     Basophils Absolute PENDING K/UL     Immature Granulocytes Absolute PENDING K/UL     Differential Type PENDING     C-Reactive Protein     Collection Time: 06/18/24  8:19 AM   Result Value Ref Range     CRP 14.40 (H) 0.00 - 0.30 mg/dL   Procalcitonin     Collection Time: 06/18/24  8:19 AM   Result Value Ref Range     Procalcitonin 0.22 (H) 0 ng/mL            Chest X-Ray (6/15/2024):   FINDINGS: A nasogastric tube enters the stomach and terminates past the edge of  the film.  A right-sided PICC line terminates at the cavoatrial junction. The  heart is mildly enlarged, but unchanged. There is unchanged mild edema. No  pleural effusion or pneumothorax.     IMPRESSION:     A nasogastric tube enters the stomach and terminates beyond the edge of the  film.           CT abdomen/pelvis (6/2/2024):  FINDINGS:   LOWER THORAX: No significant abnormality in the incidentally imaged lower chest.  LIVER: No mass.  BILIARY TREE: Multiple gallstones CBD is not dilated.  SPLEEN: within normal limits.  PANCREAS: No mass or ductal dilatation.  ADRENALS: Unremarkable.  KIDNEYS: No mass, calculus, or hydronephrosis.  STOMACH: Unremarkable.  SMALL BOWEL: Marked small bowel dilatation to a transition zone in the left  lower quadrant (image coronal 97 and axial 37), likely secondary to an adhesion  or internal hernia. No mass  (DILAUDID) injection 1 mg  1 mg IntraVENous Q3H PRN Tommie Pineda MD   1 mg at 06/19/24 0645    diatrizoate meglumine-sodium (GASTROGRAFIN) 66-10 % solution 30 mL  30 mL Per NG tube ONCE PRN Javi Lopez MD   30 mL at 06/17/24 1013    piperacillin-tazobactam (ZOSYN) 4,500 mg in sodium chloride 0.9 % 100 mL IVPB (mini-bag)  4,500 mg IntraVENous Q8H Antonio Butler MD 25 mL/hr at 06/19/24 0356 4,500 mg at 06/19/24 0356    lidocaine 4 % external patch 1 patch  1 patch TransDERmal Daily Javi Lopez MD   1 patch at 06/18/24 0929    potassium chloride (KLOR-CON M) extended release tablet 40 mEq  40 mEq Oral PRN Javi Lopez MD   40 mEq at 06/18/24 0928     Or    potassium bicarb-citric acid (EFFER-K) effervescent tablet 40 mEq  40 mEq Oral PRN Javi Lopez MD         Or    potassium chloride 10 mEq/100 mL IVPB (Peripheral Line)  10 mEq IntraVENous PRN Javi Lopez MD        naloxone 0.4 mg in 10 mL sodium chloride syringe   IntraVENous PRN Tommie Pineda MD        HYDROmorphone HCl PF (DILAUDID) injection 1 mg  1 mg IntraVENous Once Tommie Pineda MD        furosemide (LASIX) injection 20 mg  20 mg IntraVENous BID Tommie Pineda MD   20 mg at 06/18/24 1807    fluconazole (DIFLUCAN) in 0.9 % sodium chloride IVPB 400 mg  400 mg IntraVENous Q24H Tommie Pineda  mL/hr at 06/18/24 0928 400 mg at 06/18/24 0928    sodium chloride flush 0.9 % injection 5-40 mL  5-40 mL IntraVENous 2 times per day Tommie Pineda MD   10 mL at 06/18/24 2053    0.9 % sodium chloride infusion   IntraVENous PRN Tommie Pineda MD   Stopped at 06/09/24 2145    enoxaparin Sodium (LOVENOX) injection 30 mg  30 mg SubCUTAneous BID Tommie Pineda MD   30 mg at 06/18/24 2035    ondansetron (ZOFRAN-ODT) disintegrating tablet 4 mg  4 mg Oral Q8H PRN Tommie Pineda MD         Or    ondansetron (ZOFRAN) injection 4 mg  4 mg IntraVENous Q6H PRN Tommie Pineda MD   4 mg at 06/18/24 5025     (PERCOCET)  MG per tablet 1 tablet  1 tablet Oral Q4H PRN Tommie Pineda MD   1 tablet at 06/20/24 0121    ibuprofen (ADVIL;MOTRIN) tablet 600 mg  600 mg Oral Q6H PRN Tommie Pineda MD   600 mg at 06/19/24 2248    meropenem (MERREM) 1,000 mg in sodium chloride 0.9 % 100 mL IVPB (mini-bag)  1,000 mg IntraVENous Q8H Antonio Butler MD   Stopped at 06/20/24 0657    diatrizoate meglumine-sodium (GASTROGRAFIN) 66-10 % solution 30 mL  30 mL Per NG tube ONCE PRN Javi Lopez MD   30 mL at 06/17/24 1013    lidocaine 4 % external patch 1 patch  1 patch TransDERmal Daily Javi Lopez MD   1 patch at 06/19/24 0939    potassium chloride (KLOR-CON M) extended release tablet 40 mEq  40 mEq Oral PRN Javi Lopez MD   40 mEq at 06/18/24 0928     Or    potassium bicarb-citric acid (EFFER-K) effervescent tablet 40 mEq  40 mEq Oral PRN Javi Lopez MD         Or    potassium chloride 10 mEq/100 mL IVPB (Peripheral Line)  10 mEq IntraVENous PRN Javi Lopez MD        naloxone 0.4 mg in 10 mL sodium chloride syringe   IntraVENous PRN Tommie Pineda MD        furosemide (LASIX) injection 20 mg  20 mg IntraVENous BID Tommie Pineda MD   20 mg at 06/19/24 1719    fluconazole (DIFLUCAN) in 0.9 % sodium chloride IVPB 400 mg  400 mg IntraVENous Q24H Tommie Pineda  mL/hr at 06/19/24 0941 400 mg at 06/19/24 0941    sodium chloride flush 0.9 % injection 5-40 mL  5-40 mL IntraVENous 2 times per day Tommie Pineda MD   10 mL at 06/19/24 2020    0.9 % sodium chloride infusion   IntraVENous PRN Tommie Pineda MD   Stopped at 06/09/24 2145    enoxaparin Sodium (LOVENOX) injection 30 mg  30 mg SubCUTAneous BID Tommie Pineda MD   30 mg at 06/19/24 2009    ondansetron (ZOFRAN-ODT) disintegrating tablet 4 mg  4 mg Oral Q8H PRN Tommie Pineda MD   4 mg at 06/20/24 0121     Or    ondansetron (ZOFRAN) injection 4 mg  4 mg IntraVENous Q6H PRN Tommie Pineda MD   4 mg at

## 2024-06-25 NOTE — PLAN OF CARE
Problem: Pain  Goal: Verbalizes/displays adequate comfort level or baseline comfort level  6/25/2024 1100 by Maria M Rinaldi RN  Outcome: Progressing  6/25/2024 0445 by Vannesa Farah RN  Outcome: Progressing     Problem: Discharge Planning  Goal: Discharge to home or other facility with appropriate resources  6/25/2024 1100 by Maria M Rinaldi RN  Outcome: Progressing  Flowsheets (Taken 6/25/2024 0811)  Discharge to home or other facility with appropriate resources:   Identify barriers to discharge with patient and caregiver   Arrange for needed discharge resources and transportation as appropriate   Identify discharge learning needs (meds, wound care, etc)  6/25/2024 0445 by Vannesa Farah RN  Outcome: Progressing     Problem: Safety - Adult  Goal: Free from fall injury  6/25/2024 1100 by Maria M Rinaldi RN  Outcome: Progressing  6/25/2024 0445 by Vannesa Farah RN  Outcome: Progressing     Problem: Skin/Tissue Integrity  Goal: Absence of new skin breakdown  Description: 1.  Monitor for areas of redness and/or skin breakdown  2.  Assess vascular access sites hourly  3.  Every 4-6 hours minimum:  Change oxygen saturation probe site  4.  Every 4-6 hours:  If on nasal continuous positive airway pressure, respiratory therapy assess nares and determine need for appliance change or resting period.  6/25/2024 1100 by Maria M Rinaldi RN  Outcome: Progressing  6/25/2024 0445 by Vannesa Farah RN  Outcome: Progressing     Problem: Coping  Goal: Pt/Family able to verbalize concerns and demonstrate effective coping strategies  Description: INTERVENTIONS:  1. Assist patient/family to identify coping skills, available support systems and cultural and spiritual values  2. Provide emotional support, including active listening and acknowledgement of concerns of patient and caregivers  3. Reduce environmental stimuli, as able  4. Instruct

## 2024-06-25 NOTE — PLAN OF CARE
PHYSICAL THERAPY TREATMENT     Patient: Maddie Goldberg (63 y.o. 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  ASSESSMENT  Patient continues with skilled PT services and is progressing towards goals. Pt sittin gup in recliner chair upon PT arrival, agreeable to session. (See below for objective details and assist levels).     Overall pt tolerated session well today. Pt declined ambulation this session due to having just been up with occupational therapy. Reviewed supine and seated LE therex handout with pt. Reviewed the exercises she has been practicing and introduced the new additions, pt verbalized understanding. Assisted pt with doffing gown and donning a new one. Pt left sitting up in chair with all needs met. All questions answered from Pt and Pt's family member. Will continue to benefit from skilled PT services, and will continue to progress as tolerated. Potential barriers for safe discharge:. Current PT DC recommendation Inpatient Rehabilitation Facility  once medically appropriate.    GOALS:  Problem: Physical Therapy - Adult  Goal: By Discharge: Performs mobility at highest level of function for planned discharge setting.  See evaluation for individualized goals.  Description: FUNCTIONAL STATUS PRIOR TO ADMISSION: Patient was independent and active without use of DME.    HOME SUPPORT PRIOR TO  ADMISSION: The patient lived alone but did not require assistance.    Physical Therapy Goals  Initiated 6/6/2024  Reassess 6/13 after abd sx  Pt stated goal: To go home  Pt will be I with LE HEP in 7 days.  Pt will perform bed mobility with min A in 7 days.  Pt will perform transfers with min-mod A in 7 days.   Pt will amb 150 feet with LRAD safely with [place] in 7 days.  Outcome: Progressing          PLAN :  Patient continues to benefit from skilled intervention to address functional impairments. Continue treatment per established plan of care to address goals.    Recommendation for discharge: (in order for the patient to meet his/her long term goals)  Inpatient Rehabilitation Facility     IF patient discharges home will need the following DME:continuing to assess with progress     SUBJECTIVE:   Patient stated “I don't want to get up again.”    OBJECTIVE DATA SUMMARY:   Critical Behavior:  Orientation  Orientation Level: Oriented X4  Cognition  Arousal/Alertness: Appropriate responses to stimuli  Attention Span: Attends with cues to redirect  Memory: Appears intact  Functional Mobility Training:  Bed Mobility:  Bed Mobility Training  Bed Mobility Training: No  Transfers:  Transfer Training  Transfer Training: No  Balance:  Wheelchair Mobility:  Wheelchair Management  Wheelchair Management: No  Ambulation/Gait Training:  Gait  Gait Training: No    Therapeutic Exercises:   HEP handout given to pt. Included supine and seated LE exercises.    Pain Rating:  No c/o pain reported    Activity Tolerance:   Good    After treatment patient left in no apparent distress:   Bed locked and returned to lowest position, Patient left in no apparent distress sitting up in chair, Call bell within reach, and Caregiver / family present, and nsg updated     COMMUNICATION/COLLABORATION:   The patient’s plan of care was discussed with: Registered nurse    Patient Education  Education Given To: Patient  Education Provided: Role of

## 2024-06-25 NOTE — CARE COORDINATION
Auth still pending for IRF at [facility]. Did ask if any beds available at [place] location for payor source. Was informed they are still at max capacity.

## 2024-06-26 VITALS
HEART RATE: 77 BPM | RESPIRATION RATE: 18 BRPM | TEMPERATURE: 98.2 F | HEIGHT: 68 IN | SYSTOLIC BLOOD PRESSURE: 135 MMHG | WEIGHT: 284.83 LBS | BODY MASS INDEX: 43.17 KG/M2 | OXYGEN SATURATION: 99 % | DIASTOLIC BLOOD PRESSURE: 72 MMHG

## 2024-06-26 LAB
ALBUMIN SERPL-MCNC: 1.6 G/DL (ref 3.5–5)
ALBUMIN/GLOB SERPL: 0.3 (ref 1.1–2.2)
ALP SERPL-CCNC: 145 U/L (ref 45–117)
ALT SERPL-CCNC: 33 U/L (ref 12–78)
AST SERPL W P-5'-P-CCNC: 51 U/L (ref 15–37)
BILIRUB DIRECT SERPL-MCNC: <0.1 MG/DL (ref 0–0.2)
BILIRUB SERPL-MCNC: 0.3 MG/DL (ref 0.2–1)
GLOBULIN SER CALC-MCNC: 4.8 G/DL (ref 2–4)
PROT SERPL-MCNC: 6.4 G/DL (ref 6.4–8.2)

## 2024-06-26 PROCEDURE — 2500000003 HC RX 250 WO HCPCS: Performed by: SURGERY

## 2024-06-26 PROCEDURE — 2580000003 HC RX 258: Performed by: SURGERY

## 2024-06-26 PROCEDURE — 2580000003 HC RX 258: Performed by: INTERNAL MEDICINE

## 2024-06-26 PROCEDURE — 6360000002 HC RX W HCPCS: Performed by: INTERNAL MEDICINE

## 2024-06-26 PROCEDURE — 6370000000 HC RX 637 (ALT 250 FOR IP): Performed by: SURGERY

## 2024-06-26 PROCEDURE — 80076 HEPATIC FUNCTION PANEL: CPT

## 2024-06-26 PROCEDURE — 36415 COLL VENOUS BLD VENIPUNCTURE: CPT

## 2024-06-26 PROCEDURE — 6360000002 HC RX W HCPCS: Performed by: SURGERY

## 2024-06-26 RX ORDER — HYDROMORPHONE HYDROCHLORIDE 1 MG/ML
1 INJECTION, SOLUTION INTRAMUSCULAR; INTRAVENOUS; SUBCUTANEOUS ONCE
Status: COMPLETED | OUTPATIENT
Start: 2024-06-26 | End: 2024-06-26

## 2024-06-26 RX ADMIN — SODIUM CHLORIDE, PRESERVATIVE FREE 10 ML: 5 INJECTION INTRAVENOUS at 09:02

## 2024-06-26 RX ADMIN — LEVOTHYROXINE SODIUM 150 MCG: 0.07 TABLET ORAL at 07:33

## 2024-06-26 RX ADMIN — ENOXAPARIN SODIUM 30 MG: 100 INJECTION SUBCUTANEOUS at 09:00

## 2024-06-26 RX ADMIN — OXYCODONE HYDROCHLORIDE AND ACETAMINOPHEN 1 TABLET: 5; 325 TABLET ORAL at 13:20

## 2024-06-26 RX ADMIN — MEROPENEM 1000 MG: 1 INJECTION INTRAVENOUS at 04:32

## 2024-06-26 RX ADMIN — MEROPENEM 1000 MG: 1 INJECTION INTRAVENOUS at 12:02

## 2024-06-26 RX ADMIN — FAMOTIDINE 20 MG: 20 TABLET, FILM COATED ORAL at 09:00

## 2024-06-26 RX ADMIN — OXYCODONE HYDROCHLORIDE AND ACETAMINOPHEN 1 TABLET: 5; 325 TABLET ORAL at 01:47

## 2024-06-26 RX ADMIN — HYDROMORPHONE HYDROCHLORIDE 1 MG: 1 INJECTION, SOLUTION INTRAMUSCULAR; INTRAVENOUS; SUBCUTANEOUS at 07:19

## 2024-06-26 RX ADMIN — OXYCODONE HYDROCHLORIDE AND ACETAMINOPHEN 1 TABLET: 5; 325 TABLET ORAL at 08:59

## 2024-06-26 ASSESSMENT — PAIN - FUNCTIONAL ASSESSMENT
PAIN_FUNCTIONAL_ASSESSMENT: ACTIVITIES ARE NOT PREVENTED

## 2024-06-26 ASSESSMENT — PAIN SCALES - GENERAL
PAINLEVEL_OUTOF10: 8
PAINLEVEL_OUTOF10: 8
PAINLEVEL_OUTOF10: 3
PAINLEVEL_OUTOF10: 8
PAINLEVEL_OUTOF10: 6

## 2024-06-26 ASSESSMENT — PAIN DESCRIPTION - LOCATION
LOCATION: ABDOMEN

## 2024-06-26 ASSESSMENT — PAIN DESCRIPTION - DESCRIPTORS
DESCRIPTORS: ACHING

## 2024-06-26 ASSESSMENT — PAIN DESCRIPTION - ORIENTATION
ORIENTATION: ANTERIOR
ORIENTATION: ANTERIOR
ORIENTATION: MID;ANTERIOR
ORIENTATION: ANTERIOR

## 2024-06-26 NOTE — PLAN OF CARE
Problem: Pain  Goal: Verbalizes/displays adequate comfort level or baseline comfort level  6/26/2024 1303 by Sumit Ferguson, RN  Outcome: Progressing  6/26/2024 0130 by Vannesa Farah, RN  Outcome: Progressing     Problem: Discharge Planning  Goal: Discharge to home or other facility with appropriate resources  6/26/2024 1303 by Sumit Ferguson, RN  Outcome: Progressing  6/26/2024 0130 by Vannesa Farah, RN  Outcome: Progressing     Problem: Safety - Adult  Goal: Free from fall injury  6/26/2024 1303 by Sumit Ferguson, RN  Outcome: Progressing  6/26/2024 0130 by Vannesa Farah, RN  Outcome: Progressing     Problem: Nutrition Deficit:  Goal: Optimize nutritional status  6/26/2024 0130 by Vannesa Farah, RN  Outcome: Progressing

## 2024-06-26 NOTE — PROGRESS NOTES
Attempted to give report to [facility] receiving patient. Left call back number to reach this nurse.

## 2024-06-26 NOTE — WOUND CARE
Removed NPWT dressing from midline abdomen.  Irrigated well with saline.  Removed 1 x oil emulsion gauze and 1 x black foam.  Small open area, like sinus track, continues to have milky drainage .  Informed Dr. Pineda.  Wound bed is granulating well.  Canister has approximately 175 mL of milky-red drainage.  A wet / dry was placed.  Pouch to LLQ mucous fistula was replaced.  No stool noted to mucous fistula.  Changed dressing to LLQ LIONEL drain site (drain removed previously this morning).   Patient will need a 2nd abdominal binder due to abdominal girth.  Informed Sumit Duncan.      
Met with patient for ostomy education review.  Patient sitting in chair.  Provided gloves to patient and assisted her with emptying her RLQ ileostomy pouch of approximately 125 loose/soft brown stool (with undigested corn).  Reviewed the following:  -Empty / burp pouch when 1/3-1/2 full of stool or gas.  -Change appliance (wafer and pouch) 2-3 per week.  If leaking, change as soon as possible to prevent skin irritation.  -Clean stoma and peristomal skin with plain water or NS, may use a mild soap.  No soaps with lotions as they may prevent adhesive from adhering to skin.  May bathe with appliance on or off.    I applied a new 1-piece ostomy appliance to LLQ  mucous fistula.  Thick pearly-white mucous noted, no stool noted.  Wound vac dressing to midline abdomen is due to be changed tomorrow by WCN.  Anticipate changing RLQ pouch from high-flow pouch with spout to a wide-drainable outlet pouch.  If patient discharges today, RN is to remove NPWT dressing and apply a wet/dry dressing.  Wound vac machine is to go into the soiled utility. Advised patient that she is to empty her ostomy pouch with RN supervision.  Patient confirmed she has not read through education material.  Provided the [facility] Ostomy booklet for her to read through this afternoon.  Will continue to follow for pouching and education needs regarding ostomy care.        Patient and her niece both inquired if a medical leave of absence was in patient's chart.  I confirmed the form is in patient's chart and informed patient and niece.    
Negative Pressure    NAME:  Maddie Goldberg  YOB: 1960  MEDICAL RECORD NUMBER:  [id number]  DATE:  6/2/2024    Applied Negative Pressure to surgical incision to midline abdomen.  [x] Applied skin barrier prep to mike-wound.   [x] Cut strips of plastic drape to picture frame wound so that mike-wound is     covered with the drape.   [] If bridging dressing to less prominent site, cover any intact skin that will come in contact with the Negative Pressure Therapy sponge, gauze or channel drain with plastic drape.  The sponge should never touch intact skin.   [x] Cut sponge, gauze or channel drain to size which will fit into the wound/ulcer bed without being forced.   [x] Be sure the sponge is large enough to hold the entire round plastic flange which is attached to the tubing.  Never allow flange to be larger than the sponge or it will produce suction damaging intact skin.  Total number of individual pieces of foam used within the wound bed: 3 x non-adherent oil emulsion gauze and 2 x black foam  [] If bridging the dressing away from the primary site, be sure the bridge leads to a piece of sponge large enough to hold the entire flange without allowing any of the flange to overlap onto intact skin.   [x] Covered sponge, gauze or channel drain with plastic drape.   [x] Cut a hole in this plastic drape directly over the sponge the same size as the plastic drain tubing.   [x] Removed plastic liner from flange and apply it directly over the hole you cut.   [x] Removed the plastic cover from the flange.   [x] Attached the tubing to the wound/ulcer Negative Pressure Therapy and turn it on to be sure a vacuum is created and that there are no leaks.   [] If air leaks occur, use plastic drape to patch them.   [] Secured Negative Pressure Therapy dressing with ace wrap loosely if located on an extremity. Maintain tubing outside of ace wrap. Tubing must not exert pressure on intact skin.    Applied per  
Negative Pressure    NAME:  Maddie Goldberg  YOB: 1960  MEDICAL RECORD NUMBER:  [id number]  DATE:  6/2/2024    Applied Negative Pressure to mid abdomen surgical incision.  [x] Applied skin barrier prep to mike-wound.   [x] Cut strips of plastic drape to picture frame wound so that mike-wound is     covered with the drape.   [] If bridging dressing to less prominent site, cover any intact skin that will come in contact with the Negative Pressure Therapy sponge, gauze or channel drain with plastic drape.  The sponge should never touch intact skin.   [x] Cut sponge, gauze or channel drain to size which will fit into the wound/ulcer bed without being forced.   [x] Be sure the sponge is large enough to hold the entire round plastic flange which is attached to the tubing.  Never allow flange to be larger than the sponge or it will produce suction damaging intact skin.  Total number of individual pieces of foam used within the wound bed: 3 x oil emulsion gauze and 5 x black foam  [] If bridging the dressing away from the primary site, be sure the bridge leads to a piece of sponge large enough to hold the entire flange without allowing any of the flange to overlap onto intact skin.   [x] Covered sponge, gauze or channel drain with plastic drape.   [x] Cut a hole in this plastic drape directly over the sponge the same size as the plastic drain tubing.   [x] Removed plastic liner from flange and apply it directly over the hole you cut.   [x] Removed the plastic cover from the flange.   [x] Attached the tubing to the wound/ulcer Negative Pressure Therapy and turn it on to be sure a vacuum is created and that there are no leaks.   [] If air leaks occur, use plastic drape to patch them.   [] Secured Negative Pressure Therapy dressing with ace wrap loosely if located on an extremity. Maintain tubing outside of ace wrap. Tubing must not exert pressure on intact skin.    Applied per  Guidelines  Patient 
Patient had difficulty staying awake for ostomy care education.  Patient was provided with a wide drainable outlet pouch and demonstrated back to N how to open and secure closure, but then would fall asleep immediately.  I demonstrated how to measure the stoma, clean stoma and peristomal skin, apply non-sting skin protectant wipe, apply a barrier, and then apply pouching system.  However, patient fell asleep often during pouch change.      Ostomy pouch to RLQ ileostomy and LLQ mucous fistula changed.  Mucous only noted to LLQ.  Approximately 50 mL of loose stool emptied from pouch to RLQ.  Applied a high-flow ostomy pouch to RLQ.  If stool continues to thicken over the weekend, a wide drainage outlet pouch should be applied.  Patient is to empty her pouches today with RN supervision.  Discussed with Layla Duncan.  Will continue to follow for pouching needs and ostomy education as appropriate.           
Physician's written order form for ostomy supplies is in patient's chart.  Needs Dr. Pineda's signature.  Informed Dr. Pineda via [employer].  Patient has been enrolled in [employer] program and will receive a complimentary kit of supplies to home address within 5-7 business days.  Met with patient and sister.  Ostomy education not appropriate with patient as she is drowsy.  Reviewed supplies recommendation and provided supplies education to patient's sister.  Will continue to follow for pouching needs and ostomy education as appropriate.   
Received order for an abdominal binder to be applied to patient at discharge.  Order entered and informed Sumit Duncan.  I changed the pouch over the mucous fistula.  No stool noted, only thick mucous.    
Wound vac alarming for blockage. Old track pad removed and black foam added to area and covered with drape and then track pad applied and secured. No leak or blockage shown at this time. Wound vac showing good suction at 125mmHg per orders. WCN to change wound vac tomorrow.   
Guidelines  Patient tolerated dressing change well.  Patient was pre-medicated PO pain medication.  No tissue necrosis or foul odor.  Increase in granulation tissue noted.  Moderate amount of thick pink/gray drainage noted in cannister.  No bleeding.  Small open area remains at center, not sure how deep this goes.  Informed Dr. Pineda of finding via [employer].  Applied 1 x oil emulsion gauze to wound bed and packed lightly with 2 pieces of black foam.  Applied drape and track pad.  Initiated seal at 125 mmHg and no leaks noted.  Next dressing change while in-patient will be on Tuesday 6/25/24 by WCN.  For discharge, Rn is to remove NPWT dressing and apply a wet/dry.  If wound vac dressing becomes compromised with stool beneath film, remove NPWT and irrigate abdominal incision with saline copiously until no stool noted.  Apply a wet / dry dressing and inform Surgeon and WCN.       Ostomy pouch to RLQ ileostomy and LLQ mucous fistula changed.  Mucous only noted to LLQ.  Approximately 50 mL of loose stool emptied from pouch to RLQ.  Applied a high-flow ostomy pouch to RLQ.  If stool continues to thicken over the weekend, a wide drainage outlet pouch should be applied.          If wound vac is not working properly: 1) Check to see if track pad tubing is clogged.  If so, remove track pad, apply film over exposed foam, cut dime size hole in film, and apply a new track pad.  2) If film is leaking, find area with leak and reinforce with film.   3) If leak cannot be fixed, remove dressing, apply wet/dry dressing, and inform the WCN.      .

## 2024-06-26 NOTE — DISCHARGE INSTR - COC
Continuity of Care Form    Patient Name: Maddie Goldberg   :  1960  MRN:  [id number]    Admit date:  2024  Discharge date:  2024    Code Status Order: Full Code   Advance Directives:   Advance Care Flowsheet Documentation       Date/Time Healthcare Directive Type of Healthcare Directive Copy in Chart Healthcare Agent Appointed Healthcare Agent's Name Healthcare Agent's Phone Number    24 1351 No, patient does not have an advance directive for healthcare treatment -- -- -- -- --    24 1422 No, patient does not have an advance directive for healthcare treatment -- -- -- -- --            Admitting Physician:  Kylah Rice MD  PCP: No primary care provider on file.    Discharging Nurse: Sumit Ferguson  Discharging Hospital Unit/Room#: 522/01  Discharging Unit Phone Number: [phone]    Emergency Contact:   Extended Emergency Contact Information  Primary Emergency Contact: Shekhar Goldberg   [facility]  Home Phone: [phone]  Relation: Child  Secondary Emergency Contact: AshlynApril  Mobile Phone: [phone]  Relation: Daughter-in-Law    Past Surgical History:  Past Surgical History:   Procedure Laterality Date    BLADDER SUSPENSION      CHEST SURGERY  16    RESECTION OF REMAINING THYROID    GYN      uterine ablation about 10 years ago no menses since then    HEENT      thyroidectomy    HEMICOLECTOMY N/A 2024    EXPLORATORY LAPAROTOMY; RIGHT COLON RESECTION performed by Kylah Rice MD at [facility] MAIN OR    LAPAROTOMY N/A 2024    EXPLORATORY LAPAROTOMY, PARTIAL COLON RESECTION performed by Tommie Pineda MD at [facility] MAIN OR    LAPAROTOMY N/A 2024    EXPLORATORY LAPAROTOMY WITH ILEOSTOMY AND WOUND VAC PLACEMENT performed by Tommie Pineda MD at [facility] MAIN OR    ORTHOPEDIC SURGERY      left knee arthroscopy    ORTHOPEDIC SURGERY      left knee meniscus repair    TONSILLECTOMY         Immunization History:   Immunization History   Administered Date(s) Administered

## 2024-06-26 NOTE — PROGRESS NOTES
Discharge plan of care/case management plan validated with provider discharge order. Discharge to [facility]. Called unit accepting patient and spoke to staff Belem RN. Discussed current status of patient and reviewed discharge medications. Patient has PICC line for IV Antibiotics. Notified Dr. Pineda and left a message for the IV antibiotics is not in the After Visit Summary. Wound VAC removed by wound care nurse and placed patient patient on wet to dry dressing: applied abdominal binder. Transportation will be here to  patient at 2:00 PM, care of [facility].

## 2024-06-26 NOTE — PLAN OF CARE
Problem: Pain  Goal: Verbalizes/displays adequate comfort level or baseline comfort level  Outcome: Progressing     Problem: Discharge Planning  Goal: Discharge to home or other facility with appropriate resources  Outcome: Progressing     Problem: Safety - Adult  Goal: Free from fall injury  Outcome: Progressing     Problem: Skin/Tissue Integrity  Goal: Absence of new skin breakdown  Description: 1.  Monitor for areas of redness and/or skin breakdown  2.  Assess vascular access sites hourly  3.  Every 4-6 hours minimum:  Change oxygen saturation probe site  4.  Every 4-6 hours:  If on nasal continuous positive airway pressure, respiratory therapy assess nares and determine need for appliance change or resting period.  Outcome: Progressing     Problem: Physical Therapy - Adult  Goal: By Discharge: Performs mobility at highest level of function for planned discharge setting.  See evaluation for individualized goals.  Description: FUNCTIONAL STATUS PRIOR TO ADMISSION: Patient was independent and active without use of DME.    HOME SUPPORT PRIOR TO ADMISSION: The patient lived alone but did not require assistance.    Physical Therapy Goals  Initiated 6/6/2024  Reassess 6/13 after abd sx  Pt stated goal: To go home  Pt will be I with [facility] in 7 days.  Pt will perform bed mobility with min A in 7 days.  Pt will perform transfers with min-mod A in 7 days.   Pt will amb 150 feet with LRAD safely with [place] in 7 days.  6/25/2024 1601 by Amy Kennedy PTA  Outcome: Progressing     Problem: Coping  Goal: Pt/Family able to verbalize concerns and demonstrate effective coping strategies  Description: INTERVENTIONS:  1. Assist patient/family to identify coping skills, available support systems and cultural and spiritual values  2. Provide emotional support, including active listening and acknowledgement of concerns of patient and caregivers  3. Reduce environmental stimuli, as able  4. Instruct patient/family in  relaxation techniques, as appropriate  5. Assess for spiritual pain/suffering and initiate Spiritual Care, Psychosocial Clinical Specialist consults as needed  Outcome: Progressing     Problem: Neurosensory - Adult  Goal: Achieves stable or improved neurological status  Outcome: Progressing  Goal: Achieves maximal functionality and self care  Outcome: Progressing     Problem: Respiratory - Adult  Goal: Achieves optimal ventilation and oxygenation  Outcome: Progressing     Problem: Cardiovascular - Adult  Goal: Maintains optimal cardiac output and hemodynamic stability  Outcome: Progressing  Goal: Absence of cardiac dysrhythmias or at baseline  Outcome: Progressing     Problem: Skin/Tissue Integrity - Adult  Goal: Incisions, wounds, or drain sites healing without S/S of infection  Outcome: Progressing     Problem: Musculoskeletal - Adult  Goal: Return mobility to safest level of function  Outcome: Progressing  Goal: Return ADL status to a safe level of function  Outcome: Progressing     Problem: Gastrointestinal - Adult  Goal: Minimal or absence of nausea and vomiting  Outcome: Progressing  Goal: Maintains or returns to baseline bowel function  Outcome: Progressing  Goal: Maintains adequate nutritional intake  Outcome: Progressing     Problem: Genitourinary - Adult  Goal: Absence of urinary retention  Outcome: Progressing     Problem: ABCDS Injury Assessment  Goal: Absence of physical injury  Outcome: Progressing     Problem: Nutrition Deficit:  Goal: Optimize nutritional status  Outcome: Progressing     Problem: Occupational Therapy - Adult  Goal: By Discharge: Performs self-care activities at highest level of function for planned discharge setting.  See evaluation for individualized goals.  Description: FUNCTIONAL STATUS PRIOR TO ADMISSION:  Pt reports IND w/ ADLs, IADLs and mobility tasks prior to admission. 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     HOME SUPPORT: The patient

## 2024-06-26 NOTE — CARE COORDINATION
Patient is planning to discharge today, going to [facility]. Patient and family aware. Transportation being setup through [facility].    Nurse to call report at [phone]    Transition of Care Plan:     RUR: 14%  Prior Level of Functioning: ind  Disposition: irf  If SNF or IPR: Date FOC offered: 06/12/2024  Date FOC received: 06/12/2024  Accepting facility: [facility]  Date authorization started with reference number: 06/21  Date authorization received and expires: 06/25  Follow up appointments:   DME needed:   Transportation at discharge: [facility]  IM/IMM Medicare/ letter given: na  Is patient a [place] and connected with [facility]? na  If yes, was [place] transfer form completed and [facility] notified? na  Caregiver Contact: at bedside  Discharge Caregiver contacted prior to discharge? At bedside  Care Conference needed? na  Barriers to discharge: na

## 2024-07-01 LAB
BACTERIA SPEC CULT: NORMAL
BACTERIA SPEC CULT: NORMAL
Lab: NORMAL
Lab: NORMAL

## 2024-07-06 NOTE — PROGRESS NOTES
Physician Progress Note      PATIENT:               TESSA BARRETO  [facility] #:                  [id number]  :                       1960  ADMIT DATE:       2024 12:21 PM  DISCH DATE:        2024 2:10 PM  RESPONDING  PROVIDER #:        Tommie Calles MD          QUERY TEXT:    Patient admitted with Cecal volvulus . Noted to have \"50% or less of estimated   energy requirements for 5 or more days\" per RD consult note dated . If   possible, please document in progress notes and discharge summary if you are   evaluating and /or treating any of the following:    The medical record reflects the following:  Risk Factors: C/o Abd Pain...D/w Cecal volvulus S/p EXPLORATORY LAPAROTOMY;   RIGHT COLON RESECTION on   Clinical Indicators: RD noted patient meets the following ASPEN criteria:    Malnutrition Status:  Mild malnutrition (06/10/24 1415)  Context:  Acute Illness  Findings of the 6 clinical characteristics of malnutrition:  Energy Intake:  50% or less of estimated energy requirements for 5 or more   days  Weight Loss:  No significant weight loss  Body Fat Loss:  No significant body fat loss  Muscle Mass Loss:  No significant muscle mass loss  Fluid Accumulation:  No significant fluid accumulation   Strength:  Not Performed    Treatment: RD consult; Nutrition Recommendations/Plan: NPO, advance as   medically appropriate; Start TPN; Pt not at high risk for refeeding syndrome   at this time; Monitor and record PN rate, tolerance, BM in I/Os.    Thank you,    Kelli Brizuela  CDI  [email or web address]    ASPEN Criteria:    https://aspenjournals.onlinelibrary.bergeron.com/doi/full/10.1177/[email or web address]  5  Options provided:  -- Protein calorie malnutrition mild  -- Other - I will add my own diagnosis  -- Disagree - Not applicable / Not valid  -- Disagree - Clinically unable to determine / Unknown  -- Refer to Clinical Documentation Reviewer    PROVIDER RESPONSE TEXT:    This patient has mild protein

## 2024-07-08 LAB
BACTERIA SPEC CULT: NORMAL
BACTERIA SPEC CULT: NORMAL
Lab: NORMAL
Lab: NORMAL

## 2024-07-09 ENCOUNTER — TELEPHONE (OUTPATIENT)
Facility: CLINIC | Age: 64
End: 2024-07-09

## 2024-07-09 NOTE — TELEPHONE ENCOUNTER
----- Message from Theodore Hudson sent at 7/8/2024  9:09 AM EDT -----  Regarding: [facility] Appointment Request  [facility] Appointment Request    Patient needs appointment for [facility] Appointment Type: New to Provider.    Patient Requested Dates(s): Any available date  Patient Requested Time: Any available time   Provider Name: Andi Valdovinos     Reason for Appointment Request: Established Patient - Available appointments did not meet patient need  --------------------------------------------------------------------------------------------------------------------------    Relationship to Patient: Antonio Molina / Michael    Call Back Information: OK to leave message on voicemail  Preferred Call Back Number: Phone [phone]

## 2024-07-15 ENCOUNTER — OFFICE VISIT (OUTPATIENT)
Age: 64
End: 2024-07-15

## 2024-07-15 VITALS
OXYGEN SATURATION: 97 % | HEIGHT: 68 IN | SYSTOLIC BLOOD PRESSURE: 135 MMHG | DIASTOLIC BLOOD PRESSURE: 79 MMHG | WEIGHT: 267 LBS | BODY MASS INDEX: 40.47 KG/M2 | HEART RATE: 92 BPM | TEMPERATURE: 98.3 F | RESPIRATION RATE: 17 BRPM

## 2024-07-15 DIAGNOSIS — S31.109D OPEN WOUND OF ABDOMEN, SUBSEQUENT ENCOUNTER: ICD-10-CM

## 2024-07-15 DIAGNOSIS — G89.18 POST-OP PAIN: Primary | ICD-10-CM

## 2024-07-15 PROCEDURE — 99024 POSTOP FOLLOW-UP VISIT: CPT | Performed by: SURGERY

## 2024-07-15 RX ORDER — OXYCODONE HYDROCHLORIDE AND ACETAMINOPHEN 5; 325 MG/1; MG/1
1 TABLET ORAL EVERY 8 HOURS PRN
Qty: 28 TABLET | Refills: 0 | Status: SHIPPED | OUTPATIENT
Start: 2024-07-15 | End: 2024-07-24

## 2024-07-15 NOTE — PROGRESS NOTES
Identified pt with two pt identifiers (name and ). Reviewed chart in preparation for visit and have obtained necessary documentation.    Maddie Goldberg is a 63 y.o. female  Chief Complaint   Patient presents with    Follow-up     Hospital follow up      /79 (Site: Left Upper Arm, Position: Sitting, Cuff Size: Large Adult)   Pulse 92   Temp 98.3 °F (36.8 °C) (Oral)   Resp 17   Ht 1.727 m (5' 8\")   Wt 121.1 kg (267 lb)   SpO2 97%   BMI 40.60 kg/m²     1. Have you been to the ER, urgent care clinic since your last visit?  Hospitalized since your last visit?no    2. Have you seen or consulted any other health care providers outside of the [facility] since your last visit?  Include any pap smears or colon screening. no

## 2024-07-16 ENCOUNTER — TELEPHONE (OUTPATIENT)
Age: 64
End: 2024-07-16

## 2024-07-16 DIAGNOSIS — Z93.3 COLOSTOMY PRESENT (HCC): ICD-10-CM

## 2024-07-16 DIAGNOSIS — G89.18 POST-OP PAIN: Primary | ICD-10-CM

## 2024-07-16 NOTE — TELEPHONE ENCOUNTER
Tracy the friend called regarding the referral to [facility]. She would like to be referred to colo/rectal Dr. Jagjit Kaufman at [facility]. The fax number is [phone]. Please send records.  [phone]

## 2024-07-23 ENCOUNTER — TELEPHONE (OUTPATIENT)
Age: 64
End: 2024-07-23

## 2024-07-23 NOTE — TELEPHONE ENCOUNTER
Patient called today requesting a refill on her pain medication. She uses [facility] in [place]. [phone]

## 2024-07-23 NOTE — TELEPHONE ENCOUNTER
Contacted patient two identifiers informed patient that Dr. Pineda is on vacation and will not be back until next week. Patient requesting more pain medication reviewed patients chart patient had procedure done 6/09/2024 prescriptions put in on 06/25/2024 for ibuprofen 600 mg 120 tablets for pain, 07/15/2024 oxycodone prescription put in 28 tablets. Patient reports she never got the ibuprofen filled she has ibuprofen at home, she is not completely out of the oxycodone but she just wanted to get more medication before she runs out. Explained to patient that she should be using the ibuprofen now to manager her pain, explained that Dr. Pineda cannot continue to prescribe the oxycodone. Patient reports that Dr. Pineda did not tell her see can take the ibuprofen and she know it can affect the stomach. Let patient know that if Dr. Pineda did not want her to take the ibuprofen he would not put the prescription for in to pharmacy. Patient stated that last time she saw Dr. Pineda he told her she would need the oxycodone for a while, she has a wound vac and her stomach looks bad. Patient reports that she has an appointment with Dr. Pineda on next week, she will take the ibuprofen until she sees him. Wanted to know how the ibuprofen is prescribed let her know that she is able to take 1 600 mg tablet every 6 hours.

## 2024-07-25 PROBLEM — G89.18 POST-OP PAIN: Status: ACTIVE | Noted: 2024-07-25

## 2024-07-25 NOTE — PROGRESS NOTES
Patient is here today postop follow-up.  Patient is having difficulty managing her ileostomy.  I examined her abdomen.  Patient does have chemical irritation from fecal soilage around the ileostomy site.  I personally did go over techniques over ileostomy care.  Patient will be reassessed 2 weeks follow-up.  Patient was provided with refills on Percocet.  Patient will need a colostomy reversal 3 to 6-month time.  Patient will be made referral to [facility] colorectal surgery for this.

## 2024-07-29 ENCOUNTER — TELEPHONE (OUTPATIENT)
Age: 64
End: 2024-07-29

## 2024-07-30 DIAGNOSIS — G89.18 POST-OP PAIN: Primary | ICD-10-CM

## 2024-07-30 RX ORDER — OXYCODONE HYDROCHLORIDE AND ACETAMINOPHEN 5; 325 MG/1; MG/1
1 TABLET ORAL EVERY 6 HOURS PRN
Qty: 28 TABLET | Refills: 0 | Status: ON HOLD | OUTPATIENT
Start: 2024-07-30 | End: 2024-08-06

## 2024-08-01 ENCOUNTER — APPOINTMENT (OUTPATIENT)
Facility: HOSPITAL | Age: 64
DRG: 444 | End: 2024-08-01
Payer: COMMERCIAL

## 2024-08-01 ENCOUNTER — HOSPITAL ENCOUNTER (INPATIENT)
Facility: HOSPITAL | Age: 64
LOS: 11 days | Discharge: HOME HEALTH | DRG: 444 | End: 2024-08-12
Attending: SURGERY | Admitting: SURGERY
Payer: COMMERCIAL

## 2024-08-01 DIAGNOSIS — G89.18 POST-OP PAIN: ICD-10-CM

## 2024-08-01 DIAGNOSIS — R10.84 GENERALIZED ABDOMINAL PAIN: ICD-10-CM

## 2024-08-01 DIAGNOSIS — K55.069 MESENTERIC VEIN THROMBOSIS (HCC): Primary | ICD-10-CM

## 2024-08-01 DIAGNOSIS — M79.604 PAIN OF RIGHT LOWER EXTREMITY: ICD-10-CM

## 2024-08-01 DIAGNOSIS — R10.31 RIGHT LOWER QUADRANT ABDOMINAL PAIN: ICD-10-CM

## 2024-08-01 PROBLEM — R10.9 ABDOMINAL PAIN: Status: ACTIVE | Noted: 2024-08-01

## 2024-08-01 LAB
ALBUMIN SERPL-MCNC: 2.2 G/DL (ref 3.5–5)
ALBUMIN/GLOB SERPL: 0.4 (ref 1.1–2.2)
ALP SERPL-CCNC: 168 U/L (ref 45–117)
ALT SERPL-CCNC: 40 U/L (ref 12–78)
ANION GAP SERPL CALC-SCNC: 11 MMOL/L (ref 5–15)
APPEARANCE UR: CLEAR
APTT PPP: 28.1 SEC (ref 22.1–31)
AST SERPL W P-5'-P-CCNC: 26 U/L (ref 15–37)
BACTERIA URNS QL MICRO: ABNORMAL /HPF
BASOPHILS # BLD: 0.1 K/UL (ref 0–0.1)
BASOPHILS NFR BLD: 1 % (ref 0–1)
BILIRUB SERPL-MCNC: 0.7 MG/DL (ref 0.2–1)
BILIRUB UR QL CFM: NEGATIVE
BILIRUB UR QL: ABNORMAL
BUN SERPL-MCNC: 11 MG/DL (ref 6–20)
BUN/CREAT SERPL: 11 (ref 12–20)
CA-I BLD-MCNC: 9.3 MG/DL (ref 8.5–10.1)
CHLORIDE SERPL-SCNC: 101 MMOL/L (ref 97–108)
CO2 SERPL-SCNC: 28 MMOL/L (ref 21–32)
COLOR UR: ABNORMAL
CREAT SERPL-MCNC: 1.01 MG/DL (ref 0.55–1.02)
DIFFERENTIAL METHOD BLD: ABNORMAL
EKG ATRIAL RATE: 90 BPM
EKG DIAGNOSIS: NORMAL
EKG P AXIS: -24 DEGREES
EKG P-R INTERVAL: 150 MS
EKG Q-T INTERVAL: 368 MS
EKG QRS DURATION: 90 MS
EKG QTC CALCULATION (BAZETT): 450 MS
EKG R AXIS: 14 DEGREES
EKG T AXIS: 54 DEGREES
EKG VENTRICULAR RATE: 90 BPM
EOSINOPHIL # BLD: 0.2 K/UL (ref 0–0.4)
EOSINOPHIL NFR BLD: 1 % (ref 0–7)
EPITH CASTS URNS QL MICRO: ABNORMAL /LPF
ERYTHROCYTE [DISTWIDTH] IN BLOOD BY AUTOMATED COUNT: 18 % (ref 11.5–14.5)
GLOBULIN SER CALC-MCNC: 5.3 G/DL (ref 2–4)
GLUCOSE SERPL-MCNC: 119 MG/DL (ref 65–100)
GLUCOSE UR STRIP.AUTO-MCNC: NEGATIVE MG/DL
HCT VFR BLD AUTO: 29.2 % (ref 35–47)
HGB BLD-MCNC: 9.5 G/DL (ref 11.5–16)
HGB UR QL STRIP: ABNORMAL
IMM GRANULOCYTES # BLD AUTO: 0.1 K/UL (ref 0–0.04)
IMM GRANULOCYTES NFR BLD AUTO: 1 % (ref 0–0.5)
INR PPP: 1.1 (ref 0.9–1.1)
KETONES UR QL STRIP.AUTO: 15 MG/DL
LACTATE BLD-SCNC: 0.62 MMOL/L (ref 0.4–2)
LEUKOCYTE ESTERASE UR QL STRIP.AUTO: ABNORMAL
LIPASE SERPL-CCNC: 19 U/L (ref 13–75)
LYMPHOCYTES # BLD: 2.6 K/UL (ref 0.8–3.5)
LYMPHOCYTES NFR BLD: 18 % (ref 12–49)
MCH RBC QN AUTO: 28.1 PG (ref 26–34)
MCHC RBC AUTO-ENTMCNC: 32.5 G/DL (ref 30–36.5)
MCV RBC AUTO: 86.4 FL (ref 80–99)
MONOCYTES # BLD: 1.3 K/UL (ref 0–1)
MONOCYTES NFR BLD: 10 % (ref 5–13)
NEUTS SEG # BLD: 9.7 K/UL (ref 1.8–8)
NEUTS SEG NFR BLD: 69 % (ref 32–75)
NITRITE UR QL STRIP.AUTO: NEGATIVE
PERFORMED BY:: NORMAL
PH UR STRIP: 5 (ref 5–8)
PLATELET # BLD AUTO: 646 K/UL (ref 150–400)
PMV BLD AUTO: 10.2 FL (ref 8.9–12.9)
POTASSIUM SERPL-SCNC: 3.8 MMOL/L (ref 3.5–5.1)
PROCALCITONIN SERPL-MCNC: 0.08 NG/ML
PROT SERPL-MCNC: 7.5 G/DL (ref 6.4–8.2)
PROT UR STRIP-MCNC: ABNORMAL MG/DL
PROTHROMBIN TIME: 10.9 SEC (ref 9–11.1)
RBC # BLD AUTO: 3.38 M/UL (ref 3.8–5.2)
RBC #/AREA URNS HPF: ABNORMAL /HPF (ref 0–5)
SODIUM SERPL-SCNC: 140 MMOL/L (ref 136–145)
SP GR UR REFRACTOMETRY: 1.02 (ref 1–1.03)
THERAPEUTIC RANGE: NORMAL SEC (ref 58–77)
TROPONIN I SERPL HS-MCNC: 257 NG/L (ref 0–51)
TROPONIN I SERPL HS-MCNC: 278 NG/L (ref 0–51)
URINE CULTURE IF INDICATED: ABNORMAL
UROBILINOGEN UR QL STRIP.AUTO: 0.1 EU/DL (ref 0.2–1)
WBC # BLD AUTO: 14 K/UL (ref 3.6–11)
WBC URNS QL MICRO: ABNORMAL /HPF (ref 0–4)

## 2024-08-01 PROCEDURE — 81001 URINALYSIS AUTO W/SCOPE: CPT

## 2024-08-01 PROCEDURE — 6360000002 HC RX W HCPCS

## 2024-08-01 PROCEDURE — 2580000003 HC RX 258

## 2024-08-01 PROCEDURE — 99285 EMERGENCY DEPT VISIT HI MDM: CPT

## 2024-08-01 PROCEDURE — 74177 CT ABD & PELVIS W/CONTRAST: CPT

## 2024-08-01 PROCEDURE — 83690 ASSAY OF LIPASE: CPT

## 2024-08-01 PROCEDURE — 84484 ASSAY OF TROPONIN QUANT: CPT

## 2024-08-01 PROCEDURE — 6360000004 HC RX CONTRAST MEDICATION

## 2024-08-01 PROCEDURE — 85730 THROMBOPLASTIN TIME PARTIAL: CPT

## 2024-08-01 PROCEDURE — 96376 TX/PRO/DX INJ SAME DRUG ADON: CPT

## 2024-08-01 PROCEDURE — 36415 COLL VENOUS BLD VENIPUNCTURE: CPT

## 2024-08-01 PROCEDURE — 96375 TX/PRO/DX INJ NEW DRUG ADDON: CPT

## 2024-08-01 PROCEDURE — 85025 COMPLETE CBC W/AUTO DIFF WBC: CPT

## 2024-08-01 PROCEDURE — 2500000003 HC RX 250 WO HCPCS: Performed by: SURGERY

## 2024-08-01 PROCEDURE — 94761 N-INVAS EAR/PLS OXIMETRY MLT: CPT

## 2024-08-01 PROCEDURE — 6370000000 HC RX 637 (ALT 250 FOR IP)

## 2024-08-01 PROCEDURE — 71045 X-RAY EXAM CHEST 1 VIEW: CPT

## 2024-08-01 PROCEDURE — 83605 ASSAY OF LACTIC ACID: CPT

## 2024-08-01 PROCEDURE — 93005 ELECTROCARDIOGRAM TRACING: CPT

## 2024-08-01 PROCEDURE — 80053 COMPREHEN METABOLIC PANEL: CPT

## 2024-08-01 PROCEDURE — 1100000000 HC RM PRIVATE

## 2024-08-01 PROCEDURE — 84145 PROCALCITONIN (PCT): CPT

## 2024-08-01 PROCEDURE — 96365 THER/PROPH/DIAG IV INF INIT: CPT

## 2024-08-01 PROCEDURE — 96361 HYDRATE IV INFUSION ADD-ON: CPT

## 2024-08-01 PROCEDURE — 85610 PROTHROMBIN TIME: CPT

## 2024-08-01 PROCEDURE — 2580000003 HC RX 258: Performed by: SURGERY

## 2024-08-01 PROCEDURE — 6370000000 HC RX 637 (ALT 250 FOR IP): Performed by: SURGERY

## 2024-08-01 PROCEDURE — 87040 BLOOD CULTURE FOR BACTERIA: CPT

## 2024-08-01 RX ORDER — GABAPENTIN 300 MG/1
300 CAPSULE ORAL NIGHTLY
Status: DISCONTINUED | OUTPATIENT
Start: 2024-08-01 | End: 2024-08-12 | Stop reason: HOSPADM

## 2024-08-01 RX ORDER — POTASSIUM CHLORIDE 20 MEQ/1
40 TABLET, EXTENDED RELEASE ORAL PRN
Status: DISCONTINUED | OUTPATIENT
Start: 2024-08-01 | End: 2024-08-12 | Stop reason: HOSPADM

## 2024-08-01 RX ORDER — ONDANSETRON 4 MG/1
4 TABLET, ORALLY DISINTEGRATING ORAL EVERY 8 HOURS PRN
Status: DISCONTINUED | OUTPATIENT
Start: 2024-08-01 | End: 2024-08-12 | Stop reason: HOSPADM

## 2024-08-01 RX ORDER — HEPARIN SODIUM 1000 [USP'U]/ML
80 INJECTION, SOLUTION INTRAVENOUS; SUBCUTANEOUS PRN
Status: DISCONTINUED | OUTPATIENT
Start: 2024-08-01 | End: 2024-08-04

## 2024-08-01 RX ORDER — ONDANSETRON 2 MG/ML
4 INJECTION INTRAMUSCULAR; INTRAVENOUS EVERY 6 HOURS PRN
Status: DISCONTINUED | OUTPATIENT
Start: 2024-08-01 | End: 2024-08-12 | Stop reason: HOSPADM

## 2024-08-01 RX ORDER — ONDANSETRON 2 MG/ML
4 INJECTION INTRAMUSCULAR; INTRAVENOUS ONCE
Status: COMPLETED | OUTPATIENT
Start: 2024-08-01 | End: 2024-08-01

## 2024-08-01 RX ORDER — POTASSIUM CHLORIDE 7.45 MG/ML
10 INJECTION INTRAVENOUS PRN
Status: DISCONTINUED | OUTPATIENT
Start: 2024-08-01 | End: 2024-08-12 | Stop reason: HOSPADM

## 2024-08-01 RX ORDER — MAGNESIUM SULFATE IN WATER 40 MG/ML
2000 INJECTION, SOLUTION INTRAVENOUS PRN
Status: DISCONTINUED | OUTPATIENT
Start: 2024-08-01 | End: 2024-08-12 | Stop reason: HOSPADM

## 2024-08-01 RX ORDER — 0.9 % SODIUM CHLORIDE 0.9 %
1000 INTRAVENOUS SOLUTION INTRAVENOUS ONCE
Status: COMPLETED | OUTPATIENT
Start: 2024-08-01 | End: 2024-08-01

## 2024-08-01 RX ORDER — ASPIRIN 325 MG
325 TABLET ORAL
Status: COMPLETED | OUTPATIENT
Start: 2024-08-01 | End: 2024-08-01

## 2024-08-01 RX ORDER — MORPHINE SULFATE 4 MG/ML
4 INJECTION, SOLUTION INTRAMUSCULAR; INTRAVENOUS
Status: COMPLETED | OUTPATIENT
Start: 2024-08-01 | End: 2024-08-01

## 2024-08-01 RX ORDER — SODIUM CHLORIDE 9 MG/ML
INJECTION, SOLUTION INTRAVENOUS PRN
Status: DISCONTINUED | OUTPATIENT
Start: 2024-08-01 | End: 2024-08-12 | Stop reason: HOSPADM

## 2024-08-01 RX ORDER — ACETAMINOPHEN 500 MG
1000 TABLET ORAL
Status: COMPLETED | OUTPATIENT
Start: 2024-08-01 | End: 2024-08-01

## 2024-08-01 RX ORDER — ATORVASTATIN CALCIUM 20 MG/1
20 TABLET, FILM COATED ORAL DAILY
Status: DISCONTINUED | OUTPATIENT
Start: 2024-08-01 | End: 2024-08-12 | Stop reason: HOSPADM

## 2024-08-01 RX ORDER — SODIUM CHLORIDE 0.9 % (FLUSH) 0.9 %
5-40 SYRINGE (ML) INJECTION PRN
Status: DISCONTINUED | OUTPATIENT
Start: 2024-08-01 | End: 2024-08-12 | Stop reason: HOSPADM

## 2024-08-01 RX ORDER — SODIUM CHLORIDE 9 MG/ML
INJECTION, SOLUTION INTRAVENOUS CONTINUOUS
Status: DISPENSED | OUTPATIENT
Start: 2024-08-01 | End: 2024-08-03

## 2024-08-01 RX ORDER — HEPARIN SODIUM 10000 [USP'U]/100ML
5-30 INJECTION, SOLUTION INTRAVENOUS CONTINUOUS
Status: DISCONTINUED | OUTPATIENT
Start: 2024-08-01 | End: 2024-08-04

## 2024-08-01 RX ORDER — HEPARIN SODIUM 1000 [USP'U]/ML
40 INJECTION, SOLUTION INTRAVENOUS; SUBCUTANEOUS PRN
Status: DISCONTINUED | OUTPATIENT
Start: 2024-08-01 | End: 2024-08-04

## 2024-08-01 RX ORDER — HYDROMORPHONE HYDROCHLORIDE 1 MG/ML
1 INJECTION, SOLUTION INTRAMUSCULAR; INTRAVENOUS; SUBCUTANEOUS EVERY 4 HOURS PRN
Status: DISCONTINUED | OUTPATIENT
Start: 2024-08-01 | End: 2024-08-12 | Stop reason: HOSPADM

## 2024-08-01 RX ORDER — ACETAMINOPHEN 650 MG/1
650 SUPPOSITORY RECTAL EVERY 6 HOURS PRN
Status: DISCONTINUED | OUTPATIENT
Start: 2024-08-01 | End: 2024-08-12 | Stop reason: HOSPADM

## 2024-08-01 RX ORDER — LISINOPRIL 5 MG/1
5 TABLET ORAL DAILY
Status: DISCONTINUED | OUTPATIENT
Start: 2024-08-01 | End: 2024-08-12 | Stop reason: HOSPADM

## 2024-08-01 RX ORDER — POLYETHYLENE GLYCOL 3350 17 G/17G
17 POWDER, FOR SOLUTION ORAL DAILY PRN
Status: DISCONTINUED | OUTPATIENT
Start: 2024-08-01 | End: 2024-08-12 | Stop reason: HOSPADM

## 2024-08-01 RX ORDER — LEVOTHYROXINE SODIUM 0.07 MG/1
150 TABLET ORAL
Status: DISCONTINUED | OUTPATIENT
Start: 2024-08-02 | End: 2024-08-12 | Stop reason: HOSPADM

## 2024-08-01 RX ORDER — SODIUM CHLORIDE 0.9 % (FLUSH) 0.9 %
5-40 SYRINGE (ML) INJECTION EVERY 12 HOURS SCHEDULED
Status: DISCONTINUED | OUTPATIENT
Start: 2024-08-01 | End: 2024-08-12 | Stop reason: HOSPADM

## 2024-08-01 RX ORDER — ACETAMINOPHEN 325 MG/1
650 TABLET ORAL EVERY 6 HOURS PRN
Status: DISCONTINUED | OUTPATIENT
Start: 2024-08-01 | End: 2024-08-12 | Stop reason: HOSPADM

## 2024-08-01 RX ORDER — HEPARIN SODIUM 1000 [USP'U]/ML
80 INJECTION, SOLUTION INTRAVENOUS; SUBCUTANEOUS ONCE
Status: COMPLETED | OUTPATIENT
Start: 2024-08-01 | End: 2024-08-01

## 2024-08-01 RX ADMIN — ATORVASTATIN CALCIUM 20 MG: 20 TABLET, FILM COATED ORAL at 22:09

## 2024-08-01 RX ADMIN — GABAPENTIN 300 MG: 300 CAPSULE ORAL at 22:09

## 2024-08-01 RX ADMIN — HEPARIN SODIUM 17 UNITS/KG/HR: 10000 INJECTION, SOLUTION INTRAVENOUS at 20:32

## 2024-08-01 RX ADMIN — ASPIRIN 325 MG: 325 TABLET ORAL at 17:32

## 2024-08-01 RX ADMIN — SODIUM CHLORIDE 1000 ML: 9 INJECTION, SOLUTION INTRAVENOUS at 15:40

## 2024-08-01 RX ADMIN — HEPARIN SODIUM 9700 UNITS: 1000 INJECTION INTRAVENOUS; SUBCUTANEOUS at 20:40

## 2024-08-01 RX ADMIN — SODIUM CHLORIDE: 9 INJECTION, SOLUTION INTRAVENOUS at 22:13

## 2024-08-01 RX ADMIN — IOPAMIDOL 100 ML: 755 INJECTION, SOLUTION INTRAVENOUS at 17:29

## 2024-08-01 RX ADMIN — ACETAMINOPHEN 1000 MG: 500 TABLET ORAL at 15:39

## 2024-08-01 RX ADMIN — SODIUM CHLORIDE, PRESERVATIVE FREE 10 ML: 5 INJECTION INTRAVENOUS at 22:03

## 2024-08-01 RX ADMIN — MORPHINE SULFATE 4 MG: 4 INJECTION INTRAVENOUS at 18:19

## 2024-08-01 RX ADMIN — HYDROMORPHONE HYDROCHLORIDE 1 MG: 1 INJECTION, SOLUTION INTRAMUSCULAR; INTRAVENOUS; SUBCUTANEOUS at 22:08

## 2024-08-01 RX ADMIN — ONDANSETRON 4 MG: 2 INJECTION INTRAMUSCULAR; INTRAVENOUS at 15:40

## 2024-08-01 RX ADMIN — PIPERACILLIN AND TAZOBACTAM 4500 MG: 4; .5 INJECTION, POWDER, LYOPHILIZED, FOR SOLUTION INTRAVENOUS at 16:56

## 2024-08-01 RX ADMIN — MORPHINE SULFATE 4 MG: 4 INJECTION INTRAVENOUS at 15:40

## 2024-08-01 ASSESSMENT — PAIN DESCRIPTION - LOCATION
LOCATION: ABDOMEN
LOCATION: ABDOMEN;LEG;KNEE
LOCATION: LEG;ABDOMEN

## 2024-08-01 ASSESSMENT — PAIN SCALES - GENERAL
PAINLEVEL_OUTOF10: 8
PAINLEVEL_OUTOF10: 6
PAINLEVEL_OUTOF10: 0
PAINLEVEL_OUTOF10: 5
PAINLEVEL_OUTOF10: 7
PAINLEVEL_OUTOF10: 8

## 2024-08-01 ASSESSMENT — PAIN DESCRIPTION - DESCRIPTORS: DESCRIPTORS: ACHING

## 2024-08-01 ASSESSMENT — PAIN SCALES - WONG BAKER: WONGBAKER_NUMERICALRESPONSE: NO HURT

## 2024-08-01 ASSESSMENT — PAIN - FUNCTIONAL ASSESSMENT
PAIN_FUNCTIONAL_ASSESSMENT: 0-10
PAIN_FUNCTIONAL_ASSESSMENT: 0-10
PAIN_FUNCTIONAL_ASSESSMENT: PREVENTS OR INTERFERES SOME ACTIVE ACTIVITIES AND ADLS
PAIN_FUNCTIONAL_ASSESSMENT: 0-10

## 2024-08-01 ASSESSMENT — PAIN DESCRIPTION - ORIENTATION: ORIENTATION: RIGHT

## 2024-08-01 ASSESSMENT — LIFESTYLE VARIABLES
HOW OFTEN DO YOU HAVE A DRINK CONTAINING ALCOHOL: NEVER
HOW MANY STANDARD DRINKS CONTAINING ALCOHOL DO YOU HAVE ON A TYPICAL DAY: PATIENT DOES NOT DRINK

## 2024-08-01 NOTE — ED TRIAGE NOTES
RLQ pain since tues, also c/o right leg pain since tues. States that she had surgeryx3 in June and ended up with wound vac on left lower abd. Has since been removed yesterday

## 2024-08-01 NOTE — ED PROVIDER NOTES
[facility] EMERGENCY DEPT  EMERGENCY DEPARTMENT HISTORY AND PHYSICAL EXAM      Date: 8/1/2024  Patient Name: Maddie Goldberg  MRN: [id number]  YOB: 1960  Date of evaluation: 8/1/2024  Provider: Quinn Troncoso PA-C   Note Started: 3:13 PM EDT 8/1/24    HISTORY OF PRESENT ILLNESS     Chief Complaint   Patient presents with    Abdominal Pain     lower    Leg Pain       History Provided By: Patient    HPI: Maddie Goldberg is a 63 y.o. female with past medical history as listed below pertinent for recent exploratory laparotomy on 6/2/2024, 6/8/2024, performed by Dr. Jones.  Presents emergency department for right-sided abdominal pain.  Patient reports her pain has been steadily increasing over the last 2 days states that she had 3 total surgeries performed on her abdomen last month, and had a drain placed which was removed 1 day prior to arrival.  Patient reporting decreased appetite, oral intake of liquids solids and decreased output from her colostomy bag.  Patient reports nausea without vomiting also reporting right sided leg pain for the last 2 days but reports that her leg has been hurting ever since the surgery.    PAST MEDICAL HISTORY   Past Medical History:  Past Medical History:   Diagnosis Date    Arthritis     left knee    GERD (gastroesophageal reflux disease)     Hypertension     Morbid obesity (HCC)     Thyroid disease        Past Surgical History:  Past Surgical History:   Procedure Laterality Date    BLADDER SUSPENSION      CHEST SURGERY  1/7/14    RESECTION OF REMAINING THYROID    GYN      uterine ablation about 10 years ago no menses since then    HEENT      thyroidectomy    HEMICOLECTOMY N/A 6/2/2024    EXPLORATORY LAPAROTOMY; RIGHT COLON RESECTION performed by Kylah Rice MD at [facility] MAIN OR    LAPAROTOMY N/A 6/8/2024    EXPLORATORY LAPAROTOMY, PARTIAL COLON RESECTION performed by Tommie Pineda MD at [facility] MAIN OR    LAPAROTOMY N/A 6/9/2024    EXPLORATORY LAPAROTOMY WITH ILEOSTOMY AND WOUND VAC

## 2024-08-02 ENCOUNTER — APPOINTMENT (OUTPATIENT)
Facility: HOSPITAL | Age: 64
DRG: 444 | End: 2024-08-02
Attending: SURGERY
Payer: COMMERCIAL

## 2024-08-02 LAB
UFH PPP CHRO-ACNC: 0.17 IU/ML
UFH PPP CHRO-ACNC: 0.27 IU/ML
UFH PPP CHRO-ACNC: 0.34 IU/ML

## 2024-08-02 PROCEDURE — 1100000000 HC RM PRIVATE

## 2024-08-02 PROCEDURE — 93970 EXTREMITY STUDY: CPT

## 2024-08-02 PROCEDURE — 99222 1ST HOSP IP/OBS MODERATE 55: CPT | Performed by: SURGERY

## 2024-08-02 PROCEDURE — 36415 COLL VENOUS BLD VENIPUNCTURE: CPT

## 2024-08-02 PROCEDURE — 6360000002 HC RX W HCPCS

## 2024-08-02 PROCEDURE — 94761 N-INVAS EAR/PLS OXIMETRY MLT: CPT

## 2024-08-02 PROCEDURE — 2580000003 HC RX 258: Performed by: SURGERY

## 2024-08-02 PROCEDURE — 2500000003 HC RX 250 WO HCPCS: Performed by: SURGERY

## 2024-08-02 PROCEDURE — 6360000002 HC RX W HCPCS: Performed by: SURGERY

## 2024-08-02 PROCEDURE — 85520 HEPARIN ASSAY: CPT

## 2024-08-02 PROCEDURE — 6370000000 HC RX 637 (ALT 250 FOR IP): Performed by: SURGERY

## 2024-08-02 RX ORDER — IPRATROPIUM BROMIDE AND ALBUTEROL SULFATE 2.5; .5 MG/3ML; MG/3ML
1 SOLUTION RESPIRATORY (INHALATION)
Status: DISCONTINUED | OUTPATIENT
Start: 2024-08-02 | End: 2024-08-02

## 2024-08-02 RX ORDER — IPRATROPIUM BROMIDE AND ALBUTEROL SULFATE 2.5; .5 MG/3ML; MG/3ML
1 SOLUTION RESPIRATORY (INHALATION) EVERY 6 HOURS PRN
Status: DISCONTINUED | OUTPATIENT
Start: 2024-08-02 | End: 2024-08-12 | Stop reason: HOSPADM

## 2024-08-02 RX ADMIN — HYDROMORPHONE HYDROCHLORIDE 1 MG: 1 INJECTION, SOLUTION INTRAMUSCULAR; INTRAVENOUS; SUBCUTANEOUS at 14:30

## 2024-08-02 RX ADMIN — PIPERACILLIN AND TAZOBACTAM 4500 MG: 4; .5 INJECTION, POWDER, LYOPHILIZED, FOR SOLUTION INTRAVENOUS at 21:22

## 2024-08-02 RX ADMIN — ATORVASTATIN CALCIUM 20 MG: 20 TABLET, FILM COATED ORAL at 11:17

## 2024-08-02 RX ADMIN — PIPERACILLIN AND TAZOBACTAM 4500 MG: 4; .5 INJECTION, POWDER, LYOPHILIZED, FOR SOLUTION INTRAVENOUS at 14:31

## 2024-08-02 RX ADMIN — SODIUM CHLORIDE, PRESERVATIVE FREE 10 ML: 5 INJECTION INTRAVENOUS at 21:22

## 2024-08-02 RX ADMIN — HEPARIN SODIUM 21 UNITS/KG/HR: 10000 INJECTION, SOLUTION INTRAVENOUS at 19:43

## 2024-08-02 RX ADMIN — HYDROMORPHONE HYDROCHLORIDE 1 MG: 1 INJECTION, SOLUTION INTRAMUSCULAR; INTRAVENOUS; SUBCUTANEOUS at 10:15

## 2024-08-02 RX ADMIN — HEPARIN SODIUM 19 UNITS/KG/HR: 10000 INJECTION, SOLUTION INTRAVENOUS at 07:15

## 2024-08-02 RX ADMIN — PIPERACILLIN AND TAZOBACTAM 4500 MG: 4; .5 INJECTION, POWDER, LYOPHILIZED, FOR SOLUTION INTRAVENOUS at 10:16

## 2024-08-02 RX ADMIN — SODIUM CHLORIDE, PRESERVATIVE FREE 10 ML: 5 INJECTION INTRAVENOUS at 10:20

## 2024-08-02 RX ADMIN — HEPARIN SODIUM 4900 UNITS: 1000 INJECTION INTRAVENOUS; SUBCUTANEOUS at 19:43

## 2024-08-02 RX ADMIN — LEVOTHYROXINE SODIUM 150 MCG: 0.07 TABLET ORAL at 05:35

## 2024-08-02 RX ADMIN — GABAPENTIN 300 MG: 300 CAPSULE ORAL at 21:22

## 2024-08-02 RX ADMIN — HEPARIN SODIUM 4900 UNITS: 1000 INJECTION INTRAVENOUS; SUBCUTANEOUS at 04:45

## 2024-08-02 RX ADMIN — HYDROMORPHONE HYDROCHLORIDE 1 MG: 1 INJECTION, SOLUTION INTRAMUSCULAR; INTRAVENOUS; SUBCUTANEOUS at 22:56

## 2024-08-02 RX ADMIN — HYDROMORPHONE HYDROCHLORIDE 1 MG: 1 INJECTION, SOLUTION INTRAMUSCULAR; INTRAVENOUS; SUBCUTANEOUS at 01:59

## 2024-08-02 RX ADMIN — HEPARIN SODIUM 19 UNITS/KG/HR: 10000 INJECTION, SOLUTION INTRAVENOUS at 18:26

## 2024-08-02 RX ADMIN — HYDROMORPHONE HYDROCHLORIDE 1 MG: 1 INJECTION, SOLUTION INTRAMUSCULAR; INTRAVENOUS; SUBCUTANEOUS at 18:28

## 2024-08-02 RX ADMIN — HYDROMORPHONE HYDROCHLORIDE 1 MG: 1 INJECTION, SOLUTION INTRAMUSCULAR; INTRAVENOUS; SUBCUTANEOUS at 06:16

## 2024-08-02 ASSESSMENT — PAIN DESCRIPTION - LOCATION
LOCATION: ABDOMEN
LOCATION: ABDOMEN;BACK
LOCATION: BACK
LOCATION: BACK;LEG;ABDOMEN
LOCATION: BACK;HIP
LOCATION: BACK

## 2024-08-02 ASSESSMENT — PAIN DESCRIPTION - DESCRIPTORS
DESCRIPTORS: ACHING

## 2024-08-02 ASSESSMENT — PAIN SCALES - GENERAL
PAINLEVEL_OUTOF10: 8
PAINLEVEL_OUTOF10: 7
PAINLEVEL_OUTOF10: 8
PAINLEVEL_OUTOF10: 7
PAINLEVEL_OUTOF10: 8
PAINLEVEL_OUTOF10: 0
PAINLEVEL_OUTOF10: 0
PAINLEVEL_OUTOF10: 8

## 2024-08-02 ASSESSMENT — PAIN DESCRIPTION - ORIENTATION
ORIENTATION: UPPER
ORIENTATION: RIGHT
ORIENTATION: LOWER;MID
ORIENTATION: RIGHT

## 2024-08-02 ASSESSMENT — PAIN SCALES - WONG BAKER
WONGBAKER_NUMERICALRESPONSE: NO HURT
WONGBAKER_NUMERICALRESPONSE: NO HURT

## 2024-08-02 NOTE — H&P
General surgery history and Physical    Patient: Maddie Goldberg  MRN: [id number]    YOB: 1960  Age: 63 y.o.  Sex: female     Chief Complaint:  Chief Complaint   Patient presents with    Abdominal Pain     lower    Leg Pain       History of Present Illness: Maddie Goldberg is a 63 y.o. very pleasant woman presents to the emergency room right-sided rib pain and right leg pain.  Patient had extensive surgery last a few months with right colectomy for cecal volvulus followed by redo laparotomy for anastomotic leak and sigmoid colon resection for sigmoid colon mass and placement of the terminal ileostomy and the descending colon mucous fistula placement.  Patient currently have midabdomen wound as well.  Wound VAC was discontinued over 3 days ago.  Patient denies any fever.  Denies any chest pain shortness of breath.  Patient complains of right leg pain as well and swelling.    Social History:  Social Connections: Not on file       Past Medical History:  Past Medical History:   Diagnosis Date    Arthritis     left knee    GERD (gastroesophageal reflux disease)     Hypertension     Morbid obesity (HCC)     Thyroid disease      Surgical History:  Past Surgical History:   Procedure Laterality Date    BLADDER SUSPENSION      CHEST SURGERY  1/7/14    RESECTION OF REMAINING THYROID    GYN      uterine ablation about 10 years ago no menses since then    HEENT      thyroidectomy    HEMICOLECTOMY N/A 6/2/2024    EXPLORATORY LAPAROTOMY; RIGHT COLON RESECTION performed by Kylah Rice MD at [facility] MAIN OR    LAPAROTOMY N/A 6/8/2024    EXPLORATORY LAPAROTOMY, PARTIAL COLON RESECTION performed by Tommie Pineda MD at [facility] MAIN OR    LAPAROTOMY N/A 6/9/2024    EXPLORATORY LAPAROTOMY WITH ILEOSTOMY AND WOUND VAC PLACEMENT performed by Tommie Pineda MD at [facility] MAIN OR    ORTHOPEDIC SURGERY      left knee arthroscopy    ORTHOPEDIC SURGERY      left knee meniscus repair    TONSILLECTOMY         Allergies:  No Known

## 2024-08-02 NOTE — CARE COORDINATION
08/02/24 1039   Service Assessment   Patient Orientation Alert and Oriented   Cognition Alert   History Provided By Patient   Primary Caregiver Family   Support Systems Children;Family Members   Patient's Healthcare Decision Maker is: Legal Next of Kin   PCP Verified by CM No   Prior Functional Level Independent in ADLs/IADLs   Current Functional Level Independent in ADLs/IADLs   Can patient return to prior living arrangement Unknown at present   Ability to make needs known: Good   Family able to assist with home care needs: Yes   Would you like for me to discuss the discharge plan with any other family members/significant others, and if so, who? Yes  (SON)     Advance Care Planning     General Advance Care Planning (ACP) Conversation    Date of Conversation: 8/2/2024  Conducted with: Patient with Decision Making Capacity  Other persons present: None    Healthcare Decision Maker:   Primary Decision Maker: Shekhar Goldberg - Child - [phone]     Today we documented Decision Maker(s) consistent with Legal Next of Kin hierarchy.    Length of Voluntary ACP Conversation in minutes:  <16 minutes (Non-Billable)    NEGRITO Moe      CM met w/ pt at bedside to discuss dc planning. Pt lives alone and is mod indep in ADLs. She uses a rollator, is currently followed by [facility] for SN and PT, pt indicates that she was recently dc from [facility]. Also, pt indicates removal of a wound vac 2 days ago, and recent ostomy. Pt indicates she has no PCP, uses [facility] and would like M2B. CM team will follow for dispo.

## 2024-08-02 NOTE — ED NOTES
..ED TO INPATIENT SBAR HANDOFF    Patient Name: Maddie Goldberg   Preferred Name: Maddie  : 1960  63 y.o.   Family/Caregiver Present: no   Code Status Order: Prior  PO Status: NPO:Yes  Telemetry Order:   C-SSRS: Risk of Suicide: No Risk  Sitter no   Restraints:   Sepsis Risk Score     Situation  Chief Complaint   Patient presents with    Abdominal Pain     lower    Leg Pain     Brief Description of Patient's Condition: Right lower abd pain-Mesenteric vein thrombosis  Mental Status: oriented, alert, coherent, logical, thought processes intact, and able to concentrate and follow conversation  Arrived from:Home  Imaging:   XR CHEST PORTABLE   Final Result   No Acute Disease.         Electronically signed by SHARMILA JAIN      CT ABDOMEN PELVIS W IV CONTRAST Additional Contrast? 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      Postprocedural changes related to bilateral ostomies right lower quadrant and   left lower quadrant.      Trace right-sided effusion with right basilar atelectasis.   Cholelithiasis.         The findings were called to Dr. Foster on 2024 at 7:10 p.m. by Dr. Pearl.    789   Incidental and/or nonemergent findings are as described above.         Electronically signed by EVONNE PEARL        Abnormal labs:   Abnormal Labs Reviewed   CBC WITH AUTO DIFFERENTIAL - Abnormal; Notable for the following components:       Result Value    WBC 14.0 (*)     RBC 3.38 (*)     Hemoglobin 9.5 (*)     Hematocrit 29.2 (*)     RDW 18.0 (*)     Platelets 646 (*)     Immature Granulocytes % 1 (*)     Neutrophils Absolute 9.7 (*)     Monocytes Absolute 1.3 (*)     Immature Granulocytes Absolute 0.1 (*)     All other components within normal limits   COMPREHENSIVE METABOLIC PANEL - Abnormal; Notable for the following components:    Glucose 119 (*)     BUN/Creatinine Ratio 11 (*)     Alk Phosphatase 168 (*)

## 2024-08-03 LAB
ALBUMIN SERPL-MCNC: 1.7 G/DL (ref 3.5–5)
ALBUMIN/GLOB SERPL: 0.4 (ref 1.1–2.2)
ALP SERPL-CCNC: 150 U/L (ref 45–117)
ALT SERPL-CCNC: 32 U/L (ref 12–78)
ANION GAP SERPL CALC-SCNC: 8 MMOL/L (ref 5–15)
AST SERPL W P-5'-P-CCNC: 26 U/L (ref 15–37)
BASOPHILS # BLD: 0.1 K/UL (ref 0–0.1)
BASOPHILS NFR BLD: 1 % (ref 0–1)
BILIRUB SERPL-MCNC: 0.4 MG/DL (ref 0.2–1)
BUN SERPL-MCNC: 5 MG/DL (ref 6–20)
BUN/CREAT SERPL: 8 (ref 12–20)
CA-I BLD-MCNC: 8.1 MG/DL (ref 8.5–10.1)
CHLORIDE SERPL-SCNC: 109 MMOL/L (ref 97–108)
CO2 SERPL-SCNC: 23 MMOL/L (ref 21–32)
CREAT SERPL-MCNC: 0.63 MG/DL (ref 0.55–1.02)
DIFFERENTIAL METHOD BLD: ABNORMAL
ECHO BSA: 2.41 M2
EOSINOPHIL # BLD: 0.3 K/UL (ref 0–0.4)
EOSINOPHIL NFR BLD: 4 % (ref 0–7)
ERYTHROCYTE [DISTWIDTH] IN BLOOD BY AUTOMATED COUNT: 18.4 % (ref 11.5–14.5)
GLOBULIN SER CALC-MCNC: 4.5 G/DL (ref 2–4)
GLUCOSE SERPL-MCNC: 102 MG/DL (ref 65–100)
HCT VFR BLD AUTO: 26.7 % (ref 35–47)
HGB BLD-MCNC: 8 G/DL (ref 11.5–16)
IMM GRANULOCYTES # BLD AUTO: 0.1 K/UL (ref 0–0.04)
IMM GRANULOCYTES NFR BLD AUTO: 1 % (ref 0–0.5)
LYMPHOCYTES # BLD: 2.3 K/UL (ref 0.8–3.5)
LYMPHOCYTES NFR BLD: 25 % (ref 12–49)
MCH RBC QN AUTO: 27 PG (ref 26–34)
MCHC RBC AUTO-ENTMCNC: 30 G/DL (ref 30–36.5)
MCV RBC AUTO: 90.2 FL (ref 80–99)
MONOCYTES # BLD: 0.8 K/UL (ref 0–1)
MONOCYTES NFR BLD: 9 % (ref 5–13)
NEUTS SEG # BLD: 5.8 K/UL (ref 1.8–8)
NEUTS SEG NFR BLD: 60 % (ref 32–75)
NRBC # BLD: 0 K/UL (ref 0–0.01)
NRBC BLD-RTO: 0 PER 100 WBC
PLATELET # BLD AUTO: 527 K/UL (ref 150–400)
PMV BLD AUTO: 10.2 FL (ref 8.9–12.9)
POTASSIUM SERPL-SCNC: 3.3 MMOL/L (ref 3.5–5.1)
PROT SERPL-MCNC: 6.2 G/DL (ref 6.4–8.2)
RBC # BLD AUTO: 2.96 M/UL (ref 3.8–5.2)
SODIUM SERPL-SCNC: 140 MMOL/L (ref 136–145)
UFH PPP CHRO-ACNC: 0.16 IU/ML
UFH PPP CHRO-ACNC: 0.56 IU/ML
WBC # BLD AUTO: 9.4 K/UL (ref 3.6–11)

## 2024-08-03 PROCEDURE — 99232 SBSQ HOSP IP/OBS MODERATE 35: CPT | Performed by: SURGERY

## 2024-08-03 PROCEDURE — 97116 GAIT TRAINING THERAPY: CPT

## 2024-08-03 PROCEDURE — 6360000002 HC RX W HCPCS: Performed by: SURGERY

## 2024-08-03 PROCEDURE — 36415 COLL VENOUS BLD VENIPUNCTURE: CPT

## 2024-08-03 PROCEDURE — 80053 COMPREHEN METABOLIC PANEL: CPT

## 2024-08-03 PROCEDURE — 2580000003 HC RX 258: Performed by: SURGERY

## 2024-08-03 PROCEDURE — 97162 PT EVAL MOD COMPLEX 30 MIN: CPT

## 2024-08-03 PROCEDURE — 6370000000 HC RX 637 (ALT 250 FOR IP): Performed by: SURGERY

## 2024-08-03 PROCEDURE — 85520 HEPARIN ASSAY: CPT

## 2024-08-03 PROCEDURE — 94761 N-INVAS EAR/PLS OXIMETRY MLT: CPT

## 2024-08-03 PROCEDURE — 1100000000 HC RM PRIVATE

## 2024-08-03 PROCEDURE — 93970 EXTREMITY STUDY: CPT | Performed by: SURGERY

## 2024-08-03 PROCEDURE — 85025 COMPLETE CBC W/AUTO DIFF WBC: CPT

## 2024-08-03 PROCEDURE — 97530 THERAPEUTIC ACTIVITIES: CPT

## 2024-08-03 PROCEDURE — 6360000002 HC RX W HCPCS

## 2024-08-03 RX ADMIN — IPRATROPIUM BROMIDE AND ALBUTEROL SULFATE 1 DOSE: 2.5; .5 SOLUTION RESPIRATORY (INHALATION) at 06:29

## 2024-08-03 RX ADMIN — PIPERACILLIN AND TAZOBACTAM 4500 MG: 4; .5 INJECTION, POWDER, LYOPHILIZED, FOR SOLUTION INTRAVENOUS at 21:20

## 2024-08-03 RX ADMIN — HYDROMORPHONE HYDROCHLORIDE 1 MG: 1 INJECTION, SOLUTION INTRAMUSCULAR; INTRAVENOUS; SUBCUTANEOUS at 16:44

## 2024-08-03 RX ADMIN — ATORVASTATIN CALCIUM 20 MG: 20 TABLET, FILM COATED ORAL at 08:29

## 2024-08-03 RX ADMIN — SODIUM CHLORIDE: 9 INJECTION, SOLUTION INTRAVENOUS at 08:18

## 2024-08-03 RX ADMIN — HEPARIN SODIUM 23 UNITS/KG/HR: 10000 INJECTION, SOLUTION INTRAVENOUS at 16:44

## 2024-08-03 RX ADMIN — SODIUM CHLORIDE, PRESERVATIVE FREE 10 ML: 5 INJECTION INTRAVENOUS at 08:29

## 2024-08-03 RX ADMIN — GABAPENTIN 300 MG: 300 CAPSULE ORAL at 21:20

## 2024-08-03 RX ADMIN — PIPERACILLIN AND TAZOBACTAM 4500 MG: 4; .5 INJECTION, POWDER, LYOPHILIZED, FOR SOLUTION INTRAVENOUS at 13:16

## 2024-08-03 RX ADMIN — HYDROMORPHONE HYDROCHLORIDE 1 MG: 1 INJECTION, SOLUTION INTRAMUSCULAR; INTRAVENOUS; SUBCUTANEOUS at 08:28

## 2024-08-03 RX ADMIN — HEPARIN SODIUM 4900 UNITS: 1000 INJECTION INTRAVENOUS; SUBCUTANEOUS at 04:06

## 2024-08-03 RX ADMIN — HEPARIN SODIUM 23 UNITS/KG/HR: 10000 INJECTION, SOLUTION INTRAVENOUS at 05:58

## 2024-08-03 RX ADMIN — POTASSIUM BICARBONATE 40 MEQ: 782 TABLET, EFFERVESCENT ORAL at 04:16

## 2024-08-03 RX ADMIN — LEVOTHYROXINE SODIUM 150 MCG: 0.07 TABLET ORAL at 05:49

## 2024-08-03 RX ADMIN — PIPERACILLIN AND TAZOBACTAM 4500 MG: 4; .5 INJECTION, POWDER, LYOPHILIZED, FOR SOLUTION INTRAVENOUS at 05:49

## 2024-08-03 RX ADMIN — HYDROMORPHONE HYDROCHLORIDE 1 MG: 1 INJECTION, SOLUTION INTRAMUSCULAR; INTRAVENOUS; SUBCUTANEOUS at 21:20

## 2024-08-03 RX ADMIN — HYDROMORPHONE HYDROCHLORIDE 1 MG: 1 INJECTION, SOLUTION INTRAMUSCULAR; INTRAVENOUS; SUBCUTANEOUS at 04:20

## 2024-08-03 RX ADMIN — SODIUM CHLORIDE, PRESERVATIVE FREE 10 ML: 5 INJECTION INTRAVENOUS at 21:20

## 2024-08-03 RX ADMIN — HYDROMORPHONE HYDROCHLORIDE 1 MG: 1 INJECTION, SOLUTION INTRAMUSCULAR; INTRAVENOUS; SUBCUTANEOUS at 12:41

## 2024-08-03 RX ADMIN — HEPARIN SODIUM 23 UNITS/KG/HR: 10000 INJECTION, SOLUTION INTRAVENOUS at 04:04

## 2024-08-03 ASSESSMENT — PAIN SCALES - GENERAL
PAINLEVEL_OUTOF10: 8
PAINLEVEL_OUTOF10: 3
PAINLEVEL_OUTOF10: 8
PAINLEVEL_OUTOF10: 0
PAINLEVEL_OUTOF10: 8

## 2024-08-03 ASSESSMENT — PAIN DESCRIPTION - ORIENTATION
ORIENTATION: RIGHT

## 2024-08-03 ASSESSMENT — PAIN DESCRIPTION - LOCATION
LOCATION: ABDOMEN

## 2024-08-03 ASSESSMENT — PAIN SCALES - WONG BAKER: WONGBAKER_NUMERICALRESPONSE: NO HURT

## 2024-08-03 ASSESSMENT — PAIN DESCRIPTION - DESCRIPTORS
DESCRIPTORS: ACHING
DESCRIPTORS: ACHING
DESCRIPTORS: DISCOMFORT
DESCRIPTORS: DISCOMFORT

## 2024-08-04 LAB — UFH PPP CHRO-ACNC: 0.52 IU/ML

## 2024-08-04 PROCEDURE — 1100000000 HC RM PRIVATE

## 2024-08-04 PROCEDURE — 36415 COLL VENOUS BLD VENIPUNCTURE: CPT

## 2024-08-04 PROCEDURE — 6360000002 HC RX W HCPCS: Performed by: SURGERY

## 2024-08-04 PROCEDURE — 6360000002 HC RX W HCPCS

## 2024-08-04 PROCEDURE — 6370000000 HC RX 637 (ALT 250 FOR IP): Performed by: SURGERY

## 2024-08-04 PROCEDURE — 97530 THERAPEUTIC ACTIVITIES: CPT

## 2024-08-04 PROCEDURE — 99232 SBSQ HOSP IP/OBS MODERATE 35: CPT | Performed by: SURGERY

## 2024-08-04 PROCEDURE — 85520 HEPARIN ASSAY: CPT

## 2024-08-04 PROCEDURE — 94761 N-INVAS EAR/PLS OXIMETRY MLT: CPT

## 2024-08-04 PROCEDURE — 2580000003 HC RX 258: Performed by: SURGERY

## 2024-08-04 RX ADMIN — LEVOTHYROXINE SODIUM 150 MCG: 0.07 TABLET ORAL at 05:17

## 2024-08-04 RX ADMIN — APIXABAN 10 MG: 5 TABLET, FILM COATED ORAL at 21:35

## 2024-08-04 RX ADMIN — PIPERACILLIN AND TAZOBACTAM 4500 MG: 4; .5 INJECTION, POWDER, LYOPHILIZED, FOR SOLUTION INTRAVENOUS at 05:18

## 2024-08-04 RX ADMIN — GABAPENTIN 300 MG: 300 CAPSULE ORAL at 21:34

## 2024-08-04 RX ADMIN — HYDROMORPHONE HYDROCHLORIDE 1 MG: 1 INJECTION, SOLUTION INTRAMUSCULAR; INTRAVENOUS; SUBCUTANEOUS at 14:09

## 2024-08-04 RX ADMIN — HYDROMORPHONE HYDROCHLORIDE 1 MG: 1 INJECTION, SOLUTION INTRAMUSCULAR; INTRAVENOUS; SUBCUTANEOUS at 01:16

## 2024-08-04 RX ADMIN — SODIUM CHLORIDE, PRESERVATIVE FREE 10 ML: 5 INJECTION INTRAVENOUS at 21:42

## 2024-08-04 RX ADMIN — HYDROMORPHONE HYDROCHLORIDE 1 MG: 1 INJECTION, SOLUTION INTRAMUSCULAR; INTRAVENOUS; SUBCUTANEOUS at 18:30

## 2024-08-04 RX ADMIN — HEPARIN SODIUM 23 UNITS/KG/HR: 10000 INJECTION, SOLUTION INTRAVENOUS at 01:26

## 2024-08-04 RX ADMIN — HYDROMORPHONE HYDROCHLORIDE 1 MG: 1 INJECTION, SOLUTION INTRAMUSCULAR; INTRAVENOUS; SUBCUTANEOUS at 22:34

## 2024-08-04 RX ADMIN — PIPERACILLIN AND TAZOBACTAM 4500 MG: 4; .5 INJECTION, POWDER, LYOPHILIZED, FOR SOLUTION INTRAVENOUS at 21:36

## 2024-08-04 RX ADMIN — APIXABAN 10 MG: 5 TABLET, FILM COATED ORAL at 10:58

## 2024-08-04 RX ADMIN — ATORVASTATIN CALCIUM 20 MG: 20 TABLET, FILM COATED ORAL at 09:35

## 2024-08-04 RX ADMIN — HYDROMORPHONE HYDROCHLORIDE 1 MG: 1 INJECTION, SOLUTION INTRAMUSCULAR; INTRAVENOUS; SUBCUTANEOUS at 05:17

## 2024-08-04 RX ADMIN — SODIUM CHLORIDE, PRESERVATIVE FREE 10 ML: 5 INJECTION INTRAVENOUS at 09:36

## 2024-08-04 RX ADMIN — HYDROMORPHONE HYDROCHLORIDE 1 MG: 1 INJECTION, SOLUTION INTRAMUSCULAR; INTRAVENOUS; SUBCUTANEOUS at 09:34

## 2024-08-04 RX ADMIN — PIPERACILLIN AND TAZOBACTAM 4500 MG: 4; .5 INJECTION, POWDER, LYOPHILIZED, FOR SOLUTION INTRAVENOUS at 14:09

## 2024-08-04 ASSESSMENT — PAIN SCALES - GENERAL
PAINLEVEL_OUTOF10: 8
PAINLEVEL_OUTOF10: 5
PAINLEVEL_OUTOF10: 5
PAINLEVEL_OUTOF10: 7
PAINLEVEL_OUTOF10: 8
PAINLEVEL_OUTOF10: 6
PAINLEVEL_OUTOF10: 7
PAINLEVEL_OUTOF10: 0
PAINLEVEL_OUTOF10: 5
PAINLEVEL_OUTOF10: 4
PAINLEVEL_OUTOF10: 5
PAINLEVEL_OUTOF10: 7

## 2024-08-04 ASSESSMENT — PAIN DESCRIPTION - DESCRIPTORS
DESCRIPTORS: SHARP
DESCRIPTORS: ACHING
DESCRIPTORS: SHARP
DESCRIPTORS: SHARP
DESCRIPTORS: BURNING;SHARP;STABBING

## 2024-08-04 ASSESSMENT — PAIN DESCRIPTION - ORIENTATION
ORIENTATION: RIGHT
ORIENTATION: MID;RIGHT
ORIENTATION: RIGHT;MID
ORIENTATION: MID;RIGHT
ORIENTATION: MID

## 2024-08-04 ASSESSMENT — PAIN DESCRIPTION - LOCATION
LOCATION: ABDOMEN
LOCATION: ABDOMEN
LOCATION: ABDOMEN;OTHER (COMMENT)
LOCATION: ABDOMEN;LEG

## 2024-08-04 ASSESSMENT — PAIN - FUNCTIONAL ASSESSMENT
PAIN_FUNCTIONAL_ASSESSMENT: PREVENTS OR INTERFERES SOME ACTIVE ACTIVITIES AND ADLS
PAIN_FUNCTIONAL_ASSESSMENT: ACTIVITIES ARE NOT PREVENTED
PAIN_FUNCTIONAL_ASSESSMENT: PREVENTS OR INTERFERES SOME ACTIVE ACTIVITIES AND ADLS
PAIN_FUNCTIONAL_ASSESSMENT: ACTIVITIES ARE NOT PREVENTED
PAIN_FUNCTIONAL_ASSESSMENT: ACTIVITIES ARE NOT PREVENTED

## 2024-08-04 ASSESSMENT — PAIN SCALES - WONG BAKER: WONGBAKER_NUMERICALRESPONSE: NO HURT

## 2024-08-05 LAB
ALBUMIN SERPL-MCNC: 2 G/DL (ref 3.5–5)
ALBUMIN/GLOB SERPL: 0.4 (ref 1.1–2.2)
ALP SERPL-CCNC: 171 U/L (ref 45–117)
ALT SERPL-CCNC: 28 U/L (ref 12–78)
ANION GAP SERPL CALC-SCNC: 6 MMOL/L (ref 5–15)
AST SERPL W P-5'-P-CCNC: 22 U/L (ref 15–37)
BASOPHILS # BLD: 0.1 K/UL (ref 0–0.1)
BASOPHILS NFR BLD: 1 % (ref 0–1)
BILIRUB SERPL-MCNC: 0.3 MG/DL (ref 0.2–1)
BUN SERPL-MCNC: 4 MG/DL (ref 6–20)
BUN/CREAT SERPL: 6 (ref 12–20)
CA-I BLD-MCNC: 9 MG/DL (ref 8.5–10.1)
CHLORIDE SERPL-SCNC: 108 MMOL/L (ref 97–108)
CO2 SERPL-SCNC: 24 MMOL/L (ref 21–32)
CREAT SERPL-MCNC: 0.63 MG/DL (ref 0.55–1.02)
DIFFERENTIAL METHOD BLD: ABNORMAL
EOSINOPHIL # BLD: 0.2 K/UL (ref 0–0.4)
EOSINOPHIL NFR BLD: 2 % (ref 0–7)
ERYTHROCYTE [DISTWIDTH] IN BLOOD BY AUTOMATED COUNT: 17.8 % (ref 11.5–14.5)
GLOBULIN SER CALC-MCNC: 4.9 G/DL (ref 2–4)
GLUCOSE SERPL-MCNC: 109 MG/DL (ref 65–100)
HCT VFR BLD AUTO: 27.1 % (ref 35–47)
HGB BLD-MCNC: 8.5 G/DL (ref 11.5–16)
IMM GRANULOCYTES # BLD AUTO: 0.1 K/UL (ref 0–0.04)
IMM GRANULOCYTES NFR BLD AUTO: 1 % (ref 0–0.5)
LYMPHOCYTES # BLD: 1.8 K/UL (ref 0.8–3.5)
LYMPHOCYTES NFR BLD: 17 % (ref 12–49)
MCH RBC QN AUTO: 26.3 PG (ref 26–34)
MCHC RBC AUTO-ENTMCNC: 31.4 G/DL (ref 30–36.5)
MCV RBC AUTO: 83.9 FL (ref 80–99)
MONOCYTES # BLD: 0.7 K/UL (ref 0–1)
MONOCYTES NFR BLD: 7 % (ref 5–13)
NEUTS SEG # BLD: 7.6 K/UL (ref 1.8–8)
NEUTS SEG NFR BLD: 72 % (ref 32–75)
NRBC # BLD: 0 K/UL (ref 0–0.01)
NRBC BLD-RTO: 0 PER 100 WBC
PLATELET # BLD AUTO: 648 K/UL (ref 150–400)
PMV BLD AUTO: 10.3 FL (ref 8.9–12.9)
POTASSIUM SERPL-SCNC: 3.2 MMOL/L (ref 3.5–5.1)
PROT SERPL-MCNC: 6.9 G/DL (ref 6.4–8.2)
RBC # BLD AUTO: 3.23 M/UL (ref 3.8–5.2)
RBC MORPH BLD: ABNORMAL
SODIUM SERPL-SCNC: 138 MMOL/L (ref 136–145)
UFH PPP CHRO-ACNC: >1.1 IU/ML
WBC # BLD AUTO: 10.5 K/UL (ref 3.6–11)

## 2024-08-05 PROCEDURE — 80053 COMPREHEN METABOLIC PANEL: CPT

## 2024-08-05 PROCEDURE — 99232 SBSQ HOSP IP/OBS MODERATE 35: CPT | Performed by: SURGERY

## 2024-08-05 PROCEDURE — 6360000002 HC RX W HCPCS: Performed by: SURGERY

## 2024-08-05 PROCEDURE — 2580000003 HC RX 258: Performed by: SURGERY

## 2024-08-05 PROCEDURE — 85520 HEPARIN ASSAY: CPT

## 2024-08-05 PROCEDURE — 85025 COMPLETE CBC W/AUTO DIFF WBC: CPT

## 2024-08-05 PROCEDURE — 6370000000 HC RX 637 (ALT 250 FOR IP): Performed by: SURGERY

## 2024-08-05 PROCEDURE — 1100000000 HC RM PRIVATE

## 2024-08-05 PROCEDURE — 36415 COLL VENOUS BLD VENIPUNCTURE: CPT

## 2024-08-05 RX ORDER — KETOROLAC TROMETHAMINE 30 MG/ML
30 INJECTION, SOLUTION INTRAMUSCULAR; INTRAVENOUS EVERY 6 HOURS PRN
Status: DISPENSED | OUTPATIENT
Start: 2024-08-05 | End: 2024-08-10

## 2024-08-05 RX ADMIN — APIXABAN 10 MG: 5 TABLET, FILM COATED ORAL at 09:12

## 2024-08-05 RX ADMIN — HYDROMORPHONE HYDROCHLORIDE 1 MG: 1 INJECTION, SOLUTION INTRAMUSCULAR; INTRAVENOUS; SUBCUTANEOUS at 02:41

## 2024-08-05 RX ADMIN — ONDANSETRON 4 MG: 2 INJECTION INTRAMUSCULAR; INTRAVENOUS at 09:17

## 2024-08-05 RX ADMIN — LEVOTHYROXINE SODIUM 150 MCG: 0.07 TABLET ORAL at 06:11

## 2024-08-05 RX ADMIN — HYDROMORPHONE HYDROCHLORIDE 1 MG: 1 INJECTION, SOLUTION INTRAMUSCULAR; INTRAVENOUS; SUBCUTANEOUS at 11:54

## 2024-08-05 RX ADMIN — ONDANSETRON 4 MG: 2 INJECTION INTRAMUSCULAR; INTRAVENOUS at 02:46

## 2024-08-05 RX ADMIN — PIPERACILLIN AND TAZOBACTAM 4500 MG: 4; .5 INJECTION, POWDER, LYOPHILIZED, FOR SOLUTION INTRAVENOUS at 21:00

## 2024-08-05 RX ADMIN — SODIUM CHLORIDE, PRESERVATIVE FREE 10 ML: 5 INJECTION INTRAVENOUS at 09:17

## 2024-08-05 RX ADMIN — APIXABAN 10 MG: 5 TABLET, FILM COATED ORAL at 20:30

## 2024-08-05 RX ADMIN — ATORVASTATIN CALCIUM 20 MG: 20 TABLET, FILM COATED ORAL at 09:13

## 2024-08-05 RX ADMIN — ACETAMINOPHEN 650 MG: 325 TABLET ORAL at 09:12

## 2024-08-05 RX ADMIN — HYDROMORPHONE HYDROCHLORIDE 1 MG: 1 INJECTION, SOLUTION INTRAMUSCULAR; INTRAVENOUS; SUBCUTANEOUS at 17:33

## 2024-08-05 RX ADMIN — PIPERACILLIN AND TAZOBACTAM 4500 MG: 4; .5 INJECTION, POWDER, LYOPHILIZED, FOR SOLUTION INTRAVENOUS at 06:09

## 2024-08-05 RX ADMIN — HYDROMORPHONE HYDROCHLORIDE 1 MG: 1 INJECTION, SOLUTION INTRAMUSCULAR; INTRAVENOUS; SUBCUTANEOUS at 06:47

## 2024-08-05 RX ADMIN — KETOROLAC TROMETHAMINE 30 MG: 30 INJECTION, SOLUTION INTRAMUSCULAR; INTRAVENOUS at 20:26

## 2024-08-05 RX ADMIN — LISINOPRIL 5 MG: 10 TABLET ORAL at 09:12

## 2024-08-05 RX ADMIN — SODIUM CHLORIDE, PRESERVATIVE FREE 10 ML: 5 INJECTION INTRAVENOUS at 20:29

## 2024-08-05 RX ADMIN — KETOROLAC TROMETHAMINE 30 MG: 30 INJECTION, SOLUTION INTRAMUSCULAR; INTRAVENOUS at 14:47

## 2024-08-05 RX ADMIN — GABAPENTIN 300 MG: 300 CAPSULE ORAL at 20:30

## 2024-08-05 RX ADMIN — PIPERACILLIN AND TAZOBACTAM 4500 MG: 4; .5 INJECTION, POWDER, LYOPHILIZED, FOR SOLUTION INTRAVENOUS at 14:47

## 2024-08-05 ASSESSMENT — PAIN - FUNCTIONAL ASSESSMENT
PAIN_FUNCTIONAL_ASSESSMENT: ACTIVITIES ARE NOT PREVENTED

## 2024-08-05 ASSESSMENT — PAIN DESCRIPTION - DESCRIPTORS
DESCRIPTORS: ACHING

## 2024-08-05 ASSESSMENT — PAIN SCALES - GENERAL
PAINLEVEL_OUTOF10: 9
PAINLEVEL_OUTOF10: 8
PAINLEVEL_OUTOF10: 5
PAINLEVEL_OUTOF10: 7
PAINLEVEL_OUTOF10: 4
PAINLEVEL_OUTOF10: 8
PAINLEVEL_OUTOF10: 6
PAINLEVEL_OUTOF10: 8
PAINLEVEL_OUTOF10: 7
PAINLEVEL_OUTOF10: 10

## 2024-08-05 ASSESSMENT — PAIN DESCRIPTION - LOCATION
LOCATION: ABDOMEN

## 2024-08-05 ASSESSMENT — PAIN SCALES - WONG BAKER: WONGBAKER_NUMERICALRESPONSE: HURTS WHOLE LOT

## 2024-08-05 ASSESSMENT — PAIN DESCRIPTION - ORIENTATION
ORIENTATION: OTHER (COMMENT)
ORIENTATION: RIGHT
ORIENTATION: RIGHT
ORIENTATION: OTHER (COMMENT)

## 2024-08-05 ASSESSMENT — PAIN DESCRIPTION - FREQUENCY: FREQUENCY: CONTINUOUS

## 2024-08-05 ASSESSMENT — PAIN DESCRIPTION - PAIN TYPE: TYPE: SURGICAL PAIN

## 2024-08-05 NOTE — CARE COORDINATION
DC Plan: Disposition pending    PT is recommending SNF     Cm stopped by pt's room this afternoon. Pt indicated she was feeling sick and did not want to discuss dc planning at this time.

## 2024-08-06 ENCOUNTER — APPOINTMENT (OUTPATIENT)
Facility: HOSPITAL | Age: 64
DRG: 444 | End: 2024-08-06
Payer: COMMERCIAL

## 2024-08-06 PROCEDURE — 1100000000 HC RM PRIVATE

## 2024-08-06 PROCEDURE — 6360000004 HC RX CONTRAST MEDICATION: Performed by: SURGERY

## 2024-08-06 PROCEDURE — 94761 N-INVAS EAR/PLS OXIMETRY MLT: CPT

## 2024-08-06 PROCEDURE — 6360000002 HC RX W HCPCS: Performed by: SURGERY

## 2024-08-06 PROCEDURE — 99232 SBSQ HOSP IP/OBS MODERATE 35: CPT | Performed by: SURGERY

## 2024-08-06 PROCEDURE — 2580000003 HC RX 258: Performed by: SURGERY

## 2024-08-06 PROCEDURE — 74177 CT ABD & PELVIS W/CONTRAST: CPT

## 2024-08-06 PROCEDURE — 6370000000 HC RX 637 (ALT 250 FOR IP): Performed by: SURGERY

## 2024-08-06 RX ADMIN — KETOROLAC TROMETHAMINE 30 MG: 30 INJECTION, SOLUTION INTRAMUSCULAR; INTRAVENOUS at 11:05

## 2024-08-06 RX ADMIN — APIXABAN 10 MG: 5 TABLET, FILM COATED ORAL at 08:30

## 2024-08-06 RX ADMIN — GABAPENTIN 300 MG: 300 CAPSULE ORAL at 20:25

## 2024-08-06 RX ADMIN — HYDROMORPHONE HYDROCHLORIDE 1 MG: 1 INJECTION, SOLUTION INTRAMUSCULAR; INTRAVENOUS; SUBCUTANEOUS at 12:31

## 2024-08-06 RX ADMIN — POTASSIUM CHLORIDE 40 MEQ: 1500 TABLET, EXTENDED RELEASE ORAL at 12:31

## 2024-08-06 RX ADMIN — HYDROMORPHONE HYDROCHLORIDE 1 MG: 1 INJECTION, SOLUTION INTRAMUSCULAR; INTRAVENOUS; SUBCUTANEOUS at 03:25

## 2024-08-06 RX ADMIN — KETOROLAC TROMETHAMINE 30 MG: 30 INJECTION, SOLUTION INTRAMUSCULAR; INTRAVENOUS at 18:19

## 2024-08-06 RX ADMIN — SODIUM CHLORIDE, PRESERVATIVE FREE 10 ML: 5 INJECTION INTRAVENOUS at 08:31

## 2024-08-06 RX ADMIN — IOPAMIDOL 100 ML: 755 INJECTION, SOLUTION INTRAVENOUS at 09:38

## 2024-08-06 RX ADMIN — SODIUM CHLORIDE, PRESERVATIVE FREE 10 ML: 5 INJECTION INTRAVENOUS at 20:27

## 2024-08-06 RX ADMIN — PIPERACILLIN AND TAZOBACTAM 4500 MG: 4; .5 INJECTION, POWDER, LYOPHILIZED, FOR SOLUTION INTRAVENOUS at 13:19

## 2024-08-06 RX ADMIN — HYDROMORPHONE HYDROCHLORIDE 1 MG: 1 INJECTION, SOLUTION INTRAMUSCULAR; INTRAVENOUS; SUBCUTANEOUS at 20:24

## 2024-08-06 RX ADMIN — HYDROMORPHONE HYDROCHLORIDE 1 MG: 1 INJECTION, SOLUTION INTRAMUSCULAR; INTRAVENOUS; SUBCUTANEOUS at 16:09

## 2024-08-06 RX ADMIN — KETOROLAC TROMETHAMINE 30 MG: 30 INJECTION, SOLUTION INTRAMUSCULAR; INTRAVENOUS at 02:14

## 2024-08-06 RX ADMIN — PIPERACILLIN AND TAZOBACTAM 4500 MG: 4; .5 INJECTION, POWDER, LYOPHILIZED, FOR SOLUTION INTRAVENOUS at 04:13

## 2024-08-06 RX ADMIN — HYDROMORPHONE HYDROCHLORIDE 1 MG: 1 INJECTION, SOLUTION INTRAMUSCULAR; INTRAVENOUS; SUBCUTANEOUS at 08:31

## 2024-08-06 RX ADMIN — LISINOPRIL 5 MG: 10 TABLET ORAL at 08:30

## 2024-08-06 RX ADMIN — LEVOTHYROXINE SODIUM 150 MCG: 0.07 TABLET ORAL at 06:22

## 2024-08-06 RX ADMIN — APIXABAN 10 MG: 5 TABLET, FILM COATED ORAL at 20:25

## 2024-08-06 RX ADMIN — PIPERACILLIN AND TAZOBACTAM 4500 MG: 4; .5 INJECTION, POWDER, LYOPHILIZED, FOR SOLUTION INTRAVENOUS at 23:16

## 2024-08-06 RX ADMIN — ATORVASTATIN CALCIUM 20 MG: 20 TABLET, FILM COATED ORAL at 08:30

## 2024-08-06 ASSESSMENT — PAIN SCALES - GENERAL
PAINLEVEL_OUTOF10: 6
PAINLEVEL_OUTOF10: 8
PAINLEVEL_OUTOF10: 6
PAINLEVEL_OUTOF10: 8
PAINLEVEL_OUTOF10: 2
PAINLEVEL_OUTOF10: 8
PAINLEVEL_OUTOF10: 8
PAINLEVEL_OUTOF10: 7
PAINLEVEL_OUTOF10: 8
PAINLEVEL_OUTOF10: 9
PAINLEVEL_OUTOF10: 2

## 2024-08-06 ASSESSMENT — PAIN DESCRIPTION - DESCRIPTORS
DESCRIPTORS: ACHING;CRAMPING
DESCRIPTORS: CRAMPING;ACHING
DESCRIPTORS: ACHING
DESCRIPTORS: ACHING

## 2024-08-06 ASSESSMENT — PAIN DESCRIPTION - LOCATION
LOCATION: ABDOMEN

## 2024-08-06 ASSESSMENT — PAIN DESCRIPTION - ORIENTATION
ORIENTATION: OTHER (COMMENT)
ORIENTATION: OTHER (COMMENT)

## 2024-08-06 ASSESSMENT — PAIN DESCRIPTION - PAIN TYPE: TYPE: SURGICAL PAIN

## 2024-08-06 ASSESSMENT — PAIN DESCRIPTION - FREQUENCY: FREQUENCY: CONTINUOUS

## 2024-08-06 NOTE — CARE COORDINATION
DC Plan: Home with [facility] ([facility])    CM met with pt at the bedside. PT is recommending SNF. Cm discussed and educated SNF. CM presented SNF choice list. Pt reports she's been to [facility] and a facility in [place]. Cm discussed and educated IRF vs SNF. Cm explained to pt PT is recommending a lower level of care - SNF. Pt reports she does not want to stay anywhere for rehab. Pt indicated she wanted to go home and resume home health services. CM left SNF list in pt's room in case she changes her mind. Cm will continue to follow.

## 2024-08-07 LAB
ALBUMIN SERPL-MCNC: 1.8 G/DL (ref 3.5–5)
ALBUMIN/GLOB SERPL: 0.4 (ref 1.1–2.2)
ALP SERPL-CCNC: 164 U/L (ref 45–117)
ALT SERPL-CCNC: 21 U/L (ref 12–78)
ANION GAP SERPL CALC-SCNC: 5 MMOL/L (ref 5–15)
APTT PPP: 128 SEC (ref 21.2–34.1)
APTT PPP: 37.7 SEC (ref 21.2–34.1)
APTT PPP: 50.5 SEC (ref 21.2–34.1)
AST SERPL W P-5'-P-CCNC: 15 U/L (ref 15–37)
BASOPHILS # BLD: 0.1 K/UL (ref 0–0.1)
BASOPHILS NFR BLD: 0 % (ref 0–1)
BILIRUB SERPL-MCNC: 0.3 MG/DL (ref 0.2–1)
BUN SERPL-MCNC: 10 MG/DL (ref 6–20)
BUN/CREAT SERPL: 16 (ref 12–20)
CA-I BLD-MCNC: 8.5 MG/DL (ref 8.5–10.1)
CHLORIDE SERPL-SCNC: 108 MMOL/L (ref 97–108)
CO2 SERPL-SCNC: 24 MMOL/L (ref 21–32)
CREAT SERPL-MCNC: 0.61 MG/DL (ref 0.55–1.02)
DIFFERENTIAL METHOD BLD: ABNORMAL
EOSINOPHIL # BLD: 0.3 K/UL (ref 0–0.4)
EOSINOPHIL NFR BLD: 3 % (ref 0–7)
ERYTHROCYTE [DISTWIDTH] IN BLOOD BY AUTOMATED COUNT: 18.2 % (ref 11.5–14.5)
ERYTHROCYTE [DISTWIDTH] IN BLOOD BY AUTOMATED COUNT: 18.3 % (ref 11.5–14.5)
GLOBULIN SER CALC-MCNC: 4.7 G/DL (ref 2–4)
GLUCOSE SERPL-MCNC: 119 MG/DL (ref 65–100)
HCT VFR BLD AUTO: 25.6 % (ref 35–47)
HCT VFR BLD AUTO: 26.7 % (ref 35–47)
HGB BLD-MCNC: 7.9 G/DL (ref 11.5–16)
HGB BLD-MCNC: 8.3 G/DL (ref 11.5–16)
IMM GRANULOCYTES # BLD AUTO: 0.1 K/UL (ref 0–0.04)
IMM GRANULOCYTES NFR BLD AUTO: 1 % (ref 0–0.5)
INR PPP: 1.6 (ref 0.9–1.1)
LYMPHOCYTES # BLD: 2.2 K/UL (ref 0.8–3.5)
LYMPHOCYTES NFR BLD: 20 % (ref 12–49)
MCH RBC QN AUTO: 26 PG (ref 26–34)
MCH RBC QN AUTO: 26.2 PG (ref 26–34)
MCHC RBC AUTO-ENTMCNC: 30.9 G/DL (ref 30–36.5)
MCHC RBC AUTO-ENTMCNC: 31.1 G/DL (ref 30–36.5)
MCV RBC AUTO: 83.7 FL (ref 80–99)
MCV RBC AUTO: 85 FL (ref 80–99)
MONOCYTES # BLD: 1 K/UL (ref 0–1)
MONOCYTES NFR BLD: 9 % (ref 5–13)
NEUTS SEG # BLD: 7.7 K/UL (ref 1.8–8)
NEUTS SEG NFR BLD: 67 % (ref 32–75)
NRBC # BLD: 0 K/UL (ref 0–0.01)
NRBC # BLD: 0 K/UL (ref 0–0.01)
NRBC BLD-RTO: 0 PER 100 WBC
NRBC BLD-RTO: 0 PER 100 WBC
PLATELET # BLD AUTO: 602 K/UL (ref 150–400)
PLATELET # BLD AUTO: 654 K/UL (ref 150–400)
PMV BLD AUTO: 10 FL (ref 8.9–12.9)
PMV BLD AUTO: 9.9 FL (ref 8.9–12.9)
POTASSIUM SERPL-SCNC: 3.4 MMOL/L (ref 3.5–5.1)
PROT SERPL-MCNC: 6.5 G/DL (ref 6.4–8.2)
PROTHROMBIN TIME: 19.8 SEC (ref 11.9–14.6)
RBC # BLD AUTO: 3.01 M/UL (ref 3.8–5.2)
RBC # BLD AUTO: 3.19 M/UL (ref 3.8–5.2)
SODIUM SERPL-SCNC: 137 MMOL/L (ref 136–145)
THERAPEUTIC RANGE: ABNORMAL SEC (ref 82–109)
WBC # BLD AUTO: 11.4 K/UL (ref 3.6–11)
WBC # BLD AUTO: 12 K/UL (ref 3.6–11)

## 2024-08-07 PROCEDURE — 85610 PROTHROMBIN TIME: CPT

## 2024-08-07 PROCEDURE — 0F9430Z DRAINAGE OF GALLBLADDER WITH DRAINAGE DEVICE, PERCUTANEOUS APPROACH: ICD-10-PCS | Performed by: SURGERY

## 2024-08-07 PROCEDURE — 94761 N-INVAS EAR/PLS OXIMETRY MLT: CPT

## 2024-08-07 PROCEDURE — 85027 COMPLETE CBC AUTOMATED: CPT

## 2024-08-07 PROCEDURE — 97116 GAIT TRAINING THERAPY: CPT

## 2024-08-07 PROCEDURE — 2500000003 HC RX 250 WO HCPCS: Performed by: SURGERY

## 2024-08-07 PROCEDURE — 6360000002 HC RX W HCPCS: Performed by: SURGERY

## 2024-08-07 PROCEDURE — 2580000003 HC RX 258: Performed by: SURGERY

## 2024-08-07 PROCEDURE — 85730 THROMBOPLASTIN TIME PARTIAL: CPT

## 2024-08-07 PROCEDURE — 36415 COLL VENOUS BLD VENIPUNCTURE: CPT

## 2024-08-07 PROCEDURE — 6370000000 HC RX 637 (ALT 250 FOR IP): Performed by: SURGERY

## 2024-08-07 PROCEDURE — 1100000000 HC RM PRIVATE

## 2024-08-07 PROCEDURE — 85025 COMPLETE CBC W/AUTO DIFF WBC: CPT

## 2024-08-07 PROCEDURE — 80053 COMPREHEN METABOLIC PANEL: CPT

## 2024-08-07 PROCEDURE — 99232 SBSQ HOSP IP/OBS MODERATE 35: CPT | Performed by: SURGERY

## 2024-08-07 RX ORDER — HEPARIN SODIUM 1000 [USP'U]/ML
80 INJECTION, SOLUTION INTRAVENOUS; SUBCUTANEOUS PRN
Status: DISCONTINUED | OUTPATIENT
Start: 2024-08-07 | End: 2024-08-08

## 2024-08-07 RX ORDER — HEPARIN SODIUM 10000 [USP'U]/100ML
5-30 INJECTION, SOLUTION INTRAVENOUS CONTINUOUS
Status: DISCONTINUED | OUTPATIENT
Start: 2024-08-07 | End: 2024-08-08

## 2024-08-07 RX ORDER — HEPARIN SODIUM 1000 [USP'U]/ML
40 INJECTION, SOLUTION INTRAVENOUS; SUBCUTANEOUS PRN
Status: DISCONTINUED | OUTPATIENT
Start: 2024-08-07 | End: 2024-08-08

## 2024-08-07 RX ORDER — HEPARIN SODIUM 10000 [USP'U]/100ML
5-30 INJECTION, SOLUTION INTRAVENOUS CONTINUOUS
Status: DISCONTINUED | OUTPATIENT
Start: 2024-08-07 | End: 2024-08-07

## 2024-08-07 RX ORDER — HEPARIN SODIUM 1000 [USP'U]/ML
80 INJECTION, SOLUTION INTRAVENOUS; SUBCUTANEOUS ONCE
Status: COMPLETED | OUTPATIENT
Start: 2024-08-07 | End: 2024-08-07

## 2024-08-07 RX ADMIN — GABAPENTIN 300 MG: 300 CAPSULE ORAL at 20:36

## 2024-08-07 RX ADMIN — KETOROLAC TROMETHAMINE 30 MG: 30 INJECTION, SOLUTION INTRAMUSCULAR; INTRAVENOUS at 10:46

## 2024-08-07 RX ADMIN — HEPARIN SODIUM 17 UNITS/KG/HR: 10000 INJECTION, SOLUTION INTRAVENOUS at 10:40

## 2024-08-07 RX ADMIN — HEPARIN SODIUM 9700 UNITS: 1000 INJECTION INTRAVENOUS; SUBCUTANEOUS at 10:39

## 2024-08-07 RX ADMIN — HYDROMORPHONE HYDROCHLORIDE 1 MG: 1 INJECTION, SOLUTION INTRAMUSCULAR; INTRAVENOUS; SUBCUTANEOUS at 08:46

## 2024-08-07 RX ADMIN — HYDROMORPHONE HYDROCHLORIDE 1 MG: 1 INJECTION, SOLUTION INTRAMUSCULAR; INTRAVENOUS; SUBCUTANEOUS at 21:45

## 2024-08-07 RX ADMIN — SODIUM CHLORIDE, PRESERVATIVE FREE 10 ML: 5 INJECTION INTRAVENOUS at 20:37

## 2024-08-07 RX ADMIN — HYDROMORPHONE HYDROCHLORIDE 1 MG: 1 INJECTION, SOLUTION INTRAMUSCULAR; INTRAVENOUS; SUBCUTANEOUS at 00:45

## 2024-08-07 RX ADMIN — HYDROMORPHONE HYDROCHLORIDE 1 MG: 1 INJECTION, SOLUTION INTRAMUSCULAR; INTRAVENOUS; SUBCUTANEOUS at 17:20

## 2024-08-07 RX ADMIN — HEPARIN SODIUM 17 UNITS/KG/HR: 10000 INJECTION, SOLUTION INTRAVENOUS at 23:51

## 2024-08-07 RX ADMIN — LISINOPRIL 5 MG: 10 TABLET ORAL at 08:46

## 2024-08-07 RX ADMIN — POTASSIUM CHLORIDE 40 MEQ: 1500 TABLET, EXTENDED RELEASE ORAL at 10:39

## 2024-08-07 RX ADMIN — ATORVASTATIN CALCIUM 20 MG: 20 TABLET, FILM COATED ORAL at 08:46

## 2024-08-07 RX ADMIN — LEVOTHYROXINE SODIUM 150 MCG: 0.07 TABLET ORAL at 05:25

## 2024-08-07 RX ADMIN — HYDROMORPHONE HYDROCHLORIDE 1 MG: 1 INJECTION, SOLUTION INTRAMUSCULAR; INTRAVENOUS; SUBCUTANEOUS at 13:06

## 2024-08-07 RX ADMIN — SODIUM CHLORIDE, PRESERVATIVE FREE 10 ML: 5 INJECTION INTRAVENOUS at 08:48

## 2024-08-07 ASSESSMENT — PAIN DESCRIPTION - ORIENTATION
ORIENTATION: OTHER (COMMENT)
ORIENTATION: MID
ORIENTATION: MID
ORIENTATION: RIGHT;MID
ORIENTATION: RIGHT;MID
ORIENTATION: RIGHT

## 2024-08-07 ASSESSMENT — PAIN SCALES - GENERAL
PAINLEVEL_OUTOF10: 10
PAINLEVEL_OUTOF10: 7
PAINLEVEL_OUTOF10: 4
PAINLEVEL_OUTOF10: 6
PAINLEVEL_OUTOF10: 8
PAINLEVEL_OUTOF10: 6
PAINLEVEL_OUTOF10: 8
PAINLEVEL_OUTOF10: 8
PAINLEVEL_OUTOF10: 6

## 2024-08-07 ASSESSMENT — PAIN SCALES - WONG BAKER
WONGBAKER_NUMERICALRESPONSE: NO HURT
WONGBAKER_NUMERICALRESPONSE: HURTS A LITTLE BIT

## 2024-08-07 ASSESSMENT — PAIN - FUNCTIONAL ASSESSMENT
PAIN_FUNCTIONAL_ASSESSMENT: ACTIVITIES ARE NOT PREVENTED
PAIN_FUNCTIONAL_ASSESSMENT: PREVENTS OR INTERFERES SOME ACTIVE ACTIVITIES AND ADLS

## 2024-08-07 ASSESSMENT — PAIN DESCRIPTION - DESCRIPTORS
DESCRIPTORS: ACHING
DESCRIPTORS: CRAMPING;DISCOMFORT;ACHING
DESCRIPTORS: ACHING;THROBBING
DESCRIPTORS: ACHING

## 2024-08-07 ASSESSMENT — PAIN DESCRIPTION - LOCATION
LOCATION: ABDOMEN
LOCATION: ABDOMEN
LOCATION: ABDOMEN;CHEST
LOCATION: ABDOMEN
LOCATION: ABDOMEN;CHEST
LOCATION: ABDOMEN;CHEST

## 2024-08-07 NOTE — WOUND CARE
Wound Care Note:      Wound Care into see patient because of Ostomy pouch changes    Patient resting in bed, midline dressing intact, Dr. Pineda following patient for this.     Ostomy pouches intact but per patient have been on for about 7 days now. Old ostomy pouches removed without difficulty. There is some denuded area to there mike-stomal skin of the right stoma. Both stomas are retracted/flush but pink and moist. Stoma to LLQ only produces mucous.     RLQ ostomy mike-stomal skin cleansed with NS and gauze then skin prep applied and dusted with stoma powder. Convex barrier ring then applied around stoma and convex 1 piece pouch cut to fit around stoma and applied over barrier ring. Then barrier strips were applied around wafer for extra security, no leaks noted at this time.     LLQ stoma: mike-stomal skin cleansed with NS and gauze and then 1 pice flat ostomy pouch cut to fit over stoma and then applied with no leaks noted at this time.         Please re-consult for any changes in skin condition.       Recommend applying sacral foam dressing to sacrum for protection. Dressing to be changed every 3 days and PRN for soiling, nurse to peel back dressing daily for skin assessment.     Float heels on pillow at all times while in bed, place pillow long ways under patient leg so knee is supported and heel is hanging off edge of pillow.      Use foam body alignment wedge to turn patient q2h at 30 degree angle to offload sacrum to prevent pressure related skin injury.  Do not position directly on greater trochanter.

## 2024-08-07 NOTE — CARE COORDINATION
CM reviewed chart.  Acute cholecystitis noted on CT scan. Plans for percutaneous cholecystostomy tube insertion by IR.     DCP: home with [facility]     Recent clinicals attached in [facility]    CM will continue to follow

## 2024-08-08 LAB
ALBUMIN SERPL-MCNC: 1.9 G/DL (ref 3.5–5)
ALBUMIN/GLOB SERPL: 0.4 (ref 1.1–2.2)
ALP SERPL-CCNC: 144 U/L (ref 45–117)
ALT SERPL-CCNC: 17 U/L (ref 12–78)
ANION GAP SERPL CALC-SCNC: 5 MMOL/L (ref 5–15)
APTT PPP: 33.5 SEC (ref 21.2–34.1)
APTT PPP: 34.8 SEC (ref 21.2–34.1)
AST SERPL W P-5'-P-CCNC: 14 U/L (ref 15–37)
BACTERIA SPEC CULT: NORMAL
BACTERIA SPEC CULT: NORMAL
BASOPHILS # BLD: 0.1 K/UL (ref 0–0.1)
BASOPHILS NFR BLD: 1 % (ref 0–1)
BILIRUB SERPL-MCNC: 0.3 MG/DL (ref 0.2–1)
BUN SERPL-MCNC: 7 MG/DL (ref 6–20)
BUN/CREAT SERPL: 12 (ref 12–20)
CA-I BLD-MCNC: 8.9 MG/DL (ref 8.5–10.1)
CHLORIDE SERPL-SCNC: 108 MMOL/L (ref 97–108)
CO2 SERPL-SCNC: 27 MMOL/L (ref 21–32)
CREAT SERPL-MCNC: 0.58 MG/DL (ref 0.55–1.02)
DIFFERENTIAL METHOD BLD: ABNORMAL
EOSINOPHIL # BLD: 0.2 K/UL (ref 0–0.4)
EOSINOPHIL NFR BLD: 2 % (ref 0–7)
ERYTHROCYTE [DISTWIDTH] IN BLOOD BY AUTOMATED COUNT: 18 % (ref 11.5–14.5)
GLOBULIN SER CALC-MCNC: 4.6 G/DL (ref 2–4)
GLUCOSE SERPL-MCNC: 105 MG/DL (ref 65–100)
HCT VFR BLD AUTO: 24.5 % (ref 35–47)
HGB BLD-MCNC: 7.6 G/DL (ref 11.5–16)
IMM GRANULOCYTES # BLD AUTO: 0.1 K/UL (ref 0–0.04)
IMM GRANULOCYTES NFR BLD AUTO: 1 % (ref 0–0.5)
INR PPP: 1.2 (ref 0.9–1.1)
LYMPHOCYTES # BLD: 2.7 K/UL (ref 0.8–3.5)
LYMPHOCYTES NFR BLD: 27 % (ref 12–49)
Lab: NORMAL
Lab: NORMAL
MCH RBC QN AUTO: 26.1 PG (ref 26–34)
MCHC RBC AUTO-ENTMCNC: 31 G/DL (ref 30–36.5)
MCV RBC AUTO: 84.2 FL (ref 80–99)
MONOCYTES # BLD: 0.7 K/UL (ref 0–1)
MONOCYTES NFR BLD: 8 % (ref 5–13)
NEUTS SEG # BLD: 6 K/UL (ref 1.8–8)
NEUTS SEG NFR BLD: 61 % (ref 32–75)
NRBC # BLD: 0 K/UL (ref 0–0.01)
NRBC BLD-RTO: 0 PER 100 WBC
PLATELET # BLD AUTO: 559 K/UL (ref 150–400)
PMV BLD AUTO: 10.1 FL (ref 8.9–12.9)
POTASSIUM SERPL-SCNC: 3.4 MMOL/L (ref 3.5–5.1)
PROT SERPL-MCNC: 6.5 G/DL (ref 6.4–8.2)
PROTHROMBIN TIME: 15.2 SEC (ref 11.9–14.6)
RBC # BLD AUTO: 2.91 M/UL (ref 3.8–5.2)
SODIUM SERPL-SCNC: 140 MMOL/L (ref 136–145)
THERAPEUTIC RANGE: ABNORMAL SEC (ref 82–109)
THERAPEUTIC RANGE: NORMAL SEC (ref 82–109)
UFH PPP CHRO-ACNC: 0.39 IU/ML
UFH PPP CHRO-ACNC: 0.55 IU/ML
UFH PPP CHRO-ACNC: 0.98 IU/ML
WBC # BLD AUTO: 9.8 K/UL (ref 3.6–11)

## 2024-08-08 PROCEDURE — 2500000003 HC RX 250 WO HCPCS: Performed by: SURGERY

## 2024-08-08 PROCEDURE — 2580000003 HC RX 258: Performed by: SURGERY

## 2024-08-08 PROCEDURE — 85520 HEPARIN ASSAY: CPT

## 2024-08-08 PROCEDURE — 6370000000 HC RX 637 (ALT 250 FOR IP): Performed by: SURGERY

## 2024-08-08 PROCEDURE — 85610 PROTHROMBIN TIME: CPT

## 2024-08-08 PROCEDURE — 85025 COMPLETE CBC W/AUTO DIFF WBC: CPT

## 2024-08-08 PROCEDURE — 6360000002 HC RX W HCPCS: Performed by: SURGERY

## 2024-08-08 PROCEDURE — 97530 THERAPEUTIC ACTIVITIES: CPT

## 2024-08-08 PROCEDURE — 85730 THROMBOPLASTIN TIME PARTIAL: CPT

## 2024-08-08 PROCEDURE — 99232 SBSQ HOSP IP/OBS MODERATE 35: CPT | Performed by: SURGERY

## 2024-08-08 PROCEDURE — 80053 COMPREHEN METABOLIC PANEL: CPT

## 2024-08-08 PROCEDURE — 1100000000 HC RM PRIVATE

## 2024-08-08 PROCEDURE — 36415 COLL VENOUS BLD VENIPUNCTURE: CPT

## 2024-08-08 RX ORDER — HEPARIN SODIUM 10000 [USP'U]/100ML
5-30 INJECTION, SOLUTION INTRAVENOUS CONTINUOUS
Status: DISCONTINUED | OUTPATIENT
Start: 2024-08-08 | End: 2024-08-10

## 2024-08-08 RX ORDER — HEPARIN SODIUM 1000 [USP'U]/ML
4000 INJECTION, SOLUTION INTRAVENOUS; SUBCUTANEOUS PRN
Status: DISCONTINUED | OUTPATIENT
Start: 2024-08-08 | End: 2024-08-10

## 2024-08-08 RX ORDER — HEPARIN SODIUM 1000 [USP'U]/ML
4000 INJECTION, SOLUTION INTRAVENOUS; SUBCUTANEOUS ONCE
Status: COMPLETED | OUTPATIENT
Start: 2024-08-08 | End: 2024-08-08

## 2024-08-08 RX ORDER — HEPARIN SODIUM 1000 [USP'U]/ML
2000 INJECTION, SOLUTION INTRAVENOUS; SUBCUTANEOUS PRN
Status: DISCONTINUED | OUTPATIENT
Start: 2024-08-08 | End: 2024-08-10

## 2024-08-08 RX ADMIN — HYDROMORPHONE HYDROCHLORIDE 1 MG: 1 INJECTION, SOLUTION INTRAMUSCULAR; INTRAVENOUS; SUBCUTANEOUS at 21:33

## 2024-08-08 RX ADMIN — ATORVASTATIN CALCIUM 20 MG: 20 TABLET, FILM COATED ORAL at 08:37

## 2024-08-08 RX ADMIN — HYDROMORPHONE HYDROCHLORIDE 1 MG: 1 INJECTION, SOLUTION INTRAMUSCULAR; INTRAVENOUS; SUBCUTANEOUS at 16:55

## 2024-08-08 RX ADMIN — SODIUM CHLORIDE, PRESERVATIVE FREE 10 ML: 5 INJECTION INTRAVENOUS at 08:39

## 2024-08-08 RX ADMIN — GABAPENTIN 300 MG: 300 CAPSULE ORAL at 21:33

## 2024-08-08 RX ADMIN — HEPARIN SODIUM 4000 UNITS: 1000 INJECTION INTRAVENOUS; SUBCUTANEOUS at 16:48

## 2024-08-08 RX ADMIN — LEVOTHYROXINE SODIUM 150 MCG: 0.07 TABLET ORAL at 06:31

## 2024-08-08 RX ADMIN — HYDROMORPHONE HYDROCHLORIDE 1 MG: 1 INJECTION, SOLUTION INTRAMUSCULAR; INTRAVENOUS; SUBCUTANEOUS at 12:46

## 2024-08-08 RX ADMIN — LISINOPRIL 5 MG: 10 TABLET ORAL at 08:37

## 2024-08-08 RX ADMIN — HYDROMORPHONE HYDROCHLORIDE 1 MG: 1 INJECTION, SOLUTION INTRAMUSCULAR; INTRAVENOUS; SUBCUTANEOUS at 04:08

## 2024-08-08 RX ADMIN — SODIUM CHLORIDE, PRESERVATIVE FREE 10 ML: 5 INJECTION INTRAVENOUS at 21:39

## 2024-08-08 RX ADMIN — HYDROMORPHONE HYDROCHLORIDE 1 MG: 1 INJECTION, SOLUTION INTRAMUSCULAR; INTRAVENOUS; SUBCUTANEOUS at 08:37

## 2024-08-08 ASSESSMENT — PAIN SCALES - GENERAL
PAINLEVEL_OUTOF10: 5
PAINLEVEL_OUTOF10: 6
PAINLEVEL_OUTOF10: 7
PAINLEVEL_OUTOF10: 8
PAINLEVEL_OUTOF10: 7
PAINLEVEL_OUTOF10: 8
PAINLEVEL_OUTOF10: 6

## 2024-08-08 ASSESSMENT — PAIN DESCRIPTION - LOCATION
LOCATION: ABDOMEN;CHEST
LOCATION: ABDOMEN
LOCATION: ABDOMEN

## 2024-08-08 ASSESSMENT — PAIN DESCRIPTION - ORIENTATION
ORIENTATION: RIGHT
ORIENTATION: RIGHT;MID
ORIENTATION: MID

## 2024-08-08 ASSESSMENT — PAIN DESCRIPTION - DESCRIPTORS
DESCRIPTORS: ACHING;STABBING
DESCRIPTORS: ACHING;CRAMPING;DISCOMFORT
DESCRIPTORS: ACHING

## 2024-08-08 NOTE — CARE COORDINATION
DCP: home with [facility]     Post IDR SHERRILL: 24 hours, hep gtt discontinued d/t blood observed by nursing in colostomy, cholecystostomy TBD

## 2024-08-09 ENCOUNTER — APPOINTMENT (OUTPATIENT)
Facility: HOSPITAL | Age: 64
DRG: 444 | End: 2024-08-09
Attending: SURGERY
Payer: COMMERCIAL

## 2024-08-09 LAB
ALBUMIN SERPL-MCNC: 1.9 G/DL (ref 3.5–5)
ALBUMIN/GLOB SERPL: 0.4 (ref 1.1–2.2)
ALP SERPL-CCNC: 135 U/L (ref 45–117)
ALT SERPL-CCNC: 17 U/L (ref 12–78)
ANION GAP SERPL CALC-SCNC: 6 MMOL/L (ref 5–15)
APTT PPP: 34.4 SEC (ref 21.2–34.1)
AST SERPL W P-5'-P-CCNC: 17 U/L (ref 15–37)
BASOPHILS # BLD: 0.1 K/UL (ref 0–0.1)
BASOPHILS NFR BLD: 1 % (ref 0–1)
BILIRUB SERPL-MCNC: 0.4 MG/DL (ref 0.2–1)
BUN SERPL-MCNC: 7 MG/DL (ref 6–20)
BUN/CREAT SERPL: 13 (ref 12–20)
CA-I BLD-MCNC: 9 MG/DL (ref 8.5–10.1)
CHLORIDE SERPL-SCNC: 107 MMOL/L (ref 97–108)
CO2 SERPL-SCNC: 26 MMOL/L (ref 21–32)
CREAT SERPL-MCNC: 0.56 MG/DL (ref 0.55–1.02)
DIFFERENTIAL METHOD BLD: ABNORMAL
EOSINOPHIL # BLD: 0.3 K/UL (ref 0–0.4)
EOSINOPHIL NFR BLD: 4 % (ref 0–7)
ERYTHROCYTE [DISTWIDTH] IN BLOOD BY AUTOMATED COUNT: 18 % (ref 11.5–14.5)
GLOBULIN SER CALC-MCNC: 4.9 G/DL (ref 2–4)
GLUCOSE SERPL-MCNC: 94 MG/DL (ref 65–100)
HCT VFR BLD AUTO: 24.7 % (ref 35–47)
HGB BLD-MCNC: 7.7 G/DL (ref 11.5–16)
IMM GRANULOCYTES # BLD AUTO: 0.1 K/UL (ref 0–0.04)
IMM GRANULOCYTES NFR BLD AUTO: 1 % (ref 0–0.5)
LYMPHOCYTES # BLD: 2.2 K/UL (ref 0.8–3.5)
LYMPHOCYTES NFR BLD: 27 % (ref 12–49)
MCH RBC QN AUTO: 26 PG (ref 26–34)
MCHC RBC AUTO-ENTMCNC: 31.2 G/DL (ref 30–36.5)
MCV RBC AUTO: 83.4 FL (ref 80–99)
MONOCYTES # BLD: 0.7 K/UL (ref 0–1)
MONOCYTES NFR BLD: 9 % (ref 5–13)
NEUTS SEG # BLD: 4.7 K/UL (ref 1.8–8)
NEUTS SEG NFR BLD: 58 % (ref 32–75)
NRBC # BLD: 0 K/UL (ref 0–0.01)
NRBC BLD-RTO: 0 PER 100 WBC
PLATELET # BLD AUTO: 649 K/UL (ref 150–400)
PMV BLD AUTO: 9.9 FL (ref 8.9–12.9)
POTASSIUM SERPL-SCNC: 3.3 MMOL/L (ref 3.5–5.1)
PROT SERPL-MCNC: 6.8 G/DL (ref 6.4–8.2)
RBC # BLD AUTO: 2.96 M/UL (ref 3.8–5.2)
SODIUM SERPL-SCNC: 139 MMOL/L (ref 136–145)
THERAPEUTIC RANGE: ABNORMAL SEC (ref 82–109)
UFH PPP CHRO-ACNC: 0.17 IU/ML
UFH PPP CHRO-ACNC: 0.24 IU/ML
UFH PPP CHRO-ACNC: <0.1 IU/ML
WBC # BLD AUTO: 8 K/UL (ref 3.6–11)

## 2024-08-09 PROCEDURE — 2500000003 HC RX 250 WO HCPCS: Performed by: SURGERY

## 2024-08-09 PROCEDURE — 99232 SBSQ HOSP IP/OBS MODERATE 35: CPT | Performed by: SURGERY

## 2024-08-09 PROCEDURE — 99156 MOD SED OTH PHYS/QHP 5/>YRS: CPT

## 2024-08-09 PROCEDURE — 80053 COMPREHEN METABOLIC PANEL: CPT

## 2024-08-09 PROCEDURE — 36415 COLL VENOUS BLD VENIPUNCTURE: CPT

## 2024-08-09 PROCEDURE — 2580000003 HC RX 258: Performed by: SURGERY

## 2024-08-09 PROCEDURE — 85025 COMPLETE CBC W/AUTO DIFF WBC: CPT

## 2024-08-09 PROCEDURE — 6370000000 HC RX 637 (ALT 250 FOR IP): Performed by: SURGERY

## 2024-08-09 PROCEDURE — 87205 SMEAR GRAM STAIN: CPT

## 2024-08-09 PROCEDURE — 47490 INCISION OF GALLBLADDER: CPT

## 2024-08-09 PROCEDURE — 6360000004 HC RX CONTRAST MEDICATION: Performed by: SURGERY

## 2024-08-09 PROCEDURE — 87070 CULTURE OTHR SPECIMN AEROBIC: CPT

## 2024-08-09 PROCEDURE — 2580000003 HC RX 258: Performed by: RADIOLOGY

## 2024-08-09 PROCEDURE — 85730 THROMBOPLASTIN TIME PARTIAL: CPT

## 2024-08-09 PROCEDURE — 6360000002 HC RX W HCPCS: Performed by: RADIOLOGY

## 2024-08-09 PROCEDURE — 1100000000 HC RM PRIVATE

## 2024-08-09 PROCEDURE — 85520 HEPARIN ASSAY: CPT

## 2024-08-09 PROCEDURE — 6360000002 HC RX W HCPCS: Performed by: SURGERY

## 2024-08-09 RX ORDER — FENTANYL CITRATE 50 UG/ML
INJECTION, SOLUTION INTRAMUSCULAR; INTRAVENOUS PRN
Status: COMPLETED | OUTPATIENT
Start: 2024-08-09 | End: 2024-08-09

## 2024-08-09 RX ORDER — MIDAZOLAM HYDROCHLORIDE 2 MG/2ML
INJECTION, SOLUTION INTRAMUSCULAR; INTRAVENOUS PRN
Status: COMPLETED | OUTPATIENT
Start: 2024-08-09 | End: 2024-08-09

## 2024-08-09 RX ADMIN — ATORVASTATIN CALCIUM 20 MG: 20 TABLET, FILM COATED ORAL at 10:48

## 2024-08-09 RX ADMIN — HYDROMORPHONE HYDROCHLORIDE 1 MG: 1 INJECTION, SOLUTION INTRAMUSCULAR; INTRAVENOUS; SUBCUTANEOUS at 10:48

## 2024-08-09 RX ADMIN — HYDROMORPHONE HYDROCHLORIDE 1 MG: 1 INJECTION, SOLUTION INTRAMUSCULAR; INTRAVENOUS; SUBCUTANEOUS at 05:35

## 2024-08-09 RX ADMIN — FENTANYL CITRATE 50 MCG: 50 INJECTION INTRAMUSCULAR; INTRAVENOUS at 09:24

## 2024-08-09 RX ADMIN — HYDROMORPHONE HYDROCHLORIDE 1 MG: 1 INJECTION, SOLUTION INTRAMUSCULAR; INTRAVENOUS; SUBCUTANEOUS at 23:10

## 2024-08-09 RX ADMIN — MIDAZOLAM HYDROCHLORIDE 1 MG: 1 INJECTION, SOLUTION INTRAMUSCULAR; INTRAVENOUS at 09:20

## 2024-08-09 RX ADMIN — MIDAZOLAM HYDROCHLORIDE 1 MG: 1 INJECTION, SOLUTION INTRAMUSCULAR; INTRAVENOUS at 09:24

## 2024-08-09 RX ADMIN — CEFAZOLIN 2000 MG: 1 INJECTION, POWDER, FOR SOLUTION INTRAMUSCULAR; INTRAVENOUS at 09:18

## 2024-08-09 RX ADMIN — GABAPENTIN 300 MG: 300 CAPSULE ORAL at 20:48

## 2024-08-09 RX ADMIN — HYDROMORPHONE HYDROCHLORIDE 1 MG: 1 INJECTION, SOLUTION INTRAMUSCULAR; INTRAVENOUS; SUBCUTANEOUS at 18:45

## 2024-08-09 RX ADMIN — SODIUM CHLORIDE, PRESERVATIVE FREE 10 ML: 5 INJECTION INTRAVENOUS at 10:49

## 2024-08-09 RX ADMIN — IOPAMIDOL 14 ML: 755 INJECTION, SOLUTION INTRAVENOUS at 10:08

## 2024-08-09 RX ADMIN — LISINOPRIL 5 MG: 10 TABLET ORAL at 10:48

## 2024-08-09 RX ADMIN — HYDROMORPHONE HYDROCHLORIDE 1 MG: 1 INJECTION, SOLUTION INTRAMUSCULAR; INTRAVENOUS; SUBCUTANEOUS at 01:33

## 2024-08-09 RX ADMIN — KETOROLAC TROMETHAMINE 30 MG: 30 INJECTION, SOLUTION INTRAMUSCULAR; INTRAVENOUS at 20:47

## 2024-08-09 RX ADMIN — FENTANYL CITRATE 50 MCG: 50 INJECTION INTRAMUSCULAR; INTRAVENOUS at 09:40

## 2024-08-09 RX ADMIN — SODIUM CHLORIDE, PRESERVATIVE FREE 10 ML: 5 INJECTION INTRAVENOUS at 20:49

## 2024-08-09 RX ADMIN — MIDAZOLAM HYDROCHLORIDE 1 MG: 1 INJECTION, SOLUTION INTRAMUSCULAR; INTRAVENOUS at 09:33

## 2024-08-09 RX ADMIN — HYDROMORPHONE HYDROCHLORIDE 1 MG: 1 INJECTION, SOLUTION INTRAMUSCULAR; INTRAVENOUS; SUBCUTANEOUS at 14:45

## 2024-08-09 RX ADMIN — FENTANYL CITRATE 50 MCG: 50 INJECTION INTRAMUSCULAR; INTRAVENOUS at 09:19

## 2024-08-09 ASSESSMENT — PAIN DESCRIPTION - LOCATION
LOCATION: ABDOMEN
LOCATION: CHEST;INCISION
LOCATION: ABDOMEN

## 2024-08-09 ASSESSMENT — PAIN SCALES - GENERAL
PAINLEVEL_OUTOF10: 8
PAINLEVEL_OUTOF10: 7
PAINLEVEL_OUTOF10: 8
PAINLEVEL_OUTOF10: 3
PAINLEVEL_OUTOF10: 8
PAINLEVEL_OUTOF10: 4
PAINLEVEL_OUTOF10: 8
PAINLEVEL_OUTOF10: 7
PAINLEVEL_OUTOF10: 8
PAINLEVEL_OUTOF10: 8

## 2024-08-09 ASSESSMENT — PAIN SCALES - WONG BAKER
WONGBAKER_NUMERICALRESPONSE: HURTS A LITTLE BIT
WONGBAKER_NUMERICALRESPONSE: HURTS A LITTLE BIT
WONGBAKER_NUMERICALRESPONSE: HURTS LITTLE MORE

## 2024-08-09 ASSESSMENT — PAIN DESCRIPTION - ORIENTATION
ORIENTATION: RIGHT
ORIENTATION: LEFT;MID

## 2024-08-09 ASSESSMENT — PAIN DESCRIPTION - DESCRIPTORS
DESCRIPTORS: ACHING;STABBING
DESCRIPTORS: ACHING;SHARP
DESCRIPTORS: ACHING;STABBING
DESCRIPTORS: ACHING;SHARP
DESCRIPTORS: DISCOMFORT;ACHING;STABBING

## 2024-08-09 ASSESSMENT — PAIN DESCRIPTION - FREQUENCY: FREQUENCY: INTERMITTENT

## 2024-08-09 ASSESSMENT — PAIN - FUNCTIONAL ASSESSMENT: PAIN_FUNCTIONAL_ASSESSMENT: ACTIVITIES ARE NOT PREVENTED

## 2024-08-09 NOTE — CARE COORDINATION
[facility]    Post IDR SHERRILL: Monday, cholecystostomy tube to be placed today, CM noted that PT rec for SNF    1327: CM met with pt at bedside to discuss dispo. CM informed PT rec for SNF, pt declines SNF placement at this time, reports she has a strong support system. [facility] Plan continues to be for pt to return home with .

## 2024-08-10 LAB
ERYTHROCYTE [DISTWIDTH] IN BLOOD BY AUTOMATED COUNT: 18 % (ref 11.5–14.5)
HCT VFR BLD AUTO: 24.2 % (ref 35–47)
HGB BLD-MCNC: 7.7 G/DL (ref 11.5–16)
MCH RBC QN AUTO: 26.2 PG (ref 26–34)
MCHC RBC AUTO-ENTMCNC: 31.8 G/DL (ref 30–36.5)
MCV RBC AUTO: 82.3 FL (ref 80–99)
NRBC # BLD: 0 K/UL (ref 0–0.01)
NRBC BLD-RTO: 0 PER 100 WBC
PLATELET # BLD AUTO: 601 K/UL (ref 150–400)
PMV BLD AUTO: 9.5 FL (ref 8.9–12.9)
RBC # BLD AUTO: 2.94 M/UL (ref 3.8–5.2)
UFH PPP CHRO-ACNC: <0.1 IU/ML
WBC # BLD AUTO: 9.3 K/UL (ref 3.6–11)

## 2024-08-10 PROCEDURE — 85027 COMPLETE CBC AUTOMATED: CPT

## 2024-08-10 PROCEDURE — 85520 HEPARIN ASSAY: CPT

## 2024-08-10 PROCEDURE — 2500000003 HC RX 250 WO HCPCS: Performed by: SURGERY

## 2024-08-10 PROCEDURE — 2580000003 HC RX 258: Performed by: SURGERY

## 2024-08-10 PROCEDURE — 36415 COLL VENOUS BLD VENIPUNCTURE: CPT

## 2024-08-10 PROCEDURE — 6370000000 HC RX 637 (ALT 250 FOR IP): Performed by: SURGERY

## 2024-08-10 PROCEDURE — 1100000000 HC RM PRIVATE

## 2024-08-10 PROCEDURE — 94761 N-INVAS EAR/PLS OXIMETRY MLT: CPT

## 2024-08-10 RX ORDER — HYDROMORPHONE HYDROCHLORIDE 2 MG/1
2 TABLET ORAL EVERY 4 HOURS PRN
Status: DISCONTINUED | OUTPATIENT
Start: 2024-08-10 | End: 2024-08-12 | Stop reason: HOSPADM

## 2024-08-10 RX ADMIN — LEVOTHYROXINE SODIUM 150 MCG: 0.07 TABLET ORAL at 06:11

## 2024-08-10 RX ADMIN — HYDROMORPHONE HYDROCHLORIDE 1 MG: 1 INJECTION, SOLUTION INTRAMUSCULAR; INTRAVENOUS; SUBCUTANEOUS at 07:08

## 2024-08-10 RX ADMIN — HYDROMORPHONE HYDROCHLORIDE 1 MG: 1 INJECTION, SOLUTION INTRAMUSCULAR; INTRAVENOUS; SUBCUTANEOUS at 03:17

## 2024-08-10 RX ADMIN — SODIUM CHLORIDE, PRESERVATIVE FREE 10 ML: 5 INJECTION INTRAVENOUS at 22:01

## 2024-08-10 RX ADMIN — APIXABAN 10 MG: 5 TABLET, FILM COATED ORAL at 21:44

## 2024-08-10 RX ADMIN — APIXABAN 10 MG: 5 TABLET, FILM COATED ORAL at 06:11

## 2024-08-10 RX ADMIN — HYDROMORPHONE HYDROCHLORIDE 1 MG: 1 INJECTION, SOLUTION INTRAMUSCULAR; INTRAVENOUS; SUBCUTANEOUS at 21:32

## 2024-08-10 RX ADMIN — LISINOPRIL 5 MG: 10 TABLET ORAL at 08:56

## 2024-08-10 RX ADMIN — HYDROMORPHONE HYDROCHLORIDE 1 MG: 1 INJECTION, SOLUTION INTRAMUSCULAR; INTRAVENOUS; SUBCUTANEOUS at 14:46

## 2024-08-10 RX ADMIN — ATORVASTATIN CALCIUM 20 MG: 20 TABLET, FILM COATED ORAL at 08:56

## 2024-08-10 RX ADMIN — SODIUM CHLORIDE, PRESERVATIVE FREE 10 ML: 5 INJECTION INTRAVENOUS at 08:56

## 2024-08-10 RX ADMIN — GABAPENTIN 300 MG: 300 CAPSULE ORAL at 21:44

## 2024-08-10 RX ADMIN — HYDROMORPHONE HYDROCHLORIDE 1 MG: 1 INJECTION, SOLUTION INTRAMUSCULAR; INTRAVENOUS; SUBCUTANEOUS at 10:46

## 2024-08-10 ASSESSMENT — PAIN DESCRIPTION - LOCATION
LOCATION: ABDOMEN

## 2024-08-10 ASSESSMENT — PAIN DESCRIPTION - DESCRIPTORS
DESCRIPTORS: ACHING;THROBBING;SHARP
DESCRIPTORS: DISCOMFORT
DESCRIPTORS: ACHING;DISCOMFORT

## 2024-08-10 ASSESSMENT — PAIN SCALES - WONG BAKER: WONGBAKER_NUMERICALRESPONSE: HURTS A LITTLE BIT

## 2024-08-10 ASSESSMENT — PAIN SCALES - GENERAL
PAINLEVEL_OUTOF10: 10
PAINLEVEL_OUTOF10: 7
PAINLEVEL_OUTOF10: 3
PAINLEVEL_OUTOF10: 10
PAINLEVEL_OUTOF10: 7
PAINLEVEL_OUTOF10: 8
PAINLEVEL_OUTOF10: 7
PAINLEVEL_OUTOF10: 7
PAINLEVEL_OUTOF10: 8

## 2024-08-10 ASSESSMENT — PAIN DESCRIPTION - ORIENTATION
ORIENTATION: ANTERIOR
ORIENTATION: RIGHT;LEFT;INNER;MID
ORIENTATION: RIGHT;MID;LEFT
ORIENTATION: RIGHT

## 2024-08-11 PROCEDURE — 2580000003 HC RX 258: Performed by: SURGERY

## 2024-08-11 PROCEDURE — 6360000002 HC RX W HCPCS: Performed by: SURGERY

## 2024-08-11 PROCEDURE — 6370000000 HC RX 637 (ALT 250 FOR IP): Performed by: SURGERY

## 2024-08-11 PROCEDURE — 97530 THERAPEUTIC ACTIVITIES: CPT

## 2024-08-11 PROCEDURE — 94761 N-INVAS EAR/PLS OXIMETRY MLT: CPT

## 2024-08-11 PROCEDURE — 1100000000 HC RM PRIVATE

## 2024-08-11 RX ADMIN — GABAPENTIN 300 MG: 300 CAPSULE ORAL at 21:04

## 2024-08-11 RX ADMIN — HYDROMORPHONE HYDROCHLORIDE 1 MG: 1 INJECTION, SOLUTION INTRAMUSCULAR; INTRAVENOUS; SUBCUTANEOUS at 05:12

## 2024-08-11 RX ADMIN — APIXABAN 10 MG: 5 TABLET, FILM COATED ORAL at 21:04

## 2024-08-11 RX ADMIN — SODIUM CHLORIDE, PRESERVATIVE FREE 10 ML: 5 INJECTION INTRAVENOUS at 09:00

## 2024-08-11 RX ADMIN — LEVOTHYROXINE SODIUM 150 MCG: 0.07 TABLET ORAL at 05:12

## 2024-08-11 RX ADMIN — HYDROMORPHONE HYDROCHLORIDE 2 MG: 2 TABLET ORAL at 21:04

## 2024-08-11 RX ADMIN — APIXABAN 10 MG: 5 TABLET, FILM COATED ORAL at 09:00

## 2024-08-11 RX ADMIN — HYDROMORPHONE HYDROCHLORIDE 2 MG: 2 TABLET ORAL at 09:03

## 2024-08-11 RX ADMIN — LISINOPRIL 5 MG: 10 TABLET ORAL at 09:00

## 2024-08-11 RX ADMIN — HYDROMORPHONE HYDROCHLORIDE 2 MG: 2 TABLET ORAL at 16:59

## 2024-08-11 RX ADMIN — HYDROMORPHONE HYDROCHLORIDE 2 MG: 2 TABLET ORAL at 13:14

## 2024-08-11 RX ADMIN — ATORVASTATIN CALCIUM 20 MG: 20 TABLET, FILM COATED ORAL at 09:00

## 2024-08-11 RX ADMIN — SODIUM CHLORIDE, PRESERVATIVE FREE 10 ML: 5 INJECTION INTRAVENOUS at 21:05

## 2024-08-11 ASSESSMENT — PAIN SCALES - GENERAL
PAINLEVEL_OUTOF10: 5
PAINLEVEL_OUTOF10: 8
PAINLEVEL_OUTOF10: 7
PAINLEVEL_OUTOF10: 7
PAINLEVEL_OUTOF10: 2
PAINLEVEL_OUTOF10: 7
PAINLEVEL_OUTOF10: 7

## 2024-08-11 ASSESSMENT — PAIN DESCRIPTION - LOCATION
LOCATION: ABDOMEN

## 2024-08-11 ASSESSMENT — PAIN DESCRIPTION - DESCRIPTORS
DESCRIPTORS: ACHING
DESCRIPTORS: DISCOMFORT
DESCRIPTORS: DISCOMFORT;ACHING

## 2024-08-11 ASSESSMENT — PAIN DESCRIPTION - ORIENTATION
ORIENTATION: RIGHT;LEFT;INNER;MID
ORIENTATION: RIGHT;LEFT

## 2024-08-12 VITALS
SYSTOLIC BLOOD PRESSURE: 144 MMHG | OXYGEN SATURATION: 94 % | BODY MASS INDEX: 40.56 KG/M2 | HEIGHT: 68 IN | RESPIRATION RATE: 18 BRPM | DIASTOLIC BLOOD PRESSURE: 71 MMHG | WEIGHT: 267.6 LBS | HEART RATE: 79 BPM | TEMPERATURE: 98.2 F

## 2024-08-12 PROCEDURE — 99024 POSTOP FOLLOW-UP VISIT: CPT | Performed by: SURGERY

## 2024-08-12 PROCEDURE — 2580000003 HC RX 258: Performed by: SURGERY

## 2024-08-12 PROCEDURE — 6370000000 HC RX 637 (ALT 250 FOR IP): Performed by: SURGERY

## 2024-08-12 RX ORDER — OXYCODONE HYDROCHLORIDE AND ACETAMINOPHEN 5; 325 MG/1; MG/1
1 TABLET ORAL EVERY 6 HOURS PRN
Qty: 28 TABLET | Refills: 0 | Status: SHIPPED | OUTPATIENT
Start: 2024-08-12 | End: 2024-08-19

## 2024-08-12 RX ORDER — CEPHALEXIN 500 MG/1
500 CAPSULE ORAL 4 TIMES DAILY
Qty: 40 CAPSULE | Refills: 0 | Status: SHIPPED | OUTPATIENT
Start: 2024-08-12 | End: 2024-08-22

## 2024-08-12 RX ADMIN — HYDROMORPHONE HYDROCHLORIDE 2 MG: 2 TABLET ORAL at 05:09

## 2024-08-12 RX ADMIN — LEVOTHYROXINE SODIUM 150 MCG: 0.07 TABLET ORAL at 05:09

## 2024-08-12 RX ADMIN — HYDROMORPHONE HYDROCHLORIDE 2 MG: 2 TABLET ORAL at 09:01

## 2024-08-12 RX ADMIN — ATORVASTATIN CALCIUM 20 MG: 20 TABLET, FILM COATED ORAL at 09:01

## 2024-08-12 RX ADMIN — LISINOPRIL 5 MG: 10 TABLET ORAL at 09:01

## 2024-08-12 RX ADMIN — APIXABAN 10 MG: 5 TABLET, FILM COATED ORAL at 09:01

## 2024-08-12 RX ADMIN — HYDROMORPHONE HYDROCHLORIDE 2 MG: 2 TABLET ORAL at 12:42

## 2024-08-12 RX ADMIN — SODIUM CHLORIDE, PRESERVATIVE FREE 10 ML: 5 INJECTION INTRAVENOUS at 09:05

## 2024-08-12 RX ADMIN — HYDROMORPHONE HYDROCHLORIDE 2 MG: 2 TABLET ORAL at 01:00

## 2024-08-12 ASSESSMENT — PAIN SCALES - GENERAL
PAINLEVEL_OUTOF10: 7
PAINLEVEL_OUTOF10: 3
PAINLEVEL_OUTOF10: 10
PAINLEVEL_OUTOF10: 3
PAINLEVEL_OUTOF10: 9
PAINLEVEL_OUTOF10: 0
PAINLEVEL_OUTOF10: 3

## 2024-08-12 ASSESSMENT — PAIN DESCRIPTION - ORIENTATION
ORIENTATION: RIGHT
ORIENTATION: RIGHT

## 2024-08-12 ASSESSMENT — PAIN SCALES - WONG BAKER
WONGBAKER_NUMERICALRESPONSE: NO HURT
WONGBAKER_NUMERICALRESPONSE: NO HURT

## 2024-08-12 ASSESSMENT — PAIN - FUNCTIONAL ASSESSMENT
PAIN_FUNCTIONAL_ASSESSMENT: PREVENTS OR INTERFERES WITH ALL ACTIVE AND SOME PASSIVE ACTIVITIES
PAIN_FUNCTIONAL_ASSESSMENT: PREVENTS OR INTERFERES SOME ACTIVE ACTIVITIES AND ADLS

## 2024-08-12 ASSESSMENT — PAIN DESCRIPTION - LOCATION
LOCATION: ABDOMEN

## 2024-08-12 ASSESSMENT — PAIN DESCRIPTION - DESCRIPTORS
DESCRIPTORS: ACHING
DESCRIPTORS: ACHING

## 2024-08-12 NOTE — DISCHARGE INSTRUCTIONS
Daily wound care on the midline abdominal wall with a wet-to-dry 4 x 4 gauze and a iodoform packing  Cholecystostomy tube drainage and teaching daily.

## 2024-08-12 NOTE — DISCHARGE SUMMARY
Home    Provider Follow-Up:   Tommie Pineda MD  [address] [place] [id number]  [phone]    Follow up in 2 week(s)         Hospital/Incidental Findings Requiring Follow-Up:  As above    Patient Instructions   Diet: regular diet    Activity: activity as tolerated    Other Instructions:   As above    Discharge Medications         Medication List        START taking these medications      apixaban 5 MG Tabs tablet  Commonly known as: ELIQUIS  Take 2 tablets by mouth 2 times daily for 5 days, THEN 1 tablet 2 times daily.  Start taking on: August 12, 2024     cephALEXin 500 MG capsule  Commonly known as: KEFLEX  Take 1 capsule by mouth 4 times daily for 10 days            CONTINUE taking these medications      clobetasol 0.05 % ointment  Commonly known as: Temovate  Apply topically 2 times daily.     diclofenac sodium 1 % Gel  Commonly known as: VOLTAREN  Apply 2 g topically 4 times daily     gabapentin 300 MG capsule  Commonly known as: NEURONTIN  Take 1 capsule by mouth nightly for 180 days. Max Daily Amount: 300 mg     levothyroxine 150 MCG tablet  Commonly known as: SYNTHROID  Take 1 tablet by mouth every morning (before breakfast)     oxyCODONE-acetaminophen 5-325 MG per tablet  Commonly known as: Percocet  Take 1 tablet by mouth every 6 hours as needed for Pain for up to 7 days. Intended supply: 7 days. Take lowest dose possible to manage pain Max Daily Amount: 4 tablets     predniSONE 20 MG tablet  Commonly known as: DELTASONE  Take 1 tablet by mouth 2 times daily            STOP taking these medications      atorvastatin 20 MG tablet  Commonly known as: LIPITOR     enoxaparin Sodium 30 MG/0.3ML injection  Commonly known as: LOVENOX     ibuprofen 600 MG tablet  Commonly known as: ADVIL;MOTRIN     ondansetron 4 MG/2ML injection  Commonly known as: ZOFRAN            ASK your doctor about these medications      lisinopril 5 MG tablet  Commonly known as: PRINIVIL;ZESTRIL     potassium

## 2024-08-12 NOTE — CARE COORDINATION
Transition of Care Plan:    RUR: 18%  Prior Level of Functioning: Independent  Disposition: Home w/HH via [facility].    Follow up appointments: PCP, general surgeon, and all other recommended specialists  DME needed: N/A  Transportation at discharge: Parents  IM/IMM Medicare/ letter given: N/A  Is patient a [place] and connected with [facility]? N/A  If yes, was [place] transfer form completed and [facility] notified? N/A  Caregiver Contact:   Discharge Caregiver contacted prior to discharge? Patient contacted her parents  Care Conference needed? N/A  Barriers to discharge: N/A      NEGRITO Pandey

## 2024-08-12 NOTE — PLAN OF CARE
PHYSICAL THERAPY TREATMENT     Patient: Maddie Goldberg (63 y.o. 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  ASSESSMENT  Patient continues with skilled PT services and is progressing towards goals. Pt sitting up in the bed upon PT arrival, agreeable to session. Pt A&O x 4. (See below for objective details and assist levels).     Overall pt tolerated session well today. Patient demos improved mobility and was able to ambulate approx 160 ft overall with rollator today. Patient requested to use rollator as that is her AD at baseline. Pt used brakes safely/appropriately with rollator however amb quickly req some cues for mobility and speed to ensure safety. Patient reeducated on LE therex and pt verbalized understanding, did not complete as pt was assisted back in bed for lab to get bloodwork.   Will continue to benefit from skilled PT services, and will continue to progress as tolerated. Potential barriers for safe discharge: pt is not safe to be alone and concern for pt safely navigating or managing the home environment. Current PT DC recommendation Skilled Nursing Facility once medically appropriate.     Start of Session End of Session   SPO2 (%)  95   Heart Rate (BPM)  104     GOALS:    Problem: Physical Therapy - Adult  Goal: By Discharge: Performs mobility at highest level of function for planned discharge setting. 
  Problem: Discharge Planning  Goal: Discharge to home or other facility with appropriate resources  8/11/2024 0122 by Toya Wallace RN  Outcome: Progressing  8/10/2024 1803 by Elias Booker RN  Outcome: Progressing     Problem: Pain  Goal: Verbalizes/displays adequate comfort level or baseline comfort level  8/11/2024 0122 by Toya Wallace RN  Outcome: Progressing  8/10/2024 1803 by Elias Booker RN  Outcome: Progressing     Problem: Skin/Tissue Integrity  Goal: Absence of new skin breakdown  Description: 1.  Monitor for areas of redness and/or skin breakdown  2.  Assess vascular access sites hourly  3.  Every 4-6 hours minimum:  Change oxygen saturation probe site  4.  Every 4-6 hours:  If on nasal continuous positive airway pressure, respiratory therapy assess nares and determine need for appliance change or resting period.  8/11/2024 0122 by Toya Wallace RN  Outcome: Progressing  8/10/2024 1803 by Elias Booker RN  Outcome: Progressing     Problem: Safety - Adult  Goal: Free from fall injury  8/11/2024 0122 by Toya Wallace RN  Outcome: Progressing  8/10/2024 1803 by Elias Booker RN  Outcome: Progressing     
  Problem: Discharge Planning  Goal: Discharge to home or other facility with appropriate resources  8/12/2024 0001 by Alina Quezada RN  Outcome: Progressing  8/11/2024 1003 by Elias Booker RN  Outcome: Progressing     Problem: Pain  Goal: Verbalizes/displays adequate comfort level or baseline comfort level  8/12/2024 0001 by Alina Quezada RN  Outcome: Progressing  8/11/2024 1003 by Elias Booker RN  Outcome: Progressing     Problem: Skin/Tissue Integrity  Goal: Absence of new skin breakdown  Description: 1.  Monitor for areas of redness and/or skin breakdown  2.  Assess vascular access sites hourly  3.  Every 4-6 hours minimum:  Change oxygen saturation probe site  4.  Every 4-6 hours:  If on nasal continuous positive airway pressure, respiratory therapy assess nares and determine need for appliance change or resting period.  8/12/2024 0001 by Alina Quezada RN  Outcome: Progressing  8/11/2024 1003 by Elias Booker RN  Outcome: Progressing     Problem: Safety - Adult  Goal: Free from fall injury  8/12/2024 0001 by Alina Quezada RN  Outcome: Progressing  8/11/2024 1003 by Elias Booker RN  Outcome: Progressing     Problem: Physical Therapy - Adult  Goal: By Discharge: Performs mobility at highest level of function for planned discharge setting.  See evaluation for individualized goals.  Description: FUNCTIONAL STATUS PRIOR TO ADMISSION: Patient was modified independent using a rollator for functional mobility.    HOME SUPPORT PRIOR TO ADMISSION: The patient lived alone with friends/family to provide assistance.    Physical Therapy Goals  Initiated 8/3/2024  Pt stated goal: to go home  Pt will be I with LE HEP in 7 days.  Pt will perform bed mobility with [place] in 7 days.  Pt will perform transfers with [place] in 7 days.   Pt will amb 100 feet with LRAD safely with Modified [place] in 7 days.  Pt will ascend/descend 4 steps with B handrail(s) and Contact Guard Assist in 7 
  Problem: Discharge Planning  Goal: Discharge to home or other facility with appropriate resources  8/4/2024 0215 by Amy Sutton RN  Outcome: Progressing  8/3/2024 1626 by Tammy Longo RN  Outcome: Progressing     Problem: Pain  Goal: Verbalizes/displays adequate comfort level or baseline comfort level  8/4/2024 0215 by Amy Sutton RN  Outcome: Progressing  8/3/2024 1626 by Tammy Longo RN  Outcome: Progressing     Problem: Skin/Tissue Integrity  Goal: Absence of new skin breakdown  Description: 1.  Monitor for areas of redness and/or skin breakdown  2.  Assess vascular access sites hourly  3.  Every 4-6 hours minimum:  Change oxygen saturation probe site  4.  Every 4-6 hours:  If on nasal continuous positive airway pressure, respiratory therapy assess nares and determine need for appliance change or resting period.  Outcome: Progressing     Problem: Safety - Adult  Goal: Free from fall injury  Outcome: Progressing     Problem: Physical Therapy - Adult  Goal: By Discharge: Performs mobility at highest level of function for planned discharge setting.  See evaluation for individualized goals.  Description: FUNCTIONAL STATUS PRIOR TO ADMISSION: Patient was modified independent using a rollator for functional mobility.    HOME SUPPORT PRIOR TO ADMISSION: The patient lived alone with friends/family to provide assistance.    Physical Therapy Goals  Initiated 8/3/2024  Pt stated goal: to go home  Pt will be I with LE HEP in 7 days.  Pt will perform bed mobility with [place] in 7 days.  Pt will perform transfers with [place] in 7 days.   Pt will amb 100 feet with LRAD safely with Modified [place] in 7 days.  Pt will ascend/descend 4 steps with B handrail(s) and Contact Guard Assist in 7 days to safely enter/navigate home.   Pt will verbalize and demonstrate compliance with abdominal precautions to include logrolling in 7 days.        8/3/2024 1224 by Kelsey Spaulding PT  Outcome: Progressing     
  Problem: Discharge Planning  Goal: Discharge to home or other facility with appropriate resources  8/4/2024 1113 by Tracy Beyer RN  Outcome: Progressing  Flowsheets (Taken 8/4/2024 0810)  Discharge to home or other facility with appropriate resources: Identify barriers to discharge with patient and caregiver  8/4/2024 0215 by Amy Sutton RN  Outcome: Progressing     Problem: Pain  Goal: Verbalizes/displays adequate comfort level or baseline comfort level  8/4/2024 1113 by Tracy Beyer RN  Outcome: Progressing  8/4/2024 0215 by Amy Sutton RN  Outcome: Progressing     Problem: Skin/Tissue Integrity  Goal: Absence of new skin breakdown  Description: 1.  Monitor for areas of redness and/or skin breakdown  2.  Assess vascular access sites hourly  3.  Every 4-6 hours minimum:  Change oxygen saturation probe site  4.  Every 4-6 hours:  If on nasal continuous positive airway pressure, respiratory therapy assess nares and determine need for appliance change or resting period.  8/4/2024 1113 by Tracy Beyer RN  Outcome: Progressing  8/4/2024 0215 by Amy Sutton RN  Outcome: Progressing     Problem: Safety - Adult  Goal: Free from fall injury  8/4/2024 1113 by Tracy Beyer RN  Outcome: Progressing  8/4/2024 0215 by Amy Sutton RN  Outcome: Progressing     
  Problem: Discharge Planning  Goal: Discharge to home or other facility with appropriate resources  8/9/2024 2347 by Deya Christie RN  Outcome: Progressing  Flowsheets (Taken 8/9/2024 2030)  Discharge to home or other facility with appropriate resources: Identify barriers to discharge with patient and caregiver  8/9/2024 1326 by Elias Booker RN  Outcome: Progressing     Problem: Pain  Goal: Verbalizes/displays adequate comfort level or baseline comfort level  8/9/2024 2347 by Deya Christie RN  Outcome: Progressing  8/9/2024 1326 by Elias Booker RN  Outcome: Progressing     Problem: Skin/Tissue Integrity  Goal: Absence of new skin breakdown  Description: 1.  Monitor for areas of redness and/or skin breakdown  2.  Assess vascular access sites hourly  3.  Every 4-6 hours minimum:  Change oxygen saturation probe site  4.  Every 4-6 hours:  If on nasal continuous positive airway pressure, respiratory therapy assess nares and determine need for appliance change or resting period.  8/9/2024 2347 by Deya Christie RN  Outcome: Progressing  8/9/2024 1326 by Elias Booker RN  Outcome: Progressing     Problem: Safety - Adult  Goal: Free from fall injury  8/9/2024 2347 by Deya Christie RN  Outcome: Progressing  8/9/2024 1326 by Elias Booker RN  Outcome: Progressing     
  Problem: Discharge Planning  Goal: Discharge to home or other facility with appropriate resources  Outcome: Progressing     Problem: Pain  Goal: Verbalizes/displays adequate comfort level or baseline comfort level  8/3/2024 1626 by Tammy Longo RN  Outcome: Progressing  8/3/2024 0519 by Graciela Lopez LPN  Outcome: Progressing     
  Problem: Discharge Planning  Goal: Discharge to home or other facility with appropriate resources  Outcome: Progressing     Problem: Pain  Goal: Verbalizes/displays adequate comfort level or baseline comfort level  8/7/2024 0054 by Alyssa Greer RN  Outcome: Progressing  8/6/2024 1130 by Layla Kenny RN  Outcome: Progressing  Flowsheets  Taken 8/6/2024 1130 by Layla Kenny RN  Verbalizes/displays adequate comfort level or baseline comfort level:   Assess pain using appropriate pain scale   Encourage patient to monitor pain and request assistance   Administer analgesics based on type and severity of pain and evaluate response   Implement non-pharmacological measures as appropriate and evaluate response   Consider cultural and social influences on pain and pain management  Taken 8/6/2024 0803 by Maria M Rinaldi RN  Verbalizes/displays adequate comfort level or baseline comfort level:   Encourage patient to monitor pain and request assistance   Assess pain using appropriate pain scale   Administer analgesics based on type and severity of pain and evaluate response     Problem: Skin/Tissue Integrity  Goal: Absence of new skin breakdown  Description: 1.  Monitor for areas of redness and/or skin breakdown  2.  Assess vascular access sites hourly  3.  Every 4-6 hours minimum:  Change oxygen saturation probe site  4.  Every 4-6 hours:  If on nasal continuous positive airway pressure, respiratory therapy assess nares and determine need for appliance change or resting period.  Outcome: Progressing     
  Problem: Discharge Planning  Goal: Discharge to home or other facility with appropriate resources  Outcome: Progressing     Problem: Pain  Goal: Verbalizes/displays adequate comfort level or baseline comfort level  Outcome: Progressing     Problem: Skin/Tissue Integrity  Goal: Absence of new skin breakdown  Description: 1.  Monitor for areas of redness and/or skin breakdown  2.  Assess vascular access sites hourly  3.  Every 4-6 hours minimum:  Change oxygen saturation probe site  4.  Every 4-6 hours:  If on nasal continuous positive airway pressure, respiratory therapy assess nares and determine need for appliance change or resting period.  Outcome: Progressing     Problem: Safety - Adult  Goal: Free from fall injury  Outcome: Progressing     
  Problem: Discharge Planning  Goal: Discharge to home or other facility with appropriate resources  Outcome: Progressing     Problem: Pain  Goal: Verbalizes/displays adequate comfort level or baseline comfort level  Outcome: Progressing     Problem: Skin/Tissue Integrity  Goal: Absence of new skin breakdown  Description: 1.  Monitor for areas of redness and/or skin breakdown  2.  Assess vascular access sites hourly  3.  Every 4-6 hours minimum:  Change oxygen saturation probe site  4.  Every 4-6 hours:  If on nasal continuous positive airway pressure, respiratory therapy assess nares and determine need for appliance change or resting period.  Outcome: Progressing     Problem: Safety - Adult  Goal: Free from fall injury  Outcome: Progressing     Problem: Physical Therapy - Adult  Goal: By Discharge: Performs mobility at highest level of function for planned discharge setting.  See evaluation for individualized goals.  Description: FUNCTIONAL STATUS PRIOR TO ADMISSION: Patient was modified independent using a rollator for functional mobility.    HOME SUPPORT PRIOR TO ADMISSION: The patient lived alone with friends/family to provide assistance.    Physical Therapy Goals  Initiated 8/3/2024  Pt stated goal: to go home  Pt will be I with LE HEP in 7 days.  Pt will perform bed mobility with [place] in 7 days.  Pt will perform transfers with [place] in 7 days.   Pt will amb 100 feet with LRAD safely with Modified [place] in 7 days.  Pt will ascend/descend 4 steps with B handrail(s) and Contact Guard Assist in 7 days to safely enter/navigate home.   Pt will verbalize and demonstrate compliance with abdominal precautions to include logrolling in 7 days.        8/8/2024 0927 by Amy Kennedy PTA  Outcome: Progressing     
  Problem: Discharge Planning  Goal: Discharge to home or other facility with appropriate resources  Outcome: Progressing     Problem: Pain  Goal: Verbalizes/displays adequate comfort level or baseline comfort level  Outcome: Progressing     Problem: Skin/Tissue Integrity  Goal: Absence of new skin breakdown  Description: 1.  Monitor for areas of redness and/or skin breakdown  2.  Assess vascular access sites hourly  3.  Every 4-6 hours minimum:  Change oxygen saturation probe site  4.  Every 4-6 hours:  If on nasal continuous positive airway pressure, respiratory therapy assess nares and determine need for appliance change or resting period.  Outcome: Progressing     Problem: Safety - Adult  Goal: Free from fall injury  Outcome: Progressing     Problem: Physical Therapy - Adult  Goal: By Discharge: Performs mobility at highest level of function for planned discharge setting.  See evaluation for individualized goals.  Description: FUNCTIONAL STATUS PRIOR TO ADMISSION: Patient was modified independent using a rollator for functional mobility.    HOME SUPPORT PRIOR TO ADMISSION: The patient lived alone with friends/family to provide assistance.    Physical Therapy Goals  Initiated 8/3/2024  Pt stated goal: to go home  Pt will be I with LE HEP in 7 days.  Pt will perform bed mobility with [place] in 7 days.  Pt will perform transfers with [place] in 7 days.   Pt will amb 100 feet with LRAD safely with Modified [place] in 7 days.  Pt will ascend/descend 4 steps with B handrail(s) and Contact Guard Assist in 7 days to safely enter/navigate home.   Pt will verbalize and demonstrate compliance with abdominal precautions to include logrolling in 7 days.        8/7/2024 1335 by Jeannette Mcdonough, MIRZA  Outcome: Progressing     
  Problem: Discharge Planning  Goal: Discharge to home or other facility with appropriate resources  Outcome: Progressing  Flowsheets (Taken 8/4/2024 2130)  Discharge to home or other facility with appropriate resources: Identify barriers to discharge with patient and caregiver     Problem: Pain  Goal: Verbalizes/displays adequate comfort level or baseline comfort level  Outcome: Progressing     Problem: Skin/Tissue Integrity  Goal: Absence of new skin breakdown  Description: 1.  Monitor for areas of redness and/or skin breakdown  2.  Assess vascular access sites hourly  3.  Every 4-6 hours minimum:  Change oxygen saturation probe site  4.  Every 4-6 hours:  If on nasal continuous positive airway pressure, respiratory therapy assess nares and determine need for appliance change or resting period.  Outcome: Progressing     Problem: Physical Therapy - Adult  Goal: By Discharge: Performs mobility at highest level of function for planned discharge setting.  See evaluation for individualized goals.  Description: FUNCTIONAL STATUS PRIOR TO ADMISSION: Patient was modified independent using a rollator for functional mobility.    HOME SUPPORT PRIOR TO ADMISSION: The patient lived alone with friends/family to provide assistance.    Physical Therapy Goals  Initiated 8/3/2024  Pt stated goal: to go home  Pt will be I with LE HEP in 7 days.  Pt will perform bed mobility with [place] in 7 days.  Pt will perform transfers with [place] in 7 days.   Pt will amb 100 feet with LRAD safely with Modified [place] in 7 days.  Pt will ascend/descend 4 steps with B handrail(s) and Contact Guard Assist in 7 days to safely enter/navigate home.   Pt will verbalize and demonstrate compliance with abdominal precautions to include logrolling in 7 days.        8/4/2024 1404 by Marie Howell PTA  Outcome: Progressing     
  Problem: Pain  Goal: Verbalizes/displays adequate comfort level or baseline comfort level  8/6/2024 1130 by Layla Kenny RN  Outcome: Progressing  Flowsheets  Taken 8/6/2024 1130 by Layla Kenny RN  Verbalizes/displays adequate comfort level or baseline comfort level:   Assess pain using appropriate pain scale   Encourage patient to monitor pain and request assistance   Administer analgesics based on type and severity of pain and evaluate response   Implement non-pharmacological measures as appropriate and evaluate response   Consider cultural and social influences on pain and pain management  Taken 8/6/2024 0803 by Maria M Rinaldi, RN  Verbalizes/displays adequate comfort level or baseline comfort level:   Encourage patient to monitor pain and request assistance   Assess pain using appropriate pain scale   Administer analgesics based on type and severity of pain and evaluate response  8/6/2024 0320 by Yoly Rolle, RN  Outcome: Progressing     
  Problem: Pain  Goal: Verbalizes/displays adequate comfort level or baseline comfort level  Outcome: Progressing     Problem: Skin/Tissue Integrity  Goal: Absence of new skin breakdown  Description: 1.  Monitor for areas of redness and/or skin breakdown  2.  Assess vascular access sites hourly  3.  Every 4-6 hours minimum:  Change oxygen saturation probe site  4.  Every 4-6 hours:  If on nasal continuous positive airway pressure, respiratory therapy assess nares and determine need for appliance change or resting period.  Outcome: Progressing     
  Problem: Pain  Goal: Verbalizes/displays adequate comfort level or baseline comfort level  Outcome: Progressing     Problem: Skin/Tissue Integrity  Goal: Absence of new skin breakdown  Description: 1.  Monitor for areas of redness and/or skin breakdown  2.  Assess vascular access sites hourly  3.  Every 4-6 hours minimum:  Change oxygen saturation probe site  4.  Every 4-6 hours:  If on nasal continuous positive airway pressure, respiratory therapy assess nares and determine need for appliance change or resting period.  Outcome: Progressing     
  Problem: Safety - Adult  Goal: Free from fall injury  8/11/2024 1003 by Elias Booker RN  Outcome: Progressing  8/11/2024 0122 by Toya Wallace RN  Outcome: Progressing     Problem: Skin/Tissue Integrity  Goal: Absence of new skin breakdown  Description: 1.  Monitor for areas of redness and/or skin breakdown  2.  Assess vascular access sites hourly  3.  Every 4-6 hours minimum:  Change oxygen saturation probe site  4.  Every 4-6 hours:  If on nasal continuous positive airway pressure, respiratory therapy assess nares and determine need for appliance change or resting period.  8/11/2024 1003 by Elias Booker, RN  Outcome: Progressing  8/11/2024 0122 by Toya Wallace RN  Outcome: Progressing     Problem: Pain  Goal: Verbalizes/displays adequate comfort level or baseline comfort level  8/11/2024 1003 by Elias Booker RN  Outcome: Progressing  8/11/2024 0122 by Toya Wallace RN  Outcome: Progressing     Problem: Discharge Planning  Goal: Discharge to home or other facility with appropriate resources  8/11/2024 1003 by Elias Booker RN  Outcome: Progressing  8/11/2024 0122 by Toya Wallace RN  Outcome: Progressing     
steps with B handrail(s) and Contact Guard Assist in 7 days to safely enter/navigate home.   Pt will verbalize and demonstrate compliance with abdominal precautions to include logrolling in 7 days.        Outcome: Progressing     PLAN :  Patient continues to benefit from skilled intervention to address functional impairments. Continue treatment per established plan of care to address goals.    Recommendation for discharge: (in order for the patient to meet his/her long term goals)  Skilled Nursing Facility    IF patient discharges home will need the following DME:continuing to assess with progress     SUBJECTIVE:   Patient stated “This will be my third walk today.”    OBJECTIVE DATA SUMMARY:   Critical Behavior:  Orientation  Overall Orientation Status: Within Normal Limits  Orientation Level: Oriented X4  Cognition  Overall Cognitive Status: WNL  Functional Mobility Training:  Bed Mobility:  Bed Mobility Training  Bed Mobility Training: Yes  Rolling: Independent  Supine to Sit: Independent  Sit to Supine: Independent  Scooting: Independent  Transfers:  Transfer Training  Transfer Training: Yes  Sit to Stand: Supervision  Stand to Sit: Supervision  Balance:  Balance  Sitting: Intact  Standing: Intact  Standing - Static: Good  Standing - Dynamic: Good  Wheelchair Mobility:  Wheelchair Management  Wheelchair Management: No  Ambulation/Gait Training:  Gait  Gait Training: Yes  Overall Level of Assistance: Supervision  Distance (ft): 300 Feet  Assistive Device: Walker, rollator     Pain Ratin/10 reported    Activity Tolerance:   Good    After treatment patient left in no apparent distress:   Bed locked and returned to lowest position, Patient left in no apparent distress in bed and Call bell within reach, and nsg updated     COMMUNICATION/COLLABORATION:   The patient’s plan of care was discussed with: Registered nurse    Patient Education  Education Given To: Patient  Education Provided: Role of Therapy;Plan of 
[x] [] [] []    Glut Sets  12 [x] [] [] []    [place] Sets   [] [] [] []    Short Arc Quads   [] [] [] []    Heel Slides   [] [] [] []    Straight Leg Raises   [] [] [] []    Hip abd/add   [] [] [] []    Long Arc Quads  12 [x] [] [] []    Marching  12 [x] [] [] []    Seated HR/TR   [] [] [] []       [] [] [] []       Pain Ratin10 reported    Activity Tolerance:   Good and Fair     After treatment patient left in no apparent distress:   Bed locked and returned to lowest position, Patient left in no apparent distress in bed and Call bell within reach, and nsg updated     COMMUNICATION/COLLABORATION:   The patient’s plan of care was discussed with: Registered nurse    Patient Education  Education Given To: Patient  Education Provided: Role of Therapy;Plan of Care;Precautions;Transfer Training;Equipment;Home Exercise Program;Energy Conservation;Orientation  Education Method: Demonstration;Verbal  Education Outcome: Verbalized understanding;Demonstrated understanding    Amy Kennedy PTA  Minutes: 17  
assistance;Stand-by assistance  Distance (ft): 200 Feet (100ft x2)  Assistive Device: Walker, rolling;Gait belt  Interventions: Verbal cues  Speed/Leonor: Slow;Shuffled  Step Length: Left shortened;Right shortened  Gait Abnormalities: Step to gait                     Therapeutic Exercises:       EXERCISE   Sets   Reps   Active Active Assist   Passive Self ROM   Comments   Ankle Pumps 1 15 [x] [] [] []    Quad Sets/Glut Sets   [] [] [] []    Hamstring Sets   [] [] [] []    Short Arc Quads   [] [] [] []    Heel Slides   [] [] [] []    Straight Leg Raises   [] [] [] []    Hip abd/add 1 15 [x] [] [] []    Long Arc Quads 1 15 [x] [] [] []    Marching 1 15 [x] [] [] []    Seated HR/TR   [] [] [] []       [] [] [] []       Pain Ratin/10 RLE and abdomen reported  Pain Intervention(s):   patient medicated for pain prior to session    Activity Tolerance:   Fair  and requires rest breaks    After treatment patient left in no apparent distress:   Bed locked and returned to lowest position, Patient left in no apparent distress sitting up in chair and Call bell within reach, and nsg updated     COMMUNICATION/COLLABORATION:   The patient’s plan of care was discussed with: Registered nurse    Patient Education  Education Given To: Patient  Education Provided: Role of Therapy;Plan of Care;Precautions;Transfer Training;Orientation;Equipment  Education Method: Demonstration;Verbal;Teach Back  Barriers to Learning: None  Education Outcome: Verbalized understanding;Demonstrated understanding      Marie Howell PTA  Minutes: 32           
much difficulty does the patient currently have... Unable A Lot A Little None   1.  Turning over in bed (including adjusting bedclothes, sheets and blankets)?   [] 1   [] 2   [x] 3   [] 4   2.  Sitting down on and standing up from a chair with arms ( e.g., wheelchair, bedside commode, etc.)   [] 1   [] 2   [x] 3   [] 4   3.  Moving from lying on back to sitting on the side of the bed?   [] 1   [] 2   [x] 3   [] 4          How much help from another person does the patient currently need... Total A Lot A Little None   4.  Moving to and from a bed to a chair (including a wheelchair)?   [] 1   [] 2   [x] 3   [] 4   5.  Need to walk in hospital room?   [] 1   [x] 2   [] 3   [] 4   6.  Climbing 3-5 steps with a railing?   [] 1   [x] 2   [] 3   [] 4   © , Trustees of [facility], under license to [employer]. All rights reserved     Score:  Initial:  Most Recent: X (Date: 8/3/2024 )   Interpretation of Tool:  Represents activities that are increasingly more difficult (i.e. Bed mobility, Transfers, Gait).  Score 24 23 22-20 19-15 14-10 9-7 6   Modifier CH CI CJ CK CL CM CN         Physical Therapy Evaluation Charge Determination   History Examination Presentation Decision-Making   MEDIUM  Complexity : 1-2 comorbidities / personal factors will impact the outcome/ POC  MEDIUM Complexity : 3 Standardized tests and measures addressing body structure, function, activity limitation and / or participation in recreation  MEDIUM Complexity : Evolving with changing characteristics  Other outcome measures [facility] 6  MEDIUM      Based on the above components, the patient evaluation is determined to be of the following complexity level: MEDIUM    Pain Ratin/10 right thorax and right LE  Pain Intervention(s):       Activity Tolerance:   Poor    After treatment patient left in no apparent distress:   Bed locked and in lowest position Patient left in no apparent distress sitting up in chair and Call bell within reach

## 2024-08-13 ENCOUNTER — TELEPHONE (OUTPATIENT)
Age: 64
End: 2024-08-13

## 2024-08-13 LAB
BACTERIA SPEC CULT: NORMAL
BACTERIA SPEC CULT: NORMAL
GRAM STN SPEC: NORMAL
GRAM STN SPEC: NORMAL
Lab: NORMAL

## 2024-08-13 NOTE — PROGRESS NOTES
Pharmacy Note - Extended Infusion Beta-Lactam Adjustment    Piperacillin/Tazobactam 3375mg Q6h for treatment of Community acquired pneumonia. Per [facility] Extended Infusion Beta-Lactam Policy, piperacillin/tazobactam will be changed to 4500mg loading dose followed by 4500mg Q8h extended infusion due to BMI >/= 40    Estimated Creatinine Clearance: Estimated Creatinine Clearance: 80 mL/min (based on SCr of 1.01 mg/dL).    BMI: Body mass index is 40.9 kg/m².    Please call with any questions.    Thank you,    ROEL LORENZO, [facility]    
  Physician Progress Note      PATIENT:               TESSA BARRETO  CSN #:                  [id number]  :                       1960  ADMIT DATE:       2024 2:50 PM  DISCH DATE:        2024 1:48 PM  RESPONDING  PROVIDER #:        Tommie Pineda MD          QUERY TEXT:    Patient admitted with mesenteric vein thrombosis. Noted to have dietician   assessment with malnutrition diagnosis in  note. If possible, please   document in progress notes and discharge summary if you are evaluating and /or   treating any of the following:    The medical record reflects the following:  Risk Factors: 64 yo female with mesenteric vein thrombosis, acute   cholecystitis, cellulitis  Clinical Indicators: Albumin 1.9;  Nutritional Consult:  Severe malnutrition-    Greater than 5% weight loss over 1 month  Treatment: Add high protein supplement BID    Thank you,  Tammy Cornoy RN, CCDS    ASPEN Criteria:    https://aspenjournals.onlinelibrary.bergeron.com/doi/full/10.1177/[email or web address]  5  Options provided:  -- Protein calorie malnutrition severe  -- Other - I will add my own diagnosis  -- Disagree - Not applicable / Not valid  -- Disagree - Clinically unable to determine / Unknown  -- Refer to Clinical Documentation Reviewer    PROVIDER RESPONSE TEXT:    This patient has severe protein calorie malnutrition.    Query created by: Tammy Conroy on 2024 10:16 AM      Electronically signed by:  Tommie Pineda MD 2024 7:38 AM          
4 Eyes Skin Assessment     NAME:  Maddie Goldberg  YOB: 1960  MEDICAL RECORD NUMBER:  [id number]    The patient is being assessed for  Transfer to New Unit    I agree that at least one RN has performed a thorough Head to Toe Skin Assessment on the patient. ALL assessment sites listed below have been assessed.      Areas assessed by both nurses:    Head, Face, Ears, Shoulders, Back, Chest, Arms, Elbows, Hands, Sacrum. Buttock, Coccyx, Ischium, Legs. Feet and Heels, and Under Medical Devices         Does the Patient have a Wound? Yes wound(s) were present on assessment. LDA wound assessment was Initiated and completed by RN     Abd incision   Rashaad Prevention initiated by RN: Yes  Wound Care Orders initiated by RN: No    Pressure Injury (Stage 3,4, Unstageable, DTI, NWPT, and Complex wounds) if present, place Wound referral order by RN under : No    New Ostomies, if present place, Ostomy referral order under : No     Nurse 1 eSignature: Electronically signed by Jill Koch RN on 8/8/24 at 1:50 AM EDT    **SHARE this note so that the co-signing nurse can place an eSignature**    Nurse 2 eSignature: Electronically signed by Pat Romero RN on 8/8/24 at 2:49 AM EDT   
4 Eyes Skin Assessment     NAME:  Maddie Goldberg  YOB: 1960  MEDICAL RECORD NUMBER:  [id number]    The patient is being assessed for  Admission    I agree that at least one RN has performed a thorough Head to Toe Skin Assessment on the patient. ALL assessment sites listed below have been assessed.      Areas assessed by both nurses:    Head, Face, Ears, Shoulders, Back, Chest, Arms, Elbows, Hands, Sacrum. Buttock, Coccyx, Ischium, Legs. Feet and Heels, and Under Medical Devices         Does the Patient have a Wound? Yes wound(s) were present on assessment. LDA wound assessment was Initiated and completed by RN (Abdominal wound, two ostomies on the abd)  Rashaad Prevention initiated by RN: Yes  Wound Care Orders initiated by RN: Yes    Pressure Injury (Stage 3,4, Unstageable, DTI, NWPT, and Complex wounds) if present, place Wound referral order by RN under : No    New Ostomies, if present place, Ostomy referral order under : Yes     Nurse 1 eSignature: Electronically signed by Sierra Ojeda RN on 8/1/24 at 10:33 PM EDT    **SHARE this note so that the co-signing nurse can place an eSignature**    Nurse 2 eSignature: Electronically signed by DUANE FRIEDMAN RN on 8/2/24 at 1:16 AM EDT   
Chief Complaint:    Abdominal pain    Subjective:  Ms. Maddie Goldberg is a 63 y.o. year old * female admitted for abdominal pain.  Patient has past medical history of GERD, hypertension, thyroid disease, morbid obesity, arthritis.  Patient had cholecystotomy tube inserted by IR on August 9, 2024.    Patient was seen and is complaining of belly pain.  She is having drainage from cholecystotomy tube with no evidence of swelling, infection, streaking or any other signs of infection.  No other acute symptoms at this time.    In no acute distress. Still has some belly pain but overall is able to walk around.      Review of Systems:   Constitutional:  no fever, no chills,  no sweats, No weakness, No fatigue, No decreased activity.  Respiratory: No shortness of breath, No cough, No sputum production, No hemoptysis, No wheezing, No cyanosis.  Cardiovascular: No chest pain, No palpitations, No bradycardia, No tachycardia, No peripheral edema, No syncope.  Gastrointestinal: Abdominal pain, no nausea, No vomiting, No diarrhea, No constipation, No heartburn,   Genitourinary: No dysuria, No hematuria, No change in urine stream, No urethral discharge, No lesions.  Hematology/Lymphatics: No bruising tendency, No bleeding tendency, No petechiae, No swollen lymph glands.  Endocrine: No excessive thirst, No polyuria, No cold intolerance, No heat intolerance, No excessive hunger.  Musculoskeletal: No back pain, No neck pain, No joint pain, No muscle pain, No claudication, No decreased range of motion, No trauma.  Integumentary: No rash, No pruritus, No abrasions.  Neurologic: Alert and oriented X4, No abnormal balance, No headache, No confusion, No numbness, No tingling.  Psychiatric: No anxiety, No depression, No jigna.      Physical Exam:     Vitals & Measurements:    Wt Readings from Last 3 Encounters:   08/11/24 121.4 kg (267 lb 9.6 oz)   07/15/24 121.1 kg (267 lb)   06/26/24 129.2 kg (284 lb 13.4 oz)     Temp Readings from Last 3 
Comprehensive Nutrition Assessment    Type and Reason for Visit:  Initial, RD Nutrition Re-Screen/LOS    Nutrition Recommendations/Plan:   Continue diet per general surgery recs  Add high protein supplement BID  RD to monitor weight, po intake, labs, GI function     Malnutrition Assessment:  Malnutrition Status:  Severe malnutrition (08/07/24 1544)    Context:  Acute Illness     Findings of the 6 clinical characteristics of malnutrition:  Energy Intake:  50% or less of estimated energy requirements for 5 or more days  Weight Loss:  Greater than 5% over 1 month     Body Fat Loss:  Unable to assess     Muscle Mass Loss:  Unable to assess    Fluid Accumulation:  Unable to assess     Strength:  Not Performed    Nutrition Assessment:    Pt assessed for LOS. Pt admitted for mesenteric vein thrombosis, awaiting percutaneous cholecystostomy tube insertion. Pt currently ordered a regular diet. Meds: lipitor, neuroontin, synthroid, lisinopril. Labs: K 3.4 L, Glucose 119 H.    Nutrition Related Findings:    PO intakes 0%. No BM documented this admission. Nausea improved. Wound Type: None       Current Nutrition Intake & Therapies:    Average Meal Intake: 0%  Average Supplements Intake: None Ordered  ADULT DIET; Regular    Anthropometric Measures:  Height: 172.7 cm (5' 8\")  Ideal Body Weight (IBW): 140 lbs (64 kg)       Current Body Weight: 120.7 kg (266 lb), 190 % IBW. Weight Source: Bed Scale  Current BMI (kg/m2): 40.5     Weight Adjustment For: No Adjustment     BMI Categories: Obese Class 3 (BMI 40.0 or greater)  Last Weight Metrics:      8/7/2024     3:27 PM 8/6/2024     6:00 AM 8/3/2024     6:00 AM 8/2/2024     6:00 AM 8/1/2024     2:48 PM 7/15/2024     3:02 PM 6/26/2024     6:00 AM   Weight Loss Metrics   Height 5' 8\"    5' 8\" 5' 8\"    Weight - Scale  266 lbs 10 oz 274 lbs 268 lbs 15 oz 268 lbs 267 lbs 284 lbs 13 oz   BMI (Calculated)  40.6 kg/m2 41.7 kg/m2 41 kg/m2 40.8 kg/m2 40.7 kg/m2 43.4 kg/m2      Estimated 
Heparin Infusion Initiation  Maddie Goldberg is a 63 y.o. female starting heparin for:  VTE  Heparin dosing: order for weight based protocol  Initial Dosing Weight: 121.6 kg (Recorded body weight)  Recent Labs     08/01/24  1515   *   HGB 9.5*     Factor Xa inhibitor/LMWH use within the past 72 hours? No, patient reports the last dose of Lovenox was ~7/10/24.    Hypertriglyceridemia (> 690 mg/dL) or hyperbilirubinemia (> 37 mg/dL conjugated bilirubin, >14 mg/dL unconjugated bilirubin) present? No  Recent Labs     08/01/24  1515   BILITOT 0.7       Assessment/Plan:   Heparin to be monitored using anti-Xa for duration of therapy  Initial bolus ordered: Yes  Starting rate:  17 unit/kg/hr  PRN boluses entered: Yes      
Heparin gtt handoff with Jill SLADE, rate and units/kg/hr verified in order and on IV pump.  
LAURENT from Dr Pineda to restart heparin gtt with ordered bolus from AM. RN placed order for baseline Xa and PTT and will initiate order as soon as labs are drawn.  
PROGRESS NOTE      Chief Complaints:  No new issues overnight.  HPI and  Objective:    Patient did ambulate.  No fever.  Denies chest pain shortness of breath.  Review of Systems:  Rest of review of system reviewed personally and they are negative.    EXAM:  /68   Pulse 77   Temp 98.6 °F (37 °C) (Oral)   Resp 18   Ht 1.727 m (5' 8\")   Wt 124.3 kg (274 lb)   SpO2 97%   BMI 41.66 kg/m²     Patient is awake   Head and neck atraumatic, normocephalic.  ENT: No hoarse voice, gaze appropriate.  Cardiac system regular rate rhythm.  Pulmonary no audible wheeze, no cyanosis.  Chest wall excursion normal with respiration cycle  Abdomen is soft not particularly distended.  Neurologically nonfocal.  Hematology system: No bruising noted.  Musculoskeletal system: No joint deformity noted.  Genitourinary system: No hematuria noted.  Skin is warm and moist.  Psychosocial: Cooperative.  Vascular examination as previously noted no changes.    Current Facility-Administered Medications   Medication Dose Route Frequency Provider Last Rate Last Admin    apixaban (ELIQUIS) tablet 10 mg  10 mg Oral BID Tommie Pineda MD   10 mg at 08/04/24 1058    Followed by    [START ON 8/11/2024] apixaban (ELIQUIS) tablet 5 mg  5 mg Oral BID Tommie Pineda MD        piperacillin-tazobactam (ZOSYN) 4,500 mg in sodium chloride 0.9 % 100 mL IVPB (mini-bag)  4,500 mg IntraVENous Q8H Tommie Pineda MD 25 mL/hr at 08/04/24 1409 4,500 mg at 08/04/24 1409    ipratropium 0.5 mg-albuterol 2.5 mg (DUONEB) nebulizer solution 1 Dose  1 Dose Inhalation Q6H PRN Tommie Pineda MD   1 Dose at 08/03/24 0629    sodium chloride flush 0.9 % injection 5-40 mL  5-40 mL IntraVENous 2 times per day Tommie Pineda MD   10 mL at 08/04/24 0936    sodium chloride flush 0.9 % injection 5-40 mL  5-40 mL IntraVENous PRN Tommie Pineda MD        0.9 % sodium chloride infusion   IntraVENous PRN Tommie Pineda MD        potassium chloride (KLOR-CON M) extended release 
PROGRESS NOTE      Chief Complaints:  No new issues.  HPI and  Objective:    Cholecystostomy tube is draining.  No fever or chills.  Patient looks comfortable.  Review of Systems:  Rest of review of system reviewed personally and they are negative.    EXAM:  /81   Pulse 77   Temp 97.5 °F (36.4 °C) (Oral)   Resp 17   Ht 1.727 m (5' 8\")   Wt 121.4 kg (267 lb 9.6 oz)   SpO2 100%   BMI 40.69 kg/m²     Patient is awake   Head and neck atraumatic, normocephalic.  ENT: No hoarse voice, gaze appropriate.  Cardiac system regular rate rhythm.  Pulmonary no audible wheeze, no cyanosis.  Chest wall excursion normal with respiration cycle  Abdomen is soft not particularly distended.  Neurologically nonfocal.  Hematology system: No bruising noted.  Musculoskeletal system: No joint deformity noted.  Genitourinary system: No hematuria noted.  Skin is warm and moist.  Psychosocial: Cooperative.  Vascular examination as previously noted no changes.    Current Facility-Administered Medications   Medication Dose Route Frequency Provider Last Rate Last Admin    apixaban (ELIQUIS) tablet 10 mg  10 mg Oral BID Tommie Pineda MD   10 mg at 08/11/24 2104    Followed by    [START ON 8/17/2024] apixaban (ELIQUIS) tablet 5 mg  5 mg Oral BID Tommie Pineda MD        HYDROmorphone (DILAUDID) tablet 2 mg  2 mg Oral Q4H PRN Javi Lopez MD   2 mg at 08/12/24 0509    ipratropium 0.5 mg-albuterol 2.5 mg (DUONEB) nebulizer solution 1 Dose  1 Dose Inhalation Q6H PRN Tommie Pineda MD   1 Dose at 08/03/24 0629    sodium chloride flush 0.9 % injection 5-40 mL  5-40 mL IntraVENous 2 times per day Tommie Pineda MD   10 mL at 08/11/24 2105    sodium chloride flush 0.9 % injection 5-40 mL  5-40 mL IntraVENous PRN Tommie Pineda MD        0.9 % sodium chloride infusion   IntraVENous PRN Tommie Pineda MD        potassium chloride (KLOR-CON M) extended release tablet 40 mEq  40 mEq Oral PRN Tommie Pineda MD        Or    
PROGRESS NOTE      Chief Complaints:  Patient examined this morning.  HPI and  Objective:    Nausea is better.  No fever.  Right side abdomen is better as well.  Review of Systems:  Rest of review of system reviewed personally and they are negative.    EXAM:  BP (!) 104/50   Pulse 85   Temp 98.8 °F (37.1 °C) (Oral)   Resp 18   Ht 1.727 m (5' 8\")   Wt 120.9 kg (266 lb 9.6 oz)   SpO2 99%   BMI 40.54 kg/m²     Patient is awake   Head and neck atraumatic, normocephalic.  ENT: No hoarse voice, gaze appropriate.  Cardiac system regular rate rhythm.  Pulmonary no audible wheeze, no cyanosis.  Chest wall excursion normal with respiration cycle  Abdomen is soft not particularly distended.  Neurologically nonfocal.  Hematology system: No bruising noted.  Musculoskeletal system: No joint deformity noted.  Genitourinary system: No hematuria noted.  Skin is warm and moist.  Psychosocial: Cooperative.  Vascular examination as previously noted no changes.    Current Facility-Administered Medications   Medication Dose Route Frequency Provider Last Rate Last Admin    heparin 25,000 unit in sodium chloride 0.45% 250 mL (premix) infusion  5-30 Units/kg/hr IntraVENous Continuous Tommie Pineda MD        ketorolac (TORADOL) injection 30 mg  30 mg IntraVENous Q6H PRN Tommie Pineda MD   30 mg at 08/06/24 1819    ipratropium 0.5 mg-albuterol 2.5 mg (DUONEB) nebulizer solution 1 Dose  1 Dose Inhalation Q6H PRN Tommie Pineda MD   1 Dose at 08/03/24 0629    sodium chloride flush 0.9 % injection 5-40 mL  5-40 mL IntraVENous 2 times per day Tommie Pineda MD   10 mL at 08/06/24 2027    sodium chloride flush 0.9 % injection 5-40 mL  5-40 mL IntraVENous PRN Tommie Pineda MD        0.9 % sodium chloride infusion   IntraVENous PRN Tommie Pineda MD        potassium chloride (KLOR-CON M) extended release tablet 40 mEq  40 mEq Oral PRN Tommie Pineda MD        Or    potassium bicarb-citric acid (EFFER-K) effervescent tablet 40 mEq  
PROGRESS NOTE      Chief Complaints:  Patient examined this morning.  HPI and  Objective:    Patient states nausea is better.  No fever.  I also changed the dressing this morning.  Cellulitis lower abdomen improving.  Review of Systems:  Rest of review of system reviewed personally and they are negative.    EXAM:  BP (!) 116/59   Pulse 76   Temp 98.3 °F (36.8 °C) (Oral)   Resp 19   Ht 1.727 m (5' 8\")   Wt 120.9 kg (266 lb 9.6 oz)   SpO2 95%   BMI 40.54 kg/m²     Patient is awake   Head and neck atraumatic, normocephalic.  ENT: No hoarse voice, gaze appropriate.  Cardiac system regular rate rhythm.  Pulmonary no audible wheeze, no cyanosis.  Chest wall excursion normal with respiration cycle  Abdomen is soft not particularly distended.  Neurologically nonfocal.  Hematology system: No bruising noted.  Musculoskeletal system: No joint deformity noted.  Genitourinary system: No hematuria noted.  Skin is warm and moist.  Psychosocial: Cooperative.  Vascular examination as previously noted no changes.    Current Facility-Administered Medications   Medication Dose Route Frequency Provider Last Rate Last Admin    ketorolac (TORADOL) injection 30 mg  30 mg IntraVENous Q6H PRN Tommie Pineda MD   30 mg at 08/06/24 1105    apixaban (ELIQUIS) tablet 10 mg  10 mg Oral BID Tommie Pineda MD   10 mg at 08/06/24 0830    Followed by    [START ON 8/11/2024] apixaban (ELIQUIS) tablet 5 mg  5 mg Oral BID Tommie Pineda MD        piperacillin-tazobactam (ZOSYN) 4,500 mg in sodium chloride 0.9 % 100 mL IVPB (mini-bag)  4,500 mg IntraVENous Q8H Tommie Pineda MD 25 mL/hr at 08/06/24 1319 4,500 mg at 08/06/24 1319    ipratropium 0.5 mg-albuterol 2.5 mg (DUONEB) nebulizer solution 1 Dose  1 Dose Inhalation Q6H PRN Tommie Pineda MD   1 Dose at 08/03/24 0629    sodium chloride flush 0.9 % injection 5-40 mL  5-40 mL IntraVENous 2 times per day Tommie Pineda MD   10 mL at 08/06/24 0831    sodium chloride flush 0.9 % injection 
PT treatment session attempted at 0857, however pt declined tx session at this time due to just having received pain medicine. Will continue to check on pt and see them for treatment session at a later time. Thank you.   
PT treatment session attempted at 1115, however pt declined due to just coming back from a test and requested some time to rest. Will continue to check on pt and see them for treatment session at a later time. Thank you.     PT treatment session attempted at 1415, however pt declined mobility stating that she is not feeling well and in a lot of pain. Will continue to check on pt and see them for treatment session at a later time. Thank you.   
PT tx attempted at 0948 however pt tearful and in visible pain, declines to participate with PT at this time stating \"I can't right now. I'm in too much pain\". Will continue to follow patient and attempt PT at a later time. Thank you.     
Patient medicated prior to leaving for discharge and dressing change. Discharge instructions explained. Additional wound care supplies given until seen by Wound Nurse. Patient states nurse will be out to see her in the am. Patient education done this am with Surgical Team RN on how to flush LIONEL bag. NS syringes given to assist with flushing until supplies sent home.   
RN clarified with Dr Pineda at 0827 for heparin gtt pause. RN got in report that MD manually stopped infusion on night shift, RN noted that there is a new order for heparin push and gtt. Dr Pineda TORB to not give heparin and do not restart gtt d/t patient bleeding from colostomy.  
Spiritual Health Assessment/Progress Note  [facility]    Initial Encounter,  ,  ,      Name: Maddie Goldberg MRN: [id number]    Age: 63 y.o.     Sex: female   Language: English   Rastafari: None   Mesenteric vein thrombosis (HCC)     Date: 8/7/2024            Total Time Calculated: 9 min              Spiritual Assessment began in [facility]        Referral/Consult From: Rounding   Encounter Overview/Reason: Initial Encounter  Service Provided For: Patient    Florence, Belief, Meaning:   Patient is connected with a florence tradition or spiritual practice and Other: Religious  Family/Friends No family/friends present      Importance and Influence:  Patient has spiritual/personal beliefs that influence decisions regarding their health  Family/Friends no family/friends present    Community:  Patient feels well-supported. Support system includes: Friends and Extended family  Family/Friends Other: None    Assessment and Plan of Care:     Patient Interventions include: Facilitated expression of thoughts and feelings and Explored spiritual coping/struggle/distress and theological reflection  Family/Friends Interventions include: Other: None    Patient Plan of Care: Spiritual Care available upon further referral  Family/Friends Plan of Care: Other: None    Electronically signed by Chaplain Fernando on 8/7/2024 at 1:08 PM    Narrative:  attempted to completed spiritual assessment. Conferred with RNTracy and reviewed chart. Engaged in brief conversation with the pt. She reports she is experiencing severe pain and wishes to end the visit.    
   potassium chloride (KLOR-CON M) extended release tablet 40 mEq  40 mEq Oral PRN Tommie Pineda MD        Or    potassium bicarb-citric acid (EFFER-K) effervescent tablet 40 mEq  40 mEq Oral PRN Tommie Pineda MD   40 mEq at 08/03/24 0416    Or    potassium chloride 10 mEq/100 mL IVPB (Peripheral Line)  10 mEq IntraVENous PRN Tommie Pineda MD        magnesium sulfate 2000 mg in 50 mL IVPB premix  2,000 mg IntraVENous PRN Tommie Pineda MD        ondansetron (ZOFRAN-ODT) disintegrating tablet 4 mg  4 mg Oral Q8H PRN Tommie Pineda MD        Or    ondansetron (ZOFRAN) injection 4 mg  4 mg IntraVENous Q6H PRN Tommie Pineda MD        polyethylene glycol (GLYCOLAX) packet 17 g  17 g Oral Daily PRN Tommie Pineda MD        acetaminophen (TYLENOL) tablet 650 mg  650 mg Oral Q6H PRN Tommie Pineda MD   650 mg at 08/05/24 0912    Or    acetaminophen (TYLENOL) suppository 650 mg  650 mg Rectal Q6H PRN Tommie Pineda MD        atorvastatin (LIPITOR) tablet 20 mg  20 mg Oral Daily Tommie Pineda MD   20 mg at 08/08/24 0837    gabapentin (NEURONTIN) capsule 300 mg  300 mg Oral Nightly Tommie Pineda MD   300 mg at 08/08/24 2133    levothyroxine (SYNTHROID) tablet 150 mcg  150 mcg Oral QAM AC Tommie Pineda MD   150 mcg at 08/08/24 0631    lisinopril (PRINIVIL;ZESTRIL) tablet 5 mg  5 mg Oral Daily Tommie Pineda MD   5 mg at 08/08/24 0837    ondansetron (ZOFRAN) injection 4 mg  4 mg IntraVENous Q6H PRN Tommie Pineda MD   4 mg at 08/05/24 0917    potassium chloride (KLOR-CON M) extended release tablet 40 mEq  40 mEq Oral PRN Tommie Pineda MD   40 mEq at 08/07/24 1039    HYDROmorphone HCl PF (DILAUDID) injection 1 mg  1 mg IntraVENous Q4H PRN Tommie Pineda MD   1 mg at 08/09/24 0535           Recent Results (from the past 24 hour(s))   Heparin, Anti-Xa    Collection Time: 08/08/24  2:49 PM   Result Value Ref Range    Heparin Xa,LMWH and Unfrac 0.55 IU/mL   APTT    Collection Time: 08/08/24  2:49 PM   Result 
40 mEq  40 mEq Oral PRN Tommie Pineda MD        Or    potassium bicarb-citric acid (EFFER-K) effervescent tablet 40 mEq  40 mEq Oral PRN Tommie Pineda MD   40 mEq at 08/03/24 0416    Or    potassium chloride 10 mEq/100 mL IVPB (Peripheral Line)  10 mEq IntraVENous PRN Tommie Pineda MD        magnesium sulfate 2000 mg in 50 mL IVPB premix  2,000 mg IntraVENous PRN Tommie Pineda MD        ondansetron (ZOFRAN-ODT) disintegrating tablet 4 mg  4 mg Oral Q8H PRN Tommie Pineda MD        Or    ondansetron (ZOFRAN) injection 4 mg  4 mg IntraVENous Q6H PRN Tommie Pineda MD        polyethylene glycol (GLYCOLAX) packet 17 g  17 g Oral Daily PRN Tommie Pineda MD        acetaminophen (TYLENOL) tablet 650 mg  650 mg Oral Q6H PRN Tommie Pineda MD        Or    acetaminophen (TYLENOL) suppository 650 mg  650 mg Rectal Q6H PRN Tommie Pineda MD        atorvastatin (LIPITOR) tablet 20 mg  20 mg Oral Daily Tommie Pineda MD   20 mg at 08/04/24 0935    gabapentin (NEURONTIN) capsule 300 mg  300 mg Oral Nightly Tommie Pineda MD   300 mg at 08/04/24 2134    levothyroxine (SYNTHROID) tablet 150 mcg  150 mcg Oral QAM AC Tommie Pineda MD   150 mcg at 08/05/24 0611    lisinopril (PRINIVIL;ZESTRIL) tablet 5 mg  5 mg Oral Daily Tommie Pineda MD        ondansetron (ZOFRAN) injection 4 mg  4 mg IntraVENous Q6H PRN Tommie Pineda MD   4 mg at 08/05/24 0246    potassium chloride (KLOR-CON M) extended release tablet 40 mEq  40 mEq Oral PRN Tommie Pineda MD        HYDROmorphone HCl PF (DILAUDID) injection 1 mg  1 mg IntraVENous Q4H PRN Tommie Pineda MD   1 mg at 08/05/24 0647           No results found for this or any previous visit (from the past 24 hour(s)).    ASSESSMENT:   Patient is 63 y.o. with diagnosis of : Principal Problem:    Mesenteric vein thrombosis (HCC)  Active Problems:    Abdominal pain  Resolved Problems:    * No resolved hospital problems. *      PLAN:                 Will get CT scan of the pelvis 
Tommie Rodriguez MD   10 mL at 08/02/24 2122    sodium chloride flush 0.9 % injection 5-40 mL  5-40 mL IntraVENous PRN Tommie Pineda MD        0.9 % sodium chloride infusion   IntraVENous PRN Tommie Pineda MD        potassium chloride (KLOR-CON M) extended release tablet 40 mEq  40 mEq Oral PRN Tommie Pineda MD        Or    potassium bicarb-citric acid (EFFER-K) effervescent tablet 40 mEq  40 mEq Oral PRN Tommie Pineda MD   40 mEq at 08/03/24 0416    Or    potassium chloride 10 mEq/100 mL IVPB (Peripheral Line)  10 mEq IntraVENous PRN Tommie Pineda MD        magnesium sulfate 2000 mg in 50 mL IVPB premix  2,000 mg IntraVENous PRN Tommie Pineda MD        ondansetron (ZOFRAN-ODT) disintegrating tablet 4 mg  4 mg Oral Q8H PRN Tommie Pineda MD        Or    ondansetron (ZOFRAN) injection 4 mg  4 mg IntraVENous Q6H PRN Tommie Pineda MD        polyethylene glycol (GLYCOLAX) packet 17 g  17 g Oral Daily PRN Tommie Pineda MD        acetaminophen (TYLENOL) tablet 650 mg  650 mg Oral Q6H PRN Tommie Pineda MD        Or    acetaminophen (TYLENOL) suppository 650 mg  650 mg Rectal Q6H PRN Tommie Pineda MD        0.9 % sodium chloride infusion   IntraVENous Continuous Tommie Pineda MD 75 mL/hr at 08/01/24 2213 New Bag at 08/01/24 2213    atorvastatin (LIPITOR) tablet 20 mg  20 mg Oral Daily Tommie Pineda MD   20 mg at 08/02/24 1117    gabapentin (NEURONTIN) capsule 300 mg  300 mg Oral Nightly Tommie Pineda MD   300 mg at 08/02/24 2122    levothyroxine (SYNTHROID) tablet 150 mcg  150 mcg Oral QAM AC Tommie Pineda MD   150 mcg at 08/03/24 0549    lisinopril (PRINIVIL;ZESTRIL) tablet 5 mg  5 mg Oral Daily Tommie Pineda MD        ondansetron (ZOFRAN) injection 4 mg  4 mg IntraVENous Q6H PRN Tommie Pineda MD        potassium chloride (KLOR-CON M) extended release tablet 40 mEq  40 mEq Oral PRN Tommie Pineda MD        HYDROmorphone HCl PF (DILAUDID) injection 1 mg  1 mg IntraVENous Q4H PRN Tommie Pineda, 
- 14.6 sec    INR 1.2 (H) 0.9 - 1.1     APTT    Collection Time: 08/08/24  8:59 AM   Result Value Ref Range    APTT 34.8 (H) 21.2 - 34.1 sec    Therapeutic Range   82 - 109 sec   Heparin, Anti-Xa    Collection Time: 08/08/24  2:49 PM   Result Value Ref Range    Heparin Xa,LMWH and Unfrac 0.55 IU/mL   APTT    Collection Time: 08/08/24  2:49 PM   Result Value Ref Range    APTT 33.5 21.2 - 34.1 sec    Therapeutic Range   82 - 109 sec       ASSESSMENT:   Patient is 63 y.o. with diagnosis of : Principal Problem:    Mesenteric vein thrombosis (HCC)  Active Problems:    Abdominal pain  Resolved Problems:    * No resolved hospital problems. *      PLAN:                 Will hold heparin drip for now restart lower dose.  Patient anticipated cholecystostomy tube placement tomorrow.  And we will suspend heparin drip tomorrow morning prior to procedure.    I changed wound today.  Wounds are clean.    PT OT.    Will continue monitor.  
care and monitor cholecystotomy tube.     Thank you for allowing me to participate in the care of this patient.

## 2024-08-13 NOTE — TELEPHONE ENCOUNTER
Received a referral from the hospital on this patient tried to schedule and appointment and patient said she would have to call me back so she can set up her transportation.  Referral for abdominal pain.

## 2024-08-15 NOTE — ADT AUTH CERT
on 8/1/2024 at 7:10 p.m. by Dr. Pearl.    [id number]   Incidental and/or nonemergent findings are as described above.           Electronically signed by EVONNE PEARL             Assessment:  Problem List Items Addressed This Visit                  Circulatory     * (Principal) Mesenteric vein thrombosis (HCC) - Primary          Other     Abdominal pain      Other Visit Diagnoses         Pain of right lower extremity         Relevant Orders     Vascular duplex lower extremity venous bilateral (Completed)                Plan:     Admission  Diet   IV fluids  SCD  Pain medications  Antibiotics  Nausea medication  Labs & Radiology  Consult  10. Plan discussed with patient and family and answered all their questions.  11.  Continue wound care and monitor cholecystotomy tube.      Thank you for allowing me to participate in the care of this patient.                            Electronically signed by Kendall Davis at 8/10/2024  9:43 PM  Electronically signed by Kendall Davis at 8/10/2024  9:48 PM  Electronically signed by Javi Lopez MD at 8/10/2024 10:10 PM         ED to Hosp-Admission (Discharged) on 8/1/2024            Revision History            Detailed Report          Note shared with patient

## 2024-08-19 ENCOUNTER — TELEPHONE (OUTPATIENT)
Age: 64
End: 2024-08-19

## 2024-08-19 NOTE — TELEPHONE ENCOUNTER
I called and spoke with Maddie Goldberg 2 patient identifiers used. I informed her that  gave her pain medication last Monday and she would need to discuss this with him at her follow up appointment. I informed her that I would send a message to  to ask him about the Prednisone and get back with her. I did inform her that he is in surgery so it may not be until tomorrow. She understood and states that is fine.

## 2024-08-19 NOTE — TELEPHONE ENCOUNTER
Patient called today she wanted to know if it was safe for her to take prednisone and she is requesting a refill on oxycodone please advise. Call back number is [phone]

## 2024-08-20 NOTE — TELEPHONE ENCOUNTER
would like for the patient to hold off on the Prednisone until her wounds have healed. I called and spoke with Maddie Goldberg 2 patient identifiers used I made her aware to hold off on the prednisone. She thanked me for calling and will see  next week.

## 2024-08-21 ENCOUNTER — APPOINTMENT (OUTPATIENT)
Facility: HOSPITAL | Age: 64
End: 2024-08-21
Payer: COMMERCIAL

## 2024-08-21 ENCOUNTER — HOSPITAL ENCOUNTER (EMERGENCY)
Facility: HOSPITAL | Age: 64
Discharge: HOME | End: 2024-08-21
Attending: EMERGENCY MEDICINE
Payer: COMMERCIAL

## 2024-08-21 VITALS
SYSTOLIC BLOOD PRESSURE: 118 MMHG | TEMPERATURE: 97.6 F | BODY MASS INDEX: 40.62 KG/M2 | OXYGEN SATURATION: 96 % | HEIGHT: 68 IN | DIASTOLIC BLOOD PRESSURE: 65 MMHG | WEIGHT: 268 LBS | RESPIRATION RATE: 20 BRPM | HEART RATE: 84 BPM

## 2024-08-21 DIAGNOSIS — M25.532 LEFT WRIST PAIN: Primary | ICD-10-CM

## 2024-08-21 PROCEDURE — 96372 THER/PROPH/DIAG INJ SC/IM: CPT

## 2024-08-21 PROCEDURE — 6360000002 HC RX W HCPCS: Performed by: EMERGENCY MEDICINE

## 2024-08-21 PROCEDURE — 73130 X-RAY EXAM OF HAND: CPT

## 2024-08-21 PROCEDURE — 73110 X-RAY EXAM OF WRIST: CPT

## 2024-08-21 PROCEDURE — 99284 EMERGENCY DEPT VISIT MOD MDM: CPT

## 2024-08-21 PROCEDURE — 2500000003 HC RX 250 WO HCPCS: Performed by: EMERGENCY MEDICINE

## 2024-08-21 RX ORDER — MORPHINE SULFATE 4 MG/ML
4 INJECTION, SOLUTION INTRAMUSCULAR; INTRAVENOUS
Status: COMPLETED | OUTPATIENT
Start: 2024-08-21 | End: 2024-08-21

## 2024-08-21 RX ORDER — OXYCODONE HYDROCHLORIDE AND ACETAMINOPHEN 5; 325 MG/1; MG/1
1 TABLET ORAL EVERY 6 HOURS PRN
Qty: 12 TABLET | Refills: 0 | Status: SHIPPED | OUTPATIENT
Start: 2024-08-21 | End: 2024-08-24

## 2024-08-21 RX ADMIN — MORPHINE SULFATE 4 MG: 4 INJECTION, SOLUTION INTRAMUSCULAR; INTRAVENOUS at 11:12

## 2024-08-21 RX ADMIN — HYDROMORPHONE HYDROCHLORIDE 1 MG: 1 INJECTION, SOLUTION INTRAMUSCULAR; INTRAVENOUS; SUBCUTANEOUS at 12:38

## 2024-08-21 ASSESSMENT — PAIN SCALES - GENERAL: PAINLEVEL_OUTOF10: 7

## 2024-08-21 ASSESSMENT — PAIN - FUNCTIONAL ASSESSMENT: PAIN_FUNCTIONAL_ASSESSMENT: 0-10

## 2024-08-21 NOTE — DISCHARGE INSTRUCTIONS
Thank you for choosing our Emergency Department for your care.  It is our privilege to care for you in your time of need.  In the next several days, you may receive a survey via email or mailed to your home about your experience with our team.  We would greatly appreciate you taking a few minutes to complete the survey, as we use this information to learn what we have done well and what we could be doing better. Thank you for trusting us with your care!    Below you will find a list of your tests from today's visit.   Labs  No results found for this or any previous visit (from the past 12 hour(s)).    Radiologic Studies  XR HAND RIGHT (MIN 3 VIEWS)   Final Result   No acute abnormality.      Electronically signed by DEVORA GAUTAM      XR WRIST RIGHT (MIN 3 VIEWS)   Final Result   No acute abnormality.      Electronically signed by DEVORA GAUTAM        ------------------------------------------------------------------------------------------------------------  The evaluation and treatment you received in the Emergency Department were for an urgent problem. It is important that you follow-up with a doctor, nurse practitioner, or physician assistant to:  (1) confirm your diagnosis,  (2) re-evaluation of changes in your illness and treatment, and (3) for ongoing care. Please take your discharge instructions with you when you go to your follow-up appointment.     If you have any problem arranging a follow-up appointment, contact us!  If your symptoms become worse or you do not improve as expected, please return to us. We are available 24 hours a day.     If a prescription has been provided, please fill it as soon as possible to prevent a delay in treatment. If you have any questions or reservations about taking the medication due to side effects or interactions with other medications, please call your primary care provider or contact us directly.  Again, THANK YOU for choosing us to care for YOU!

## 2024-08-21 NOTE — ED TRIAGE NOTES
Pt with right hand pain. States she woke up this morning with pain and slight bruising and swelling. Denies injury States the bruising is from IV sticks at the hosp. (Was inpt for 12 days) Pulse palpable in that extremity and good cap refill.

## 2024-08-21 NOTE — ED PROVIDER NOTES
[facility] EMERGENCY DEPARTMENT  EMERGENCY DEPARTMENT HISTORY AND PHYSICAL EXAM      Date: 8/21/2024  Patient Name: Maddie Goldberg  MRN: [id number]  Birthdate 1960  Date of evaluation: 8/21/2024  Provider: Valdemar Almaguer DO   Note Started: 11:00 AM EDT 8/21/24    HISTORY OF PRESENT ILLNESS     Chief Complaint   Patient presents with    Hand Pain     History Provided By: Patient    HPI: Maddie Goldberg is a 63 y.o. female with past medical history of DVT, arthritis, GERD, hypertension, and obesity  who presents with right hand pain.  States has been present for about 2 days.  Hurts in her wrist when she moves it.    PAST MEDICAL HISTORY   Past Medical History:  Past Medical History:   Diagnosis Date    Arthritis     left knee    Colostomy in place (HCC)     DVT (deep venous thrombosis) (HCC)     GERD (gastroesophageal reflux disease)     Hypertension     Morbid obesity (HCC)     Open abdominal wall wound     had wound vac in it but not anymore.    Thyroid disease        Past Surgical History:  Past Surgical History:   Procedure Laterality Date    BLADDER SUSPENSION      CHEST SURGERY  1/7/14    RESECTION OF REMAINING THYROID    GYN      uterine ablation about 10 years ago no menses since then    HEENT      thyroidectomy    HEMICOLECTOMY N/A 6/2/2024    EXPLORATORY LAPAROTOMY; RIGHT COLON RESECTION performed by Kylah Rice MD at [facility] MAIN OR    IR CHOLECYSTOSTOMY PERCUTANEOUS COMPLETE  8/9/2024    IR CHOLECYSTOSTOMY PERCUTANEOUS COMPLETE 8/9/2024 Obi Lacy MD [facility] RAD ANGIO IR    LAPAROTOMY N/A 6/8/2024    EXPLORATORY LAPAROTOMY, PARTIAL COLON RESECTION performed by Tommie Pineda MD at [facility] MAIN OR    LAPAROTOMY N/A 6/9/2024    EXPLORATORY LAPAROTOMY WITH ILEOSTOMY AND WOUND VAC PLACEMENT performed by Tommie Pineda MD at [facility] MAIN OR    ORTHOPEDIC SURGERY      left knee arthroscopy    ORTHOPEDIC SURGERY      left knee meniscus repair    TONSILLECTOMY         Family History:  Family History   Problem Relation Age of

## 2024-08-25 ENCOUNTER — APPOINTMENT (OUTPATIENT)
Facility: HOSPITAL | Age: 64
End: 2024-08-25
Payer: COMMERCIAL

## 2024-08-25 ENCOUNTER — HOSPITAL ENCOUNTER (EMERGENCY)
Facility: HOSPITAL | Age: 64
Discharge: HOME | End: 2024-08-25
Attending: EMERGENCY MEDICINE
Payer: COMMERCIAL

## 2024-08-25 VITALS
DIASTOLIC BLOOD PRESSURE: 62 MMHG | TEMPERATURE: 97.9 F | RESPIRATION RATE: 18 BRPM | HEART RATE: 83 BPM | WEIGHT: 268 LBS | HEIGHT: 68 IN | BODY MASS INDEX: 40.62 KG/M2 | OXYGEN SATURATION: 100 % | SYSTOLIC BLOOD PRESSURE: 112 MMHG

## 2024-08-25 DIAGNOSIS — R10.84 GENERALIZED ABDOMINAL PAIN: Primary | ICD-10-CM

## 2024-08-25 DIAGNOSIS — R11.2 NAUSEA AND VOMITING, UNSPECIFIED VOMITING TYPE: ICD-10-CM

## 2024-08-25 PROBLEM — M79.604 PAIN OF RIGHT LOWER EXTREMITY: Status: ACTIVE | Noted: 2024-08-25

## 2024-08-25 LAB
ALBUMIN SERPL-MCNC: 2.8 G/DL (ref 3.5–5)
ALBUMIN/GLOB SERPL: 0.7 (ref 1.1–2.2)
ALP SERPL-CCNC: 247 U/L (ref 45–117)
ALT SERPL-CCNC: 61 U/L (ref 12–78)
ANION GAP SERPL CALC-SCNC: 8 MMOL/L (ref 5–15)
APPEARANCE UR: ABNORMAL
AST SERPL W P-5'-P-CCNC: 51 U/L (ref 15–37)
BACTERIA URNS QL MICRO: ABNORMAL /HPF
BASOPHILS # BLD: 0 K/UL (ref 0–0.1)
BASOPHILS NFR BLD: 0 % (ref 0–1)
BILIRUB DIRECT SERPL-MCNC: 0.2 MG/DL (ref 0–0.2)
BILIRUB SERPL-MCNC: 0.6 MG/DL (ref 0.2–1)
BILIRUB UR QL: NEGATIVE
BUN SERPL-MCNC: 19 MG/DL (ref 6–20)
BUN/CREAT SERPL: 19 (ref 12–20)
CA-I BLD-MCNC: 10.1 MG/DL (ref 8.5–10.1)
CHLORIDE SERPL-SCNC: 101 MMOL/L (ref 97–108)
CO2 SERPL-SCNC: 22 MMOL/L (ref 21–32)
COLOR UR: YELLOW
CREAT SERPL-MCNC: 0.98 MG/DL (ref 0.55–1.02)
DIFFERENTIAL METHOD BLD: ABNORMAL
EOSINOPHIL # BLD: 0.3 K/UL (ref 0–0.4)
EOSINOPHIL NFR BLD: 3 % (ref 0–7)
EPITH CASTS URNS QL MICRO: ABNORMAL /LPF
ERYTHROCYTE [DISTWIDTH] IN BLOOD BY AUTOMATED COUNT: 18.2 % (ref 11.5–14.5)
GLOBULIN SER CALC-MCNC: 4 G/DL (ref 2–4)
GLUCOSE SERPL-MCNC: 117 MG/DL (ref 65–100)
GLUCOSE UR STRIP.AUTO-MCNC: NEGATIVE MG/DL
HCT VFR BLD AUTO: 28.8 % (ref 35–47)
HGB BLD-MCNC: 8.9 G/DL (ref 11.5–16)
HGB UR QL STRIP: ABNORMAL
IMM GRANULOCYTES # BLD AUTO: 0 K/UL (ref 0–0.04)
IMM GRANULOCYTES NFR BLD AUTO: 0 % (ref 0–0.5)
KETONES UR QL STRIP.AUTO: NEGATIVE MG/DL
LEUKOCYTE ESTERASE UR QL STRIP.AUTO: ABNORMAL
LIPASE SERPL-CCNC: 24 U/L (ref 13–75)
LYMPHOCYTES # BLD: 2.9 K/UL (ref 0.8–3.5)
LYMPHOCYTES NFR BLD: 28 % (ref 12–49)
MCH RBC QN AUTO: 25.3 PG (ref 26–34)
MCHC RBC AUTO-ENTMCNC: 30.9 G/DL (ref 30–36.5)
MCV RBC AUTO: 81.8 FL (ref 80–99)
MONOCYTES # BLD: 0.9 K/UL (ref 0–1)
MONOCYTES NFR BLD: 9 % (ref 5–13)
NEUTS SEG # BLD: 6.2 K/UL (ref 1.8–8)
NEUTS SEG NFR BLD: 60 % (ref 32–75)
NITRITE UR QL STRIP.AUTO: NEGATIVE
PH UR STRIP: 5 (ref 5–8)
PLATELET # BLD AUTO: 527 K/UL (ref 150–400)
PMV BLD AUTO: 10.2 FL (ref 8.9–12.9)
POTASSIUM SERPL-SCNC: 3.4 MMOL/L (ref 3.5–5.1)
PROT SERPL-MCNC: 6.8 G/DL (ref 6.4–8.2)
PROT UR STRIP-MCNC: ABNORMAL MG/DL
RBC # BLD AUTO: 3.52 M/UL (ref 3.8–5.2)
RBC #/AREA URNS HPF: ABNORMAL /HPF (ref 0–5)
SODIUM SERPL-SCNC: 131 MMOL/L (ref 136–145)
SP GR UR REFRACTOMETRY: 1.02 (ref 1–1.03)
TROPONIN I SERPL HS-MCNC: 7 NG/L (ref 0–51)
UROBILINOGEN UR QL STRIP.AUTO: 1 EU/DL (ref 0.2–1)
WBC # BLD AUTO: 10.4 K/UL (ref 3.6–11)
WBC URNS QL MICRO: ABNORMAL /HPF (ref 0–4)

## 2024-08-25 PROCEDURE — 96376 TX/PRO/DX INJ SAME DRUG ADON: CPT

## 2024-08-25 PROCEDURE — 84484 ASSAY OF TROPONIN QUANT: CPT

## 2024-08-25 PROCEDURE — 96375 TX/PRO/DX INJ NEW DRUG ADDON: CPT

## 2024-08-25 PROCEDURE — 85025 COMPLETE CBC W/AUTO DIFF WBC: CPT

## 2024-08-25 PROCEDURE — 81001 URINALYSIS AUTO W/SCOPE: CPT

## 2024-08-25 PROCEDURE — 83690 ASSAY OF LIPASE: CPT

## 2024-08-25 PROCEDURE — 80048 BASIC METABOLIC PNL TOTAL CA: CPT

## 2024-08-25 PROCEDURE — 80076 HEPATIC FUNCTION PANEL: CPT

## 2024-08-25 PROCEDURE — 99284 EMERGENCY DEPT VISIT MOD MDM: CPT

## 2024-08-25 PROCEDURE — 74176 CT ABD & PELVIS W/O CONTRAST: CPT

## 2024-08-25 PROCEDURE — 96374 THER/PROPH/DIAG INJ IV PUSH: CPT

## 2024-08-25 PROCEDURE — 36415 COLL VENOUS BLD VENIPUNCTURE: CPT

## 2024-08-25 PROCEDURE — 6360000002 HC RX W HCPCS: Performed by: EMERGENCY MEDICINE

## 2024-08-25 PROCEDURE — 2580000003 HC RX 258: Performed by: EMERGENCY MEDICINE

## 2024-08-25 RX ORDER — OXYCODONE AND ACETAMINOPHEN 5; 325 MG/1; MG/1
1 TABLET ORAL EVERY 6 HOURS PRN
Qty: 12 TABLET | Refills: 0 | Status: SHIPPED | OUTPATIENT
Start: 2024-08-25 | End: 2024-08-28

## 2024-08-25 RX ORDER — MORPHINE SULFATE 4 MG/ML
2 INJECTION INTRAVENOUS ONCE
Status: COMPLETED | OUTPATIENT
Start: 2024-08-25 | End: 2024-08-25

## 2024-08-25 RX ORDER — MORPHINE SULFATE 4 MG/ML
4 INJECTION INTRAVENOUS ONCE
Status: COMPLETED | OUTPATIENT
Start: 2024-08-25 | End: 2024-08-25

## 2024-08-25 RX ORDER — ONDANSETRON 4 MG/1
4 TABLET, ORALLY DISINTEGRATING ORAL 3 TIMES DAILY PRN
Qty: 21 TABLET | Refills: 0 | Status: SHIPPED | OUTPATIENT
Start: 2024-08-25 | End: 2024-09-01

## 2024-08-25 RX ORDER — ONDANSETRON 2 MG/ML
4 INJECTION INTRAMUSCULAR; INTRAVENOUS ONCE
Status: COMPLETED | OUTPATIENT
Start: 2024-08-25 | End: 2024-08-25

## 2024-08-25 RX ORDER — 0.9 % SODIUM CHLORIDE 0.9 %
1000 INTRAVENOUS SOLUTION INTRAVENOUS
Status: COMPLETED | OUTPATIENT
Start: 2024-08-25 | End: 2024-08-25

## 2024-08-25 RX ADMIN — ONDANSETRON 4 MG: 2 INJECTION INTRAMUSCULAR; INTRAVENOUS at 03:58

## 2024-08-25 RX ADMIN — ONDANSETRON 4 MG: 2 INJECTION INTRAMUSCULAR; INTRAVENOUS at 02:49

## 2024-08-25 RX ADMIN — SODIUM CHLORIDE 1000 ML: 9 INJECTION, SOLUTION INTRAVENOUS at 03:04

## 2024-08-25 RX ADMIN — MORPHINE SULFATE 4 MG: 4 INJECTION, SOLUTION INTRAMUSCULAR; INTRAVENOUS at 02:51

## 2024-08-25 RX ADMIN — MORPHINE SULFATE 2 MG: 4 INJECTION, SOLUTION INTRAMUSCULAR; INTRAVENOUS at 03:58

## 2024-08-25 ASSESSMENT — LIFESTYLE VARIABLES
HOW MANY STANDARD DRINKS CONTAINING ALCOHOL DO YOU HAVE ON A TYPICAL DAY: PATIENT DOES NOT DRINK
HOW OFTEN DO YOU HAVE A DRINK CONTAINING ALCOHOL: NEVER

## 2024-08-25 ASSESSMENT — PAIN - FUNCTIONAL ASSESSMENT: PAIN_FUNCTIONAL_ASSESSMENT: 0-10

## 2024-08-25 NOTE — ED TRIAGE NOTES
Pt c/o abdominal pain  chills and generalized body aches. Pt had multpile abdominal surgery in June. Pt is nauseous

## 2024-08-25 NOTE — ED PROVIDER NOTES
[facility] EMERGENCY DEPT  EMERGENCY DEPARTMENT HISTORY AND PHYSICAL EXAM      Date: 8/25/2024  Patient Name: Maddie Goldberg  MRN: [id number]  Birthdate 1960  Date of evaluation: 8/25/2024  Provider: Bandar العلي MD  Note Started: 4:58 AM EDT 8/25/24    HISTORY OF PRESENT ILLNESS     Chief Complaint   Patient presents with    Chills    Abdominal Pain       History Provided By: Patient    HPI: Maddie Goldberg is a 63 y.o. female.  Patient presents with a complaint of general abdominal pain with nausea and vomiting since last night.  Patient has recent history of multiple abdominal surgery for cecal volvulus and cholecystitis.  Patient has a ileostomy and biliary drainage.  Patient states that Percocet ran out 2 days ago.  Patient also complains of generalized body ache with subjective fever.  No cough.  No chest pain or shortness of breath.  No dysuria or hematuria.  Patient recently completed course of antibiotic for cholecystitis that was prescribed by her surgeon 2 days ago.  Patient is currently on anticoagulation for recently diagnosed DVT.        PAST MEDICAL HISTORY   Past Medical History:  Past Medical History:   Diagnosis Date    Arthritis     left knee    Colostomy in place (HCC)     DVT (deep venous thrombosis) (HCC)     GERD (gastroesophageal reflux disease)     Hypertension     Morbid obesity (HCC)     Open abdominal wall wound     had wound vac in it but not anymore.    Thyroid disease        Past Surgical History:  Past Surgical History:   Procedure Laterality Date    BLADDER SUSPENSION      CHEST SURGERY  1/7/14    RESECTION OF REMAINING THYROID    GYN      uterine ablation about 10 years ago no menses since then    HEENT      thyroidectomy    HEMICOLECTOMY N/A 6/2/2024    EXPLORATORY LAPAROTOMY; RIGHT COLON RESECTION performed by Kylah Rice MD at [facility] MAIN OR    IR CHOLECYSTOSTOMY PERCUTANEOUS COMPLETE  8/9/2024    IR CHOLECYSTOSTOMY PERCUTANEOUS COMPLETE 8/9/2024 Obi Lacy MD [facility] RAD  pelvis without acute changes.  No signs of bowel obstruction or pneumoperitoneum.  IV hydration/IV morphine/IV Zofran with improvement of pain.  Patient tolerated p.o. well without vomiting.  Patient was discharged in improved condition with prescription for Percocet and Zofran.  Patient has an appointment to see her surgeon Dr. Pineda tomorrow.   Patient is advised to return to emergency room for worsening pain/recurrent vomiting/fever        Records Reviewed (source and summary of external notes): Prior medical records and Nursing notes.    Vitals:    Vitals:    08/25/24 0225   BP: 112/62   Pulse: 83   Resp: 18   Temp: 97.9 °F (36.6 °C)   TempSrc: Oral   SpO2: 100%   Weight: 121.6 kg (268 lb)   Height: 1.727 m (5' 8\")        ED COURSE   Pain improved.  Nausea improved.  Abdomen soft with mild generalized tenderness without guarding or rebound.        Patient was given the following medications:  Medications   sodium chloride 0.9 % bolus 1,000 mL (0 mLs IntraVENous Stopped 8/25/24 0446)   ondansetron (ZOFRAN) injection 4 mg (4 mg IntraVENous Given 8/25/24 0249)   morphine injection 4 mg (4 mg IntraVENous Given 8/25/24 0251)   morphine injection 2 mg (2 mg IntraVENous Given 8/25/24 0358)   ondansetron (ZOFRAN) injection 4 mg (4 mg IntraVENous Given 8/25/24 0358)       CONSULTS: See ED Course/MDM for further details.        PROCEDURES   Unless otherwise noted above, none  Procedures      CRITICAL CARE TIME       ED IMPRESSION     1. Generalized abdominal pain    2. Nausea and vomiting, unspecified vomiting type          DISPOSITION/PLAN   DISPOSITION Decision To Discharge 08/25/2024 04:49:55 AM  Condition at Disposition: Good    Discharge Note: The patient is stable for discharge home. The signs, symptoms, diagnosis, and discharge instructions have been discussed, understanding conveyed, and agreed upon. The patient is to follow up as recommended or return to ER should their symptoms worsen.      PATIENT REFERRED

## 2024-08-26 ENCOUNTER — OFFICE VISIT (OUTPATIENT)
Age: 64
End: 2024-08-26

## 2024-08-26 VITALS
WEIGHT: 243 LBS | DIASTOLIC BLOOD PRESSURE: 64 MMHG | BODY MASS INDEX: 36.83 KG/M2 | HEART RATE: 100 BPM | TEMPERATURE: 97.9 F | OXYGEN SATURATION: 95 % | RESPIRATION RATE: 20 BRPM | SYSTOLIC BLOOD PRESSURE: 97 MMHG | HEIGHT: 68 IN

## 2024-08-26 DIAGNOSIS — G89.18 POST-OP PAIN: Primary | ICD-10-CM

## 2024-08-26 PROCEDURE — 99024 POSTOP FOLLOW-UP VISIT: CPT | Performed by: SURGERY

## 2024-08-26 RX ORDER — OXYCODONE AND ACETAMINOPHEN 5; 325 MG/1; MG/1
1 TABLET ORAL EVERY 6 HOURS PRN
Qty: 28 TABLET | Refills: 0 | Status: SHIPPED | OUTPATIENT
Start: 2024-08-26 | End: 2024-09-02

## 2024-08-26 RX ORDER — OXYCODONE AND ACETAMINOPHEN 5; 325 MG/1; MG/1
1 TABLET ORAL EVERY 6 HOURS PRN
Qty: 28 TABLET | Refills: 0 | OUTPATIENT
Start: 2024-08-26 | End: 2024-09-02

## 2024-08-26 ASSESSMENT — PATIENT HEALTH QUESTIONNAIRE - PHQ9
SUM OF ALL RESPONSES TO PHQ QUESTIONS 1-9: 2
2. FEELING DOWN, DEPRESSED OR HOPELESS: SEVERAL DAYS
1. LITTLE INTEREST OR PLEASURE IN DOING THINGS: SEVERAL DAYS
SUM OF ALL RESPONSES TO PHQ QUESTIONS 1-9: 2
SUM OF ALL RESPONSES TO PHQ9 QUESTIONS 1 & 2: 2

## 2024-08-26 NOTE — PROGRESS NOTES
Identified pt with two pt identifiers (name and ). Reviewed chart in preparation for visit and have obtained necessary documentation.    Maddie Goldberg is a 63 y.o. female  Chief Complaint   Patient presents with    Follow-up    Abdominal Pain     BP 97/64 (Site: Left Upper Arm, Position: Sitting, Cuff Size: Large Adult)   Pulse 100   Temp 97.9 °F (36.6 °C) (Oral)   Resp 20   Ht 1.727 m (5' 8\")   Wt 110.2 kg (243 lb)   SpO2 95%   BMI 36.95 kg/m²     1. Have you been to the ER, urgent care clinic since your last visit?  Hospitalized since your last visit?NO    2. Have you seen or consulted any other health care providers outside of the [facility] since your last visit?  Include any pap smears or colon screening. NO

## 2024-08-28 ENCOUNTER — TELEPHONE (OUTPATIENT)
Age: 64
End: 2024-08-28

## 2024-08-28 NOTE — TELEPHONE ENCOUNTER
Beatrice  at [facility] is requesting recent progress notes over for patient. Her number  [phone] and fax numbers are as follows , [phone] & [phone]. Please contact her today to assist. Thanks DCR

## 2024-08-29 NOTE — TELEPHONE ENCOUNTER
Progress note has not been completed, Edwin has been notified to do so and once done we can fax it.

## 2024-09-01 ENCOUNTER — HOSPITAL ENCOUNTER (EMERGENCY)
Facility: HOSPITAL | Age: 64
Discharge: HOME | End: 2024-09-01
Attending: STUDENT IN AN ORGANIZED HEALTH CARE EDUCATION/TRAINING PROGRAM
Payer: COMMERCIAL

## 2024-09-01 ENCOUNTER — APPOINTMENT (OUTPATIENT)
Facility: HOSPITAL | Age: 64
End: 2024-09-01
Payer: COMMERCIAL

## 2024-09-01 VITALS
HEIGHT: 68 IN | RESPIRATION RATE: 16 BRPM | HEART RATE: 80 BPM | DIASTOLIC BLOOD PRESSURE: 78 MMHG | BODY MASS INDEX: 39.1 KG/M2 | WEIGHT: 258 LBS | SYSTOLIC BLOOD PRESSURE: 108 MMHG | TEMPERATURE: 97.6 F | OXYGEN SATURATION: 99 %

## 2024-09-01 DIAGNOSIS — R10.9 ABDOMINAL PAIN, UNSPECIFIED ABDOMINAL LOCATION: Primary | ICD-10-CM

## 2024-09-01 LAB
ALBUMIN SERPL-MCNC: 3.4 G/DL (ref 3.5–5.2)
ALBUMIN/GLOB SERPL: 0.9 (ref 1.1–2.2)
ALP SERPL-CCNC: 220 U/L (ref 35–104)
ALT SERPL-CCNC: 49 U/L (ref 10–35)
ANION GAP SERPL CALC-SCNC: 13 MMOL/L (ref 5–15)
AST SERPL-CCNC: 44 U/L (ref 10–35)
BASOPHILS # BLD: 0 K/UL (ref 0–0.1)
BASOPHILS NFR BLD: 0 % (ref 0–1)
BILIRUB SERPL-MCNC: 0.3 MG/DL (ref 0.2–1)
BUN SERPL-MCNC: 14 MG/DL (ref 8–23)
BUN/CREAT SERPL: 15 (ref 12–20)
CALCIUM SERPL-MCNC: 10.2 MG/DL (ref 8.8–10.2)
CHLORIDE SERPL-SCNC: 101 MMOL/L (ref 98–107)
CO2 SERPL-SCNC: 26 MMOL/L (ref 22–29)
CREAT SERPL-MCNC: 0.95 MG/DL (ref 0.5–0.9)
DIFFERENTIAL METHOD BLD: ABNORMAL
EOSINOPHIL # BLD: 0.5 K/UL (ref 0–0.4)
EOSINOPHIL NFR BLD: 6 % (ref 0–7)
ERYTHROCYTE [DISTWIDTH] IN BLOOD BY AUTOMATED COUNT: 18 % (ref 11.5–14.5)
GLOBULIN SER CALC-MCNC: 3.8 G/DL (ref 2–4)
GLUCOSE SERPL-MCNC: 113 MG/DL (ref 65–100)
HCT VFR BLD AUTO: 31 % (ref 35–47)
HGB BLD-MCNC: 9.7 G/DL (ref 11.5–16)
IMM GRANULOCYTES # BLD AUTO: 0 K/UL
IMM GRANULOCYTES NFR BLD AUTO: 0 %
LIPASE SERPL-CCNC: 15 U/L (ref 13–60)
LYMPHOCYTES # BLD: 2.4 K/UL (ref 0.8–3.5)
LYMPHOCYTES NFR BLD: 29 % (ref 12–49)
MCH RBC QN AUTO: 24.7 PG (ref 26–34)
MCHC RBC AUTO-ENTMCNC: 31.3 G/DL (ref 30–36.5)
MCV RBC AUTO: 78.9 FL (ref 80–99)
MONOCYTES # BLD: 0.9 K/UL (ref 0–1)
MONOCYTES NFR BLD: 11 % (ref 5–13)
NEUTS SEG # BLD: 4.5 K/UL (ref 1.8–8)
NEUTS SEG NFR BLD: 54 % (ref 32–75)
NRBC # BLD: 0 K/UL (ref 0–0.01)
NRBC BLD-RTO: 0 PER 100 WBC
PLATELET # BLD AUTO: 427 K/UL (ref 150–400)
PMV BLD AUTO: 10.4 FL (ref 8.9–12.9)
POTASSIUM SERPL-SCNC: 3.5 MMOL/L (ref 3.5–5.1)
PROT SERPL-MCNC: 7.2 G/DL (ref 6.4–8.3)
RBC # BLD AUTO: 3.93 M/UL (ref 3.8–5.2)
RBC MORPH BLD: ABNORMAL
SODIUM SERPL-SCNC: 140 MMOL/L (ref 136–145)
WBC # BLD AUTO: 8.3 K/UL (ref 3.6–11)
WBC MORPH BLD: ABNORMAL

## 2024-09-01 PROCEDURE — 83690 ASSAY OF LIPASE: CPT

## 2024-09-01 PROCEDURE — 6360000002 HC RX W HCPCS: Performed by: STUDENT IN AN ORGANIZED HEALTH CARE EDUCATION/TRAINING PROGRAM

## 2024-09-01 PROCEDURE — 99285 EMERGENCY DEPT VISIT HI MDM: CPT

## 2024-09-01 PROCEDURE — 96374 THER/PROPH/DIAG INJ IV PUSH: CPT

## 2024-09-01 PROCEDURE — 80053 COMPREHEN METABOLIC PANEL: CPT

## 2024-09-01 PROCEDURE — 96375 TX/PRO/DX INJ NEW DRUG ADDON: CPT

## 2024-09-01 PROCEDURE — 6360000004 HC RX CONTRAST MEDICATION: Performed by: STUDENT IN AN ORGANIZED HEALTH CARE EDUCATION/TRAINING PROGRAM

## 2024-09-01 PROCEDURE — 74177 CT ABD & PELVIS W/CONTRAST: CPT

## 2024-09-01 PROCEDURE — 85025 COMPLETE CBC W/AUTO DIFF WBC: CPT

## 2024-09-01 PROCEDURE — 36415 COLL VENOUS BLD VENIPUNCTURE: CPT

## 2024-09-01 PROCEDURE — 2580000003 HC RX 258: Performed by: STUDENT IN AN ORGANIZED HEALTH CARE EDUCATION/TRAINING PROGRAM

## 2024-09-01 RX ORDER — IOPAMIDOL 755 MG/ML
100 INJECTION, SOLUTION INTRAVASCULAR ONCE
Status: COMPLETED | OUTPATIENT
Start: 2024-09-01 | End: 2024-09-01

## 2024-09-01 RX ORDER — ONDANSETRON 2 MG/ML
4 INJECTION INTRAMUSCULAR; INTRAVENOUS ONCE
Status: COMPLETED | OUTPATIENT
Start: 2024-09-01 | End: 2024-09-01

## 2024-09-01 RX ORDER — ONDANSETRON 4 MG/1
4 TABLET, ORALLY DISINTEGRATING ORAL 3 TIMES DAILY PRN
Qty: 21 TABLET | Refills: 0 | Status: SHIPPED | OUTPATIENT
Start: 2024-09-01

## 2024-09-01 RX ORDER — 0.9 % SODIUM CHLORIDE 0.9 %
1000 INTRAVENOUS SOLUTION INTRAVENOUS ONCE
Status: COMPLETED | OUTPATIENT
Start: 2024-09-01 | End: 2024-09-01

## 2024-09-01 RX ADMIN — IOPAMIDOL 100 ML: 755 INJECTION, SOLUTION INTRAVENOUS at 16:41

## 2024-09-01 RX ADMIN — HYDROMORPHONE HYDROCHLORIDE 1 MG: 1 INJECTION, SOLUTION INTRAMUSCULAR; INTRAVENOUS; SUBCUTANEOUS at 16:49

## 2024-09-01 RX ADMIN — ONDANSETRON HYDROCHLORIDE 4 MG: 2 SOLUTION INTRAMUSCULAR; INTRAVENOUS at 15:50

## 2024-09-01 RX ADMIN — SODIUM CHLORIDE 1000 ML: 9 INJECTION, SOLUTION INTRAVENOUS at 15:49

## 2024-09-01 ASSESSMENT — PAIN DESCRIPTION - LOCATION
LOCATION: ABDOMEN
LOCATION: ABDOMEN

## 2024-09-01 ASSESSMENT — PAIN DESCRIPTION - FREQUENCY: FREQUENCY: INTERMITTENT

## 2024-09-01 ASSESSMENT — PAIN DESCRIPTION - ORIENTATION: ORIENTATION: RIGHT;LEFT

## 2024-09-01 ASSESSMENT — PAIN - FUNCTIONAL ASSESSMENT: PAIN_FUNCTIONAL_ASSESSMENT: 0-10

## 2024-09-01 ASSESSMENT — PAIN SCALES - GENERAL
PAINLEVEL_OUTOF10: 8
PAINLEVEL_OUTOF10: 8

## 2024-09-01 ASSESSMENT — PAIN DESCRIPTION - DESCRIPTORS: DESCRIPTORS: ACHING

## 2024-09-01 NOTE — ED TRIAGE NOTES
Pt to ER via ambulatory with rollator with cramping and vomiting, 5 x in last 24 hours. Bilateral with intermittent cramping. Pt with multiple abd surgeries. No OTC medications taken at home  prior to arrival.

## 2024-09-01 NOTE — ED PROVIDER NOTES
[facility] EMERGENCY DEPT  EMERGENCY DEPARTMENT ENCOUNTER      Pt Name: Maddie Goldberg  MRN: [id number]  Birthdate 1960  Date of evaluation: 9/1/2024  Provider: Escobar Nelson MD    CHIEF COMPLAINT       Chief Complaint   Patient presents with    Emesis    Abdominal Pain         HISTORY OF PRESENT ILLNESS   63-year-old female with history significant for HTN, GERD, DVT, colostomy, recent cholecystostomy with drain remaining in place presents to the ED with chief complaint of right-sided abdominal pain starting this afternoon.  Patient describes pain as cramping, waxing and waning, reports associated nausea.  She has had consistent drainage from her cholecystostomy tube but it has been somewhat increased today.  No fevers, chills, vomiting, chest pain, difficulty breathing, urinary symptoms, or bowel symptoms.  No treatment prior to coming to the emergency room.  Surgery was done at [facility] on 8/1 by Dr. Pineda.    The history is provided by the patient.       Review of External Medical Records:     Nursing Notes were reviewed.    REVIEW OF SYSTEMS       Review of Systems   Respiratory:  Negative for shortness of breath.    Cardiovascular:  Negative for chest pain.       Except as noted above the remainder of the review of systems was reviewed and negative.       PAST MEDICAL HISTORY     Past Medical History:   Diagnosis Date    Arthritis     left knee    Colostomy in place (HCC)     DVT (deep venous thrombosis) (HCC)     GERD (gastroesophageal reflux disease)     Hypertension     Morbid obesity (HCC)     Open abdominal wall wound     had wound vac in it but not anymore.    Thyroid disease          SURGICAL HISTORY       Past Surgical History:   Procedure Laterality Date    BLADDER SUSPENSION      CHEST SURGERY  1/7/14    RESECTION OF REMAINING THYROID    GYN      uterine ablation about 10 years ago no menses since then    HEENT      thyroidectomy    HEMICOLECTOMY N/A 6/2/2024    EXPLORATORY LAPAROTOMY;

## 2024-09-02 ENCOUNTER — HOSPITAL ENCOUNTER (INPATIENT)
Facility: HOSPITAL | Age: 64
LOS: 2 days | Discharge: HOME | DRG: 395 | End: 2024-09-05
Attending: INTERNAL MEDICINE | Admitting: INTERNAL MEDICINE
Payer: COMMERCIAL

## 2024-09-02 ENCOUNTER — APPOINTMENT (OUTPATIENT)
Facility: HOSPITAL | Age: 64
DRG: 395 | End: 2024-09-02
Payer: COMMERCIAL

## 2024-09-02 DIAGNOSIS — R10.9 ABDOMINAL PAIN, UNSPECIFIED ABDOMINAL LOCATION: Primary | ICD-10-CM

## 2024-09-02 DIAGNOSIS — G89.18 POST-OP PAIN: ICD-10-CM

## 2024-09-02 DIAGNOSIS — N30.01 ACUTE CYSTITIS WITH HEMATURIA: ICD-10-CM

## 2024-09-02 LAB
ALBUMIN SERPL-MCNC: 3 G/DL (ref 3.5–5)
ALBUMIN/GLOB SERPL: 0.6 (ref 1.1–2.2)
ALP SERPL-CCNC: 206 U/L (ref 45–117)
ALT SERPL-CCNC: 46 U/L (ref 12–78)
ANION GAP SERPL CALC-SCNC: 9 MMOL/L (ref 5–15)
APPEARANCE UR: ABNORMAL
AST SERPL W P-5'-P-CCNC: 25 U/L (ref 15–37)
BACTERIA URNS QL MICRO: ABNORMAL /HPF
BASOPHILS # BLD: 0.1 K/UL (ref 0–0.1)
BASOPHILS NFR BLD: 1 % (ref 0–1)
BILIRUB SERPL-MCNC: 0.3 MG/DL (ref 0.2–1)
BILIRUB UR QL: NEGATIVE
BUN SERPL-MCNC: 14 MG/DL (ref 6–20)
BUN/CREAT SERPL: 15 (ref 12–20)
CA-I BLD-MCNC: 10 MG/DL (ref 8.5–10.1)
CHLORIDE SERPL-SCNC: 106 MMOL/L (ref 97–108)
CO2 SERPL-SCNC: 24 MMOL/L (ref 21–32)
COLOR UR: ABNORMAL
CREAT SERPL-MCNC: 0.95 MG/DL (ref 0.55–1.02)
DIFFERENTIAL METHOD BLD: ABNORMAL
EOSINOPHIL # BLD: 0.5 K/UL (ref 0–0.4)
EOSINOPHIL NFR BLD: 6 % (ref 0–7)
EPITH CASTS URNS QL MICRO: ABNORMAL /LPF
ERYTHROCYTE [DISTWIDTH] IN BLOOD BY AUTOMATED COUNT: 17.8 % (ref 11.5–14.5)
GLOBULIN SER CALC-MCNC: 4.9 G/DL (ref 2–4)
GLUCOSE SERPL-MCNC: 109 MG/DL (ref 65–100)
GLUCOSE UR STRIP.AUTO-MCNC: NEGATIVE MG/DL
HCT VFR BLD AUTO: 31.5 % (ref 35–47)
HGB BLD-MCNC: 9.7 G/DL (ref 11.5–16)
HGB UR QL STRIP: ABNORMAL
IMM GRANULOCYTES # BLD AUTO: 0 K/UL (ref 0–0.04)
IMM GRANULOCYTES NFR BLD AUTO: 1 % (ref 0–0.5)
KETONES UR QL STRIP.AUTO: NEGATIVE MG/DL
LEUKOCYTE ESTERASE UR QL STRIP.AUTO: ABNORMAL
LIPASE SERPL-CCNC: 24 U/L (ref 13–75)
LYMPHOCYTES # BLD: 2.5 K/UL (ref 0.8–3.5)
LYMPHOCYTES NFR BLD: 28 % (ref 12–49)
MCH RBC QN AUTO: 24.2 PG (ref 26–34)
MCHC RBC AUTO-ENTMCNC: 30.8 G/DL (ref 30–36.5)
MCV RBC AUTO: 78.6 FL (ref 80–99)
MONOCYTES # BLD: 0.8 K/UL (ref 0–1)
MONOCYTES NFR BLD: 9 % (ref 5–13)
MUCOUS THREADS URNS QL MICRO: ABNORMAL /LPF
NEUTS SEG # BLD: 5 K/UL (ref 1.8–8)
NEUTS SEG NFR BLD: 55 % (ref 32–75)
NITRITE UR QL STRIP.AUTO: POSITIVE
NRBC # BLD: 0 K/UL (ref 0–0.01)
NRBC BLD-RTO: 0 PER 100 WBC
PH UR STRIP: 5 (ref 5–8)
PLATELET # BLD AUTO: 447 K/UL (ref 150–400)
PMV BLD AUTO: 10.7 FL (ref 8.9–12.9)
POTASSIUM SERPL-SCNC: 3.4 MMOL/L (ref 3.5–5.1)
PROT SERPL-MCNC: 7.9 G/DL (ref 6.4–8.2)
PROT UR STRIP-MCNC: 30 MG/DL
RBC # BLD AUTO: 4.01 M/UL (ref 3.8–5.2)
RBC #/AREA URNS HPF: ABNORMAL /HPF (ref 0–5)
SODIUM SERPL-SCNC: 139 MMOL/L (ref 136–145)
SP GR UR REFRACTOMETRY: 1.03 (ref 1–1.03)
URINE CULTURE IF INDICATED: ABNORMAL
UROBILINOGEN UR QL STRIP.AUTO: 0.1 EU/DL (ref 0.1–1)
WBC # BLD AUTO: 8.9 K/UL (ref 3.6–11)
WBC URNS QL MICRO: >100 /HPF (ref 0–4)

## 2024-09-02 PROCEDURE — 74177 CT ABD & PELVIS W/CONTRAST: CPT

## 2024-09-02 PROCEDURE — 6360000002 HC RX W HCPCS: Performed by: NURSE PRACTITIONER

## 2024-09-02 PROCEDURE — 87186 SC STD MICRODIL/AGAR DIL: CPT

## 2024-09-02 PROCEDURE — 2580000003 HC RX 258: Performed by: NURSE PRACTITIONER

## 2024-09-02 PROCEDURE — 87086 URINE CULTURE/COLONY COUNT: CPT

## 2024-09-02 PROCEDURE — 6360000004 HC RX CONTRAST MEDICATION: Performed by: NURSE PRACTITIONER

## 2024-09-02 PROCEDURE — 99285 EMERGENCY DEPT VISIT HI MDM: CPT

## 2024-09-02 PROCEDURE — 96375 TX/PRO/DX INJ NEW DRUG ADDON: CPT

## 2024-09-02 PROCEDURE — 85025 COMPLETE CBC W/AUTO DIFF WBC: CPT

## 2024-09-02 PROCEDURE — 87077 CULTURE AEROBIC IDENTIFY: CPT

## 2024-09-02 PROCEDURE — 80053 COMPREHEN METABOLIC PANEL: CPT

## 2024-09-02 PROCEDURE — 83690 ASSAY OF LIPASE: CPT

## 2024-09-02 PROCEDURE — 36415 COLL VENOUS BLD VENIPUNCTURE: CPT

## 2024-09-02 PROCEDURE — 81001 URINALYSIS AUTO W/SCOPE: CPT

## 2024-09-02 PROCEDURE — 96374 THER/PROPH/DIAG INJ IV PUSH: CPT

## 2024-09-02 RX ORDER — IOPAMIDOL 755 MG/ML
100 INJECTION, SOLUTION INTRAVASCULAR
Status: COMPLETED | OUTPATIENT
Start: 2024-09-02 | End: 2024-09-02

## 2024-09-02 RX ORDER — ONDANSETRON 2 MG/ML
4 INJECTION INTRAMUSCULAR; INTRAVENOUS ONCE
Status: COMPLETED | OUTPATIENT
Start: 2024-09-02 | End: 2024-09-02

## 2024-09-02 RX ORDER — 0.9 % SODIUM CHLORIDE 0.9 %
500 INTRAVENOUS SOLUTION INTRAVENOUS ONCE
Status: COMPLETED | OUTPATIENT
Start: 2024-09-02 | End: 2024-09-03

## 2024-09-02 RX ADMIN — ONDANSETRON 4 MG: 2 INJECTION INTRAMUSCULAR; INTRAVENOUS at 18:33

## 2024-09-02 RX ADMIN — HYDROMORPHONE HYDROCHLORIDE 1 MG: 1 INJECTION, SOLUTION INTRAMUSCULAR; INTRAVENOUS; SUBCUTANEOUS at 18:34

## 2024-09-02 RX ADMIN — SODIUM CHLORIDE 500 ML: 9 INJECTION, SOLUTION INTRAVENOUS at 18:32

## 2024-09-02 RX ADMIN — CEFTRIAXONE SODIUM 1000 MG: 1 INJECTION, POWDER, FOR SOLUTION INTRAMUSCULAR; INTRAVENOUS at 21:04

## 2024-09-02 RX ADMIN — DIATRIZOATE MEGLUMINE AND DIATRIZOATE SODIUM 30 ML: 660; 100 LIQUID ORAL; RECTAL at 20:31

## 2024-09-02 RX ADMIN — IOPAMIDOL 100 ML: 755 INJECTION, SOLUTION INTRAVENOUS at 21:31

## 2024-09-02 ASSESSMENT — PAIN SCALES - GENERAL
PAINLEVEL_OUTOF10: 8
PAINLEVEL_OUTOF10: 8

## 2024-09-02 ASSESSMENT — PAIN DESCRIPTION - LOCATION
LOCATION: ABDOMEN
LOCATION: ABDOMEN

## 2024-09-02 ASSESSMENT — PAIN DESCRIPTION - DESCRIPTORS: DESCRIPTORS: CRAMPING

## 2024-09-02 NOTE — ED TRIAGE NOTES
Pt c/o intermittent abdominal cramping x3-4 days w N/V. Pt seen at [facility] yesterday for similar sxs.

## 2024-09-02 NOTE — ED PROVIDER NOTES
[facility] EMERGENCY DEPT  EMERGENCY DEPARTMENT HISTORY AND PHYSICAL EXAM      Date: 9/2/2024  Patient Name: Maddie Goldberg  MRN: [id number]  Birthdate 1960  Date of evaluation: 9/2/2024  Provider: KARI Toussaint NP   Note Started: 5:11 PM EDT 9/2/24    HISTORY OF PRESENT ILLNESS     Chief Complaint   Patient presents with   • Abdominal Pain       History Provided By: Patient    HPI: Maddie Goldberg is a 63 y.o. female with a past medical history significant for HTN, GERD, DVT, colostomy, recent cholecystostomy with drain remaining in place by Dr. Jones on 8/1/2024 presents to the ED with CC of abdominal pain.  Patient reports generalized abdominal pain that radiates around to her right lower back intermittent over the last few days.  She endorses chills and states her cholecystostomy tube has had increased drainage over the last few days.  She also states her midline abdominal dressing had some greenish drainage noted today.  She denies any fever/chills.  She states she had excessive nausea and vomiting throughout the night tonight and her urinary output has been decreased.  No chest pain or shortness of breath.  She describes her pain as severe cramping-like, no aggravating or alleviating factors.  She states she has been taking oxycodone for her pain but ran out today.    PAST MEDICAL HISTORY   Past Medical History:  Past Medical History:   Diagnosis Date   • Arthritis     left knee   • Colostomy in place (HCC)    • DVT (deep venous thrombosis) (HCC)    • GERD (gastroesophageal reflux disease)    • Hypertension    • Morbid obesity (HCC)    • Open abdominal wall wound     had wound vac in it but not anymore.   • Thyroid disease        Past Surgical History:  Past Surgical History:   Procedure Laterality Date   • BLADDER SUSPENSION     • CHEST SURGERY  1/7/14    RESECTION OF REMAINING THYROID   • GYN      uterine ablation about 10 years ago no menses since then   • HEENT      thyroidectomy   • HEMICOLECTOMY N/A     Glucose, Ur Negative Negative mg/dL    Ketones, Urine Negative Negative mg/dL    Bilirubin, Urine Negative Negative      Blood, Urine Small (A) Negative      Urobilinogen, Urine 0.1 0.1 - 1.0 EU/dL    Nitrite, Urine Positive (A) Negative      Leukocyte Esterase, Urine Moderate (A) Negative      WBC, UA >100 (H) 0 - 4 /hpf    RBC, UA 20-50 0 - 5 /hpf    Epithelial Cells, UA Moderate (A) Few /lpf    BACTERIA, URINE 2+ (A) Negative /hpf    Urine Culture if Indicated Urine Culture Ordered (A) Culture not indicated by UA result      Mucus, UA Trace (A) Negative /lpf       EKG:.Not Applicable    Radiologic Studies:  Non-plain film images such as CT, Ultrasound and MRI are read by the radiologist. Plain radiographic images are visualized and preliminarily interpreted by the ED Provider with the following findings: See ED Course Below    Interpretation per the Radiologist below, if available at the time of this note:  CT ABDOMEN PELVIS W IV CONTRAST Additional Contrast? Oral   Final Result   Cholecystostomy tube is unchanged.      There is a punctate calculus suggested in the distal CBD with no significant CBD   distention.      Inflammatory stranding about the sigmoid colon as on prior exams and the patient   is status post right hemicolectomy.      Loculated fluid at the base of the umbilicus is once changed.      Incidental and/or nonemergent findings are as described above.         Electronically signed by EVONNE PEARL           ED COURSE and DIFFERENTIAL DIAGNOSIS/MDM   12:39 AM Differential and Considerations: Patient presents with abdominal pain.  DDx: intraabdominal infection/abscess, post surgical complication, SBO, appendicitis, colitis, IBD, diverticulitis, mesenteric ischemia, AAA or descending dissection, ACS, ureteral stone, UTI. Will get labs and CT Abdomen.       Records Reviewed (source and summary of external notes): Prior medical records and Nursing notes.    Vitals:    Vitals:    09/02/24 2045 09/03/24

## 2024-09-03 PROBLEM — R10.84 GENERALIZED ABDOMINAL PAIN: Status: ACTIVE | Noted: 2024-09-03

## 2024-09-03 LAB
ANION GAP SERPL CALC-SCNC: 8 MMOL/L (ref 5–15)
BASOPHILS # BLD: 0.1 K/UL (ref 0–0.1)
BASOPHILS NFR BLD: 1 % (ref 0–1)
BUN SERPL-MCNC: 9 MG/DL (ref 6–20)
BUN/CREAT SERPL: 11 (ref 12–20)
CA-I BLD-MCNC: 9.5 MG/DL (ref 8.5–10.1)
CHLORIDE SERPL-SCNC: 108 MMOL/L (ref 97–108)
CO2 SERPL-SCNC: 26 MMOL/L (ref 21–32)
CREAT SERPL-MCNC: 0.83 MG/DL (ref 0.55–1.02)
DIFFERENTIAL METHOD BLD: ABNORMAL
EOSINOPHIL # BLD: 0.5 K/UL (ref 0–0.4)
EOSINOPHIL NFR BLD: 6 % (ref 0–7)
ERYTHROCYTE [DISTWIDTH] IN BLOOD BY AUTOMATED COUNT: 18 % (ref 11.5–14.5)
GLUCOSE SERPL-MCNC: 106 MG/DL (ref 65–100)
HCT VFR BLD AUTO: 30 % (ref 35–47)
HGB BLD-MCNC: 9.4 G/DL (ref 11.5–16)
IMM GRANULOCYTES # BLD AUTO: 0 K/UL (ref 0–0.04)
IMM GRANULOCYTES NFR BLD AUTO: 0 % (ref 0–0.5)
LYMPHOCYTES # BLD: 2.3 K/UL (ref 0.8–3.5)
LYMPHOCYTES NFR BLD: 29 % (ref 12–49)
MAGNESIUM SERPL-MCNC: 1.4 MG/DL (ref 1.6–2.4)
MCH RBC QN AUTO: 24.3 PG (ref 26–34)
MCHC RBC AUTO-ENTMCNC: 31.3 G/DL (ref 30–36.5)
MCV RBC AUTO: 77.5 FL (ref 80–99)
MONOCYTES # BLD: 0.8 K/UL (ref 0–1)
MONOCYTES NFR BLD: 11 % (ref 5–13)
NEUTS SEG # BLD: 4.1 K/UL (ref 1.8–8)
NEUTS SEG NFR BLD: 53 % (ref 32–75)
NRBC # BLD: 0 K/UL (ref 0–0.01)
NRBC BLD-RTO: 0 PER 100 WBC
PLATELET # BLD AUTO: 415 K/UL (ref 150–400)
PMV BLD AUTO: 11 FL (ref 8.9–12.9)
POTASSIUM SERPL-SCNC: 2.9 MMOL/L (ref 3.5–5.1)
RBC # BLD AUTO: 3.87 M/UL (ref 3.8–5.2)
SODIUM SERPL-SCNC: 142 MMOL/L (ref 136–145)
T4 FREE SERPL-MCNC: 1.7 NG/DL (ref 0.8–1.5)
TSH SERPL DL<=0.05 MIU/L-ACNC: 0.23 UIU/ML (ref 0.36–3.74)
WBC # BLD AUTO: 7.8 K/UL (ref 3.6–11)

## 2024-09-03 PROCEDURE — 6360000002 HC RX W HCPCS: Performed by: HOSPITALIST

## 2024-09-03 PROCEDURE — 6360000002 HC RX W HCPCS: Performed by: NURSE PRACTITIONER

## 2024-09-03 PROCEDURE — 1100000000 HC RM PRIVATE

## 2024-09-03 PROCEDURE — 2580000003 HC RX 258: Performed by: INTERNAL MEDICINE

## 2024-09-03 PROCEDURE — 84439 ASSAY OF FREE THYROXINE: CPT

## 2024-09-03 PROCEDURE — 80048 BASIC METABOLIC PNL TOTAL CA: CPT

## 2024-09-03 PROCEDURE — 84443 ASSAY THYROID STIM HORMONE: CPT

## 2024-09-03 PROCEDURE — 6370000000 HC RX 637 (ALT 250 FOR IP): Performed by: INTERNAL MEDICINE

## 2024-09-03 PROCEDURE — 6360000002 HC RX W HCPCS: Performed by: PHYSICIAN ASSISTANT

## 2024-09-03 PROCEDURE — 85025 COMPLETE CBC W/AUTO DIFF WBC: CPT

## 2024-09-03 PROCEDURE — 96376 TX/PRO/DX INJ SAME DRUG ADON: CPT

## 2024-09-03 PROCEDURE — 6370000000 HC RX 637 (ALT 250 FOR IP): Performed by: PHYSICIAN ASSISTANT

## 2024-09-03 PROCEDURE — 83735 ASSAY OF MAGNESIUM: CPT

## 2024-09-03 RX ORDER — POTASSIUM CHLORIDE 1500 MG/1
40 TABLET, EXTENDED RELEASE ORAL PRN
Status: DISCONTINUED | OUTPATIENT
Start: 2024-09-03 | End: 2024-09-05 | Stop reason: HOSPADM

## 2024-09-03 RX ORDER — DICYCLOMINE HYDROCHLORIDE 10 MG/1
10 CAPSULE ORAL
Status: DISCONTINUED | OUTPATIENT
Start: 2024-09-03 | End: 2024-09-05 | Stop reason: HOSPADM

## 2024-09-03 RX ORDER — SODIUM CHLORIDE 9 MG/ML
INJECTION, SOLUTION INTRAVENOUS PRN
Status: DISCONTINUED | OUTPATIENT
Start: 2024-09-03 | End: 2024-09-05 | Stop reason: HOSPADM

## 2024-09-03 RX ORDER — LEVOTHYROXINE SODIUM 75 UG/1
150 TABLET ORAL
Status: DISCONTINUED | OUTPATIENT
Start: 2024-09-04 | End: 2024-09-04

## 2024-09-03 RX ORDER — ONDANSETRON 4 MG/1
4 TABLET, ORALLY DISINTEGRATING ORAL EVERY 8 HOURS PRN
Status: DISCONTINUED | OUTPATIENT
Start: 2024-09-03 | End: 2024-09-05 | Stop reason: HOSPADM

## 2024-09-03 RX ORDER — POLYETHYLENE GLYCOL 3350 17 G/17G
17 POWDER, FOR SOLUTION ORAL DAILY PRN
Status: DISCONTINUED | OUTPATIENT
Start: 2024-09-03 | End: 2024-09-05 | Stop reason: HOSPADM

## 2024-09-03 RX ORDER — POTASSIUM CHLORIDE 7.45 MG/ML
10 INJECTION INTRAVENOUS
Status: COMPLETED | OUTPATIENT
Start: 2024-09-03 | End: 2024-09-03

## 2024-09-03 RX ORDER — SODIUM CHLORIDE 0.9 % (FLUSH) 0.9 %
5-40 SYRINGE (ML) INJECTION PRN
Status: DISCONTINUED | OUTPATIENT
Start: 2024-09-03 | End: 2024-09-05 | Stop reason: HOSPADM

## 2024-09-03 RX ORDER — ACETAMINOPHEN 650 MG/1
650 SUPPOSITORY RECTAL EVERY 6 HOURS PRN
Status: DISCONTINUED | OUTPATIENT
Start: 2024-09-03 | End: 2024-09-05 | Stop reason: HOSPADM

## 2024-09-03 RX ORDER — SODIUM CHLORIDE 0.9 % (FLUSH) 0.9 %
5-40 SYRINGE (ML) INJECTION EVERY 12 HOURS SCHEDULED
Status: DISCONTINUED | OUTPATIENT
Start: 2024-09-03 | End: 2024-09-05 | Stop reason: HOSPADM

## 2024-09-03 RX ORDER — ACETAMINOPHEN 325 MG/1
650 TABLET ORAL EVERY 6 HOURS PRN
Status: DISCONTINUED | OUTPATIENT
Start: 2024-09-03 | End: 2024-09-05 | Stop reason: HOSPADM

## 2024-09-03 RX ORDER — DICYCLOMINE HYDROCHLORIDE 10 MG/1
10 CAPSULE ORAL ONCE
Status: COMPLETED | OUTPATIENT
Start: 2024-09-03 | End: 2024-09-03

## 2024-09-03 RX ORDER — HYDROMORPHONE HYDROCHLORIDE 1 MG/ML
0.5 INJECTION, SOLUTION INTRAMUSCULAR; INTRAVENOUS; SUBCUTANEOUS EVERY 4 HOURS PRN
Status: DISCONTINUED | OUTPATIENT
Start: 2024-09-03 | End: 2024-09-05 | Stop reason: HOSPADM

## 2024-09-03 RX ORDER — MAGNESIUM SULFATE IN WATER 40 MG/ML
2000 INJECTION, SOLUTION INTRAVENOUS PRN
Status: DISCONTINUED | OUTPATIENT
Start: 2024-09-03 | End: 2024-09-05 | Stop reason: HOSPADM

## 2024-09-03 RX ORDER — ONDANSETRON 2 MG/ML
4 INJECTION INTRAMUSCULAR; INTRAVENOUS EVERY 6 HOURS PRN
Status: DISCONTINUED | OUTPATIENT
Start: 2024-09-03 | End: 2024-09-05 | Stop reason: HOSPADM

## 2024-09-03 RX ORDER — POTASSIUM CHLORIDE 7.45 MG/ML
10 INJECTION INTRAVENOUS PRN
Status: DISCONTINUED | OUTPATIENT
Start: 2024-09-03 | End: 2024-09-05 | Stop reason: HOSPADM

## 2024-09-03 RX ORDER — POTASSIUM CHLORIDE 1500 MG/1
40 TABLET, EXTENDED RELEASE ORAL ONCE
Status: COMPLETED | OUTPATIENT
Start: 2024-09-03 | End: 2024-09-03

## 2024-09-03 RX ADMIN — POTASSIUM CHLORIDE 10 MEQ: 7.46 INJECTION, SOLUTION INTRAVENOUS at 14:17

## 2024-09-03 RX ADMIN — DICYCLOMINE HYDROCHLORIDE 10 MG: 10 CAPSULE ORAL at 10:01

## 2024-09-03 RX ADMIN — HYDROMORPHONE HYDROCHLORIDE 0.5 MG: 1 INJECTION, SOLUTION INTRAMUSCULAR; INTRAVENOUS; SUBCUTANEOUS at 14:53

## 2024-09-03 RX ADMIN — ONDANSETRON 4 MG: 4 TABLET, ORALLY DISINTEGRATING ORAL at 20:09

## 2024-09-03 RX ADMIN — DICYCLOMINE HYDROCHLORIDE 10 MG: 10 CAPSULE ORAL at 14:11

## 2024-09-03 RX ADMIN — POTASSIUM CHLORIDE 40 MEQ: 1500 TABLET, EXTENDED RELEASE ORAL at 14:10

## 2024-09-03 RX ADMIN — SODIUM CHLORIDE, PRESERVATIVE FREE 10 ML: 5 INJECTION INTRAVENOUS at 10:01

## 2024-09-03 RX ADMIN — DICYCLOMINE HYDROCHLORIDE 10 MG: 10 CAPSULE ORAL at 08:01

## 2024-09-03 RX ADMIN — SODIUM CHLORIDE, PRESERVATIVE FREE 10 ML: 5 INJECTION INTRAVENOUS at 20:10

## 2024-09-03 RX ADMIN — HYDROMORPHONE HYDROCHLORIDE 0.5 MG: 1 INJECTION, SOLUTION INTRAMUSCULAR; INTRAVENOUS; SUBCUTANEOUS at 20:09

## 2024-09-03 RX ADMIN — HYDROMORPHONE HYDROCHLORIDE 0.5 MG: 0.5 INJECTION, SOLUTION INTRAMUSCULAR; INTRAVENOUS; SUBCUTANEOUS at 06:24

## 2024-09-03 RX ADMIN — APIXABAN 5 MG: 5 TABLET, FILM COATED ORAL at 14:28

## 2024-09-03 RX ADMIN — POTASSIUM CHLORIDE 10 MEQ: 7.46 INJECTION, SOLUTION INTRAVENOUS at 15:14

## 2024-09-03 RX ADMIN — APIXABAN 5 MG: 5 TABLET, FILM COATED ORAL at 20:09

## 2024-09-03 RX ADMIN — HYDROMORPHONE HYDROCHLORIDE 1 MG: 1 INJECTION, SOLUTION INTRAMUSCULAR; INTRAVENOUS; SUBCUTANEOUS at 00:04

## 2024-09-03 RX ADMIN — DICYCLOMINE HYDROCHLORIDE 10 MG: 10 CAPSULE ORAL at 00:58

## 2024-09-03 ASSESSMENT — PAIN DESCRIPTION - ORIENTATION
ORIENTATION: RIGHT;LEFT
ORIENTATION: RIGHT;LEFT

## 2024-09-03 ASSESSMENT — ENCOUNTER SYMPTOMS
BACK PAIN: 0
ABDOMINAL PAIN: 1
COUGH: 0
ABDOMINAL DISTENTION: 1
NAUSEA: 0
EYES NEGATIVE: 1
DIARRHEA: 0
NAUSEA: 1
VOMITING: 0
SHORTNESS OF BREATH: 0
BACK PAIN: 1

## 2024-09-03 ASSESSMENT — PAIN SCALES - GENERAL
PAINLEVEL_OUTOF10: 8
PAINLEVEL_OUTOF10: 7
PAINLEVEL_OUTOF10: 5
PAINLEVEL_OUTOF10: 8

## 2024-09-03 ASSESSMENT — PAIN DESCRIPTION - LOCATION
LOCATION: ABDOMEN
LOCATION: ABDOMEN;LEG
LOCATION: ABDOMEN
LOCATION: ABDOMEN

## 2024-09-03 ASSESSMENT — PAIN DESCRIPTION - DESCRIPTORS
DESCRIPTORS: ACHING
DESCRIPTORS: ACHING
DESCRIPTORS: CRAMPING
DESCRIPTORS: ACHING;DISCOMFORT

## 2024-09-03 ASSESSMENT — PAIN - FUNCTIONAL ASSESSMENT: PAIN_FUNCTIONAL_ASSESSMENT: ACTIVITIES ARE NOT PREVENTED

## 2024-09-03 NOTE — PROGRESS NOTES
Hospitalist Progress Note    NAME:   Maddie Goldberg   : 1960   MRN: [id number]     Date/Time: 9/3/2024 1:50 PM  Patient PCP: No primary care provider on file.    Estimated discharge date:48  Barriers: GI consult, clinical improvement      Assessment / Plan:  Generalized abdominal pain  - Has been evaluated previously in ER on multiple occasions for similar  - Consider functional versus chronic pain  - GI consult  - If failing to improve consider surgical consult  - Scheduled Bentyl  - IV Dilaudid for breakthrough pain    Cholecystostomy tube in situ  Mucous fistula, ileostomy, middle abdominal wound  - Follows with Dr. Jones as outpatient  - Has been referred to [facility] for surgery on gallbladder  - Consider general surgery consult if abdominal pain not improving    DVT  - Continue Eliquis    Thyroid disease  - TSH and T4 pending  - Continue Synthroid    Asymptomatic bacteriuria  - Given no symptoms, no indication for treatment    Moderate hypokalemia  - Replete via IV, p.o., recheck in a.m.  - Check magnesium    Microcytic anemia  - Hemoglobin 9.4, baseline around 9  - Iron studies, B12 and folate    Code Status: Full  DVT Prophylaxis: Eliquis  GI Prophylaxis: Not indicated    --------------------------------------------------------------------  [] High (any 2)    A. Problems (any 1)  [] Acute/Chronic Illness/injury posing threat to life or bodily function:    [] Severe exacerbation of chronic illness:    ---------------------------------------------------------------------  B. Risk of Treatment (any 1)   [] Drugs/treatments that require intensive monitoring for toxicity include:    [] IV ABX requiring serial renal monitoring for nephrotoxicity:     [] IV Narcotic analgesia for adverse drug reaction  [] Aggressive IV diuresis requiring serial monitoring for renal impairment and electrolyte derangements  [] Critical electrolyte abnormalities requiring IV replacement and close serial monitoring  [] Insulin -

## 2024-09-03 NOTE — CONSULTS
been done to attempt to correct this mistakes upon review, but there still might be some inadvertent errors in grammar and spelling.  Please utilize caution while reading the report due to this  inherent and potential for grammatical mistakes.    This physician  works as a inpatient consult gastroenterologist only, any result not available at time of inpatient discharge and/or clinical follow-up should be managed by the primary care physician or patient's primary gastroenterologist.  It is responsibility of hospitalitis/admitting physician to forward the discharge summary to patient's primary care physician and the primary gastroenterologist regarding further follow-up plan after patient be discharged.    note to patient:  The 21 st century Cures Act make the medical notes like this available to patient in the interest of transparency, however please be advised this is a medical document.  It is intended  as peer to peer communication. It is written in the medical language and may contain abbreviation or verbiage that are unfamiliar. It may appear blunt or direct.  Medical documents are intended to carry relevant information, facts as evident.  And the clinic opinions of the practitioner.  This note maybe transcribed  using a voice dictation system, voice-recognition errors may occur, this should not be taken to alter the contents or meaning for this note.      Cc Referring Physician

## 2024-09-03 NOTE — PLAN OF CARE
Problem: Discharge Planning  Goal: Discharge to home or other facility with appropriate resources  9/3/2024 1013 by Alicia Moreno RN  Outcome: Progressing  9/3/2024 0611 by Kemal Antony RN  Outcome: Progressing     Problem: Pain  Goal: Verbalizes/displays adequate comfort level or baseline comfort level  9/3/2024 1013 by Alicia Moreno RN  Outcome: Progressing  9/3/2024 0611 by Kemal Antony RN  Outcome: Progressing     Problem: Safety - Adult  Goal: Free from fall injury  9/3/2024 1013 by Alicia Moreno RN  Outcome: Progressing  9/3/2024 0611 by Kemal Antony RN  Outcome: Progressing

## 2024-09-03 NOTE — H&P
IV CONTRAST Additional Contrast? None    Result Date: 9/1/2024  EXAM: CT ABDOMEN PELVIS W IV CONTRAST INDICATION: Recent cholecystostomy, increasing R-sided abd pain COMPARISON: 8/25/2024 CONTRAST: 100 mL of Isovue-370. ORAL CONTRAST: None TECHNIQUE: Following intravenous administration of contrast, thin axial images were obtained through the abdomen and pelvis. Coronal and sagittal reconstructions were generated. CT dose reduction was achieved through use of a standardized protocol tailored for this examination and automatic exposure control for dose modulation. FINDINGS: LOWER THORAX: Minimal right basilar atelectasis LIVER: No mass. BILIARY TREE: Cholecystostomy tube unchanged in position. Multiple stones within the gallbladder. Mild gallbladder wall thickening. Small stone in the distal common duct. SPLEEN: within normal limits. PANCREAS: No mass or ductal dilatation. ADRENALS: Unremarkable. KIDNEYS: No mass, calculus, or hydronephrosis. Subcentimeter hypodensity left kidney too small to characterize STOMACH: Unremarkable. BOWEL: There are right and left abdominal wall ostomies. No bowel obstruction. Post partial colectomy. PERITONEUM: No ascites or pneumoperitoneum. RETROPERITONEUM: No lymphadenopathy or aortic aneurysm. There are vascular calcifications. Slight stranding at the root of the mesentery anterior to the sacral promontory unchanged REPRODUCTIVE ORGANS: URINARY BLADDER: No mass or calculus. BONES: No destructive bone lesion. ABDOMINAL WALL: Thick walled anterior abdominal wall collection at the base of the umbilicus measuring 15 mm. ADDITIONAL COMMENTS: N/A     1. Cholecystostomy tube unchanged 2. Small stent with the distal common duct 3. Small fluid collection at the base of the umbilicus from previous midline incision not significantly changed 4. Small amount of inflammation in the root of the mesentery anterior to the sacral promontory near previous surgical site. This has not significantly  yesterday/today were reviewed  [x] All current labs were reviewed and interpreted for clinical significance   [x] Appropriate follow-up labs were ordered  [x] Collateral history obtained from:  [facility]

## 2024-09-03 NOTE — PROGRESS NOTES
4 Eyes Skin Assessment     NAME:  Maddie Goldberg  YOB: 1960  MEDICAL RECORD NUMBER:  [id number]    The patient is being assessed for  Transfer to New Unit    I agree that at least one RN has performed a thorough Head to Toe Skin Assessment on the patient. ALL assessment sites listed below have been assessed.      Areas assessed by both nurses:    Head, Face, Ears, Shoulders, Back, Chest, Arms, Elbows, Hands, Sacrum. Buttock, Coccyx, Ischium, and Legs. Feet and Heels        Does the Patient have a Wound? Yes wound(s) were present on assessment. LDA wound assessment was Initiated and completed by RN       Rashaad Prevention initiated by RN: Yes  Wound Care Orders initiated by RN: Yes    Pressure Injury (Stage 3,4, Unstageable, DTI, NWPT, and Complex wounds) if present, place Wound referral order by RN under : No    New Ostomies, if present place, Ostomy referral order under : No     Nurse 1 eSignature: Electronically signed by Karissa Crooks RN on 9/3/24 at 3:57 PM EDT    **SHARE this note so that the co-signing nurse can place an eSignature**    Nurse 2 eSignature: Electronically signed by Luis Gray RN on 9/3/24 at 4:06 PM EDT

## 2024-09-03 NOTE — CARE COORDINATION
09/03/24 1321   Service Assessment   Patient Orientation Alert and Oriented   Cognition Alert   History Provided By Patient   Primary Caregiver Self   Accompanied By/Relationship alone   Support Systems Children   Patient's Healthcare Decision Maker is: Named in Scanned ACP Document   PCP Verified by CM No  (uses [facility])   Prior Functional Level Assistance with the following:;Mobility   Current Functional Level Assistance with the following:;Mobility   Can patient return to prior living arrangement Yes   Ability to make needs known: Good   Family able to assist with home care needs: Yes   Would you like for me to discuss the discharge plan with any other family members/significant others, and if so, who? Yes   Financial Resources Other (Comment)  ([facility])   Community Resources None     Lives at address on file. Uses [facility] for home health needs. Referral being sent out to [facility]. Has [facility] for ambulation. Current discharge plan is home.     Advance Care Planning   Healthcare Decision Maker:    Primary Decision Maker: Shekhar Goldberg  Child - [phone]    Click here to complete Healthcare Decision Makers including selection of the Healthcare Decision Maker Relationship (ie \"Primary\").         Readmission Assessment  Number of Days since last admission?: 8-30 days  Previous Disposition: Home with Home Health  Who is being Interviewed: Patient  What was the patient's/caregiver's perception as to why they think they needed to return back to the hospital?: Other (Comment) (cramping/pain)  Did you visit your Primary Care Physician after you left the hospital, before you returned this time?: No  Why weren't you able to visit your PCP?: Other (Comment) (no PCP, uses [facility])  Did you see a specialist, such as Cardiac, Pulmonary, Orthopedic Physician, etc. after you left the hospital?: No  Who advised the patient to return to the hospital?: Self-referral  Does the patient report anything that got in the  way of taking their medications?: No  In our efforts to provide the best possible care to you and others like you, can you think of anything that we could have done to help you after you left the hospital the first time, so that you might not have needed to return so soon?: Teaching during hospitalization regarding your illness, Improved written discharge instructions, Identify patient's health literacy needs, Arrange for more help when leaving the hospital

## 2024-09-03 NOTE — ED NOTES
ED TO INPATIENT SBAR HANDOFF    Patient Name: Maddie Goldberg   Preferred Name: Maddie  : 1960  63 y.o.   Family/Caregiver Present: no   Code Status Order: Prior  PO Status: NPO:No  Telemetry Order:   C-SSRS: Risk of Suicide: No Risk  Sitter no  n/a  Restraints:     Sepsis Risk Score      Situation  Chief Complaint   Patient presents with    Abdominal Pain     Brief Description of Patient's Condition: abd surgery in , has been having pain since. CT scans are unchanged, continues to come to different ERs. Provider spoke with surgery who wants to eval her tomorrow. 7/10 pain, medicated. Has colostomy, changed bag at 2300. Also has a gallbladder drain since surgery draining green liquid.     Mental Status: oriented, alert, coherent, and logical  Arrived from:Home  Imaging:   CT ABDOMEN PELVIS W IV CONTRAST Additional Contrast? Oral   Final Result   Cholecystostomy tube is unchanged.      There is a punctate calculus suggested in the distal CBD with no significant CBD   distention.      Inflammatory stranding about the sigmoid colon as on prior exams and the patient   is status post right hemicolectomy.      Loculated fluid at the base of the umbilicus is once changed.      Incidental and/or nonemergent findings are as described above.         Electronically signed by EVONNE PEARL        Abnormal labs:   Abnormal Labs Reviewed   CBC WITH AUTO DIFFERENTIAL - Abnormal; Notable for the following components:       Result Value    Hemoglobin 9.7 (*)     Hematocrit 31.5 (*)     MCV 78.6 (*)     MCH 24.2 (*)     RDW 17.8 (*)     Platelets 447 (*)     Immature Granulocytes % 1 (*)     Eosinophils Absolute 0.5 (*)     All other components within normal limits   COMPREHENSIVE METABOLIC PANEL - Abnormal; Notable for the following components:    Potassium 3.4 (*)     Glucose 109 (*)     Alk Phosphatase 206 (*)     Albumin 3.0 (*)     Globulin 4.9 (*)     Albumin/Globulin Ratio 0.6 (*)     All other components within normal

## 2024-09-03 NOTE — PROGRESS NOTES
4 Eyes Skin Assessment     NAME:  Maddie Goldberg  YOB: 1960  MEDICAL RECORD NUMBER:  [id number]    The patient is being assessed for  Admission    I agree that at least one RN has performed a thorough Head to Toe Skin Assessment on the patient. ALL assessment sites listed below have been assessed.      Areas assessed by both nurses:    Head, Face, Ears, Shoulders, Back, Chest, Arms, Elbows, Hands, Sacrum. Buttock, Coccyx, Ischium, Legs. Feet and Heels, and Under Medical Devices         Does the Patient have a Wound? No noted wound(s)       Rashaad Prevention initiated by RN: Yes  Wound Care Orders initiated by RN: Yes    Pressure Injury (Stage 3,4, Unstageable, DTI, NWPT, and Complex wounds) if present, place Wound referral order by RN under : No    New Ostomies, if present place, Ostomy referral order under : No     Nurse 1 eSignature: Electronically signed by Kemal Antony RN on 9/3/24 at 6:38 AM EDT    **SHARE this note so that the co-signing nurse can place an eSignature**    Nurse 2 eSignature: Electronically signed by Patt Greene RN on 9/3/24 at 6:54 AM EDT

## 2024-09-04 LAB
ANION GAP SERPL CALC-SCNC: 6 MMOL/L (ref 5–15)
BASOPHILS # BLD: 0.1 K/UL (ref 0–0.1)
BASOPHILS NFR BLD: 1 % (ref 0–1)
BUN SERPL-MCNC: 11 MG/DL (ref 6–20)
BUN/CREAT SERPL: 14 (ref 12–20)
CA-I BLD-MCNC: 9.2 MG/DL (ref 8.5–10.1)
CHLORIDE SERPL-SCNC: 107 MMOL/L (ref 97–108)
CO2 SERPL-SCNC: 25 MMOL/L (ref 21–32)
CREAT SERPL-MCNC: 0.77 MG/DL (ref 0.55–1.02)
DIFFERENTIAL METHOD BLD: ABNORMAL
EOSINOPHIL # BLD: 0.5 K/UL (ref 0–0.4)
EOSINOPHIL NFR BLD: 6 % (ref 0–7)
ERYTHROCYTE [DISTWIDTH] IN BLOOD BY AUTOMATED COUNT: 17.8 % (ref 11.5–14.5)
FOLATE SERPL-MCNC: 6.4 NG/ML (ref 5–21)
GLUCOSE SERPL-MCNC: 112 MG/DL (ref 65–100)
HCT VFR BLD AUTO: 28.8 % (ref 35–47)
HGB BLD-MCNC: 8.8 G/DL (ref 11.5–16)
IMM GRANULOCYTES # BLD AUTO: 0 K/UL (ref 0–0.04)
IMM GRANULOCYTES NFR BLD AUTO: 0 % (ref 0–0.5)
IRON SATN MFR SERPL: 14 % (ref 20–50)
IRON SERPL-MCNC: 29 UG/DL (ref 35–150)
LYMPHOCYTES # BLD: 2.6 K/UL (ref 0.8–3.5)
LYMPHOCYTES NFR BLD: 32 % (ref 12–49)
MCH RBC QN AUTO: 24 PG (ref 26–34)
MCHC RBC AUTO-ENTMCNC: 30.6 G/DL (ref 30–36.5)
MCV RBC AUTO: 78.7 FL (ref 80–99)
MONOCYTES # BLD: 0.7 K/UL (ref 0–1)
MONOCYTES NFR BLD: 8 % (ref 5–13)
NEUTS SEG # BLD: 4.3 K/UL (ref 1.8–8)
NEUTS SEG NFR BLD: 53 % (ref 32–75)
NRBC # BLD: 0 K/UL (ref 0–0.01)
NRBC BLD-RTO: 0 PER 100 WBC
PLATELET # BLD AUTO: 428 K/UL (ref 150–400)
PMV BLD AUTO: 11.4 FL (ref 8.9–12.9)
POTASSIUM SERPL-SCNC: 3.2 MMOL/L (ref 3.5–5.1)
RBC # BLD AUTO: 3.66 M/UL (ref 3.8–5.2)
SODIUM SERPL-SCNC: 138 MMOL/L (ref 136–145)
TIBC SERPL-MCNC: 210 UG/DL (ref 250–450)
VIT B12 SERPL-MCNC: 350 PG/ML (ref 193–986)
WBC # BLD AUTO: 8.2 K/UL (ref 3.6–11)

## 2024-09-04 PROCEDURE — 82607 VITAMIN B-12: CPT

## 2024-09-04 PROCEDURE — 80048 BASIC METABOLIC PNL TOTAL CA: CPT

## 2024-09-04 PROCEDURE — 6370000000 HC RX 637 (ALT 250 FOR IP): Performed by: INTERNAL MEDICINE

## 2024-09-04 PROCEDURE — 99232 SBSQ HOSP IP/OBS MODERATE 35: CPT | Performed by: SURGERY

## 2024-09-04 PROCEDURE — 1100000000 HC RM PRIVATE

## 2024-09-04 PROCEDURE — 6360000002 HC RX W HCPCS: Performed by: PHYSICIAN ASSISTANT

## 2024-09-04 PROCEDURE — 36415 COLL VENOUS BLD VENIPUNCTURE: CPT

## 2024-09-04 PROCEDURE — 6370000000 HC RX 637 (ALT 250 FOR IP): Performed by: PHYSICIAN ASSISTANT

## 2024-09-04 PROCEDURE — 83540 ASSAY OF IRON: CPT

## 2024-09-04 PROCEDURE — 82746 ASSAY OF FOLIC ACID SERUM: CPT

## 2024-09-04 PROCEDURE — 85025 COMPLETE CBC W/AUTO DIFF WBC: CPT

## 2024-09-04 PROCEDURE — 2500000003 HC RX 250 WO HCPCS: Performed by: PHYSICIAN ASSISTANT

## 2024-09-04 PROCEDURE — 94761 N-INVAS EAR/PLS OXIMETRY MLT: CPT

## 2024-09-04 PROCEDURE — 2580000003 HC RX 258: Performed by: INTERNAL MEDICINE

## 2024-09-04 RX ORDER — POTASSIUM CHLORIDE 1500 MG/1
40 TABLET, EXTENDED RELEASE ORAL ONCE
Status: COMPLETED | OUTPATIENT
Start: 2024-09-04 | End: 2024-09-04

## 2024-09-04 RX ORDER — LEVOTHYROXINE SODIUM 25 UG/1
50 TABLET ORAL
Status: DISCONTINUED | OUTPATIENT
Start: 2024-09-05 | End: 2024-09-05 | Stop reason: HOSPADM

## 2024-09-04 RX ORDER — MAGNESIUM SULFATE HEPTAHYDRATE 40 MG/ML
2000 INJECTION, SOLUTION INTRAVENOUS ONCE
Status: COMPLETED | OUTPATIENT
Start: 2024-09-04 | End: 2024-09-04

## 2024-09-04 RX ORDER — LEVOTHYROXINE SODIUM 100 UG/1
100 TABLET ORAL
Status: DISCONTINUED | OUTPATIENT
Start: 2024-09-05 | End: 2024-09-04

## 2024-09-04 RX ORDER — LEVOTHYROXINE SODIUM 100 UG/1
200 TABLET ORAL
Status: DISCONTINUED | OUTPATIENT
Start: 2024-09-05 | End: 2024-09-04

## 2024-09-04 RX ADMIN — HYDROMORPHONE HYDROCHLORIDE 0.5 MG: 1 INJECTION, SOLUTION INTRAMUSCULAR; INTRAVENOUS; SUBCUTANEOUS at 09:17

## 2024-09-04 RX ADMIN — APIXABAN 5 MG: 5 TABLET, FILM COATED ORAL at 20:58

## 2024-09-04 RX ADMIN — SODIUM CHLORIDE, PRESERVATIVE FREE 10 ML: 5 INJECTION INTRAVENOUS at 09:26

## 2024-09-04 RX ADMIN — DICYCLOMINE HYDROCHLORIDE 10 MG: 10 CAPSULE ORAL at 09:19

## 2024-09-04 RX ADMIN — LEVOTHYROXINE SODIUM 150 MCG: 0.07 TABLET ORAL at 06:25

## 2024-09-04 RX ADMIN — POTASSIUM CHLORIDE 40 MEQ: 1500 TABLET, EXTENDED RELEASE ORAL at 09:18

## 2024-09-04 RX ADMIN — HYDROMORPHONE HYDROCHLORIDE 0.5 MG: 1 INJECTION, SOLUTION INTRAMUSCULAR; INTRAVENOUS; SUBCUTANEOUS at 20:57

## 2024-09-04 RX ADMIN — HYDROMORPHONE HYDROCHLORIDE 0.5 MG: 1 INJECTION, SOLUTION INTRAMUSCULAR; INTRAVENOUS; SUBCUTANEOUS at 15:06

## 2024-09-04 RX ADMIN — HYDROMORPHONE HYDROCHLORIDE 0.5 MG: 1 INJECTION, SOLUTION INTRAMUSCULAR; INTRAVENOUS; SUBCUTANEOUS at 04:43

## 2024-09-04 RX ADMIN — SODIUM CHLORIDE, PRESERVATIVE FREE 10 ML: 5 INJECTION INTRAVENOUS at 20:58

## 2024-09-04 RX ADMIN — HYDROMORPHONE HYDROCHLORIDE 0.5 MG: 1 INJECTION, SOLUTION INTRAMUSCULAR; INTRAVENOUS; SUBCUTANEOUS at 00:15

## 2024-09-04 RX ADMIN — MAGNESIUM SULFATE HEPTAHYDRATE 2000 MG: 40 INJECTION, SOLUTION INTRAVENOUS at 09:24

## 2024-09-04 RX ADMIN — DICYCLOMINE HYDROCHLORIDE 10 MG: 10 CAPSULE ORAL at 18:17

## 2024-09-04 RX ADMIN — DICYCLOMINE HYDROCHLORIDE 10 MG: 10 CAPSULE ORAL at 06:25

## 2024-09-04 RX ADMIN — APIXABAN 5 MG: 5 TABLET, FILM COATED ORAL at 09:17

## 2024-09-04 ASSESSMENT — PAIN SCALES - GENERAL
PAINLEVEL_OUTOF10: 8
PAINLEVEL_OUTOF10: 0
PAINLEVEL_OUTOF10: 8

## 2024-09-04 ASSESSMENT — PAIN DESCRIPTION - DESCRIPTORS
DESCRIPTORS: ACHING
DESCRIPTORS: ACHING;DISCOMFORT
DESCRIPTORS: ACHING
DESCRIPTORS: ACHING
DESCRIPTORS: ACHING;DISCOMFORT
DESCRIPTORS: ACHING;CRAMPING

## 2024-09-04 ASSESSMENT — PAIN DESCRIPTION - ORIENTATION
ORIENTATION: MID

## 2024-09-04 ASSESSMENT — ENCOUNTER SYMPTOMS
DIARRHEA: 0
COUGH: 0
NAUSEA: 0
SHORTNESS OF BREATH: 0
ABDOMINAL DISTENTION: 1
BACK PAIN: 0
ABDOMINAL PAIN: 1
VOMITING: 0

## 2024-09-04 ASSESSMENT — PAIN SCALES - WONG BAKER
WONGBAKER_NUMERICALRESPONSE: NO HURT
WONGBAKER_NUMERICALRESPONSE: HURTS WHOLE LOT
WONGBAKER_NUMERICALRESPONSE: HURTS WHOLE LOT

## 2024-09-04 ASSESSMENT — PAIN DESCRIPTION - LOCATION
LOCATION: ABDOMEN
LOCATION: LEG;ABDOMEN
LOCATION: ABDOMEN

## 2024-09-04 ASSESSMENT — PAIN - FUNCTIONAL ASSESSMENT: PAIN_FUNCTIONAL_ASSESSMENT: PREVENTS OR INTERFERES SOME ACTIVE ACTIVITIES AND ADLS

## 2024-09-04 NOTE — PROGRESS NOTES
(ZOFRAN-ODT) disintegrating tablet 4 mg  4 mg Oral Q8H PRN Haris Santos MD   4 mg at 09/03/24 2009    Or    ondansetron (ZOFRAN) injection 4 mg  4 mg IntraVENous Q6H PRN Haris Santos MD        polyethylene glycol (GLYCOLAX) packet 17 g  17 g Oral Daily PRN Haris Santos MD        acetaminophen (TYLENOL) tablet 650 mg  650 mg Oral Q6H PRN Haris Santos MD        Or    acetaminophen (TYLENOL) suppository 650 mg  650 mg Rectal Q6H PRN Haris Santos MD        apixaban (ELIQUIS) tablet 5 mg  5 mg Oral BID Andrade Barton PA-C   5 mg at 09/03/24 2009    levothyroxine (SYNTHROID) tablet 150 mcg  150 mcg Oral QAM AC Andrade Barton PA-C   150 mcg at 09/04/24 0625    HYDROmorphone HCl PF (DILAUDID) injection 0.5 mg  0.5 mg IntraVENous Q4H PRN Andrade Barton PA-C   0.5 mg at 09/04/24 0443    diatrizoate meglumine-sodium (GASTROGRAFIN) 66-10 % solution 30 mL  30 mL Oral ONCE PRN Dinah Figueroa APRN - NP   30 mL at 09/02/24 2031           Recent Results (from the past 24 hour(s))   Basic Metabolic Panel w/ Reflex to MG    Collection Time: 09/03/24 10:20 AM   Result Value Ref Range    Sodium 142 136 - 145 mmol/L    Potassium 2.9 (L) 3.5 - 5.1 mmol/L    Chloride 108 97 - 108 mmol/L    CO2 26 21 - 32 mmol/L    Anion Gap 8 5 - 15 mmol/L    Glucose 106 (H) 65 - 100 mg/dL    BUN 9 6 - 20 mg/dL    Creatinine 0.83 0.55 - 1.02 mg/dL    BUN/Creatinine Ratio 11 (L) 12 - 20      Est, Glom Filt Rate 80 >60 ml/min/1.73m2    Calcium 9.5 8.5 - 10.1 mg/dL   CBC with Auto Differential    Collection Time: 09/03/24 10:20 AM   Result Value Ref Range    WBC 7.8 3.6 - 11.0 K/uL    RBC 3.87 3.80 - 5.20 M/uL    Hemoglobin 9.4 (L) 11.5 - 16.0 g/dL    Hematocrit 30.0 (L) 35.0 - 47.0 %    MCV 77.5 (L) 80.0 - 99.0 FL    MCH 24.3 (L) 26.0 - 34.0 PG    MCHC 31.3 30.0 - 36.5 g/dL    RDW 18.0 (H) 11.5 - 14.5 %    Platelets 415 (H) 150 - 400 K/uL    MPV 11.0 8.9 - 12.9 FL    Nucleated RBCs 0.0  Absolute 4.3 1.8 - 8.0 K/UL    Lymphocytes Absolute 2.6 0.8 - 3.5 K/UL    Monocytes Absolute 0.7 0.0 - 1.0 K/UL    Eosinophils Absolute 0.5 (H) 0.0 - 0.4 K/UL    Basophils Absolute 0.1 0.0 - 0.1 K/UL    Immature Granulocytes Absolute 0.0 0.00 - 0.04 K/UL    Differential Type AUTOMATED         ASSESSMENT:   Patient is 63 y.o. with diagnosis of : Principal Problem:    Generalized abdominal pain  Active Problems:    Abdominal pain    DVT (deep venous thrombosis) (HCC)    Hypertension    Thyroid disease  Resolved Problems:    * No resolved hospital problems. *      PLAN:                 Cholecystostomy tube drain noted.  I changed the dressing mid abdomen.  Ileostomy is working.    I did review the CT scan.  No acute surgical findings.  Liver enzymes normal.    Patient will need daily wound care wet-to-dry.  Ileostomy needs to be changed today.    Continue cholecystostomy tube to gravity suction.    I will follow.

## 2024-09-04 NOTE — PROGRESS NOTES
4 Eyes Skin Assessment     NAME:  Maddie Goldberg  YOB: 1960  MEDICAL RECORD NUMBER:  [id number]    The patient is being assessed for  Other :Weekly    I agree that at least one RN has performed a thorough Head to Toe Skin Assessment on the patient. ALL assessment sites listed below have been assessed.      Areas assessed by both nurses:    Head, Face, Ears, Shoulders, Back, Chest, Arms, Elbows, Hands, Sacrum. Buttock, Coccyx, Ischium, Legs. Feet and Heels, and Under Medical Devices         Does the Patient have a Wound? No noted wound(s)       Rashaad Prevention initiated by RN: Yes  Wound Care Orders initiated by RN: Yes    Pressure Injury (Stage 3,4, Unstageable, DTI, NWPT, and Complex wounds) if present, place Wound referral order by RN under : No    New Ostomies, if present place, Ostomy referral order under : No     Nurse 1 eSignature: Electronically signed by Andre Lewis RN on 9/4/24 at 4:14 AM EDT    **SHARE this note so that the co-signing nurse can place an eSignature**    Nurse 2 eSignature: Electronically signed by HELIO BENITEZ RN on 9/4/24 at 4:38 AM EDT

## 2024-09-04 NOTE — PROGRESS NOTES
Comprehensive Nutrition Assessment    Type and Reason for Visit:  Positive Nutrition Screen    Nutrition Recommendations/Plan:   Continue current diet  Initiate ONS 2x/day   Monitor PO intake, ONS intake and tolerance in I/O's     Malnutrition Assessment:  Malnutrition Status:  Severe malnutrition (09/04/24 1154)    Context:  Acute Illness     Findings of the 6 clinical characteristics of malnutrition:  Energy Intake:  50% or less of estimated energy requirements for 5 or more days  Weight Loss:  Greater than 2% over 1 week     Body Fat Loss:  No significant body fat loss     Muscle Mass Loss:  No significant muscle mass loss    Fluid Accumulation:  No significant fluid accumulation     Strength:  Not Performed    Nutrition Assessment:    RD assessed for MST 3. Pt reports not having an appetite most of the time and sometimes not eating anything at all. PO intake from breakfast > 50%. Discussed initiating ONS 2x/day to address decreased intake and wt loss, pt agreeable. PLan to continue to monitor. Meds reviewed. Abnormal labs include K 3.2, Glcuose 112, Albumin 3, H/H 8.8/28.8.    Nutrition Related Findings:    NFPE w severe finding related to intake and wt loss. Reports being nauseous often and last vomitted sunday. Nausea medication has been helping. +colostomy bag. Wound Type: Surgical Incision       Current Nutrition Intake & Therapies:    Average Meal Intake: 51-75%  Average Supplements Intake: None Ordered  ADULT DIET; Regular  ADULT ORAL NUTRITION SUPPLEMENT; Breakfast, Dinner; Standard High Calorie/High Protein Oral Supplement    Anthropometric Measures:  Height: 172.7 cm (5' 8\")  Ideal Body Weight (IBW): 140 lbs (64 kg)       Current Body Weight: 111.8 kg (246 lb 8 oz) (8/4 RD obtained),   IBW. Weight Source: Bed Scale  Current BMI (kg/m2): 37.5  Usual Body Weight: 117 kg (258 lb) (1 week ago)  % Weight Change (Calculated): -4.5                    BMI Categories: Obese Class 2 (BMI 35.0  -39.9)    Estimated Daily Nutrient Needs:  Energy Requirements Based On: Formula  Weight Used for Energy Requirements: Current  Energy (kcal/day): 2253 kcal ( MSJ x 1.3 AF x 1 SF)  Weight Used for Protein Requirements: Current  Protein (g/day): 90 - 112 g ( .8 - 1.0 g / CBW)  Method Used for Fluid Requirements: 1 ml/kcal  Fluid (ml/day):      Nutrition Diagnosis:   Severe malnutrition, In context of acute illness or injury related to acute injury/trauma as evidenced by intake 51-75%, weight loss    Nutrition Interventions:   Food and/or Nutrient Delivery: Continue Current Diet, Start Oral Nutrition Supplement  Nutrition Education/Counseling: No recommendation at this time  Coordination of Nutrition Care: Continue to monitor while inpatient  Plan of Care discussed with: Pt    Goals:     Goals: Meet at least 75% of estimated needs, by next RD assessment       Nutrition Monitoring and Evaluation:   Behavioral-Environmental Outcomes: None Identified  Food/Nutrient Intake Outcomes: Food and Nutrient Intake, Supplement Intake  Physical Signs/Symptoms Outcomes: Biochemical Data, Nausea or Vomiting    Discharge Planning:    Continue Oral Nutrition Supplement     Emy Orlando RD  Contact: [phone] or [employer]

## 2024-09-04 NOTE — PROGRESS NOTES
Spiritual Health Assessment/Progress Note  [facility]    Initial Encounter,  ,  ,      Name: Maddie Goldberg MRN: [id number]    Age: 63 y.o.     Sex: female   Language: English   [employer]: None   Generalized abdominal pain     Date: 9/4/2024            Total Time Calculated: 11 min              Spiritual Assessment began in [facility] ONCOLOGY        Referral/Consult From: Rounding   Encounter Overview/Reason: Initial Encounter  Service Provided For: Patient    [place], Belief, Meaning:   Patient identifies as spiritual, is connected with a florence tradition or spiritual practice, and has beliefs or practices that help with coping during difficult times  Family/Friends No family/friends present      Importance and Influence:  Patient has spiritual/personal beliefs that influence decisions regarding their health  Family/Friends no family/friends present    Community:  Patient feels well-supported. Support system includes: Children and Extended family  Family/Friends Other: none    Assessment and Plan of Care:     Patient Interventions include: Facilitated expression of thoughts and feelings, Explored spiritual coping/struggle/distress and theological reflection, and Affirmed coping skills/support systems  Family/Friends Interventions include: Other: none    Patient Plan of Care: Spiritual Care available upon further referral  Family/Friends Plan of Care: Other: none     is visiting for a routine spiritual assessment with the patient in 4E. She shared she was feeling tired on this day. She notes she is looking forward to discharge soon and that her son and parents should be visiting for support soon. She is Church and finds prayer a source of strength. She is not currently connected with a [employer] due to her health concerns. Active listening, spiritual assessment and prayer provided.       Electronically signed by WENDY TRACEY on 9/4/2024 at 12:00 PM

## 2024-09-04 NOTE — PLAN OF CARE
Problem: Discharge Planning  Goal: Discharge to home or other facility with appropriate resources  Outcome: Progressing  Discharge to home or other facility with appropriate resources:   Identify barriers to discharge with patient and caregiver   Identify discharge learning needs (meds, wound care, etc)     Problem: Pain  Goal: Verbalizes/displays adequate comfort level or baseline comfort level  Outcome: Progressing     Problem: Safety - Adult  Goal: Free from fall injury  Outcome: Progressing     Problem: Chronic Conditions and Co-morbidities  Goal: Patient's chronic conditions and co-morbidity symptoms are monitored and maintained or improved  Outcome: Progressing     Problem: Skin/Tissue Integrity - Adult  Goal: Skin integrity remains intact  Outcome: Progressing  Skin Integrity Remains Intact:   Monitor for areas of redness and/or skin breakdown   Assess vascular access sites hourly   Every 4-6 hours minimum: Change oxygen saturation probe site   Every 4-6 hours: If on nasal continuous positive airway pressure, respiratory therapy assesses nares and determine need for appliance change or resting period  Goal: Incisions, wounds, or drain sites healing without S/S of infection  Outcome: Progressing  Goal: Oral mucous membranes remain intact  Outcome: Progressing     Problem: Gastrointestinal - Adult  Goal: Minimal or absence of nausea and vomiting  Outcome: Progressing  Goal: Maintains or returns to baseline bowel function  Outcome: Progressing  Goal: Maintains adequate nutritional intake  Outcome: Progressing  Goal: Establish and maintain optimal ostomy function  Outcome: Progressing      Cardiology Progress Note - Team [facility] 66 y o  female MRN: [id number]  Unit/Bed#: S -01 Encounter: [id number]      Assessment/Plan:  1  Atrial flutter  - EKG 1/31 - atrial flutter with variable AV block, bifasicular block, LVH  - a m telemetry review - NSR, HR 60s, no significant events noted (converted to NSR around 0226 this morning - s/p amiodarone drip)  - echo yesterday - LVEF 55%, no RWMA, grade 2 DD, moderate concentric hypertrophy, severe asymmetric hypertrophy, mild LA dilation  - continue Xarelto 15 mg po daily for AC, Toprol- mg po BID, amiodarone 200 mg po daily  - will schedule outpatient follow-up with EP    2  Paroxysmal atrial fibrillation  - s/p afib ablation (5/20/2020) by Dr Ruthy Pabon  - continue Xarelto, amiodarone, Toprol-XL      3  Hx of monomorphic VT  - s/p MDT DC ICD (7/13/2016) by Dr Sally Hurtado  - last device report (1/11/2022)  - AP 93%,  0%, 624 AT/AF noted (4% burden)  - bedside device interrogation yesterday unremarkable     4  HCM  - not established in HCM clinic  - will address establishing care with Dr Easton Cornejo     5  Hypertensive urgency  - blood pressure on arrival 240/120 - last documented at 127/77; now resolved s/p cardene gtt (now weaned to off)  - home antihypertensive regimen - lasix 20 mg po daily, Toprol- mg po BID, Entresto 49/51 mg po BID; continue      6  Hx of CM w/ LVEF 30%  - LVEF now recovered to 55% as per most recent echo (see above) - previous LVEF of 30% felt to be tachy mediated  - cath (2019) - no significant CAD  - follows with HF outpatient   - continue Entresto     7  DANIELA on CKD stage 3  - creatinine on arrival 1 69 > 1 74 this morning; resolved  - baseline appears to be 1 4-1 9  - resume home lasix 20 mg po daily  - monitor urine output     8  Tobacco abuse  - cessation highly advised  - continue nicotine patch     9   Hyperlipidemia   - lipid panel (2/7/2021) - / triglycerides 80/ HDL 42/ LDL 57  - not on statin outpatient     10  SURINDER  - continue CPAP HS    Subjective:   Patient seen and examined  No significant events overnight  Patient states her pain is now located in her flank region and is concerned for kidney stones  Otherwise, she offers no further complaints  Denies any chest pain, palpitations, or shortness of breath  Objective:   Vitals: Blood pressure 122/77, pulse 59, temperature 98 2 °F (36 8 °C), temperature source Oral, resp   rate 19, height 5' 7" (1 702 m), weight 97 5 kg (215 lb), SpO2 98 %, not currently breastfeeding , Body mass index is 33 67 kg/m² ,   Orthostatic Blood Pressures      Most Recent Value   Blood Pressure 122/77 filed at 02/01/2022 5023   Patient Position - Orthostatic VS Lying filed at 02/01/2022 4756          Intake/Output Summary (Last 24 hours) at 2/1/2022 0932  Last data filed at 2/1/2022 0500  Gross per 24 hour   Intake 480 ml   Output 300 ml   Net 180 ml     Physical Exam:  GEN: Isidro Diaz appears well, alert and oriented x 3, pleasant and cooperative, appears older than stated age  [de-identified]: pupils equal, round, and reactive to light; extraocular muscles intact, missing teeth  NECK: supple, no carotid bruits   HEART: regular rhythm, normal S1 and S2, no murmurs, clicks, gallops or rubs   LUNGS: clear to auscultation bilaterally; no wheezes, rales, or rhonchi   ABDOMEN: normal bowel sounds, soft, no tenderness, no distention  EXTREMITIES: peripheral pulses normal; no clubbing, cyanosis, or edema    Medications:    Current Facility-Administered Medications:     acetaminophen (TYLENOL) tablet 650 mg, 650 mg, Oral, Q8H [address], Nnoi Hutchins MD, 650 mg at 02/01/22 0609    amiodarone (CORDARONE) 900 mg in dextrose 5 % 500 mL infusion, 1 mg/min, Intravenous, Continuous, CHARLOTTE Taylor, Last Rate: 33 3 mL/hr at 01/31/22 1311, 1 mg/min at 01/31/22 1311    amiodarone tablet 200 mg, 200 mg, Oral, Daily With Breakfast, Lola Mosquera PA-C, 200 mg at 02/01/22 1705   HYDROmorphone (DILAUDID) injection 0 2 mg, 0 2 mg, Intravenous, Q4H PRN, Kate Wilson MD, 0 2 mg at 02/01/22 4539    metoprolol succinate (TOPROL-XL) 24 hr tablet 150 mg, 150 mg, Oral, BID, Mavis Pryor PA-C, 150 mg at 02/01/22 0682    multi-electrolyte (PLASMALYTE-A/ISOLYTE-S PH 7 4) IV solution, 50 mL/hr, Intravenous, Continuous, Ramses Reese MD, Last Rate: 50 mL/hr at 01/31/22 1312, 50 mL/hr at 01/31/22 1312    nicotine (NICODERM CQ) 21 mg/24 hr TD 24 hr patch 21 mg, 21 mg, Transdermal, Daily, Mavis Pryor PA-C, 21 mg at 02/01/22 6349    oxyCODONE (ROXICODONE) IR tablet 5 mg, 5 mg, Oral, Q6H PRN, Juan Vincent MD, 5 mg at 02/01/22 7945    rivaroxaban (XARELTO) tablet 15 mg, 15 mg, Oral, Daily With Dinner, Mavis Pryor PA-C, 15 mg at 01/31/22 1556    sacubitril-valsartan (ENTRESTO) 49-51 MG per tablet 1 tablet, 1 tablet, Oral, BID, Juan Vincent MD, 1 tablet at 02/01/22 0921     Labs & Results:  Results from last 7 days   Lab Units 01/29/22  2312   CK TOTAL U/L 72     Results from last 7 days   Lab Units 01/30/22 0447 01/29/22  2312   WBC Thousand/uL 8 20 8 56   HEMOGLOBIN g/dL 12 2 12 8   HEMATOCRIT % 38 6 40 0   PLATELETS Thousands/uL 162 165         Results from last 7 days   Lab Units 02/01/22  0513 01/31/22  0421 01/30/22 0447 01/29/22  2312 01/29/22  2312   POTASSIUM mmol/L 3 9 4 0 3 8   < > 3 9   CHLORIDE mmol/L 102 102 105   < > 104   CO2 mmol/L 25 27 28   < > 30   BUN mg/dL 30* 25 19   < > 18   CREATININE mg/dL 1 74* 2 11* 1 77*   < > 1 69*   CALCIUM mg/dL 8 4 8 9 8 8   < > 9 1   ALK PHOS U/L 55  --   --   --  69   ALT U/L 18  --   --   --  22   AST U/L 21  --   --   --  18    < > = values in this interval not displayed       Results from last 7 days   Lab Units 01/29/22  2312   INR  1 17   PTT seconds 32     Results from last 7 days   Lab Units 02/01/22  0513 01/31/22  0421 01/30/22  0447   MAGNESIUM mg/dL 2 1 2 0 1 8

## 2024-09-04 NOTE — PROGRESS NOTES
Hospitalist Progress Note    NAME:   Maddie Goldberg   : 1960   MRN: [id number]     Date/Time: 2024 1:26 PM  Patient PCP: No primary care provider on file.    Estimated discharge date:48  Barriers: GI consult, clinical improvement      Assessment / Plan:  Generalized abdominal pain, improving  - Has been evaluated previously in ER on multiple occasions for similar  - Likely muscular in nature as significantly improved with Bentyl  - GI signed off  - General Surgery recommending no acute surgical needs  - Continue scheduled Bentyl  - IV Dilaudid for breakthrough pain    Cholecystostomy tube in situ  Mucous fistula, ileostomy, middle abdominal wound  - Follows with Dr. Pineda as outpatient  - Has been referred to [facility] for surgery on gallbladder  - Washout cholecystostomy tube    DVT  - Continue Eliquis    S/p thyroidectomy  Medication induced hyperthyroidism  - TSH decreased and T4 elevated, indicating too much Synthroid  - Decrease Synthroid dosing and have patient recheck TSH and T4 in 1 month    Asymptomatic bacteriuria  - Given no symptoms, no indication for treatment    Moderate hypokalemia  - K persistently low at 3.2, replete via IV, p.o., recheck in a.m.  - Magnesium 1.4, replete and recheck in a.m.    Microcytic anemia  - Hemoglobin 9.4>8.8  - Iron studies, B12 and folate pending    Code Status: Full  DVT Prophylaxis: Eliquis  GI Prophylaxis: Not indicated    --------------------------------------------------------------------  [] High (any 2)    A. Problems (any 1)  [] Acute/Chronic Illness/injury posing threat to life or bodily function:    [] Severe exacerbation of chronic illness:    ---------------------------------------------------------------------  B. Risk of Treatment (any 1)   [] Drugs/treatments that require intensive monitoring for toxicity include:    [] IV ABX requiring serial renal monitoring for nephrotoxicity:     [] IV Narcotic analgesia for adverse drug reaction  [] Aggressive  consult.  Scheduled Bentyl.  UA consistent with UTI however patient asymptomatic, no indication for treatment.  Potassium and magnesium low, supplementation ordered with plan to recheck in AM.  TSH low and T4 elevated, will decrease Synthroid dosing.    Subjective:  Patient seen in room for follow-up.  We discussed possibly discharging today however given electrolyte disturbance will keep overnight with recheck in AM.  Patient and friend at bedside understand, no questions  Objective:     VITALS:   Last 24hrs VS reviewed since prior progress note. Most recent are:  Patient Vitals for the past 24 hrs:   BP Temp Temp src Pulse Resp SpO2   09/04/24 0917 -- -- -- -- 16 --   09/04/24 0751 115/74 97.9 °F (36.6 °C) Oral 75 16 97 %   09/04/24 0443 -- -- -- -- 16 --   09/04/24 0341 -- -- -- 86 16 97 %   09/04/24 0339 120/71 98.1 °F (36.7 °C) Oral 88 17 --   09/04/24 0015 -- -- -- -- 18 --   09/04/24 0010 115/74 98.1 °F (36.7 °C) Oral 81 18 99 %   09/03/24 2007 112/71 98.8 °F (37.1 °C) Oral 89 18 96 %   09/03/24 1523 -- -- -- -- 16 --         Intake/Output Summary (Last 24 hours) at 9/4/2024 1326  Last data filed at 9/4/2024 0700  Gross per 24 hour   Intake --   Output 1300 ml   Net -1300 ml        I had a face to face encounter and independently examined this patient on 9/4/2024, as outlined below:    Review of Systems   Constitutional:  Negative for fatigue and fever.   Respiratory:  Negative for cough and shortness of breath.    Cardiovascular:  Negative for chest pain and palpitations.   Gastrointestinal:  Positive for abdominal distention and abdominal pain. Negative for diarrhea, nausea and vomiting.   Musculoskeletal:  Negative for back pain and myalgias.   Skin:  Positive for wound (Middle abdomen wound). Negative for rash.   Neurological:  Negative for dizziness and light-headedness.   Psychiatric/Behavioral:  Negative for confusion and dysphoric mood.         PHYSICAL EXAM:  Physical Exam  Constitutional:

## 2024-09-04 NOTE — CARE COORDINATION
CM reviewed chart.    DCP: home with family    Prior HH services with [facility], now discharged from their agency as pt does not meet admission criteria.     Numerous HH referrals sent. No accepting home health agency at this time.

## 2024-09-05 VITALS
BODY MASS INDEX: 39.1 KG/M2 | WEIGHT: 258 LBS | RESPIRATION RATE: 14 BRPM | HEART RATE: 72 BPM | OXYGEN SATURATION: 98 % | DIASTOLIC BLOOD PRESSURE: 68 MMHG | SYSTOLIC BLOOD PRESSURE: 117 MMHG | HEIGHT: 68 IN | TEMPERATURE: 97.5 F

## 2024-09-05 PROBLEM — R10.84 GENERALIZED ABDOMINAL PAIN: Status: RESOLVED | Noted: 2024-09-03 | Resolved: 2024-09-05

## 2024-09-05 LAB
ANION GAP SERPL CALC-SCNC: 8 MMOL/L (ref 2–12)
BASOPHILS # BLD: 0.1 K/UL (ref 0–0.1)
BASOPHILS NFR BLD: 1 % (ref 0–1)
BUN SERPL-MCNC: 9 MG/DL (ref 6–20)
BUN/CREAT SERPL: 13 (ref 12–20)
CA-I BLD-MCNC: 9.2 MG/DL (ref 8.5–10.1)
CHLORIDE SERPL-SCNC: 107 MMOL/L (ref 97–108)
CO2 SERPL-SCNC: 24 MMOL/L (ref 21–32)
CREAT SERPL-MCNC: 0.7 MG/DL (ref 0.55–1.02)
DIFFERENTIAL METHOD BLD: ABNORMAL
EOSINOPHIL # BLD: 0.4 K/UL (ref 0–0.4)
EOSINOPHIL NFR BLD: 5 % (ref 0–7)
ERYTHROCYTE [DISTWIDTH] IN BLOOD BY AUTOMATED COUNT: 17.9 % (ref 11.5–14.5)
GLUCOSE SERPL-MCNC: 103 MG/DL (ref 65–100)
HCT VFR BLD AUTO: 28.3 % (ref 35–47)
HGB BLD-MCNC: 8.8 G/DL (ref 11.5–16)
IMM GRANULOCYTES # BLD AUTO: 0 K/UL (ref 0–0.04)
IMM GRANULOCYTES NFR BLD AUTO: 0 % (ref 0–0.5)
LYMPHOCYTES # BLD: 2.5 K/UL (ref 0.8–3.5)
LYMPHOCYTES NFR BLD: 36 % (ref 12–49)
MAGNESIUM SERPL-MCNC: 1.7 MG/DL (ref 1.6–2.4)
MCH RBC QN AUTO: 24.1 PG (ref 26–34)
MCHC RBC AUTO-ENTMCNC: 31.1 G/DL (ref 30–36.5)
MCV RBC AUTO: 77.5 FL (ref 80–99)
MONOCYTES # BLD: 0.6 K/UL (ref 0–1)
MONOCYTES NFR BLD: 8 % (ref 5–13)
NEUTS SEG # BLD: 3.5 K/UL (ref 1.8–8)
NEUTS SEG NFR BLD: 50 % (ref 32–75)
NRBC # BLD: 0 K/UL (ref 0–0.01)
NRBC BLD-RTO: 0 PER 100 WBC
PLATELET # BLD AUTO: 420 K/UL (ref 150–400)
PMV BLD AUTO: 11.2 FL (ref 8.9–12.9)
POTASSIUM SERPL-SCNC: 3.4 MMOL/L (ref 3.5–5.1)
RBC # BLD AUTO: 3.65 M/UL (ref 3.8–5.2)
SODIUM SERPL-SCNC: 139 MMOL/L (ref 136–145)
WBC # BLD AUTO: 7.1 K/UL (ref 3.6–11)

## 2024-09-05 PROCEDURE — 85025 COMPLETE CBC W/AUTO DIFF WBC: CPT

## 2024-09-05 PROCEDURE — 2580000003 HC RX 258: Performed by: INTERNAL MEDICINE

## 2024-09-05 PROCEDURE — 94761 N-INVAS EAR/PLS OXIMETRY MLT: CPT

## 2024-09-05 PROCEDURE — 6360000002 HC RX W HCPCS: Performed by: PHYSICIAN ASSISTANT

## 2024-09-05 PROCEDURE — 6360000002 HC RX W HCPCS: Performed by: INTERNAL MEDICINE

## 2024-09-05 PROCEDURE — 6370000000 HC RX 637 (ALT 250 FOR IP): Performed by: PHYSICIAN ASSISTANT

## 2024-09-05 PROCEDURE — 6370000000 HC RX 637 (ALT 250 FOR IP): Performed by: INTERNAL MEDICINE

## 2024-09-05 PROCEDURE — 36415 COLL VENOUS BLD VENIPUNCTURE: CPT

## 2024-09-05 PROCEDURE — 80048 BASIC METABOLIC PNL TOTAL CA: CPT

## 2024-09-05 PROCEDURE — 99232 SBSQ HOSP IP/OBS MODERATE 35: CPT | Performed by: SURGERY

## 2024-09-05 PROCEDURE — 83735 ASSAY OF MAGNESIUM: CPT

## 2024-09-05 RX ORDER — OXYCODONE AND ACETAMINOPHEN 5; 325 MG/1; MG/1
1 TABLET ORAL EVERY 12 HOURS PRN
Qty: 8 TABLET | Refills: 0 | Status: SHIPPED | OUTPATIENT
Start: 2024-09-05 | End: 2024-09-12

## 2024-09-05 RX ORDER — POTASSIUM CHLORIDE 1500 MG/1
40 TABLET, EXTENDED RELEASE ORAL DAILY
Qty: 6 TABLET | Refills: 0 | Status: SHIPPED | OUTPATIENT
Start: 2024-09-05 | End: 2024-09-08

## 2024-09-05 RX ORDER — FERROUS SULFATE 325(65) MG
325 TABLET ORAL 2 TIMES DAILY WITH MEALS
Qty: 30 TABLET | Refills: 3 | Status: SHIPPED | OUTPATIENT
Start: 2024-09-05

## 2024-09-05 RX ORDER — LEVOTHYROXINE SODIUM 50 UG/1
50 TABLET ORAL
Qty: 30 TABLET | Refills: 0 | Status: SHIPPED | OUTPATIENT
Start: 2024-09-06 | End: 2024-10-06

## 2024-09-05 RX ORDER — DICYCLOMINE HYDROCHLORIDE 10 MG/1
10 CAPSULE ORAL
Qty: 42 CAPSULE | Refills: 0 | Status: SHIPPED | OUTPATIENT
Start: 2024-09-05 | End: 2024-09-19

## 2024-09-05 RX ORDER — FERROUS SULFATE 325(65) MG
325 TABLET ORAL 2 TIMES DAILY WITH MEALS
Status: DISCONTINUED | OUTPATIENT
Start: 2024-09-05 | End: 2024-09-05 | Stop reason: HOSPADM

## 2024-09-05 RX ADMIN — APIXABAN 5 MG: 5 TABLET, FILM COATED ORAL at 09:35

## 2024-09-05 RX ADMIN — DICYCLOMINE HYDROCHLORIDE 10 MG: 10 CAPSULE ORAL at 05:32

## 2024-09-05 RX ADMIN — SODIUM CHLORIDE, PRESERVATIVE FREE 10 ML: 5 INJECTION INTRAVENOUS at 09:36

## 2024-09-05 RX ADMIN — DICYCLOMINE HYDROCHLORIDE 10 MG: 10 CAPSULE ORAL at 12:01

## 2024-09-05 RX ADMIN — HYDROMORPHONE HYDROCHLORIDE 0.5 MG: 1 INJECTION, SOLUTION INTRAMUSCULAR; INTRAVENOUS; SUBCUTANEOUS at 01:16

## 2024-09-05 RX ADMIN — FERROUS SULFATE TAB 325 MG (65 MG ELEMENTAL FE) 325 MG: 325 (65 FE) TAB at 09:35

## 2024-09-05 RX ADMIN — ONDANSETRON 4 MG: 2 INJECTION INTRAMUSCULAR; INTRAVENOUS at 01:22

## 2024-09-05 RX ADMIN — HYDROMORPHONE HYDROCHLORIDE 0.5 MG: 1 INJECTION, SOLUTION INTRAMUSCULAR; INTRAVENOUS; SUBCUTANEOUS at 05:30

## 2024-09-05 RX ADMIN — HYDROMORPHONE HYDROCHLORIDE 0.5 MG: 1 INJECTION, SOLUTION INTRAMUSCULAR; INTRAVENOUS; SUBCUTANEOUS at 09:35

## 2024-09-05 RX ADMIN — LEVOTHYROXINE SODIUM 50 MCG: 0.03 TABLET ORAL at 05:33

## 2024-09-05 RX ADMIN — POTASSIUM CHLORIDE 40 MEQ: 1500 TABLET, EXTENDED RELEASE ORAL at 12:07

## 2024-09-05 ASSESSMENT — PAIN SCALES - GENERAL
PAINLEVEL_OUTOF10: 0
PAINLEVEL_OUTOF10: 0
PAINLEVEL_OUTOF10: 7
PAINLEVEL_OUTOF10: 7
PAINLEVEL_OUTOF10: 8
PAINLEVEL_OUTOF10: 8

## 2024-09-05 ASSESSMENT — PAIN SCALES - WONG BAKER
WONGBAKER_NUMERICALRESPONSE: NO HURT
WONGBAKER_NUMERICALRESPONSE: NO HURT

## 2024-09-05 ASSESSMENT — PAIN DESCRIPTION - ORIENTATION: ORIENTATION: RIGHT

## 2024-09-05 ASSESSMENT — PAIN DESCRIPTION - LOCATION
LOCATION: ABDOMEN

## 2024-09-05 ASSESSMENT — PAIN DESCRIPTION - DESCRIPTORS
DESCRIPTORS: ACHING
DESCRIPTORS: ACHING

## 2024-09-05 NOTE — DISCHARGE SUMMARY
Discharge Summary    Name: Maddie Goldberg  [id number]  YOB: 1960 (Age: 63 y.o.)   Date of Admission: 9/2/2024  Date of Discharge: 9/5/2024  Attending Physician: Masood Andrea MD    Discharge Diagnosis:   Principal Problem (Resolved):    Generalized abdominal pain  Active Problems:    Abdominal pain    DVT (deep venous thrombosis) (HCC)    Hypertension    Thyroid disease       Consultations:  IP CONSULT TO GI  IP WOUND CARE NURSE CONSULT TO EVAL  IP CONSULT TO VASCULAR SURGERY    Brief Hospital Course by Main Problems:   Maddie Goldberg is a 63 y.o. female with pmh of thyroidectomy for noncancerous enlargement and hypertension who presents with recurrent abdominal pain. Patient in June of this year experienced sigmoid volvulus requiring colostomy.  She went through 2 additional surgeries to fix leaking anastomosis and have mucous fistula established.  She also has drainage tubes for her gallbladder secondary to having cholecystitis through all of this.  She has been referred by her general surgeon here Dr. Pineda to [facility] for surgery on the gallbladder.  Patient complains of intermittent crampy abdominal pain 8/10 associated with nausea and occasionally emesis.  She has had multiple ER visit for this and other pain conditions.  The patient was admitted for further evaluation and management.  GI consulted, recommended surgery consultation however limited GI role, continue symptomatic management and no further GI study needed.  Scheduled Bentyl added with significant improvement in abdominal pain.  UA consistent with UTI however patient asymptomatic, no indication for treatment.  Potassium and magnesium low, supplementation ordered with improvement, will need to discharge on oral potassium supplement and have level rechecked in 1 week.  TSH low and T4 elevated, will decrease Synthroid dosing.  She will need TFTs rechecked in 1 month.  General surgery recommended no acute  disintegrating tablet  Commonly known as: ZOFRAN-ODT  Take 1 tablet by mouth 3 times daily as needed for Nausea or Vomiting            STOP taking these medications      clobetasol 0.05 % ointment  Commonly known as: Temovate     diclofenac sodium 1 % Gel  Commonly known as: VOLTAREN     gabapentin 300 MG capsule  Commonly known as: NEURONTIN     lisinopril 5 MG tablet  Commonly known as: PRINIVIL;ZESTRIL     predniSONE 20 MG tablet  Commonly known as: DELTASONE               Where to Get Your Medications        These medications were sent to [facility]/pharmacy #[id number] - [place], [place] - [address] [facility] - P [phone] - F [phone]  [address] [facility] [address]      Phone: [phone]   dicyclomine 10 MG capsule  ferrous sulfate 325 (65 Fe) MG tablet  levothyroxine 50 MCG tablet  oxyCODONE-acetaminophen 5-325 MG per tablet  potassium chloride 20 MEQ extended release tablet             DISPOSITION:    Home with Family:    x   Home with HH/PT/OT/RN:    SNF/LTC:    HALEY:    OTHER:            Code status: FULL  Recommended diet: regular diet with standard high-calorie/high-protein oral supplement for breakfast and dinner  Recommended activity: activity as tolerated  Wound care:  Daily wet-to-dry dressing changes, midline abdominal wound, ileostomy changes as needed      Follow up with:   PCP : No primary care provider on file.  Luana Flores MD  [facility] [address] [phone]    Follow up in 1 week(s)  New patient referral for outpatient PCP care.  Patient should have a recheck of CBC, BMP within 1 week to reassess potassium level.    [facility] EMERGENCY DEP  [address] [address] [phone]  Follow up  As needed, If symptoms worsen    Natasha Carmichael MD  [address] [address] [phone]    Follow up in 1 week(s)  New patient referral for pain management    Tommie Pineda MD  [address] [place] [address]

## 2024-09-05 NOTE — PLAN OF CARE
Problem: Discharge Planning  Goal: Discharge to home or other facility with appropriate resources  9/5/2024 0149 by Sierra Ojeda RN  Outcome: Progressing  9/4/2024 1158 by Monica Ojeda RN  Outcome: Progressing  Flowsheets (Taken 9/4/2024 0751)  Discharge to home or other facility with appropriate resources: Identify barriers to discharge with patient and caregiver     Problem: Pain  Goal: Verbalizes/displays adequate comfort level or baseline comfort level  9/5/2024 0149 by Sierra Ojeda RN  Outcome: Progressing  9/4/2024 1158 by Monica Ojeda RN  Outcome: Progressing     Problem: Safety - Adult  Goal: Free from fall injury  9/5/2024 0149 by Sierra Ojeda RN  Outcome: Progressing  9/4/2024 1158 by Monica Ojeda RN  Outcome: Progressing     Problem: Chronic Conditions and Co-morbidities  Goal: Patient's chronic conditions and co-morbidity symptoms are monitored and maintained or improved  9/5/2024 0149 by Sierra Ojeda RN  Outcome: Progressing  9/4/2024 1158 by Monica Ojeda RN  Outcome: Progressing  Flowsheets (Taken 9/4/2024 0751)  Care Plan - Patient's Chronic Conditions and Co-Morbidity Symptoms are Monitored and Maintained or Improved:   Collaborate with multidisciplinary team to address chronic and comorbid conditions and prevent exacerbation or deterioration   Monitor and assess patient's chronic conditions and comorbid symptoms for stability, deterioration, or improvement     Problem: Skin/Tissue Integrity - Adult  Goal: Skin integrity remains intact  9/5/2024 0149 by Sierra Ojeda RN  Outcome: Progressing  9/4/2024 1158 by Monica Ojeda RN  Outcome: Progressing  Flowsheets (Taken 9/4/2024 0751)  Skin Integrity Remains Intact: Monitor for areas of redness and/or skin breakdown  Goal: Incisions, wounds, or drain sites healing without S/S of infection  9/5/2024 0149 by Sierra Ojeda RN  Outcome: Progressing  9/4/2024 1158 by Monica Ojeda

## 2024-09-05 NOTE — PROGRESS NOTES
PROGRESS NOTE      Chief Complaints:  Patient examined this morning.  HPI and  Objective:    She looks comfortable.  No fever.  Vague abdominal discomfort.  Review of Systems:  Rest of review of system reviewed personally and they are negative.    EXAM:  BP (!) 118/54   Pulse 77   Temp 97.9 °F (36.6 °C) (Oral)   Resp 18   Ht 1.727 m (5' 8\")   Wt 117 kg (258 lb)   SpO2 96%   BMI 39.23 kg/m²     Patient is awake   Head and neck atraumatic, normocephalic.  ENT: No hoarse voice, gaze appropriate.  Cardiac system regular rate rhythm.  Pulmonary no audible wheeze, no cyanosis.  Chest wall excursion normal with respiration cycle  Abdomen is soft not particularly distended.  Neurologically nonfocal.  Hematology system: No bruising noted.  Musculoskeletal system: No joint deformity noted.  Genitourinary system: No hematuria noted.  Skin is warm and moist.  Psychosocial: Cooperative.  Vascular examination as previously noted no changes.    Current Facility-Administered Medications   Medication Dose Route Frequency Provider Last Rate Last Admin    levothyroxine (SYNTHROID) tablet 50 mcg  50 mcg Oral QAM AC Andrade Barton PA-C   50 mcg at 09/05/24 0533    dicyclomine (BENTYL) capsule 10 mg  10 mg Oral TID AC Haris Santos MD   10 mg at 09/05/24 0532    sodium chloride flush 0.9 % injection 5-40 mL  5-40 mL IntraVENous 2 times per day Haris Santos MD   10 mL at 09/04/24 2058    sodium chloride flush 0.9 % injection 5-40 mL  5-40 mL IntraVENous PRN Haris Santos MD        0.9 % sodium chloride infusion   IntraVENous PRN Haris Santos MD        potassium chloride (KLOR-CON M) extended release tablet 40 mEq  40 mEq Oral PRN Haris Santos MD        Or    potassium bicarb-citric acid (EFFER-K) effervescent tablet 40 mEq  40 mEq Oral PRN Haris Santos MD        Or    potassium chloride 10 mEq/100 mL IVPB (Peripheral Line)  10 mEq IntraVENous PRN Haris Santos MD

## 2024-09-05 NOTE — PLAN OF CARE
Problem: Discharge Planning  Goal: Discharge to home or other facility with appropriate resources  9/5/2024 1138 by Gonzalez Graves RN  Outcome: Progressing  9/5/2024 0149 by Sierra Ojeda RN  Outcome: Progressing     Problem: Pain  Goal: Verbalizes/displays adequate comfort level or baseline comfort level  9/5/2024 1138 by Gonzalez Graves RN  Outcome: Progressing  9/5/2024 0149 by Sierra Ojeda RN  Outcome: Progressing     Problem: Safety - Adult  Goal: Free from fall injury  9/5/2024 1138 by Gonzalez Graves RN  Outcome: Progressing  9/5/2024 0149 by Sierra Ojeda RN  Outcome: Progressing     Problem: Chronic Conditions and Co-morbidities  Goal: Patient's chronic conditions and co-morbidity symptoms are monitored and maintained or improved  9/5/2024 1138 by Gonzalez Graves RN  Outcome: Progressing  9/5/2024 0149 by Sierra Ojeda RN  Outcome: Progressing     Problem: Skin/Tissue Integrity - Adult  Goal: Skin integrity remains intact  9/5/2024 0149 by Sierra Ojeda RN  Outcome: Progressing  Goal: Incisions, wounds, or drain sites healing without S/S of infection  9/5/2024 0149 by Sierra Ojeda RN  Outcome: Progressing  Goal: Oral mucous membranes remain intact  9/5/2024 0149 by Sierra Ojeda RN  Outcome: Progressing

## 2024-09-06 ENCOUNTER — TELEPHONE (OUTPATIENT)
Age: 64
End: 2024-09-06

## 2024-09-06 DIAGNOSIS — G89.18 POST-OP PAIN: Primary | ICD-10-CM

## 2024-09-06 LAB
BACTERIA SPEC CULT: ABNORMAL
BACTERIA SPEC CULT: ABNORMAL
COLONY COUNT, CNT: ABNORMAL
Lab: ABNORMAL

## 2024-09-06 RX ORDER — OXYCODONE AND ACETAMINOPHEN 5; 325 MG/1; MG/1
1 TABLET ORAL EVERY 6 HOURS PRN
Qty: 28 TABLET | Refills: 0 | Status: SHIPPED | OUTPATIENT
Start: 2024-09-06 | End: 2024-09-13

## 2024-09-06 NOTE — TELEPHONE ENCOUNTER
Contacted patient two identifiers patient was made aware that Dr. Pineda put in a order yesterday for pain medication a 7 day supply of 28 tablets taking one every 6 hrs as needed. Patient stated yesterday when she was discharged from the hospital, she picked up pain medication which was 8 tablets, she stated that the pharmacy says it is too early for her to receive the prescription Dr. Pineda put in, she feels that the 8 tablets given from the hospital will not last her the weekend. Patient prompted to call the pharmacy and see when she will be able to get the pain medication put in by Dr. Pineda. Patient was also made aware that she will need to follow the direction of the pharmacy but the prescription has been put in by Dr. Pineda. Patient stated she will contact the pharmacy if there is a problem she will contact the office back.

## 2024-09-06 NOTE — TELEPHONE ENCOUNTER
Patient called today requesting pain medication to be called in. She has an appointment to see him on Monday, 9/9/24. [phone].

## 2024-09-13 ENCOUNTER — OFFICE VISIT (OUTPATIENT)
Age: 64
End: 2024-09-13
Payer: COMMERCIAL

## 2024-09-13 VITALS
BODY MASS INDEX: 36.45 KG/M2 | SYSTOLIC BLOOD PRESSURE: 117 MMHG | OXYGEN SATURATION: 97 % | DIASTOLIC BLOOD PRESSURE: 68 MMHG | HEIGHT: 68 IN | TEMPERATURE: 98.6 F | HEART RATE: 100 BPM | WEIGHT: 240.5 LBS

## 2024-09-13 DIAGNOSIS — K91.89 LEAK OF ANASTOMOSIS BETWEEN GASTROINTESTINAL STRUCTURES: ICD-10-CM

## 2024-09-13 DIAGNOSIS — K66.0 ADHESION OF INTESTINE: ICD-10-CM

## 2024-09-13 DIAGNOSIS — G47.00 INSOMNIA, UNSPECIFIED TYPE: Primary | ICD-10-CM

## 2024-09-13 PROCEDURE — 99212 OFFICE O/P EST SF 10 MIN: CPT | Performed by: SURGERY

## 2024-09-13 PROCEDURE — 3074F SYST BP LT 130 MM HG: CPT | Performed by: SURGERY

## 2024-09-13 PROCEDURE — 3078F DIAST BP <80 MM HG: CPT | Performed by: SURGERY

## 2024-09-13 RX ORDER — ZOLPIDEM TARTRATE 5 MG/1
5 TABLET ORAL NIGHTLY PRN
Qty: 14 TABLET | Refills: 0 | Status: SHIPPED | OUTPATIENT
Start: 2024-09-13 | End: 2024-09-27

## 2024-09-13 RX ORDER — ONDANSETRON 4 MG/1
4 TABLET, ORALLY DISINTEGRATING ORAL 3 TIMES DAILY PRN
Qty: 21 TABLET | Refills: 0 | Status: SHIPPED | OUTPATIENT
Start: 2024-09-13

## 2024-09-13 ASSESSMENT — PATIENT HEALTH QUESTIONNAIRE - PHQ9
SUM OF ALL RESPONSES TO PHQ QUESTIONS 1-9: 0
1. LITTLE INTEREST OR PLEASURE IN DOING THINGS: NOT AT ALL
2. FEELING DOWN, DEPRESSED OR HOPELESS: NOT AT ALL
SUM OF ALL RESPONSES TO PHQ9 QUESTIONS 1 & 2: 0

## 2024-09-14 ENCOUNTER — HOSPITAL ENCOUNTER (EMERGENCY)
Facility: HOSPITAL | Age: 64
Discharge: ANOTHER HOSPITAL | End: 2024-09-14
Attending: EMERGENCY MEDICINE
Payer: COMMERCIAL

## 2024-09-14 ENCOUNTER — APPOINTMENT (OUTPATIENT)
Facility: HOSPITAL | Age: 64
End: 2024-09-14
Payer: COMMERCIAL

## 2024-09-14 VITALS
TEMPERATURE: 98.3 F | HEART RATE: 97 BPM | WEIGHT: 240 LBS | DIASTOLIC BLOOD PRESSURE: 85 MMHG | SYSTOLIC BLOOD PRESSURE: 143 MMHG | HEIGHT: 68 IN | BODY MASS INDEX: 36.37 KG/M2 | OXYGEN SATURATION: 98 % | RESPIRATION RATE: 18 BRPM

## 2024-09-14 DIAGNOSIS — R10.10 PAIN OF UPPER ABDOMEN: Primary | ICD-10-CM

## 2024-09-14 DIAGNOSIS — K81.9 CHOLECYSTITIS: ICD-10-CM

## 2024-09-14 LAB
ALBUMIN SERPL-MCNC: 2.8 G/DL (ref 3.5–5)
ALBUMIN/GLOB SERPL: 0.5 (ref 1.1–2.2)
ALP SERPL-CCNC: 213 U/L (ref 45–117)
ALT SERPL-CCNC: 52 U/L (ref 12–78)
ANION GAP SERPL CALC-SCNC: 13 MMOL/L (ref 2–12)
APPEARANCE UR: ABNORMAL
APTT PPP: 31.9 SEC (ref 22.1–31)
AST SERPL W P-5'-P-CCNC: 45 U/L (ref 15–37)
BACTERIA URNS QL MICRO: ABNORMAL /HPF
BASOPHILS # BLD: 0.1 K/UL (ref 0–0.1)
BASOPHILS NFR BLD: 0 % (ref 0–1)
BILIRUB SERPL-MCNC: 1.7 MG/DL (ref 0.2–1)
BILIRUB UR QL CFM: POSITIVE
BILIRUB UR QL: ABNORMAL
BUN SERPL-MCNC: 14 MG/DL (ref 6–20)
BUN/CREAT SERPL: 13 (ref 12–20)
CA-I BLD-MCNC: 9.9 MG/DL (ref 8.5–10.1)
CHLORIDE SERPL-SCNC: 99 MMOL/L (ref 97–108)
CO2 SERPL-SCNC: 27 MMOL/L (ref 21–32)
COLOR UR: ABNORMAL
CREAT SERPL-MCNC: 1.07 MG/DL (ref 0.55–1.02)
DIFFERENTIAL METHOD BLD: ABNORMAL
EOSINOPHIL # BLD: 0 K/UL (ref 0–0.4)
EOSINOPHIL NFR BLD: 0 % (ref 0–7)
EPITH CASTS URNS QL MICRO: ABNORMAL /LPF
ERYTHROCYTE [DISTWIDTH] IN BLOOD BY AUTOMATED COUNT: 18.3 % (ref 11.5–14.5)
GLOBULIN SER CALC-MCNC: 5.3 G/DL (ref 2–4)
GLUCOSE SERPL-MCNC: 140 MG/DL (ref 65–100)
GLUCOSE UR STRIP.AUTO-MCNC: NEGATIVE MG/DL
HCT VFR BLD AUTO: 34.4 % (ref 35–47)
HGB BLD-MCNC: 10.6 G/DL (ref 11.5–16)
HGB UR QL STRIP: ABNORMAL
IMM GRANULOCYTES # BLD AUTO: 0.1 K/UL (ref 0–0.04)
IMM GRANULOCYTES NFR BLD AUTO: 1 % (ref 0–0.5)
INR PPP: 1.2 (ref 0.9–1.1)
KETONES UR QL STRIP.AUTO: 15 MG/DL
LACTATE BLD-SCNC: 1.63 MMOL/L (ref 0.4–2)
LACTATE BLD-SCNC: 2.2 MMOL/L (ref 0.4–2)
LEUKOCYTE ESTERASE UR QL STRIP.AUTO: ABNORMAL
LIPASE SERPL-CCNC: 19 U/L (ref 13–75)
LYMPHOCYTES # BLD: 2.1 K/UL (ref 0.8–3.5)
LYMPHOCYTES NFR BLD: 11 % (ref 12–49)
MCH RBC QN AUTO: 23.7 PG (ref 26–34)
MCHC RBC AUTO-ENTMCNC: 30.8 G/DL (ref 30–36.5)
MCV RBC AUTO: 77 FL (ref 80–99)
MONOCYTES # BLD: 1.5 K/UL (ref 0–1)
MONOCYTES NFR BLD: 8 % (ref 5–13)
NEUTS SEG # BLD: 15.1 K/UL (ref 1.8–8)
NEUTS SEG NFR BLD: 80 % (ref 32–75)
NITRITE UR QL STRIP.AUTO: POSITIVE
PERFORMED BY:: ABNORMAL
PERFORMED BY:: NORMAL
PH UR STRIP: 5 (ref 5–8)
PLATELET # BLD AUTO: 524 K/UL (ref 150–400)
PMV BLD AUTO: 11.2 FL (ref 8.9–12.9)
POTASSIUM SERPL-SCNC: 3.4 MMOL/L (ref 3.5–5.1)
PROT SERPL-MCNC: 8.1 G/DL (ref 6.4–8.2)
PROT UR STRIP-MCNC: 30 MG/DL
PROTHROMBIN TIME: 11.6 SEC (ref 9–11.1)
RBC # BLD AUTO: 4.47 M/UL (ref 3.8–5.2)
RBC #/AREA URNS HPF: ABNORMAL /HPF (ref 0–5)
SODIUM SERPL-SCNC: 139 MMOL/L (ref 136–145)
SP GR UR REFRACTOMETRY: 1.02 (ref 1–1.03)
THERAPEUTIC RANGE: ABNORMAL SEC (ref 58–77)
TROPONIN I SERPL HS-MCNC: 11 NG/L (ref 0–51)
URINE CULTURE IF INDICATED: ABNORMAL
UROBILINOGEN UR QL STRIP.AUTO: 0.1 EU/DL (ref 0.2–1)
WBC # BLD AUTO: 18.9 K/UL (ref 3.6–11)
WBC URNS QL MICRO: ABNORMAL /HPF (ref 0–4)

## 2024-09-14 PROCEDURE — 84484 ASSAY OF TROPONIN QUANT: CPT

## 2024-09-14 PROCEDURE — 96361 HYDRATE IV INFUSION ADD-ON: CPT

## 2024-09-14 PROCEDURE — 87086 URINE CULTURE/COLONY COUNT: CPT

## 2024-09-14 PROCEDURE — 87088 URINE BACTERIA CULTURE: CPT

## 2024-09-14 PROCEDURE — 96375 TX/PRO/DX INJ NEW DRUG ADDON: CPT

## 2024-09-14 PROCEDURE — 74177 CT ABD & PELVIS W/CONTRAST: CPT

## 2024-09-14 PROCEDURE — 99285 EMERGENCY DEPT VISIT HI MDM: CPT

## 2024-09-14 PROCEDURE — 71045 X-RAY EXAM CHEST 1 VIEW: CPT

## 2024-09-14 PROCEDURE — 2500000003 HC RX 250 WO HCPCS: Performed by: EMERGENCY MEDICINE

## 2024-09-14 PROCEDURE — 85730 THROMBOPLASTIN TIME PARTIAL: CPT

## 2024-09-14 PROCEDURE — 2580000003 HC RX 258: Performed by: EMERGENCY MEDICINE

## 2024-09-14 PROCEDURE — 6360000002 HC RX W HCPCS: Performed by: EMERGENCY MEDICINE

## 2024-09-14 PROCEDURE — 80053 COMPREHEN METABOLIC PANEL: CPT

## 2024-09-14 PROCEDURE — 83690 ASSAY OF LIPASE: CPT

## 2024-09-14 PROCEDURE — 87040 BLOOD CULTURE FOR BACTERIA: CPT

## 2024-09-14 PROCEDURE — 83605 ASSAY OF LACTIC ACID: CPT

## 2024-09-14 PROCEDURE — 96365 THER/PROPH/DIAG IV INF INIT: CPT

## 2024-09-14 PROCEDURE — 6360000004 HC RX CONTRAST MEDICATION: Performed by: EMERGENCY MEDICINE

## 2024-09-14 PROCEDURE — 81001 URINALYSIS AUTO W/SCOPE: CPT

## 2024-09-14 PROCEDURE — 87186 SC STD MICRODIL/AGAR DIL: CPT

## 2024-09-14 PROCEDURE — 36415 COLL VENOUS BLD VENIPUNCTURE: CPT

## 2024-09-14 PROCEDURE — 93005 ELECTROCARDIOGRAM TRACING: CPT | Performed by: EMERGENCY MEDICINE

## 2024-09-14 PROCEDURE — 84145 PROCALCITONIN (PCT): CPT

## 2024-09-14 PROCEDURE — 85610 PROTHROMBIN TIME: CPT

## 2024-09-14 PROCEDURE — 85025 COMPLETE CBC W/AUTO DIFF WBC: CPT

## 2024-09-14 PROCEDURE — 96376 TX/PRO/DX INJ SAME DRUG ADON: CPT

## 2024-09-14 RX ORDER — SODIUM CHLORIDE 9 MG/ML
INJECTION, SOLUTION INTRAVENOUS CONTINUOUS
Status: DISCONTINUED | OUTPATIENT
Start: 2024-09-14 | End: 2024-09-14 | Stop reason: HOSPADM

## 2024-09-14 RX ORDER — MORPHINE SULFATE 4 MG/ML
4 INJECTION, SOLUTION INTRAMUSCULAR; INTRAVENOUS
Status: COMPLETED | OUTPATIENT
Start: 2024-09-14 | End: 2024-09-14

## 2024-09-14 RX ORDER — 0.9 % SODIUM CHLORIDE 0.9 %
30 INTRAVENOUS SOLUTION INTRAVENOUS ONCE
Status: COMPLETED | OUTPATIENT
Start: 2024-09-14 | End: 2024-09-14

## 2024-09-14 RX ORDER — ONDANSETRON 2 MG/ML
4 INJECTION INTRAMUSCULAR; INTRAVENOUS
Status: COMPLETED | OUTPATIENT
Start: 2024-09-14 | End: 2024-09-14

## 2024-09-14 RX ORDER — 0.9 % SODIUM CHLORIDE 0.9 %
1000 INTRAVENOUS SOLUTION INTRAVENOUS
Status: COMPLETED | OUTPATIENT
Start: 2024-09-14 | End: 2024-09-14

## 2024-09-14 RX ORDER — HYDROMORPHONE HYDROCHLORIDE 1 MG/ML
1 INJECTION, SOLUTION INTRAMUSCULAR; INTRAVENOUS; SUBCUTANEOUS ONCE
Status: COMPLETED | OUTPATIENT
Start: 2024-09-14 | End: 2024-09-14

## 2024-09-14 RX ORDER — IOPAMIDOL 755 MG/ML
100 INJECTION, SOLUTION INTRAVASCULAR
Status: COMPLETED | OUTPATIENT
Start: 2024-09-14 | End: 2024-09-14

## 2024-09-14 RX ADMIN — HYDROMORPHONE HYDROCHLORIDE 1 MG: 1 INJECTION, SOLUTION INTRAMUSCULAR; INTRAVENOUS; SUBCUTANEOUS at 11:47

## 2024-09-14 RX ADMIN — IOPAMIDOL 100 ML: 755 INJECTION, SOLUTION INTRAVENOUS at 09:17

## 2024-09-14 RX ADMIN — SODIUM CHLORIDE 1917 ML: 9 INJECTION, SOLUTION INTRAVENOUS at 10:12

## 2024-09-14 RX ADMIN — SODIUM CHLORIDE 1000 ML: 9 INJECTION, SOLUTION INTRAVENOUS at 08:02

## 2024-09-14 RX ADMIN — PIPERACILLIN AND TAZOBACTAM 4500 MG: 4; .5 INJECTION, POWDER, LYOPHILIZED, FOR SOLUTION INTRAVENOUS at 09:34

## 2024-09-14 RX ADMIN — MORPHINE SULFATE 4 MG: 4 INJECTION, SOLUTION INTRAMUSCULAR; INTRAVENOUS at 09:27

## 2024-09-14 RX ADMIN — MORPHINE SULFATE 4 MG: 4 INJECTION, SOLUTION INTRAMUSCULAR; INTRAVENOUS at 08:25

## 2024-09-14 RX ADMIN — ONDANSETRON 4 MG: 2 INJECTION INTRAMUSCULAR; INTRAVENOUS at 12:10

## 2024-09-14 RX ADMIN — ONDANSETRON 4 MG: 2 INJECTION INTRAMUSCULAR; INTRAVENOUS at 08:04

## 2024-09-14 ASSESSMENT — PAIN SCALES - GENERAL
PAINLEVEL_OUTOF10: 7
PAINLEVEL_OUTOF10: 8
PAINLEVEL_OUTOF10: 5
PAINLEVEL_OUTOF10: 8
PAINLEVEL_OUTOF10: 8
PAINLEVEL_OUTOF10: 9
PAINLEVEL_OUTOF10: 2

## 2024-09-14 ASSESSMENT — PAIN - FUNCTIONAL ASSESSMENT
PAIN_FUNCTIONAL_ASSESSMENT: 0-10

## 2024-09-14 ASSESSMENT — PAIN DESCRIPTION - LOCATION
LOCATION: ABDOMEN
LOCATION: ABDOMEN

## 2024-09-15 LAB
BACTERIA SPEC CULT: ABNORMAL
BACTERIA SPEC CULT: NORMAL
BACTERIA SPEC CULT: NORMAL
COLONY COUNT, CNT: ABNORMAL
Lab: ABNORMAL
Lab: NORMAL
Lab: NORMAL

## 2024-09-16 LAB
BACTERIA SPEC CULT: ABNORMAL
COLONY COUNT, CNT: ABNORMAL
EKG ATRIAL RATE: 97 BPM
EKG DIAGNOSIS: NORMAL
EKG P AXIS: 13 DEGREES
EKG P-R INTERVAL: 188 MS
EKG Q-T INTERVAL: 370 MS
EKG QRS DURATION: 88 MS
EKG QTC CALCULATION (BAZETT): 469 MS
EKG R AXIS: 3 DEGREES
EKG T AXIS: 8 DEGREES
EKG VENTRICULAR RATE: 97 BPM
Lab: ABNORMAL

## 2024-09-20 LAB
BACTERIA SPEC CULT: NORMAL
BACTERIA SPEC CULT: NORMAL
Lab: NORMAL
Lab: NORMAL

## 2024-09-22 PROBLEM — G47.00 INSOMNIA: Status: ACTIVE | Noted: 2024-09-22

## 2024-09-23 ENCOUNTER — TELEPHONE (OUTPATIENT)
Age: 64
End: 2024-09-23

## 2024-09-23 DIAGNOSIS — G89.18 POST-OP PAIN: Primary | ICD-10-CM

## 2024-09-23 RX ORDER — ONDANSETRON 4 MG/1
4 TABLET, ORALLY DISINTEGRATING ORAL 3 TIMES DAILY PRN
Qty: 21 TABLET | Refills: 3 | Status: SHIPPED | OUTPATIENT
Start: 2024-09-23

## 2024-09-23 RX ORDER — OXYCODONE AND ACETAMINOPHEN 5; 325 MG/1; MG/1
1 TABLET ORAL EVERY 8 HOURS PRN
Qty: 28 TABLET | Refills: 0 | Status: SHIPPED | OUTPATIENT
Start: 2024-09-23 | End: 2024-10-02

## 2024-10-07 ENCOUNTER — TELEPHONE (OUTPATIENT)
Age: 64
End: 2024-10-07

## 2024-10-07 NOTE — TELEPHONE ENCOUNTER
I called and spoke with Maddie Goldberg 2 patient identifiers used. I informed the patient she would need to discuss this with  at her follow up appointment. That  states he has given her multiple prescriptions in the past month and she has gone to [facility] as well and received pain medication. She states that she is in [place] at this time so that doesn't help her. I informed her that if she is in that much pain she would need to go to the ER.

## 2024-10-07 NOTE — TELEPHONE ENCOUNTER
Patient called in stating that she needed to make an appt with Dr. Pineda for her gallbladder. Appt made for 11.01.24. Patient then stated she needs refills on RX Oxycodone & Zolpidem both 5mg. Patient states she's out of town and she would like her RX to be sent to [facility] at [address] [place], [address]. Telephone # [phone].   I informed pt not sure if this can happen, but will let Dr. Pineda nurse know. Patient then states he has done it before. Please contact patient to assist. Thanks - DCR

## 2024-11-04 ENCOUNTER — OFFICE VISIT (OUTPATIENT)
Dept: PRIMARY CARE CLINIC | Facility: CLINIC | Age: 64
End: 2024-11-04
Payer: COMMERCIAL

## 2024-11-04 VITALS
SYSTOLIC BLOOD PRESSURE: 149 MMHG | TEMPERATURE: 97.9 F | HEART RATE: 81 BPM | BODY MASS INDEX: 36.19 KG/M2 | DIASTOLIC BLOOD PRESSURE: 88 MMHG | RESPIRATION RATE: 16 BRPM | OXYGEN SATURATION: 99 % | HEIGHT: 68 IN | WEIGHT: 238.8 LBS

## 2024-11-04 DIAGNOSIS — E66.01 OBESITY, MORBID: ICD-10-CM

## 2024-11-04 DIAGNOSIS — F51.02 ADJUSTMENT INSOMNIA: ICD-10-CM

## 2024-11-04 DIAGNOSIS — I82.4Y1 ACUTE DEEP VEIN THROMBOSIS (DVT) OF PROXIMAL VEIN OF RIGHT LOWER EXTREMITY (HCC): Primary | ICD-10-CM

## 2024-11-04 DIAGNOSIS — E07.9 THYROID DISEASE: ICD-10-CM

## 2024-11-04 PROBLEM — R65.20 SEPSIS DUE TO CANDIDA SPECIES WITHOUT ACUTE ORGAN DYSFUNCTION (HCC): Status: RESOLVED | Noted: 2024-06-18 | Resolved: 2024-11-04

## 2024-11-04 PROBLEM — R65.10 SIRS (SYSTEMIC INFLAMMATORY RESPONSE SYNDROME) (HCC): Status: RESOLVED | Noted: 2024-06-18 | Resolved: 2024-11-04

## 2024-11-04 PROBLEM — E86.1 HYPOTENSION DUE TO HYPOVOLEMIA: Status: RESOLVED | Noted: 2024-06-10 | Resolved: 2024-11-04

## 2024-11-04 PROBLEM — K56.2 CECAL VOLVULUS (HCC): Status: RESOLVED | Noted: 2024-06-02 | Resolved: 2024-11-04

## 2024-11-04 PROBLEM — B37.7 SEPSIS DUE TO CANDIDA SPECIES WITHOUT ACUTE ORGAN DYSFUNCTION (HCC): Status: RESOLVED | Noted: 2024-06-18 | Resolved: 2024-11-04

## 2024-11-04 PROBLEM — K55.069 MESENTERIC VEIN THROMBOSIS (HCC): Status: RESOLVED | Noted: 2024-08-01 | Resolved: 2024-11-04

## 2024-11-04 PROBLEM — K55.9 LARGE BOWEL ISCHEMIA (HCC): Status: RESOLVED | Noted: 2024-06-02 | Resolved: 2024-11-04

## 2024-11-04 PROBLEM — K63.89 MASS OF COLON: Status: RESOLVED | Noted: 2024-06-10 | Resolved: 2024-11-04

## 2024-11-04 PROBLEM — B37.49 CANDIDA UTI: Status: RESOLVED | Noted: 2024-06-18 | Resolved: 2024-11-04

## 2024-11-04 PROBLEM — M79.604 PAIN OF RIGHT LOWER EXTREMITY: Status: RESOLVED | Noted: 2024-08-25 | Resolved: 2024-11-04

## 2024-11-04 PROBLEM — G89.18 POST-OP PAIN: Status: RESOLVED | Noted: 2024-07-25 | Resolved: 2024-11-04

## 2024-11-04 PROCEDURE — 3077F SYST BP >= 140 MM HG: CPT | Performed by: FAMILY MEDICINE

## 2024-11-04 PROCEDURE — 3079F DIAST BP 80-89 MM HG: CPT | Performed by: FAMILY MEDICINE

## 2024-11-04 PROCEDURE — 99204 OFFICE O/P NEW MOD 45 MIN: CPT | Performed by: FAMILY MEDICINE

## 2024-11-04 RX ORDER — ZOLPIDEM TARTRATE 10 MG/1
10 TABLET ORAL NIGHTLY PRN
Qty: 30 TABLET | Refills: 0 | Status: SHIPPED | OUTPATIENT
Start: 2024-11-04 | End: 2024-12-04

## 2024-11-04 RX ORDER — TRAZODONE HYDROCHLORIDE 50 MG/1
50 TABLET, FILM COATED ORAL NIGHTLY PRN
Qty: 30 TABLET | Refills: 5 | Status: SHIPPED | OUTPATIENT
Start: 2024-11-04

## 2024-11-04 RX ORDER — PANTOPRAZOLE SODIUM 40 MG/1
40 TABLET, DELAYED RELEASE ORAL DAILY
COMMUNITY
End: 2024-11-04 | Stop reason: ALTCHOICE

## 2024-11-04 SDOH — ECONOMIC STABILITY: FOOD INSECURITY: WITHIN THE PAST 12 MONTHS, THE FOOD YOU BOUGHT JUST DIDN'T LAST AND YOU DIDN'T HAVE MONEY TO GET MORE.: NEVER TRUE

## 2024-11-04 SDOH — ECONOMIC STABILITY: FOOD INSECURITY: WITHIN THE PAST 12 MONTHS, YOU WORRIED THAT YOUR FOOD WOULD RUN OUT BEFORE YOU GOT MONEY TO BUY MORE.: NEVER TRUE

## 2024-11-04 SDOH — ECONOMIC STABILITY: INCOME INSECURITY: HOW HARD IS IT FOR YOU TO PAY FOR THE VERY BASICS LIKE FOOD, HOUSING, MEDICAL CARE, AND HEATING?: NOT HARD AT ALL

## 2024-11-04 NOTE — PROGRESS NOTES
\"Have you been to the ER, urgent care clinic since your last visit?  Hospitalized since your last visit?\"    YES - When: approximately 6  weeks ago.  Where and Why: [facility] .    “Have you seen or consulted any other health care providers outside our system since your last visit?”    NO    Have you had a mammogram?”   NO    No breast cancer screening on file      “Have you had a pap smear?”    NO    No cervical cancer screening on file       “Have you had a colorectal cancer screening such as a colonoscopy/FIT/Cologuard?    NO    No colonoscopy on file  No cologuard on file  No FIT/FOBT on file   No flexible sigmoidoscopy on file

## 2024-11-05 ENCOUNTER — TELEPHONE (OUTPATIENT)
Dept: PRIMARY CARE CLINIC | Facility: CLINIC | Age: 64
End: 2024-11-05

## 2024-11-05 LAB
CHOLEST SERPL-MCNC: 204 MG/DL (ref 100–199)
HDLC SERPL-MCNC: 44 MG/DL
LDLC SERPL CALC-MCNC: 140 MG/DL (ref 0–99)
T4 FREE SERPL-MCNC: 0.61 NG/DL (ref 0.82–1.77)
TRIGL SERPL-MCNC: 108 MG/DL (ref 0–149)
TSH SERPL DL<=0.005 MIU/L-ACNC: 49.2 UIU/ML (ref 0.45–4.5)
VLDLC SERPL CALC-MCNC: 20 MG/DL (ref 5–40)

## 2024-11-05 RX ORDER — LEVOTHYROXINE SODIUM 75 UG/1
75 TABLET ORAL DAILY
Qty: 90 TABLET | Refills: 1 | Status: SHIPPED | OUTPATIENT
Start: 2024-11-05

## 2024-11-05 NOTE — PROGRESS NOTES
Subjective  Chief Complaint   Patient presents with    New Patient     Sleep/ medication     HPI:  Maddie Goldberg is a 64 y.o. female.    Patient presents to establish new PCP.  She has had significant medical complications over the last 5 months beginning in June.  She is status post percutaneous cholecystostomy tube, cecal volvulus status post right hemicolectomy (June 2, 2024) complicated by anastomotic leak requiring takeback surgeries (June 8 and June 9, 2024) with resection of ileum and transverse colon, partial sigmoid resection and end ileostomy and descending colon mucous fistula creation.    She had multiple complications from the surgeries including DVT in mid August and is finishing up a 3-month course of anticoagulants.    She is planned to have ERCP with stent placement prior to cholecystectomy.  She is following with colorectal surgery for her ileostomy and mucous fistula.  She is hopeful for reversal in the next year or so.    She has noted profound weakness following weight loss and immobility.  Over the last month her niece took care of her and got her on exercise and diet program that helped her regain back some muscle mass.  She is increased her mobility significantly.        Past Medical History:   Diagnosis Date    Arthritis     left knee    Colostomy in place (HCC)     DVT (deep venous thrombosis) (HCC)     GERD (gastroesophageal reflux disease)     Hypertension     Morbid obesity     Open abdominal wall wound     had wound vac in it but not anymore.    Thyroid disease      Current Outpatient Medications on File Prior to Visit   Medication Sig Dispense Refill    ferrous sulfate (IRON 325) 325 (65 Fe) MG tablet Take 1 tablet by mouth 2 times daily (with meals) 30 tablet 3    levothyroxine (SYNTHROID) 50 MCG tablet Take 1 tablet by mouth every morning (before breakfast) 30 tablet 0    ondansetron (ZOFRAN-ODT) 4 MG disintegrating tablet Take 1 tablet by mouth 3 times daily as needed for Nausea or

## 2024-11-05 NOTE — RESULT ENCOUNTER NOTE
Thyroid low, will increase levothyroxine from 50 to 75 mcg.  LDL (bad cholesterol) elevated.  Patient starting lifestyle changes.  Patient also has upcoming gallbladder procedures, so we will recheck lipids in 6 months to determine if she needs to restart her statin.  Discussed with patient.

## 2024-11-05 NOTE — ASSESSMENT & PLAN NOTE
Somewhat controlled with Ambien 5 mg.  Will increase Ambien 10 mg.  Will provide trazodone to see if that can help patient instead of Ambien given its habit-forming potential.

## 2024-11-05 NOTE — ASSESSMENT & PLAN NOTE
Provoked DVT due to substantial abdominal surgery as well as immobility following surgery.  Patient is approaching 3 months of therapy with anticoagulation on 11/17/2024.  When she runs out of her current prescription we will stop the medication as she is now mobile

## 2024-11-05 NOTE — TELEPHONE ENCOUNTER
----- Message from Dr. Quinton Roa MD sent at 11/5/2024 12:55 PM EST -----  Please call patient and schedule lab appointment in 3 months to recheck her thyroid.  ----- Message -----  From: Maia Saenz Incoming Amb Ref Lab Orders To [facility]  Sent: 11/5/2024   5:38 AM EST  To: Quinton Roa MD

## 2024-11-05 NOTE — ASSESSMENT & PLAN NOTE
Weight rapidly dropped secondary to abdominal surgeries.  Patient has started to eat and is eating healthier.  She has gained back some weight which has significantly helped her strength.  She plans to continue eating healthy and pushing herself to increase her activity levels.

## 2024-11-07 ENCOUNTER — APPOINTMENT (OUTPATIENT)
Facility: HOSPITAL | Age: 64
End: 2024-11-07
Payer: COMMERCIAL

## 2024-11-07 ENCOUNTER — HOSPITAL ENCOUNTER (EMERGENCY)
Facility: HOSPITAL | Age: 64
Discharge: ANOTHER HOSPITAL | End: 2024-11-07
Attending: FAMILY MEDICINE
Payer: COMMERCIAL

## 2024-11-07 VITALS
BODY MASS INDEX: 34.86 KG/M2 | HEART RATE: 78 BPM | SYSTOLIC BLOOD PRESSURE: 118 MMHG | TEMPERATURE: 97.6 F | WEIGHT: 230 LBS | RESPIRATION RATE: 18 BRPM | DIASTOLIC BLOOD PRESSURE: 87 MMHG | HEIGHT: 68 IN | OXYGEN SATURATION: 99 %

## 2024-11-07 DIAGNOSIS — K85.90 ACUTE PANCREATITIS, UNSPECIFIED COMPLICATION STATUS, UNSPECIFIED PANCREATITIS TYPE: Primary | ICD-10-CM

## 2024-11-07 LAB
ALBUMIN SERPL-MCNC: 3.5 G/DL (ref 3.5–5)
ALBUMIN/GLOB SERPL: 0.9 (ref 1.1–2.2)
ALP SERPL-CCNC: 153 U/L (ref 45–117)
ALT SERPL-CCNC: 47 U/L (ref 12–78)
ANION GAP SERPL CALC-SCNC: 13 MMOL/L (ref 2–12)
AST SERPL W P-5'-P-CCNC: 29 U/L (ref 15–37)
BASOPHILS # BLD: 0 K/UL (ref 0–0.1)
BASOPHILS NFR BLD: 0 % (ref 0–1)
BILIRUB SERPL-MCNC: 0.5 MG/DL (ref 0.2–1)
BUN SERPL-MCNC: 17 MG/DL (ref 6–20)
BUN/CREAT SERPL: 18 (ref 12–20)
CA-I BLD-MCNC: 9.7 MG/DL (ref 8.5–10.1)
CHLORIDE SERPL-SCNC: 104 MMOL/L (ref 97–108)
CO2 SERPL-SCNC: 24 MMOL/L (ref 21–32)
CREAT SERPL-MCNC: 0.94 MG/DL (ref 0.55–1.02)
DIFFERENTIAL METHOD BLD: ABNORMAL
EOSINOPHIL # BLD: 0 K/UL (ref 0–0.4)
EOSINOPHIL NFR BLD: 0 % (ref 0–7)
ERYTHROCYTE [DISTWIDTH] IN BLOOD BY AUTOMATED COUNT: 19.4 % (ref 11.5–14.5)
GLOBULIN SER CALC-MCNC: 4 G/DL (ref 2–4)
GLUCOSE SERPL-MCNC: 163 MG/DL (ref 65–100)
HCT VFR BLD AUTO: 41.9 % (ref 35–47)
HGB BLD-MCNC: 13.9 G/DL (ref 11.5–16)
IMM GRANULOCYTES # BLD AUTO: 0 K/UL (ref 0–0.04)
IMM GRANULOCYTES NFR BLD AUTO: 0 % (ref 0–0.5)
LIPASE SERPL-CCNC: 2920 U/L (ref 13–75)
LYMPHOCYTES # BLD: 1.1 K/UL (ref 0.8–3.5)
LYMPHOCYTES NFR BLD: 13 % (ref 12–49)
MCH RBC QN AUTO: 27.1 PG (ref 26–34)
MCHC RBC AUTO-ENTMCNC: 33.2 G/DL (ref 30–36.5)
MCV RBC AUTO: 81.8 FL (ref 80–99)
MONOCYTES # BLD: 0.4 K/UL (ref 0–1)
MONOCYTES NFR BLD: 5 % (ref 5–13)
NEUTS SEG # BLD: 6.7 K/UL (ref 1.8–8)
NEUTS SEG NFR BLD: 82 % (ref 32–75)
PLATELET # BLD AUTO: 353 K/UL (ref 150–400)
PMV BLD AUTO: 10.8 FL (ref 8.9–12.9)
POTASSIUM SERPL-SCNC: 3.8 MMOL/L (ref 3.5–5.1)
PROT SERPL-MCNC: 7.5 G/DL (ref 6.4–8.2)
RBC # BLD AUTO: 5.12 M/UL (ref 3.8–5.2)
SODIUM SERPL-SCNC: 141 MMOL/L (ref 136–145)
WBC # BLD AUTO: 8.2 K/UL (ref 3.6–11)

## 2024-11-07 PROCEDURE — 85025 COMPLETE CBC W/AUTO DIFF WBC: CPT

## 2024-11-07 PROCEDURE — 2580000003 HC RX 258: Performed by: FAMILY MEDICINE

## 2024-11-07 PROCEDURE — 2500000003 HC RX 250 WO HCPCS

## 2024-11-07 PROCEDURE — 83690 ASSAY OF LIPASE: CPT

## 2024-11-07 PROCEDURE — 80053 COMPREHEN METABOLIC PANEL: CPT

## 2024-11-07 PROCEDURE — 96374 THER/PROPH/DIAG INJ IV PUSH: CPT

## 2024-11-07 PROCEDURE — 6360000004 HC RX CONTRAST MEDICATION: Performed by: FAMILY MEDICINE

## 2024-11-07 PROCEDURE — 74177 CT ABD & PELVIS W/CONTRAST: CPT

## 2024-11-07 PROCEDURE — 96375 TX/PRO/DX INJ NEW DRUG ADDON: CPT

## 2024-11-07 PROCEDURE — 2500000003 HC RX 250 WO HCPCS: Performed by: STUDENT IN AN ORGANIZED HEALTH CARE EDUCATION/TRAINING PROGRAM

## 2024-11-07 PROCEDURE — 36415 COLL VENOUS BLD VENIPUNCTURE: CPT

## 2024-11-07 PROCEDURE — 6360000002 HC RX W HCPCS: Performed by: FAMILY MEDICINE

## 2024-11-07 PROCEDURE — 76705 ECHO EXAM OF ABDOMEN: CPT

## 2024-11-07 PROCEDURE — 96376 TX/PRO/DX INJ SAME DRUG ADON: CPT

## 2024-11-07 PROCEDURE — 99285 EMERGENCY DEPT VISIT HI MDM: CPT

## 2024-11-07 RX ORDER — MORPHINE SULFATE 4 MG/ML
4 INJECTION, SOLUTION INTRAMUSCULAR; INTRAVENOUS ONCE
Status: COMPLETED | OUTPATIENT
Start: 2024-11-07 | End: 2024-11-07

## 2024-11-07 RX ORDER — HYDROMORPHONE HYDROCHLORIDE 1 MG/ML
1 INJECTION, SOLUTION INTRAMUSCULAR; INTRAVENOUS; SUBCUTANEOUS
Status: COMPLETED | OUTPATIENT
Start: 2024-11-07 | End: 2024-11-07

## 2024-11-07 RX ORDER — 0.9 % SODIUM CHLORIDE 0.9 %
1000 INTRAVENOUS SOLUTION INTRAVENOUS
Status: COMPLETED | OUTPATIENT
Start: 2024-11-07 | End: 2024-11-07

## 2024-11-07 RX ORDER — KETOROLAC TROMETHAMINE 30 MG/ML
30 INJECTION, SOLUTION INTRAMUSCULAR; INTRAVENOUS
Status: COMPLETED | OUTPATIENT
Start: 2024-11-07 | End: 2024-11-07

## 2024-11-07 RX ORDER — IOPAMIDOL 755 MG/ML
100 INJECTION, SOLUTION INTRAVASCULAR
Status: COMPLETED | OUTPATIENT
Start: 2024-11-07 | End: 2024-11-07

## 2024-11-07 RX ORDER — ONDANSETRON 2 MG/ML
4 INJECTION INTRAMUSCULAR; INTRAVENOUS
Status: COMPLETED | OUTPATIENT
Start: 2024-11-07 | End: 2024-11-07

## 2024-11-07 RX ORDER — SODIUM CHLORIDE 9 MG/ML
INJECTION, SOLUTION INTRAVENOUS
Status: COMPLETED | OUTPATIENT
Start: 2024-11-07 | End: 2024-11-07

## 2024-11-07 RX ADMIN — SODIUM CHLORIDE: 9 INJECTION, SOLUTION INTRAVENOUS at 04:24

## 2024-11-07 RX ADMIN — HYDROMORPHONE HYDROCHLORIDE 1 MG: 1 INJECTION, SOLUTION INTRAMUSCULAR; INTRAVENOUS; SUBCUTANEOUS at 08:13

## 2024-11-07 RX ADMIN — ONDANSETRON 4 MG: 2 INJECTION INTRAMUSCULAR; INTRAVENOUS at 01:44

## 2024-11-07 RX ADMIN — KETOROLAC TROMETHAMINE 30 MG: 30 INJECTION, SOLUTION INTRAMUSCULAR; INTRAVENOUS at 01:45

## 2024-11-07 RX ADMIN — SODIUM CHLORIDE 1000 ML: 9 INJECTION, SOLUTION INTRAVENOUS at 03:13

## 2024-11-07 RX ADMIN — MORPHINE SULFATE 4 MG: 4 INJECTION, SOLUTION INTRAMUSCULAR; INTRAVENOUS at 02:22

## 2024-11-07 RX ADMIN — IOPAMIDOL 100 ML: 755 INJECTION, SOLUTION INTRAVENOUS at 02:58

## 2024-11-07 RX ADMIN — HYDROMORPHONE HYDROCHLORIDE 1 MG: 1 INJECTION, SOLUTION INTRAMUSCULAR; INTRAVENOUS; SUBCUTANEOUS at 12:30

## 2024-11-07 RX ADMIN — MORPHINE SULFATE 4 MG: 4 INJECTION, SOLUTION INTRAMUSCULAR; INTRAVENOUS at 04:35

## 2024-11-07 ASSESSMENT — PAIN - FUNCTIONAL ASSESSMENT: PAIN_FUNCTIONAL_ASSESSMENT: 0-10

## 2024-11-07 ASSESSMENT — PAIN SCALES - GENERAL: PAINLEVEL_OUTOF10: 9

## 2024-11-07 NOTE — ED TRIAGE NOTES
Pt states recently had gallstones removed from bile duct and had ERCP today and began with pain and vomiting this evening and couldn't make it back to [facility] to be seen.

## 2024-11-07 NOTE — ED PROVIDER NOTES
EMERGENCY DEPARTMENT HISTORY AND PHYSICAL EXAM      Date: 11/7/2024  Patient Name: Maddie Goldberg    History of Presenting Illness     Chief Complaint   Patient presents with    Abdominal Pain       History Provided By:     HPI: Maddie Goldberg, is a very pleasant 64 y.o. female presenting to the ED with a chief complaint of abdominal pain.  States she had an ERCP this morning for treatment of a stone in her common bile duct.  She was doing well till later in the day when she developed severe epigastric pain.  Pain is constant and nonradiating.  No chest pain or shortness of breath.  No bloody nor black stools.  No fevers chills or rigors.  No alleviating or aggravating factors.    Denies any other symptoms at this time.    PCP: Quinton Roa MD    No current facility-administered medications on file prior to encounter.     Current Outpatient Medications on File Prior to Encounter   Medication Sig Dispense Refill    levothyroxine (SYNTHROID) 75 MCG tablet Take 1 tablet by mouth daily 90 tablet 1    zolpidem (AMBIEN) 10 MG tablet Take 1 tablet by mouth nightly as needed for Sleep for up to 30 days. Max Daily Amount: 10 mg 30 tablet 0    traZODone (DESYREL) 50 MG tablet Take 1 tablet by mouth nightly as needed for Sleep 30 tablet 5    ferrous sulfate (IRON 325) 325 (65 Fe) MG tablet Take 1 tablet by mouth 2 times daily (with meals) 30 tablet 3    dicyclomine (BENTYL) 10 MG capsule Take 1 capsule by mouth 3 times daily (before meals) for 14 days 42 capsule 0    ondansetron (ZOFRAN-ODT) 4 MG disintegrating tablet Take 1 tablet by mouth 3 times daily as needed for Nausea or Vomiting 21 tablet 0    apixaban (ELIQUIS) 5 MG TABS tablet Take 2 tablets by mouth 2 times daily for 5 days, THEN 1 tablet 2 times daily. 60 tablet 3       Past History     Past Medical History:  Past Medical History:   Diagnosis Date    Arthritis     left knee    Colostomy in place (HCC)     DVT (deep venous thrombosis) (HCC)     GERD

## 2024-11-12 ENCOUNTER — TELEPHONE (OUTPATIENT)
Dept: PRIMARY CARE CLINIC | Facility: CLINIC | Age: 64
End: 2024-11-12

## 2024-11-12 NOTE — TELEPHONE ENCOUNTER
Care Transitions Initial Follow Up Call    Outreach made within 2 business days of discharge: Yes    Patient: Maddie Goldberg Patient : 1960   MRN: [id number]  Reason for Admission: 24  Discharge Date: 24       Spoke with: Pt    Discharge department/facility: [facility] Interactive Patient Contact:  Was patient able to fill all prescriptions: Yes  Was patient instructed to bring all medications to the follow-up visit: Yes  Is patient taking all medications as directed in the discharge summary? Yes  Does patient understand their discharge instructions: Yes  Does patient have questions or concerns that need addressed prior to 7-14 day follow up office visit: - No    Additional needs identified to be addressed with provider  No needs identified             Scheduled appointment with PCP within 7-14 days    Follow Up  Future Appointments   Date Time Provider Department Center   2024  4:00 PM Quinton Roa MD [facility] DEP   2025  4:00 PM Quinton Roa MD [facility] DEP       Patricio Giron LPN

## 2024-11-12 NOTE — TELEPHONE ENCOUNTER
Care Transitions Initial Follow Up Call    Outreach made within 2 business days of discharge: Yes    Patient: Maddie Goldberg Patient : 1960   MRN: [id number]  Reason for Admission: Acute pancreatitis   Discharge Date: 24       Spoke with: [facility] scheduling    Discharge department/facility: [facility]      Follow Up  Future Appointments   Date Time Provider Department Center   2024  4:00 PM Quinton Roa MD [facility] DEP   2025  4:00 PM Quinton Roa MD [facility] DEP       [facility]

## 2024-11-17 ENCOUNTER — APPOINTMENT (OUTPATIENT)
Facility: HOSPITAL | Age: 64
End: 2024-11-17
Payer: COMMERCIAL

## 2024-11-17 ENCOUNTER — HOSPITAL ENCOUNTER (EMERGENCY)
Facility: HOSPITAL | Age: 64
Discharge: HOME | End: 2024-11-17
Attending: EMERGENCY MEDICINE
Payer: COMMERCIAL

## 2024-11-17 VITALS
TEMPERATURE: 98 F | HEART RATE: 76 BPM | RESPIRATION RATE: 18 BRPM | HEIGHT: 68 IN | SYSTOLIC BLOOD PRESSURE: 155 MMHG | BODY MASS INDEX: 34.86 KG/M2 | WEIGHT: 230 LBS | DIASTOLIC BLOOD PRESSURE: 90 MMHG | OXYGEN SATURATION: 99 %

## 2024-11-17 DIAGNOSIS — K85.90 ACUTE PANCREATITIS, UNSPECIFIED COMPLICATION STATUS, UNSPECIFIED PANCREATITIS TYPE: Primary | ICD-10-CM

## 2024-11-17 LAB
ALBUMIN SERPL-MCNC: 3.2 G/DL (ref 3.5–5)
ALBUMIN/GLOB SERPL: 0.7 (ref 1.1–2.2)
ALP SERPL-CCNC: 145 U/L (ref 45–117)
ALT SERPL-CCNC: 26 U/L (ref 12–78)
ANION GAP SERPL CALC-SCNC: 11 MMOL/L (ref 2–12)
AST SERPL W P-5'-P-CCNC: 23 U/L (ref 15–37)
BASOPHILS # BLD: 0 K/UL (ref 0–0.1)
BASOPHILS NFR BLD: 0 % (ref 0–1)
BILIRUB SERPL-MCNC: 0.2 MG/DL (ref 0.2–1)
BUN SERPL-MCNC: 10 MG/DL (ref 6–20)
BUN/CREAT SERPL: 13 (ref 12–20)
CA-I BLD-MCNC: 9.4 MG/DL (ref 8.5–10.1)
CHLORIDE SERPL-SCNC: 104 MMOL/L (ref 97–108)
CO2 SERPL-SCNC: 25 MMOL/L (ref 21–32)
CREAT SERPL-MCNC: 0.76 MG/DL (ref 0.55–1.02)
DIFFERENTIAL METHOD BLD: ABNORMAL
EOSINOPHIL # BLD: 0.1 K/UL (ref 0–0.4)
EOSINOPHIL NFR BLD: 1 % (ref 0–7)
ERYTHROCYTE [DISTWIDTH] IN BLOOD BY AUTOMATED COUNT: 17.5 % (ref 11.5–14.5)
GLOBULIN SER CALC-MCNC: 4.6 G/DL (ref 2–4)
GLUCOSE SERPL-MCNC: 95 MG/DL (ref 65–100)
HCT VFR BLD AUTO: 39.8 % (ref 35–47)
HGB BLD-MCNC: 12.8 G/DL (ref 11.5–16)
IMM GRANULOCYTES # BLD AUTO: 0 K/UL
IMM GRANULOCYTES NFR BLD AUTO: 0 %
LIPASE SERPL-CCNC: 38 U/L (ref 13–75)
LYMPHOCYTES # BLD: 3 K/UL (ref 0.8–3.5)
LYMPHOCYTES NFR BLD: 33 % (ref 12–49)
MCH RBC QN AUTO: 27.1 PG (ref 26–34)
MCHC RBC AUTO-ENTMCNC: 32.2 G/DL (ref 30–36.5)
MCV RBC AUTO: 84.3 FL (ref 80–99)
MONOCYTES # BLD: 0.5 K/UL (ref 0–1)
MONOCYTES NFR BLD: 5 % (ref 5–13)
NEUTS SEG # BLD: 5.6 K/UL (ref 1.8–8)
NEUTS SEG NFR BLD: 61 % (ref 32–75)
PLATELET # BLD AUTO: 488 K/UL (ref 150–400)
PMV BLD AUTO: 9.9 FL (ref 8.9–12.9)
POTASSIUM SERPL-SCNC: 3.9 MMOL/L (ref 3.5–5.1)
PROCALCITONIN SERPL-MCNC: <0.05 NG/ML
PROT SERPL-MCNC: 7.8 G/DL (ref 6.4–8.2)
RBC # BLD AUTO: 4.72 M/UL (ref 3.8–5.2)
RBC MORPH BLD: ABNORMAL
SODIUM SERPL-SCNC: 140 MMOL/L (ref 136–145)
WBC # BLD AUTO: 9.2 K/UL (ref 3.6–11)

## 2024-11-17 PROCEDURE — 96376 TX/PRO/DX INJ SAME DRUG ADON: CPT

## 2024-11-17 PROCEDURE — 74177 CT ABD & PELVIS W/CONTRAST: CPT

## 2024-11-17 PROCEDURE — 99285 EMERGENCY DEPT VISIT HI MDM: CPT

## 2024-11-17 PROCEDURE — 85025 COMPLETE CBC W/AUTO DIFF WBC: CPT

## 2024-11-17 PROCEDURE — 83690 ASSAY OF LIPASE: CPT

## 2024-11-17 PROCEDURE — 80053 COMPREHEN METABOLIC PANEL: CPT

## 2024-11-17 PROCEDURE — 6360000002 HC RX W HCPCS: Performed by: EMERGENCY MEDICINE

## 2024-11-17 PROCEDURE — 96374 THER/PROPH/DIAG INJ IV PUSH: CPT

## 2024-11-17 PROCEDURE — 96375 TX/PRO/DX INJ NEW DRUG ADDON: CPT

## 2024-11-17 PROCEDURE — 84145 PROCALCITONIN (PCT): CPT

## 2024-11-17 PROCEDURE — 6360000004 HC RX CONTRAST MEDICATION: Performed by: EMERGENCY MEDICINE

## 2024-11-17 PROCEDURE — 6370000000 HC RX 637 (ALT 250 FOR IP): Performed by: EMERGENCY MEDICINE

## 2024-11-17 PROCEDURE — 36415 COLL VENOUS BLD VENIPUNCTURE: CPT

## 2024-11-17 RX ORDER — MORPHINE SULFATE 4 MG/ML
4 INJECTION, SOLUTION INTRAMUSCULAR; INTRAVENOUS
Status: COMPLETED | OUTPATIENT
Start: 2024-11-17 | End: 2024-11-17

## 2024-11-17 RX ORDER — BACITRACIN ZINC 500 [USP'U]/G
OINTMENT TOPICAL
Status: COMPLETED | OUTPATIENT
Start: 2024-11-17 | End: 2024-11-17

## 2024-11-17 RX ORDER — ONDANSETRON 2 MG/ML
4 INJECTION INTRAMUSCULAR; INTRAVENOUS ONCE
Status: COMPLETED | OUTPATIENT
Start: 2024-11-17 | End: 2024-11-17

## 2024-11-17 RX ORDER — NEOMYCIN/BACITRACIN/POLYMYXINB 3.5-400-5K
OINTMENT (GRAM) TOPICAL
Qty: 1 EACH | Refills: 1 | Status: SHIPPED | OUTPATIENT
Start: 2024-11-17

## 2024-11-17 RX ORDER — IOPAMIDOL 755 MG/ML
100 INJECTION, SOLUTION INTRAVASCULAR
Status: COMPLETED | OUTPATIENT
Start: 2024-11-17 | End: 2024-11-17

## 2024-11-17 RX ADMIN — MORPHINE SULFATE 4 MG: 4 INJECTION, SOLUTION INTRAMUSCULAR; INTRAVENOUS at 13:29

## 2024-11-17 RX ADMIN — ONDANSETRON 4 MG: 2 INJECTION INTRAMUSCULAR; INTRAVENOUS at 13:29

## 2024-11-17 RX ADMIN — BACITRACIN ZINC: 500 OINTMENT TOPICAL at 15:23

## 2024-11-17 RX ADMIN — IOPAMIDOL 100 ML: 755 INJECTION, SOLUTION INTRAVENOUS at 14:21

## 2024-11-17 RX ADMIN — MORPHINE SULFATE 4 MG: 4 INJECTION, SOLUTION INTRAMUSCULAR; INTRAVENOUS at 14:41

## 2024-11-17 ASSESSMENT — PAIN SCALES - GENERAL
PAINLEVEL_OUTOF10: 8
PAINLEVEL_OUTOF10: 8

## 2024-11-17 ASSESSMENT — PAIN - FUNCTIONAL ASSESSMENT
PAIN_FUNCTIONAL_ASSESSMENT: 0-10
PAIN_FUNCTIONAL_ASSESSMENT: PREVENTS OR INTERFERES SOME ACTIVE ACTIVITIES AND ADLS

## 2024-11-17 ASSESSMENT — PAIN DESCRIPTION - ORIENTATION
ORIENTATION: RIGHT
ORIENTATION: RIGHT;LOWER

## 2024-11-17 ASSESSMENT — PAIN DESCRIPTION - DESCRIPTORS: DESCRIPTORS: BURNING

## 2024-11-17 ASSESSMENT — PAIN DESCRIPTION - PAIN TYPE: TYPE: ACUTE PAIN

## 2024-11-17 ASSESSMENT — PAIN DESCRIPTION - LOCATION
LOCATION: ABDOMEN
LOCATION: ABDOMEN

## 2024-11-17 NOTE — ED TRIAGE NOTES
Pt ambulatory to triage for R lower abdominal pain and nausea. Pt reports she has a drain inserted into her gallbladder and today she noticed purulent drainage from the sight. Pt reports recent hospitalization for pancreatitis.

## 2024-11-17 NOTE — DISCHARGE INSTRUCTIONS
Thank you for choosing our Emergency Department for your care.  It is our privilege to care for you in your time of need.  In the next several days, you may receive a survey via email or mailed to your home about your experience with our team.  We would greatly appreciate you taking a few minutes to complete the survey, as we use this information to learn what we have done well and what we could be doing better. Thank you for trusting us with your care!    Below you will find a list of your tests from today's visit.   Labs  Recent Results (from the past 12 hour(s))   CBC with Auto Differential    Collection Time: 11/17/24  1:30 PM   Result Value Ref Range    WBC 9.2 3.6 - 11.0 K/uL    RBC 4.72 3.80 - 5.20 M/uL    Hemoglobin 12.8 11.5 - 16.0 g/dL    Hematocrit 39.8 35.0 - 47.0 %    MCV 84.3 80.0 - 99.0 FL    MCH 27.1 26.0 - 34.0 PG    MCHC 32.2 30.0 - 36.5 g/dL    RDW 17.5 (H) 11.5 - 14.5 %    Platelets 488 (H) 150 - 400 K/uL    MPV 9.9 8.9 - 12.9 FL    Neutrophils % 61 32 - 75 %    Lymphocytes % 33 12 - 49 %    Monocytes % 5 5 - 13 %    Eosinophils % 1 0 - 7 %    Basophils % 0 0 - 1 %    Immature Granulocytes % 0 %    Neutrophils Absolute 5.6 1.8 - 8.0 K/UL    Lymphocytes Absolute 3.0 0.8 - 3.5 K/UL    Monocytes Absolute 0.5 0.0 - 1.0 K/UL    Eosinophils Absolute 0.1 0.0 - 0.4 K/UL    Basophils Absolute 0.0 0.0 - 0.1 K/UL    Immature Granulocytes Absolute 0.0 K/UL    Differential Type Manual      RBC Comment Anisocytosis  2+       Comprehensive Metabolic Panel    Collection Time: 11/17/24  1:30 PM   Result Value Ref Range    Sodium 140 136 - 145 mmol/L    Potassium 3.9 3.5 - 5.1 mmol/L    Chloride 104 97 - 108 mmol/L    CO2 25 21 - 32 mmol/L    Anion Gap 11 2 - 12 mmol/L    Glucose 95 65 - 100 mg/dL    BUN 10 6 - 20 mg/dL    Creatinine 0.76 0.55 - 1.02 mg/dL    BUN/Creatinine Ratio 13 12 - 20      Est, Glom Filt Rate 89 >60 ml/min/1.73m2    Calcium 9.4 8.5 - 10.1 mg/dL    Total Bilirubin 0.2 0.2 - 1.0 mg/dL

## 2024-11-17 NOTE — ED PROVIDER NOTES
[facility] EMERGENCY DEPT  EMERGENCY DEPARTMENT HISTORY AND PHYSICAL EXAM      Date: 11/17/2024  Patient Name: Maddie Goldberg  MRN: [id number]  Birthdate 1960  Date of evaluation: 11/17/2024  Provider: Tavia Iyer MD   Note Started: 3:18 PM EST 11/17/24    HISTORY OF PRESENT ILLNESS     Chief Complaint   Patient presents with    Abdominal Pain       History Provided By: Patient    HPI: Maddie Goldberg is a 64 y.o. female patient status post surgery for gallbladder gallstones with a ERCP triggering of pancreatitis.  Referred to [facility] for surgery.  Had a cholecystectomy drain placed many months ago.  Has been following up.  Drainage has decreased.  Pain is still present.  She was worried about a drainage that an infection may have come about.  No redness of the skin.  No fevers.  No nausea or vomiting.    PAST MEDICAL HISTORY   Past Medical History:  Past Medical History:   Diagnosis Date    Arthritis     left knee    Colostomy in place (HCC)     DVT (deep venous thrombosis) (HCC)     GERD (gastroesophageal reflux disease)     Hypertension     Morbid obesity     Open abdominal wall wound     had wound vac in it but not anymore.    Thyroid disease        Past Surgical History:  Past Surgical History:   Procedure Laterality Date    BLADDER SUSPENSION      CHEST SURGERY  1/7/14    RESECTION OF REMAINING THYROID    GYN      uterine ablation about 10 years ago no menses since then    HEENT      thyroidectomy    HEMICOLECTOMY N/A 6/2/2024    EXPLORATORY LAPAROTOMY; RIGHT COLON RESECTION performed by Kylah Rice MD at [facility] MAIN OR    IR CHOLECYSTOSTOMY PERCUTANEOUS COMPLETE  8/9/2024    IR CHOLECYSTOSTOMY PERCUTANEOUS COMPLETE 8/9/2024 Obi Lacy MD [facility] RAD ANGIO IR    LAPAROTOMY N/A 6/8/2024    EXPLORATORY LAPAROTOMY, PARTIAL COLON RESECTION performed by Tommie Pineda MD at [facility] MAIN OR    LAPAROTOMY N/A 6/9/2024    EXPLORATORY LAPAROTOMY WITH ILEOSTOMY AND WOUND VAC PLACEMENT performed by Tommie Pineda MD at [facility]

## 2024-11-18 ENCOUNTER — TELEPHONE (OUTPATIENT)
Dept: PRIMARY CARE CLINIC | Facility: CLINIC | Age: 64
End: 2024-11-18

## 2024-11-18 NOTE — TELEPHONE ENCOUNTER
Pt ([phone]) went to ER yesterday and she has a drain in her gallbladder, it is oozing pus and looks infected. It looks like the inside is coming out of her skin. ER doctor never looked at the wound. Today the cord going to her gallbladder looks like its way out and it hurts really bad.    She has a 4:30 (today) appt with Dr. Ramos     Please assist with this matter.     VLT - PSR (Float Pool)

## 2024-11-18 NOTE — TELEPHONE ENCOUNTER
Per Dr Virk patient was called - I explained to her that with her problem and surgery she really needed to see her surgeon at [facility].  Stated she understood and she would call them.

## 2024-11-26 ENCOUNTER — OFFICE VISIT (OUTPATIENT)
Dept: PRIMARY CARE CLINIC | Facility: CLINIC | Age: 64
End: 2024-11-26

## 2024-11-26 VITALS
WEIGHT: 229.8 LBS | BODY MASS INDEX: 34.83 KG/M2 | DIASTOLIC BLOOD PRESSURE: 95 MMHG | TEMPERATURE: 97.4 F | HEART RATE: 100 BPM | HEIGHT: 68 IN | SYSTOLIC BLOOD PRESSURE: 139 MMHG | OXYGEN SATURATION: 98 %

## 2024-11-26 DIAGNOSIS — F51.02 ADJUSTMENT INSOMNIA: ICD-10-CM

## 2024-11-26 DIAGNOSIS — Z09 HOSPITAL DISCHARGE FOLLOW-UP: Primary | ICD-10-CM

## 2024-11-26 DIAGNOSIS — K81.9 CHOLECYSTITIS: ICD-10-CM

## 2024-11-26 RX ORDER — ZOLPIDEM TARTRATE 10 MG/1
10 TABLET ORAL NIGHTLY PRN
Qty: 30 TABLET | Refills: 2 | Status: SHIPPED | OUTPATIENT
Start: 2024-11-26 | End: 2025-02-24

## 2024-11-26 RX ORDER — OXYCODONE HYDROCHLORIDE 5 MG/1
5 TABLET ORAL EVERY 6 HOURS PRN
Qty: 12 TABLET | Refills: 0 | Status: SHIPPED | OUTPATIENT
Start: 2024-11-26 | End: 2024-11-29

## 2024-11-26 RX ORDER — TRAZODONE HYDROCHLORIDE 50 MG/1
50 TABLET, FILM COATED ORAL DAILY PRN
Qty: 90 TABLET | Refills: 2 | Status: SHIPPED | OUTPATIENT
Start: 2024-11-26

## 2024-11-26 NOTE — ASSESSMENT & PLAN NOTE
Pain worsened following ERCP. F/u with surgery to discuss cholecystectomy. Will give small amount oxycodone.

## 2024-11-26 NOTE — PROGRESS NOTES
\"Have you been to the ER, urgent care clinic since your last visit?  Hospitalized since your last visit?\"  [facility] due to a gallbladder stone and drainage.       “Have you seen or consulted any other health care providers outside our system since your last visit?”    NO    Have you had a mammogram?”   NO    No breast cancer screening on file      “Have you had a pap smear?”    NO    No cervical cancer screening on file       “Have you had a colorectal cancer screening such as a colonoscopy/FIT/Cologuard?    YES - Type: Colonoscopy - Where: [facility] in June, 2024. Nurse/CMA to request most recent records if not in the chart     No colonoscopy on file  No cologuard on file  No FIT/FOBT on file   No flexible sigmoidoscopy on file          
malfunctioned, CT showed it was in place and it was reattached.  Patient is trying to advance diet, but reports appetite low and still has discomfort.  She occasionally takes Tylenol for pain, recent LFTs were normal.  She would like an occasional oxycodone as well when the pain spikes.  She realizes not to overuse it, she is felt symptoms of withdrawal multiple times after discharge from recent hospitalizations.  She reports her insomnia is improved on Ambien.  She has not tried trazodone yet.  I cautioned her not to take the Ambien and oxycodone together, and she understands.  She reports her surgeon at [facility] has told her that he will take her gallbladder out, as soon as he has time to schedule.      Patient Active Problem List   Diagnosis    Nontoxic multinodular goiter    Obesity, morbid    Leak of anastomosis between gastrointestinal structures    Adhesion of intestine    Open wound of abdomen    Acute respiratory failure with hypoxia    Edema    Abdominal pain    DVT (deep venous thrombosis) (HCC)    Hypertension    Thyroid disease    Insomnia       Medications listed as ordered at the time of discharge from hospital     Medication List            Accurate as of November 26, 2024  4:13 PM. If you have any questions, ask your nurse or doctor.                CHANGE how you take these medications      traZODone 50 MG tablet  Commonly known as: DESYREL  TAKE 1 TABLET BY MOUTH EVERY DAY AT BEDTIME AS NEEDED FOR SLEEP  What changed: when to take this  Changed by: Dr. Quinton Roa MD            CONTINUE taking these medications      apixaban 5 MG Tabs tablet  Commonly known as: ELIQUIS  Take 2 tablets by mouth 2 times daily for 5 days, THEN 1 tablet 2 times daily.  Start taking on: August 12, 2024     dicyclomine 10 MG capsule  Commonly known as: BENTYL  Take 1 capsule by mouth 3 times daily (before meals) for 14 days     ferrous sulfate 325 (65 Fe) MG tablet  Commonly known as: IRON 325  Take 1 tablet by mouth

## 2024-12-19 ENCOUNTER — OFFICE VISIT (OUTPATIENT)
Dept: PRIMARY CARE CLINIC | Facility: CLINIC | Age: 64
End: 2024-12-19

## 2024-12-19 VITALS
DIASTOLIC BLOOD PRESSURE: 75 MMHG | WEIGHT: 229 LBS | HEIGHT: 68 IN | TEMPERATURE: 97.8 F | SYSTOLIC BLOOD PRESSURE: 129 MMHG | RESPIRATION RATE: 16 BRPM | BODY MASS INDEX: 34.71 KG/M2 | OXYGEN SATURATION: 98 % | HEART RATE: 82 BPM

## 2024-12-19 DIAGNOSIS — Z09 HOSPITAL DISCHARGE FOLLOW-UP: Primary | ICD-10-CM

## 2024-12-19 DIAGNOSIS — K81.9 CHOLECYSTITIS: ICD-10-CM

## 2024-12-19 RX ORDER — OXYCODONE HYDROCHLORIDE 5 MG/1
5 TABLET ORAL EVERY 6 HOURS PRN
Qty: 12 TABLET | Refills: 0 | Status: SHIPPED | OUTPATIENT
Start: 2024-12-19 | End: 2024-12-22

## 2024-12-19 RX ORDER — ONDANSETRON 8 MG/1
8 TABLET, ORALLY DISINTEGRATING ORAL EVERY 8 HOURS PRN
Qty: 90 TABLET | Refills: 5 | Status: SHIPPED | OUTPATIENT
Start: 2024-12-19

## 2024-12-19 NOTE — PROGRESS NOTES
\"Have you been to the ER, urgent care clinic since your last visit?  Hospitalized since your last visit?\"    YES - When: approximately 11 days ago.  Where and Why: [facility] / Drain come out.    “Have you seen or consulted any other health care providers outside our system since your last visit?”    NO    Have you had a mammogram?”   NO    No breast cancer screening on file      “Have you had a pap smear?”    NO    No cervical cancer screening on file       “Have you had a colorectal cancer screening such as a colonoscopy/FIT/Cologuard?    NO    No colonoscopy on file  No cologuard on file  No FIT/FOBT on file   No flexible sigmoidoscopy on file          
disintegrating tablet Take 1 tablet by mouth 3 times daily as needed for Nausea or Vomiting 21 tablet 0        Medications patient taking as of now reconciled against medications ordered at time of hospital discharge: Yes        Objective:    /75 (Site: Left Upper Arm, Position: Sitting, Cuff Size: Medium Adult)   Pulse 82   Temp 97.8 °F (36.6 °C) (Oral)   Resp 16   Ht 1.727 m (5' 8\")   Wt 103.9 kg (229 lb)   SpO2 98%   BMI 34.82 kg/m²       An electronic signature was used to authenticate this note.  --Quinton Roa MD

## 2024-12-19 NOTE — ASSESSMENT & PLAN NOTE
Somewhat stable, the patient continues to have pain and nausea.  [facility] surgery hesitant to perform cholecystectomy given recent multiple abdominal surgeries and illnesses.  Will refill pain and nausea medication.  Continue to follow with [facility] surgery.

## 2025-01-08 ENCOUNTER — TELEPHONE (OUTPATIENT)
Dept: PRIMARY CARE CLINIC | Facility: CLINIC | Age: 65
End: 2025-01-08

## 2025-01-08 DIAGNOSIS — K81.9 CHOLECYSTITIS: Primary | ICD-10-CM

## 2025-01-08 RX ORDER — OXYCODONE HYDROCHLORIDE 5 MG/1
5 TABLET ORAL EVERY 6 HOURS PRN
Qty: 12 TABLET | Refills: 0 | Status: SHIPPED | OUTPATIENT
Start: 2025-01-08 | End: 2025-01-11

## 2025-01-13 ENCOUNTER — TELEPHONE (OUTPATIENT)
Dept: PRIMARY CARE CLINIC | Facility: CLINIC | Age: 65
End: 2025-01-13

## 2025-01-13 DIAGNOSIS — K91.89 LEAK OF ANASTOMOSIS BETWEEN GASTROINTESTINAL STRUCTURES: ICD-10-CM

## 2025-01-13 DIAGNOSIS — K81.9 CHOLECYSTITIS: Primary | ICD-10-CM

## 2025-01-13 RX ORDER — PROMETHAZINE HYDROCHLORIDE 25 MG/1
25 TABLET ORAL 4 TIMES DAILY PRN
Qty: 60 TABLET | Refills: 2 | Status: SHIPPED | OUTPATIENT
Start: 2025-01-13 | End: 2025-02-27

## 2025-01-13 RX ORDER — OXYCODONE HYDROCHLORIDE 10 MG/1
10 TABLET ORAL EVERY 8 HOURS PRN
Qty: 20 TABLET | Refills: 0 | Status: SHIPPED | OUTPATIENT
Start: 2025-01-13 | End: 2025-01-20

## 2025-01-13 NOTE — TELEPHONE ENCOUNTER
Pt calling in for a different pain med she sees surgeon on Thursday this week and is throwing up / in pain

## 2025-01-23 ENCOUNTER — TELEPHONE (OUTPATIENT)
Dept: PRIMARY CARE CLINIC | Facility: CLINIC | Age: 65
End: 2025-01-23

## 2025-01-23 DIAGNOSIS — K81.9 CHOLECYSTITIS: Primary | ICD-10-CM

## 2025-01-23 RX ORDER — OXYCODONE HYDROCHLORIDE 10 MG/1
10 TABLET ORAL EVERY 6 HOURS PRN
Qty: 20 TABLET | Refills: 0 | Status: SHIPPED | OUTPATIENT
Start: 2025-01-23 | End: 2025-01-28

## 2025-01-23 NOTE — TELEPHONE ENCOUNTER
Pt called stating that she was released from hosp. and has infected drain,she is asking for pain medication. pt has been advised that she needs to schedule follow up. Pt has been advised that she needs to discuss this with surgeon.

## 2025-01-23 NOTE — TELEPHONE ENCOUNTER
I sent in a prescription on the following med(s):    Requested Prescriptions     Signed Prescriptions Disp Refills    oxyCODONE (OXY-IR) 10 MG immediate release tablet 20 tablet 0     Sig: Take 1 tablet by mouth every 6 hours as needed for Pain for up to 5 days. Intended supply: 3 days. Take lowest dose possible to manage pain Max Daily Amount: 40 mg     Authorizing Provider: RICHIE HARRELL        To the below pharmacy:    [facility]/pharmacy #[id number] - [place], [place] - [address] [phone] - F [phone]  [address] [address]  Phone: [phone] Fax: [phone]       Let me know if you need anything else.

## 2025-01-30 ENCOUNTER — OFFICE VISIT (OUTPATIENT)
Dept: PRIMARY CARE CLINIC | Facility: CLINIC | Age: 65
End: 2025-01-30
Payer: COMMERCIAL

## 2025-01-30 VITALS
HEIGHT: 68 IN | SYSTOLIC BLOOD PRESSURE: 127 MMHG | WEIGHT: 226.8 LBS | HEART RATE: 75 BPM | RESPIRATION RATE: 16 BRPM | TEMPERATURE: 97.8 F | OXYGEN SATURATION: 99 % | BODY MASS INDEX: 34.37 KG/M2 | DIASTOLIC BLOOD PRESSURE: 95 MMHG

## 2025-01-30 DIAGNOSIS — K81.9 CHOLECYSTITIS: Primary | ICD-10-CM

## 2025-01-30 PROCEDURE — 3080F DIAST BP >= 90 MM HG: CPT | Performed by: FAMILY MEDICINE

## 2025-01-30 PROCEDURE — 3074F SYST BP LT 130 MM HG: CPT | Performed by: FAMILY MEDICINE

## 2025-01-30 PROCEDURE — 99213 OFFICE O/P EST LOW 20 MIN: CPT | Performed by: FAMILY MEDICINE

## 2025-01-30 RX ORDER — OXYCODONE HYDROCHLORIDE 10 MG/1
10 TABLET ORAL EVERY 6 HOURS PRN
Qty: 20 TABLET | Refills: 0 | Status: SHIPPED | OUTPATIENT
Start: 2025-01-30 | End: 2025-02-04

## 2025-01-30 ASSESSMENT — PATIENT HEALTH QUESTIONNAIRE - PHQ9
2. FEELING DOWN, DEPRESSED OR HOPELESS: NOT AT ALL
SUM OF ALL RESPONSES TO PHQ QUESTIONS 1-9: 0
SUM OF ALL RESPONSES TO PHQ9 QUESTIONS 1 & 2: 0
1. LITTLE INTEREST OR PLEASURE IN DOING THINGS: NOT AT ALL
SUM OF ALL RESPONSES TO PHQ QUESTIONS 1-9: 0

## 2025-01-30 NOTE — PROGRESS NOTES
\"Have you been to the ER, urgent care clinic since your last visit?  Hospitalized since your last visit?\"    NO    “Have you seen or consulted any other health care providers outside our system since your last visit?”    YES - When: approximately 1  weeks ago.  Where and Why: [facility] infection.    Have you had a mammogram?”   NO    No breast cancer screening on file      “Have you had a pap smear?”    NO    No cervical cancer screening on file       “Have you had a colorectal cancer screening such as a colonoscopy/FIT/Cologuard?    NO    No colonoscopy on file  No cologuard on file  No FIT/FOBT on file   No flexible sigmoidoscopy on file

## 2025-01-31 NOTE — PROGRESS NOTES
Subjective  Chief Complaint   Patient presents with    Follow-up     Hosp/ surgical     HPI:  Maddie Goldberg is a 64 y.o. female.    History of Present Illness  The patient presents for evaluation of a postoperative wound infection.    She was admitted to [facility] on 01/16/2024 following a her postoperative follow-up for cholecystectomy where she was noted to have purulent drainage from the wound.  Her blood pressure dropped to 80/60, necessitating transfer to the ICU.  She was treated for sepsis and discharged on Augmentin.  She was discharged from the hospital last Monday. Initially, she reported no improvement in her condition, but she now perceives a slight improvement. She has been compliant with her Augmentin regimen and has a few doses remaining. She reports that the wound is still draining but does not appear purulent. She has been using Zofran to help suppress her nausea and eat.  She still notes significant pain and would like a refill on her pain meds.    MEDICATIONS  Current: Augmentin, Zofran    Past Medical History:   Diagnosis Date    Arthritis     left knee    Colostomy in place (HCC)     DVT (deep venous thrombosis) (HCC)     GERD (gastroesophageal reflux disease)     Hypertension     Morbid obesity     Open abdominal wall wound     had wound vac in it but not anymore.    Pancreatitis     Thyroid disease      Current Outpatient Medications on File Prior to Visit   Medication Sig Dispense Refill    promethazine (PHENERGAN) 25 MG tablet Take 1 tablet by mouth 4 times daily as needed for Nausea 60 tablet 2    ondansetron (ZOFRAN-ODT) 8 MG TBDP disintegrating tablet Take 1 tablet by mouth every 8 hours as needed for Nausea or Vomiting 90 tablet 5    zolpidem (AMBIEN) 10 MG tablet Take 1 tablet by mouth nightly as needed for Sleep for up to 90 days. Max Daily Amount: 10 mg 30 tablet 2    levothyroxine (SYNTHROID) 75 MCG tablet Take 1 tablet by mouth daily 90 tablet 1     No current facility-administered

## 2025-02-05 ENCOUNTER — TELEPHONE (OUTPATIENT)
Dept: PRIMARY CARE CLINIC | Facility: CLINIC | Age: 65
End: 2025-02-05

## 2025-02-05 DIAGNOSIS — K81.9 CHOLECYSTITIS: Primary | ICD-10-CM

## 2025-02-05 NOTE — TELEPHONE ENCOUNTER
Pt said she was told by  to call in when she needs refills on pain medicine and antibiotics she is requesting refill on both [facility]

## 2025-02-06 RX ORDER — OXYCODONE HYDROCHLORIDE 5 MG/1
5 TABLET ORAL EVERY 6 HOURS PRN
Qty: 24 TABLET | Refills: 0 | Status: SHIPPED | OUTPATIENT
Start: 2025-02-06 | End: 2025-02-12

## 2025-02-06 NOTE — TELEPHONE ENCOUNTER
I sent in a prescription on the following med(s):    Requested Prescriptions     Signed Prescriptions Disp Refills    amoxicillin-clavulanate (AUGMENTIN) 875-125 MG per tablet 20 tablet 0     Sig: Take 1 tablet by mouth 2 times daily for 10 days     Authorizing Provider: RICHIE HARRELL    oxyCODONE (ROXICODONE) 5 MG immediate release tablet 24 tablet 0     Sig: Take 1 tablet by mouth every 6 hours as needed for Pain for up to 6 days. Intended supply: 3 days. Take lowest dose possible to manage pain Max Daily Amount: 20 mg     Authorizing Provider: RICHIE HARRELL        To the below pharmacy:    [facility]/pharmacy #[id number] - [place], [place] - [address] [phone] - F [phone]  [address] [address]  Phone: [phone] Fax: [phone]       Let me know if you need anything else.

## 2025-02-16 ENCOUNTER — PATIENT MESSAGE (OUTPATIENT)
Dept: PRIMARY CARE CLINIC | Facility: CLINIC | Age: 65
End: 2025-02-16

## 2025-02-16 DIAGNOSIS — K81.9 CHOLECYSTITIS: Primary | ICD-10-CM

## 2025-02-17 RX ORDER — OXYCODONE HYDROCHLORIDE 10 MG/1
10 TABLET ORAL EVERY 6 HOURS PRN
Qty: 24 TABLET | Refills: 0 | Status: SHIPPED | OUTPATIENT
Start: 2025-02-17 | End: 2025-02-23

## 2025-02-25 ENCOUNTER — PATIENT MESSAGE (OUTPATIENT)
Dept: PRIMARY CARE CLINIC | Facility: CLINIC | Age: 65
End: 2025-02-25

## 2025-02-25 DIAGNOSIS — K81.9 CHOLECYSTITIS: Primary | ICD-10-CM

## 2025-02-25 RX ORDER — OXYCODONE HYDROCHLORIDE 5 MG/1
5 TABLET ORAL EVERY 6 HOURS PRN
Qty: 24 TABLET | Refills: 0 | Status: SHIPPED | OUTPATIENT
Start: 2025-02-25 | End: 2025-03-03

## 2025-03-03 ENCOUNTER — PATIENT MESSAGE (OUTPATIENT)
Dept: PRIMARY CARE CLINIC | Facility: CLINIC | Age: 65
End: 2025-03-03

## 2025-03-03 DIAGNOSIS — K81.9 CHOLECYSTITIS: ICD-10-CM

## 2025-03-04 RX ORDER — OXYCODONE HYDROCHLORIDE 5 MG/1
5 TABLET ORAL EVERY 6 HOURS PRN
Qty: 24 TABLET | Refills: 0 | Status: SHIPPED | OUTPATIENT
Start: 2025-03-04 | End: 2025-03-10

## 2025-03-10 ENCOUNTER — OFFICE VISIT (OUTPATIENT)
Dept: PRIMARY CARE CLINIC | Facility: CLINIC | Age: 65
End: 2025-03-10
Payer: COMMERCIAL

## 2025-03-10 VITALS
HEART RATE: 68 BPM | TEMPERATURE: 98.1 F | WEIGHT: 228 LBS | HEIGHT: 68 IN | OXYGEN SATURATION: 99 % | RESPIRATION RATE: 16 BRPM | BODY MASS INDEX: 34.56 KG/M2 | SYSTOLIC BLOOD PRESSURE: 150 MMHG | DIASTOLIC BLOOD PRESSURE: 76 MMHG

## 2025-03-10 DIAGNOSIS — F51.02 ADJUSTMENT INSOMNIA: Primary | ICD-10-CM

## 2025-03-10 DIAGNOSIS — K81.9 CHOLECYSTITIS: ICD-10-CM

## 2025-03-10 PROCEDURE — 3078F DIAST BP <80 MM HG: CPT | Performed by: FAMILY MEDICINE

## 2025-03-10 PROCEDURE — 99214 OFFICE O/P EST MOD 30 MIN: CPT | Performed by: FAMILY MEDICINE

## 2025-03-10 PROCEDURE — 3077F SYST BP >= 140 MM HG: CPT | Performed by: FAMILY MEDICINE

## 2025-03-10 RX ORDER — ZOLPIDEM TARTRATE 10 MG/1
5 TABLET ORAL NIGHTLY PRN
COMMUNITY
Start: 2025-02-15 | End: 2025-03-10 | Stop reason: SDUPTHER

## 2025-03-10 RX ORDER — OXYCODONE HYDROCHLORIDE 5 MG/1
5 TABLET ORAL EVERY 6 HOURS PRN
Qty: 24 TABLET | Refills: 0 | Status: SHIPPED | OUTPATIENT
Start: 2025-03-10 | End: 2025-03-16

## 2025-03-10 RX ORDER — ZOLPIDEM TARTRATE 10 MG/1
10 TABLET ORAL NIGHTLY PRN
Qty: 30 TABLET | Refills: 2 | Status: SHIPPED | OUTPATIENT
Start: 2025-03-10 | End: 2025-06-08

## 2025-03-10 NOTE — PROGRESS NOTES
Subjective  Chief Complaint   Patient presents with    Follow-up     Surgery removal of stent     HPI:  Maddie Goldberg is a 64 y.o. female.    History of Present Illness  The patient presents for evaluation of abdominal pain and insomnia.    She underwent a biliary stent removal procedure on the 6th, which was initially uneventful. However, she experienced an unusual sensation in her stomach the following day, akin to the discovery of previously unrecognized muscles. She described the sensation as similar to the aftermath of performing 100 sit-ups. Fortunately, this discomfort has since improved. She is scheduled for a barium test in radiology, after which discussions regarding reconstruction will commence. She expressed concerns about potential surgical interventions and the impact on her work, particularly in relation to managing a colostomy leak.     She request a refill of ambien for insomnia.    MEDICATIONS  Ambien        Past Medical History:   Diagnosis Date    Arthritis     left knee    Colostomy in place (HCC)     DVT (deep venous thrombosis) (HCC)     GERD (gastroesophageal reflux disease)     Hypertension     Morbid obesity     Open abdominal wall wound     had wound vac in it but not anymore.    Pancreatitis     Thyroid disease      Current Outpatient Medications on File Prior to Visit   Medication Sig Dispense Refill    ondansetron (ZOFRAN-ODT) 8 MG TBDP disintegrating tablet Take 1 tablet by mouth every 8 hours as needed for Nausea or Vomiting 90 tablet 5    levothyroxine (SYNTHROID) 75 MCG tablet Take 1 tablet by mouth daily 90 tablet 1     No current facility-administered medications on file prior to visit.     No Known Allergies    Objective  Vitals:    03/10/25 0840   BP: (!) 150/76   Pulse: 68   Resp: 16   Temp: 98.1 °F (36.7 °C)   SpO2: 99%     Wt Readings from Last 3 Encounters:   03/10/25 103.4 kg (228 lb)   01/30/25 102.9 kg (226 lb 12.8 oz)   12/19/24 103.9 kg (229 lb)     Physical

## 2025-03-10 NOTE — PROGRESS NOTES
\"Have you been to the ER, urgent care clinic since your last visit?  Hospitalized since your last visit?\"    YES - When: approximately 4 days ago.  Where and Why: [facility] .    “Have you seen or consulted any other health care providers outside our system since your last visit?”    NO    Have you had a mammogram?”   NO    No breast cancer screening on file      “Have you had a pap smear?”    NO    No cervical cancer screening on file       “Have you had a colorectal cancer screening such as a colonoscopy/FIT/Cologuard?    NO    No colonoscopy on file  No cologuard on file  No FIT/FOBT on file   No flexible sigmoidoscopy on file

## 2025-03-10 NOTE — ASSESSMENT & PLAN NOTE
Status post stent removal 3/6/2025.  Patient reports some improvement in pain.  Will continue current dose of pain medication.

## 2025-03-17 ENCOUNTER — PATIENT MESSAGE (OUTPATIENT)
Dept: PRIMARY CARE CLINIC | Facility: CLINIC | Age: 65
End: 2025-03-17

## 2025-03-17 DIAGNOSIS — K81.9 CHOLECYSTITIS: Primary | ICD-10-CM

## 2025-03-17 RX ORDER — OXYCODONE HYDROCHLORIDE 5 MG/1
5 TABLET ORAL EVERY 6 HOURS PRN
Qty: 24 TABLET | Refills: 0 | Status: SHIPPED | OUTPATIENT
Start: 2025-03-17 | End: 2025-03-23

## 2025-03-24 ENCOUNTER — PATIENT MESSAGE (OUTPATIENT)
Dept: PRIMARY CARE CLINIC | Facility: CLINIC | Age: 65
End: 2025-03-24

## 2025-03-24 DIAGNOSIS — K81.9 CHOLECYSTITIS: Primary | ICD-10-CM

## 2025-03-24 RX ORDER — OXYCODONE HYDROCHLORIDE 5 MG/1
5 TABLET ORAL EVERY 6 HOURS PRN
Qty: 24 TABLET | Refills: 0 | Status: SHIPPED | OUTPATIENT
Start: 2025-03-24 | End: 2025-03-30

## 2025-04-01 ENCOUNTER — PATIENT MESSAGE (OUTPATIENT)
Dept: PRIMARY CARE CLINIC | Facility: CLINIC | Age: 65
End: 2025-04-01

## 2025-04-01 DIAGNOSIS — K81.9 CHOLECYSTITIS: Primary | ICD-10-CM

## 2025-04-03 RX ORDER — OXYCODONE HYDROCHLORIDE 5 MG/1
5 TABLET ORAL EVERY 6 HOURS PRN
Qty: 24 TABLET | Refills: 0 | Status: SHIPPED | OUTPATIENT
Start: 2025-04-03 | End: 2025-04-09

## 2025-04-10 ENCOUNTER — PATIENT MESSAGE (OUTPATIENT)
Dept: PRIMARY CARE CLINIC | Facility: CLINIC | Age: 65
End: 2025-04-10

## 2025-04-10 DIAGNOSIS — K81.9 CHOLECYSTITIS: Primary | ICD-10-CM

## 2025-04-10 RX ORDER — DICYCLOMINE HCL 20 MG
20 TABLET ORAL 4 TIMES DAILY PRN
Qty: 120 TABLET | Refills: 5 | Status: SHIPPED | OUTPATIENT
Start: 2025-04-10

## 2025-04-10 RX ORDER — OXYCODONE HYDROCHLORIDE 5 MG/1
5 TABLET ORAL EVERY 6 HOURS PRN
Qty: 24 TABLET | Refills: 0 | Status: SHIPPED | OUTPATIENT
Start: 2025-04-10 | End: 2025-04-16

## 2025-04-13 DIAGNOSIS — E07.9 THYROID DISEASE: ICD-10-CM

## 2025-04-14 RX ORDER — LEVOTHYROXINE SODIUM 75 UG/1
75 TABLET ORAL DAILY
Qty: 90 TABLET | Refills: 1 | Status: SHIPPED | OUTPATIENT
Start: 2025-04-14

## 2025-04-18 ENCOUNTER — PATIENT MESSAGE (OUTPATIENT)
Dept: PRIMARY CARE CLINIC | Facility: CLINIC | Age: 65
End: 2025-04-18

## 2025-04-18 DIAGNOSIS — K81.9 CHOLECYSTITIS: Primary | ICD-10-CM

## 2025-04-18 RX ORDER — OXYCODONE HYDROCHLORIDE 5 MG/1
5 TABLET ORAL EVERY 6 HOURS PRN
Qty: 24 TABLET | Refills: 0 | Status: SHIPPED | OUTPATIENT
Start: 2025-04-18 | End: 2025-04-24

## 2025-04-21 ENCOUNTER — OFFICE VISIT (OUTPATIENT)
Dept: PRIMARY CARE CLINIC | Facility: CLINIC | Age: 65
End: 2025-04-21
Payer: COMMERCIAL

## 2025-04-21 VITALS
WEIGHT: 234.6 LBS | RESPIRATION RATE: 16 BRPM | DIASTOLIC BLOOD PRESSURE: 71 MMHG | SYSTOLIC BLOOD PRESSURE: 134 MMHG | TEMPERATURE: 98.7 F | HEIGHT: 68 IN | BODY MASS INDEX: 35.55 KG/M2 | OXYGEN SATURATION: 98 % | HEART RATE: 90 BPM

## 2025-04-21 DIAGNOSIS — R10.10 PAIN OF UPPER ABDOMEN: Primary | ICD-10-CM

## 2025-04-21 PROCEDURE — 3075F SYST BP GE 130 - 139MM HG: CPT | Performed by: FAMILY MEDICINE

## 2025-04-21 PROCEDURE — 3078F DIAST BP <80 MM HG: CPT | Performed by: FAMILY MEDICINE

## 2025-04-21 PROCEDURE — 99214 OFFICE O/P EST MOD 30 MIN: CPT | Performed by: FAMILY MEDICINE

## 2025-04-21 RX ORDER — OXYCODONE HYDROCHLORIDE 5 MG/1
5 TABLET ORAL EVERY 6 HOURS PRN
Qty: 24 TABLET | Refills: 0 | Status: SHIPPED | OUTPATIENT
Start: 2025-04-24 | End: 2025-04-30

## 2025-04-21 RX ORDER — CYCLOBENZAPRINE HCL 10 MG
10 TABLET ORAL 3 TIMES DAILY PRN
Qty: 90 TABLET | Refills: 5 | Status: SHIPPED | OUTPATIENT
Start: 2025-04-21 | End: 2025-10-18

## 2025-04-22 NOTE — ASSESSMENT & PLAN NOTE
Not fully controlled with antispasmodics and opiate pain medication.  Patient reports cramping feeling ever since her laparotomy for gallbladder removal which was complicated by many adhesions.  Will try adding muscle relaxer.

## 2025-04-22 NOTE — PROGRESS NOTES
Subjective  Chief Complaint   Patient presents with    Other     Stomach pain     HPI:  Maddie Goldberg is a 64 y.o. female.    History of Present Illness  The patient presents for evaluation of abdominal pain.    Ms. Maddie Goldberg is a 65yo F w h/o cholecystitis s/p percutaneous cholecystostomy tube, and cecal volvulus s/p right hemicolectomy. She recently underwent laparoscopic converted to open cholecystectomy due to significant adhesions and iatrogenic SB injury proximal to her ileostomy ([facility] 12/26/2024).  She reports that her pain is improved in someway since her cholecystectomy, but she now experiences severe cramping band around her upper abdomen.  This interrupts her sleep.  She has some relief with the current regimen of pain medication but is not fully controlled.        Past Medical History:   Diagnosis Date    Arthritis     left knee    Colostomy in place (HCC)     DVT (deep venous thrombosis) (HCC)     GERD (gastroesophageal reflux disease)     Hypertension     Morbid obesity     Open abdominal wall wound     had wound vac in it but not anymore.    Pancreatitis     Thyroid disease      Current Outpatient Medications on File Prior to Visit   Medication Sig Dispense Refill    levothyroxine (SYNTHROID) 75 MCG tablet TAKE 1 TABLET BY MOUTH EVERY DAY 90 tablet 1    dicyclomine (BENTYL) 20 MG tablet Take 1 tablet by mouth 4 times daily as needed (abdominal cramping) 120 tablet 5    zolpidem (AMBIEN) 10 MG tablet Take 1 tablet by mouth nightly as needed for Sleep for up to 90 days. Max Daily Amount: 10 mg 30 tablet 2    ondansetron (ZOFRAN-ODT) 8 MG TBDP disintegrating tablet Take 1 tablet by mouth every 8 hours as needed for Nausea or Vomiting 90 tablet 5     No current facility-administered medications on file prior to visit.     No Known Allergies    Objective  Vitals:    04/21/25 1628   BP: 134/71   Pulse: 90   Resp: 16   Temp: 98.7 °F (37.1 °C)   SpO2: 98%     Body mass index is 35.67 kg/m².   Wt Readings

## 2025-05-01 ENCOUNTER — PATIENT MESSAGE (OUTPATIENT)
Dept: PRIMARY CARE CLINIC | Facility: CLINIC | Age: 65
End: 2025-05-01

## 2025-05-01 DIAGNOSIS — R10.10 PAIN OF UPPER ABDOMEN: Primary | ICD-10-CM

## 2025-05-01 RX ORDER — OXYCODONE HYDROCHLORIDE 5 MG/1
5 TABLET ORAL EVERY 6 HOURS PRN
Qty: 24 TABLET | Refills: 0 | Status: SHIPPED | OUTPATIENT
Start: 2025-05-01 | End: 2025-05-07

## 2025-05-08 ENCOUNTER — PATIENT MESSAGE (OUTPATIENT)
Dept: PRIMARY CARE CLINIC | Facility: CLINIC | Age: 65
End: 2025-05-08

## 2025-05-08 DIAGNOSIS — R10.10 PAIN OF UPPER ABDOMEN: Primary | ICD-10-CM

## 2025-05-08 RX ORDER — OXYCODONE HYDROCHLORIDE 5 MG/1
5 TABLET ORAL EVERY 6 HOURS PRN
Qty: 24 TABLET | Refills: 0 | Status: SHIPPED | OUTPATIENT
Start: 2025-05-08 | End: 2025-05-14

## 2025-05-08 RX ORDER — LOPERAMIDE HYDROCHLORIDE 2 MG/1
2 TABLET ORAL 4 TIMES DAILY PRN
Qty: 90 TABLET | Refills: 5 | Status: SHIPPED | OUTPATIENT
Start: 2025-05-08 | End: 2025-09-20

## 2025-05-15 ENCOUNTER — PATIENT MESSAGE (OUTPATIENT)
Dept: PRIMARY CARE CLINIC | Facility: CLINIC | Age: 65
End: 2025-05-15

## 2025-05-15 DIAGNOSIS — R10.10 PAIN OF UPPER ABDOMEN: Primary | ICD-10-CM

## 2025-05-16 RX ORDER — OXYCODONE HYDROCHLORIDE 5 MG/1
5 TABLET ORAL EVERY 6 HOURS PRN
Qty: 24 TABLET | Refills: 0 | Status: SHIPPED | OUTPATIENT
Start: 2025-05-16 | End: 2025-05-22

## 2025-05-22 ENCOUNTER — PATIENT MESSAGE (OUTPATIENT)
Dept: PRIMARY CARE CLINIC | Facility: CLINIC | Age: 65
End: 2025-05-22

## 2025-05-22 DIAGNOSIS — R10.10 PAIN OF UPPER ABDOMEN: Primary | ICD-10-CM

## 2025-05-23 RX ORDER — OXYCODONE HYDROCHLORIDE 5 MG/1
5 TABLET ORAL EVERY 6 HOURS PRN
Qty: 24 TABLET | Refills: 0 | Status: SHIPPED | OUTPATIENT
Start: 2025-05-23 | End: 2025-05-29

## 2025-05-29 ENCOUNTER — PATIENT MESSAGE (OUTPATIENT)
Dept: PRIMARY CARE CLINIC | Facility: CLINIC | Age: 65
End: 2025-05-29

## 2025-05-29 DIAGNOSIS — R10.10 PAIN OF UPPER ABDOMEN: Primary | ICD-10-CM

## 2025-05-29 RX ORDER — OXYCODONE HYDROCHLORIDE 5 MG/1
5 TABLET ORAL EVERY 6 HOURS PRN
Qty: 24 TABLET | Refills: 0 | Status: SHIPPED | OUTPATIENT
Start: 2025-05-29 | End: 2025-06-04

## 2025-06-02 DIAGNOSIS — F51.02 ADJUSTMENT INSOMNIA: ICD-10-CM

## 2025-06-03 RX ORDER — ZOLPIDEM TARTRATE 10 MG/1
10 TABLET ORAL NIGHTLY PRN
Qty: 30 TABLET | Refills: 2 | Status: SHIPPED | OUTPATIENT
Start: 2025-06-03 | End: 2025-09-01

## 2025-06-05 ENCOUNTER — PATIENT MESSAGE (OUTPATIENT)
Dept: PRIMARY CARE CLINIC | Facility: CLINIC | Age: 65
End: 2025-06-05

## 2025-06-05 DIAGNOSIS — R10.10 PAIN OF UPPER ABDOMEN: Primary | ICD-10-CM

## 2025-06-05 RX ORDER — OXYCODONE HYDROCHLORIDE 5 MG/1
5 TABLET ORAL EVERY 6 HOURS PRN
Qty: 24 TABLET | Refills: 0 | Status: SHIPPED | OUTPATIENT
Start: 2025-06-05 | End: 2025-06-11

## 2025-06-10 ENCOUNTER — OFFICE VISIT (OUTPATIENT)
Dept: PRIMARY CARE CLINIC | Facility: CLINIC | Age: 65
End: 2025-06-10
Payer: COMMERCIAL

## 2025-06-10 VITALS
SYSTOLIC BLOOD PRESSURE: 146 MMHG | BODY MASS INDEX: 35.85 KG/M2 | DIASTOLIC BLOOD PRESSURE: 90 MMHG | TEMPERATURE: 97.8 F | WEIGHT: 235.8 LBS | OXYGEN SATURATION: 98 % | HEART RATE: 103 BPM | RESPIRATION RATE: 16 BRPM

## 2025-06-10 DIAGNOSIS — R10.10 PAIN OF UPPER ABDOMEN: Primary | ICD-10-CM

## 2025-06-10 DIAGNOSIS — E07.9 THYROID DISEASE: ICD-10-CM

## 2025-06-10 DIAGNOSIS — E66.01 OBESITY, MORBID (HCC): ICD-10-CM

## 2025-06-10 PROCEDURE — 3077F SYST BP >= 140 MM HG: CPT | Performed by: FAMILY MEDICINE

## 2025-06-10 PROCEDURE — 3080F DIAST BP >= 90 MM HG: CPT | Performed by: FAMILY MEDICINE

## 2025-06-10 PROCEDURE — 99214 OFFICE O/P EST MOD 30 MIN: CPT | Performed by: FAMILY MEDICINE

## 2025-06-10 RX ORDER — OXYCODONE HYDROCHLORIDE 5 MG/1
5 TABLET ORAL EVERY 6 HOURS PRN
Qty: 24 TABLET | Refills: 0 | Status: SHIPPED | OUTPATIENT
Start: 2025-06-10 | End: 2025-06-16

## 2025-06-10 RX ORDER — BACLOFEN 10 MG/1
10 TABLET ORAL 3 TIMES DAILY
Qty: 90 TABLET | Refills: 5 | Status: SHIPPED | OUTPATIENT
Start: 2025-06-10

## 2025-06-10 NOTE — ASSESSMENT & PLAN NOTE
Bentyl not really helping.  Cyclobenzaprine cause drowsiness.  Opiates continue to help, patient would prefer not to be on opiate therapy.  Will try cyclobenzaprine at low-dose.  Also discussed modifying diet to small frequent meals with lean protein and more easily digestible form.

## 2025-06-10 NOTE — ASSESSMENT & PLAN NOTE
Stable since last visit.  Patient has eaten carbs primarily due to ease of digestion.  Discussed trying lean proteins and more easily digestible form (such as turning black beans and toothpaste, yogurt, smoothies/shakes)

## 2025-06-10 NOTE — PROGRESS NOTES
Have you been to the ER, urgent care clinic since your last visit?  Hospitalized since your last visit?   NO    Have you seen or consulted any other health care providers outside our system since your last visit?   NO    Have you had a mammogram?”   NO    No breast cancer screening on file      “Have you had a pap smear?”    NO    No cervical cancer screening on file       “Have you had a colorectal cancer screening such as a colonoscopy/FIT/Cologuard?    NO    No colonoscopy on file  No cologuard on file  No FIT/FOBT on file   No flexible sigmoidoscopy on file          
recognition software. Despite editing, there may be some syntax errors    Quinton Roa MD

## 2025-06-11 ENCOUNTER — RESULTS FOLLOW-UP (OUTPATIENT)
Dept: PRIMARY CARE CLINIC | Facility: CLINIC | Age: 65
End: 2025-06-11

## 2025-06-11 LAB
ALBUMIN SERPL-MCNC: 4.5 G/DL (ref 3.9–4.9)
ALP SERPL-CCNC: 175 IU/L (ref 44–121)
ALT SERPL-CCNC: 46 IU/L (ref 0–32)
AST SERPL-CCNC: 23 IU/L (ref 0–40)
BASOPHILS # BLD AUTO: 0.1 X10E3/UL (ref 0–0.2)
BASOPHILS NFR BLD AUTO: 1 %
BILIRUB SERPL-MCNC: 0.8 MG/DL (ref 0–1.2)
BUN SERPL-MCNC: 10 MG/DL (ref 8–27)
BUN/CREAT SERPL: 14 (ref 12–28)
CALCIUM SERPL-MCNC: 9.7 MG/DL (ref 8.7–10.3)
CHLORIDE SERPL-SCNC: 107 MMOL/L (ref 96–106)
CHOLEST SERPL-MCNC: 205 MG/DL (ref 100–199)
CO2 SERPL-SCNC: 18 MMOL/L (ref 20–29)
CREAT SERPL-MCNC: 0.7 MG/DL (ref 0.57–1)
EGFRCR SERPLBLD CKD-EPI 2021: 98 ML/MIN/1.73
EOSINOPHIL # BLD AUTO: 0.3 X10E3/UL (ref 0–0.4)
EOSINOPHIL NFR BLD AUTO: 4 %
ERYTHROCYTE [DISTWIDTH] IN BLOOD BY AUTOMATED COUNT: 14.5 % (ref 11.7–15.4)
GLOBULIN SER CALC-MCNC: 2.4 G/DL (ref 1.5–4.5)
GLUCOSE SERPL-MCNC: 94 MG/DL (ref 70–99)
HCT VFR BLD AUTO: 44.2 % (ref 34–46.6)
HDLC SERPL-MCNC: 59 MG/DL
HGB BLD-MCNC: 14.5 G/DL (ref 11.1–15.9)
IMM GRANULOCYTES # BLD AUTO: 0 X10E3/UL (ref 0–0.1)
IMM GRANULOCYTES NFR BLD AUTO: 0 %
LDLC SERPL CALC-MCNC: 126 MG/DL (ref 0–99)
LYMPHOCYTES # BLD AUTO: 2.6 X10E3/UL (ref 0.7–3.1)
LYMPHOCYTES NFR BLD AUTO: 33 %
MCH RBC QN AUTO: 29.7 PG (ref 26.6–33)
MCHC RBC AUTO-ENTMCNC: 32.8 G/DL (ref 31.5–35.7)
MCV RBC AUTO: 90 FL (ref 79–97)
MONOCYTES # BLD AUTO: 0.5 X10E3/UL (ref 0.1–0.9)
MONOCYTES NFR BLD AUTO: 7 %
NEUTROPHILS # BLD AUTO: 4.3 X10E3/UL (ref 1.4–7)
NEUTROPHILS NFR BLD AUTO: 55 %
PLATELET # BLD AUTO: 288 X10E3/UL (ref 150–450)
POTASSIUM SERPL-SCNC: 3.9 MMOL/L (ref 3.5–5.2)
PROT SERPL-MCNC: 6.9 G/DL (ref 6–8.5)
RBC # BLD AUTO: 4.89 X10E6/UL (ref 3.77–5.28)
SODIUM SERPL-SCNC: 141 MMOL/L (ref 134–144)
T4 FREE SERPL-MCNC: 1.16 NG/DL (ref 0.82–1.77)
TRIGL SERPL-MCNC: 113 MG/DL (ref 0–149)
TSH SERPL DL<=0.005 MIU/L-ACNC: 13.9 UIU/ML (ref 0.45–4.5)
VLDLC SERPL CALC-MCNC: 20 MG/DL (ref 5–40)
WBC # BLD AUTO: 7.9 X10E3/UL (ref 3.4–10.8)

## 2025-06-16 ENCOUNTER — PATIENT MESSAGE (OUTPATIENT)
Dept: PRIMARY CARE CLINIC | Facility: CLINIC | Age: 65
End: 2025-06-16

## 2025-06-16 DIAGNOSIS — R10.10 PAIN OF UPPER ABDOMEN: Primary | ICD-10-CM

## 2025-06-19 RX ORDER — OXYCODONE HYDROCHLORIDE 5 MG/1
5 TABLET ORAL EVERY 6 HOURS PRN
Qty: 24 TABLET | Refills: 0 | Status: SHIPPED | OUTPATIENT
Start: 2025-06-19 | End: 2025-06-25

## 2025-06-24 ENCOUNTER — OFFICE VISIT (OUTPATIENT)
Dept: PRIMARY CARE CLINIC | Facility: CLINIC | Age: 65
End: 2025-06-24
Payer: COMMERCIAL

## 2025-06-24 VITALS
BODY MASS INDEX: 35.77 KG/M2 | WEIGHT: 236 LBS | HEIGHT: 68 IN | DIASTOLIC BLOOD PRESSURE: 88 MMHG | HEART RATE: 67 BPM | SYSTOLIC BLOOD PRESSURE: 140 MMHG | TEMPERATURE: 97.8 F

## 2025-06-24 DIAGNOSIS — R10.10 PAIN OF UPPER ABDOMEN: ICD-10-CM

## 2025-06-24 DIAGNOSIS — E07.9 THYROID DISEASE: ICD-10-CM

## 2025-06-24 DIAGNOSIS — R79.89 ELEVATED LFTS: ICD-10-CM

## 2025-06-24 DIAGNOSIS — M17.11 PRIMARY OSTEOARTHRITIS OF RIGHT KNEE: Primary | ICD-10-CM

## 2025-06-24 PROBLEM — J96.01 ACUTE RESPIRATORY FAILURE WITH HYPOXIA (HCC): Status: RESOLVED | Noted: 2024-06-10 | Resolved: 2025-06-24

## 2025-06-24 PROCEDURE — 3077F SYST BP >= 140 MM HG: CPT | Performed by: FAMILY MEDICINE

## 2025-06-24 PROCEDURE — 99214 OFFICE O/P EST MOD 30 MIN: CPT | Performed by: FAMILY MEDICINE

## 2025-06-24 PROCEDURE — 3079F DIAST BP 80-89 MM HG: CPT | Performed by: FAMILY MEDICINE

## 2025-06-24 RX ORDER — BACLOFEN 20 MG/1
20 TABLET ORAL 3 TIMES DAILY
Qty: 270 TABLET | Refills: 1 | Status: SHIPPED | OUTPATIENT
Start: 2025-06-24

## 2025-06-24 RX ORDER — LEVOTHYROXINE SODIUM 88 UG/1
88 TABLET ORAL DAILY
Qty: 90 TABLET | Refills: 1 | Status: SHIPPED | OUTPATIENT
Start: 2025-06-24

## 2025-06-24 RX ORDER — OXYCODONE HYDROCHLORIDE 5 MG/1
5 TABLET ORAL EVERY 6 HOURS PRN
Qty: 12 TABLET | Refills: 0 | Status: SHIPPED | OUTPATIENT
Start: 2025-06-24 | End: 2025-06-27

## 2025-06-24 NOTE — ASSESSMENT & PLAN NOTE
Flared due to increased activity, will give cortisone shot today.    Procedure Documentation:  After discussion of the risks and benefits and obtaining verbal consent, the patient elected to proceed with a cortisone injection into right knee It was confirmed that the patient does not have history of prior adverse reactions, active infections, or relevant allergies. There was no effusion, erythema, or warmth, and the skin was clear.    * Patient was identified by name and date of birth   * Agreement on procedure being performed was verified  * Risks and Benefits explained to the patient  * Procedure site verified and marked as necessary  * Patient was positioned for comfort  * Consent was signed and verified    Utilizing sterile technique, the skin was prepped.The sites were injected with a mixture of 40 mg of Kenalog and 1cc 1% lidocaine using a 25 gauge needle. The injection(s) was/were completed without complication, and a sterile bandage was applied. The patient tolerated the injection(s) well.  The patient will call immediately with any signs of infection or allergic reaction.     Time: current in chart  Date of procedure: 6/24/2025  Procedure performed by:  Quinton Roa MD

## 2025-06-24 NOTE — PROGRESS NOTES
Have you been to the ER, urgent care clinic since your last visit?  Hospitalized since your last visit?   NO    Have you seen or consulted any other health care providers outside our system since your last visit?   NO    Have you had a mammogram?”   NO    No breast cancer screening on file      “Have you had a pap smear?”    NO    No cervical cancer screening on file       “Have you had a colorectal cancer screening such as a colonoscopy/FIT/Cologuard?    NO    No colonoscopy on file  No cologuard on file  No FIT/FOBT on file   No flexible sigmoidoscopy on file

## 2025-06-27 ENCOUNTER — PATIENT MESSAGE (OUTPATIENT)
Dept: PRIMARY CARE CLINIC | Facility: CLINIC | Age: 65
End: 2025-06-27

## 2025-06-27 NOTE — PROGRESS NOTES
Subjective  Chief Complaint   Patient presents with    Other     Knee injection     HPI:  Maddie Goldberg is a 64 y.o. female.    History of Present Illness  The patient presents for evaluation of right knee pain, hypothyroidism, and elevated liver enzymes.    She reports a slight improvement in her condition over the past 2 weeks but continues to experience difficulty with mobility due to persistent right knee pain.     She has a history of total thyroidectomy, which required 2 surgical procedures. She expresses concern about the potential impact of her thyroid condition on her hair growth, noting that her hair was previously thick but has since thinned. She also mentions that she has been prescribed high doses of medication in the past, up to 300 mg.    Abdominal pain - improving some, patient would like to reduce amount of pain meds this week. Alk phos up on recent check.          Past Medical History:   Diagnosis Date    Arthritis     left knee    Colostomy in place (HCC)     DVT (deep venous thrombosis) (HCC)     GERD (gastroesophageal reflux disease)     Hypertension     Morbid obesity (HCC)     Open abdominal wall wound     had wound vac in it but not anymore.    Pancreatitis     Thyroid disease      Current Outpatient Medications on File Prior to Visit   Medication Sig Dispense Refill    zolpidem (AMBIEN) 10 MG tablet Take 1 tablet by mouth nightly as needed for Sleep for up to 90 days. Max Daily Amount: 10 mg 30 tablet 2    loperamide (IMODIUM A-D) 2 MG tablet Take 1 tablet by mouth 4 times daily as needed for Diarrhea 90 tablet 5    ondansetron (ZOFRAN-ODT) 8 MG TBDP disintegrating tablet Take 1 tablet by mouth every 8 hours as needed for Nausea or Vomiting 90 tablet 5     No current facility-administered medications on file prior to visit.     No Known Allergies    Objective  Vitals:    06/24/25 1500   BP: (!) 140/88   Pulse: 67   Temp: 97.8 °F (36.6 °C)     Body mass index is 35.88 kg/m².   Wt Readings

## 2025-07-07 ENCOUNTER — TELEPHONE (OUTPATIENT)
Dept: PRIMARY CARE CLINIC | Facility: CLINIC | Age: 65
End: 2025-07-07

## 2025-07-07 NOTE — TELEPHONE ENCOUNTER
Care Transitions Initial Follow Up Call    Outreach made within 2 business days of discharge: No    Patient: Maddie Goldberg Patient : 1960   MRN: [id number]  Reason for Admission: Abdominal Blockage  Discharge Date: 25       Spoke with: Patient    Discharge department/facility: [facility] [place]    Scheduled appointment with PCP within 7-14 days    Follow Up  Future Appointments   Date Time Provider Department Center   2025 11:30 AM Quinton Roa MD [facility] DEP   2025  3:00 PM Quinton Roa MD [facility] DEP       Ankit Centeno

## 2025-07-11 ENCOUNTER — OFFICE VISIT (OUTPATIENT)
Dept: PRIMARY CARE CLINIC | Facility: CLINIC | Age: 65
End: 2025-07-11

## 2025-07-11 VITALS
HEART RATE: 73 BPM | TEMPERATURE: 97.8 F | HEIGHT: 68 IN | OXYGEN SATURATION: 98 % | DIASTOLIC BLOOD PRESSURE: 90 MMHG | WEIGHT: 241.6 LBS | SYSTOLIC BLOOD PRESSURE: 138 MMHG | BODY MASS INDEX: 36.62 KG/M2

## 2025-07-11 DIAGNOSIS — R10.10 PAIN OF UPPER ABDOMEN: ICD-10-CM

## 2025-07-11 DIAGNOSIS — Z09 HOSPITAL DISCHARGE FOLLOW-UP: Primary | ICD-10-CM

## 2025-07-11 PROBLEM — K91.89 LEAK OF ANASTOMOSIS BETWEEN GASTROINTESTINAL STRUCTURES: Status: RESOLVED | Noted: 2024-06-10 | Resolved: 2025-07-11

## 2025-07-11 PROBLEM — S31.109A OPEN WOUND OF ABDOMEN: Status: RESOLVED | Noted: 2024-06-10 | Resolved: 2025-07-11

## 2025-07-11 RX ORDER — OXYCODONE HYDROCHLORIDE 5 MG/1
5 TABLET ORAL EVERY 6 HOURS PRN
Qty: 24 TABLET | Refills: 0 | Status: SHIPPED | OUTPATIENT
Start: 2025-07-11 | End: 2025-07-17

## 2025-07-11 NOTE — PROGRESS NOTES
Have you been to the ER, urgent care clinic since your last visit?  Hospitalized since your last visit?   YES - When: approximately 1 months ago.  Where and Why: [facility] Abd blockage.    Have you seen or consulted any other health care providers outside our system since your last visit?   NO    Have you had a mammogram?”   NO    No breast cancer screening on file      “Have you had a pap smear?”    NO    No cervical cancer screening on file       “Have you had a colorectal cancer screening such as a colonoscopy/FIT/Cologuard?    NO    No colonoscopy on file  No cologuard on file  No FIT/FOBT on file   No flexible sigmoidoscopy on file

## 2025-07-11 NOTE — PROGRESS NOTES
Post-Discharge Transitional Care  Follow Up      Maddie Goldberg   YOB: 1960    Date of Office Visit:  7/11/2025  Date of Hospital Admission: 6/27/2025   Date of Hospital Discharge: 6/30/2025   Risk of hospital readmission (high >=14%. Medium >=10%) :Readmission Risk Score: 26      Care management risk score Rising risk (score 2-5) and Complex Care (Scores >=6): No Risk Score On File     Non face to face  following discharge, date last encounter closed (first attempt may have been earlier): 07/07/2025    Call initiated 2 business days of discharge: No    ASSESSMENT/PLAN:   Hospital discharge follow-up  Comments:  Patient discharged following resolution of small bowel obstruction.  She reports she is back to baseline in terms of abdominal pain.  Orders:  -     VT DISCHARGE MEDS RECONCILED W/ CURRENT OUTPATIENT MED LIST  Pain of upper abdomen  Assessment & Plan:  Pain waxing and waning send since recent mission.  Will refill pain medication.  Orders:  -     oxyCODONE (ROXICODONE) 5 MG immediate release tablet; Take 1 tablet by mouth every 6 hours as needed for Pain for up to 6 days. Intended supply: 3 days. Take lowest dose possible to manage pain Max Daily Amount: 20 mg, Disp-24 tablet, R-0Normal    Medical Decision Making: high complexity  No follow-ups on file.           Subjective:   HPI:  Follow up of Hospital problems/diagnosis(es): Small bowel obstruction     Inpatient course: Discharge summary reviewed- see chart.    Interval history/Current status:   History of Present Illness  The patient presents for evaluation of small bowel obstruction.    She experienced a worsening of her pain, which led to a hospital visit. Initially, surgery was considered, but due to her medical history, an NG tube was inserted instead. She underwent x-rays every few hours until her condition improved. She was admitted on a Friday and discharged the following Monday. She has not taken a vacation in several years due to

## 2025-07-22 ENCOUNTER — PATIENT MESSAGE (OUTPATIENT)
Dept: PRIMARY CARE CLINIC | Facility: CLINIC | Age: 65
End: 2025-07-22

## 2025-07-22 DIAGNOSIS — R10.10 PAIN OF UPPER ABDOMEN: Primary | ICD-10-CM

## 2025-07-25 ENCOUNTER — PATIENT MESSAGE (OUTPATIENT)
Dept: PRIMARY CARE CLINIC | Facility: CLINIC | Age: 65
End: 2025-07-25

## 2025-08-08 RX ORDER — OXYCODONE HYDROCHLORIDE 5 MG/1
5 TABLET ORAL EVERY 6 HOURS PRN
Qty: 24 TABLET | Refills: 0 | Status: SHIPPED | OUTPATIENT
Start: 2025-08-08 | End: 2025-08-14

## 2025-08-12 ENCOUNTER — OFFICE VISIT (OUTPATIENT)
Dept: PRIMARY CARE CLINIC | Facility: CLINIC | Age: 65
End: 2025-08-12
Payer: COMMERCIAL

## 2025-08-12 VITALS
HEART RATE: 70 BPM | BODY MASS INDEX: 37.19 KG/M2 | DIASTOLIC BLOOD PRESSURE: 74 MMHG | HEIGHT: 68 IN | WEIGHT: 245.4 LBS | RESPIRATION RATE: 16 BRPM | TEMPERATURE: 97.9 F | OXYGEN SATURATION: 98 % | SYSTOLIC BLOOD PRESSURE: 124 MMHG

## 2025-08-12 DIAGNOSIS — R79.0 LOW MAGNESIUM LEVEL: ICD-10-CM

## 2025-08-12 DIAGNOSIS — F51.02 ADJUSTMENT INSOMNIA: ICD-10-CM

## 2025-08-12 DIAGNOSIS — R79.89 ABNORMAL TSH: ICD-10-CM

## 2025-08-12 DIAGNOSIS — R10.10 PAIN OF UPPER ABDOMEN: Primary | ICD-10-CM

## 2025-08-12 DIAGNOSIS — R79.89 ELEVATED LFTS: ICD-10-CM

## 2025-08-12 PROCEDURE — 3078F DIAST BP <80 MM HG: CPT | Performed by: FAMILY MEDICINE

## 2025-08-12 PROCEDURE — 3074F SYST BP LT 130 MM HG: CPT | Performed by: FAMILY MEDICINE

## 2025-08-12 PROCEDURE — 99214 OFFICE O/P EST MOD 30 MIN: CPT | Performed by: FAMILY MEDICINE

## 2025-08-12 RX ORDER — ZOLPIDEM TARTRATE 10 MG/1
10 TABLET ORAL NIGHTLY PRN
Qty: 30 TABLET | Refills: 5 | Status: SHIPPED | OUTPATIENT
Start: 2025-08-12 | End: 2026-02-08

## 2025-08-13 LAB
ALBUMIN SERPL-MCNC: 4 G/DL (ref 3.9–4.9)
ALP SERPL-CCNC: 130 IU/L (ref 44–121)
ALT SERPL-CCNC: 18 IU/L (ref 0–32)
AST SERPL-CCNC: 15 IU/L (ref 0–40)
BILIRUB SERPL-MCNC: 0.5 MG/DL (ref 0–1.2)
BUN SERPL-MCNC: 17 MG/DL (ref 8–27)
BUN/CREAT SERPL: 27 (ref 12–28)
CALCIUM SERPL-MCNC: 8.7 MG/DL (ref 8.7–10.3)
CHLORIDE SERPL-SCNC: 106 MMOL/L (ref 96–106)
CO2 SERPL-SCNC: 22 MMOL/L (ref 20–29)
CREAT SERPL-MCNC: 0.63 MG/DL (ref 0.57–1)
EGFRCR SERPLBLD CKD-EPI 2021: 100 ML/MIN/1.73
GLOBULIN SER CALC-MCNC: 2.2 G/DL (ref 1.5–4.5)
GLUCOSE SERPL-MCNC: 91 MG/DL (ref 70–99)
MAGNESIUM SERPL-MCNC: 2 MG/DL (ref 1.6–2.3)
POTASSIUM SERPL-SCNC: 3.8 MMOL/L (ref 3.5–5.2)
PROT SERPL-MCNC: 6.2 G/DL (ref 6–8.5)
SODIUM SERPL-SCNC: 143 MMOL/L (ref 134–144)
T4 FREE SERPL-MCNC: 1.02 NG/DL (ref 0.82–1.77)
TSH SERPL DL<=0.005 MIU/L-ACNC: 7.25 UIU/ML (ref 0.45–4.5)

## 2025-08-19 ENCOUNTER — RESULTS FOLLOW-UP (OUTPATIENT)
Dept: PRIMARY CARE CLINIC | Facility: CLINIC | Age: 65
End: 2025-08-19

## 2025-08-19 DIAGNOSIS — E07.9 THYROID DISEASE: ICD-10-CM

## 2025-08-20 RX ORDER — LEVOTHYROXINE SODIUM 100 UG/1
100 TABLET ORAL DAILY
Qty: 90 TABLET | Refills: 1 | Status: SHIPPED | OUTPATIENT
Start: 2025-08-20

## 2025-09-03 ENCOUNTER — PATIENT MESSAGE (OUTPATIENT)
Dept: PRIMARY CARE CLINIC | Facility: CLINIC | Age: 65
End: 2025-09-03

## 2025-09-03 DIAGNOSIS — R10.10 PAIN OF UPPER ABDOMEN: ICD-10-CM

## 2025-09-04 RX ORDER — OXYCODONE HYDROCHLORIDE 5 MG/1
5 TABLET ORAL EVERY 6 HOURS PRN
Qty: 24 TABLET | Refills: 0 | Status: SHIPPED | OUTPATIENT
Start: 2025-09-04 | End: 2025-09-10

## 2025-09-05 ENCOUNTER — COMMUNITY OUTREACH (OUTPATIENT)
Dept: PRIMARY CARE CLINIC | Facility: CLINIC | Age: 65
End: 2025-09-05

## (undated) DEVICE — CANISTER WND THER 500 ML NEG PRESSURE GEL TBNG STRL DISP

## (undated) DEVICE — STAPLER INT CUT LN 40MM STPL 51MM GRN CRV HD B FRM

## (undated) DEVICE — SUTURE NONABSORBABLE MONOFILAMENT 2-0 FS 18 IN ETHILON 664H

## (undated) DEVICE — DRESSING L SLV V.A.C. GRANUFOAM

## (undated) DEVICE — SOUTHSIDE TURNOVER: Brand: MEDLINE INDUSTRIES, INC.

## (undated) DEVICE — SUTURE PERMAHAND SZ 3-0 L18IN NONABSORBABLE BLK L26MM SH C013D

## (undated) DEVICE — SUTURE ETHILON SZ 2-0 L18IN NONABSORBABLE BLK L26MM FS 3/8 664G

## (undated) DEVICE — SPONGE DRN W4XL4IN RAYON/POLYESTER 6 PLY NONWOVEN PRECUT 2 PER PK

## (undated) DEVICE — SUTURE VICRYL + SZ 0 L27IN ABSRB UD L36MM CT-1 1/2 CIR VCPP41D

## (undated) DEVICE — STAPLER INT L75MM CUT LN L73MM STPL LN L77MM BLU B FRM 8

## (undated) DEVICE — RESERVOIR,SUCTION,100CC,SILICONE: Brand: MEDLINE

## (undated) DEVICE — DRAIN SURG 10FR SIL RND HUBLESS W/ 1/8IN TRCR BLAK

## (undated) DEVICE — SUTURE PDS II SZ 0 L60IN ABSRB VLT L48MM CTX 1/2 CIR Z990G

## (undated) DEVICE — SUTURE VICRYL + SZ 3-0 L27IN ABSRB UD L26MM SH 1/2 CIR VCP416H

## (undated) DEVICE — 1LYRTR 16FR10ML100%SIL UMS SNP: Brand: MEDLINE INDUSTRIES, INC.

## (undated) DEVICE — YANKAUER,POOLE TIP,STERILE,50/CS: Brand: MEDLINE

## (undated) DEVICE — YANKAUER,BULB TIP,W/O VENT,RIGID,STERILE: Brand: MEDLINE

## (undated) DEVICE — DRAIN SURG W10XL20CM SIL SMOOTH FLAT 3/4 PERF DBL WRP

## (undated) DEVICE — BLADE,CARBON-STEEL,10,STRL,DISPOSABLE,TB: Brand: MEDLINE

## (undated) DEVICE — GLOVE SURG SZ 75 L12IN FNGR THK79MIL GRN LTX FREE

## (undated) DEVICE — THE STERILE LIGHT HANDLE COVER IS USED WITH STERIS SURGICAL LIGHTING AND VISUALIZATION SYSTEMS.

## (undated) DEVICE — SUTURE PERMA-HAND 0 L18IN NONABSORBABLE BLK CT-1 L36MM 1/2 C021D

## (undated) DEVICE — MAJOR LAP PROCEDURE PACK: Brand: MEDLINE INDUSTRIES, INC.

## (undated) DEVICE — 3M™ TEGADERM™ TRANSPARENT FILM DRESSING FRAME STYLE, 1626W, 4 IN X 4-3/4 IN (10 CM X 12 CM), 50/CT 4CT/CASE: Brand: 3M™ TEGADERM™

## (undated) DEVICE — 3M™ SURGICAL CLIPPER WITH PIVOTING HEAD, 9660, 50/CASE: Brand: 3M™

## (undated) DEVICE — SUTURE VICRYL + SZ 3-0 L18IN ABSRB UD SH 1/2 CIR TAPERCUT NDL VCP864D

## (undated) DEVICE — APPLIER LIG CLP M L11IN TI STR RNG HNDL FOR 20 CLP DISP

## (undated) DEVICE — BLADE ES L6IN ELASTOMERIC COAT EXT DURABLE BEND UPTO 90DEG

## (undated) DEVICE — GLOVE ORANGE PI 7 1/2   MSG9075

## (undated) DEVICE — GARMENT,MEDLINE,DVT,INT,CALF,MED, GEN2: Brand: MEDLINE

## (undated) DEVICE — WET SKIN PREP TRAY: Brand: MEDLINE INDUSTRIES, INC.

## (undated) DEVICE — BLADE,CARBON-STEEL,15,STRL,DISPOSABLE,TB: Brand: MEDLINE

## (undated) DEVICE — SOLUTION IRRIG 1000ML 0.9% SOD CHL USP POUR PLAS BTL

## (undated) DEVICE — BINDER ABD H12IN COT FOR 45-62IN WAIST UNIV PREM 4 PNL DSGN

## (undated) DEVICE — SUTURE VICRYL + SZ 3-0 L27IN ABSRB VLT SH 1/2 CIR TAPR PNT VCP784D

## (undated) DEVICE — 3M™ IOBAN™ 2 ANTIMICROBIAL INCISE DRAPE 6640EZ: Brand: IOBAN™ 2

## (undated) DEVICE — RELOAD STPL L75MM OPN H3.8MM CLS 1.5MM WIRE DIA0.2MM REG

## (undated) DEVICE — COLON CLOSING PACK: Brand: MEDLINE INDUSTRIES, INC.

## (undated) DEVICE — Device

## (undated) DEVICE — SPONGE LAP SFT 18X18 IN X RAY DETECTABLE

## (undated) DEVICE — STAPLER INT L60MM REG TISS BLU B FRM 8 FIRING 2 ROW AUTO

## (undated) DEVICE — SUTURE PDS II SZ 1 L96IN ABSRB VLT TP-1 L65MM 1/2 CIR Z880G

## (undated) DEVICE — STAPLER SKIN H3.9MM WIRE DIA0.58MM CRWN 6.9MM 35 STPL FIX

## (undated) DEVICE — ULNAR NERVE PROTECTOR FOAM POSITIONER: Brand: CARDINAL HEALTH

## (undated) DEVICE — 3M™ STERI-DRAPE™ ISOLATION BAG, 10 PER CARTON / 4 CARTONS PER CASE, 1003: Brand: 3M™ STERI-DRAPE™

## (undated) DEVICE — SEALER ENDOSCP NANO COAT OPN DIV CRV L JAW LIGASURE IMPACT

## (undated) DEVICE — PENCIL ES CRD L10FT HND SWCHING ROCK SWCH W/ EDGE COAT BLDE